# Patient Record
Sex: MALE | Race: WHITE | Employment: OTHER | RURAL
[De-identification: names, ages, dates, MRNs, and addresses within clinical notes are randomized per-mention and may not be internally consistent; named-entity substitution may affect disease eponyms.]

---

## 2017-02-21 ENCOUNTER — OFFICE VISIT (OUTPATIENT)
Dept: PRIMARY CARE CLINIC | Age: 62
End: 2017-02-21
Payer: COMMERCIAL

## 2017-02-21 VITALS
SYSTOLIC BLOOD PRESSURE: 150 MMHG | WEIGHT: 250.2 LBS | HEART RATE: 87 BPM | BODY MASS INDEX: 36.95 KG/M2 | OXYGEN SATURATION: 97 % | DIASTOLIC BLOOD PRESSURE: 90 MMHG

## 2017-02-21 DIAGNOSIS — M06.9 RHEUMATOID ARTHRITIS, INVOLVING UNSPECIFIED SITE, UNSPECIFIED RHEUMATOID FACTOR PRESENCE: ICD-10-CM

## 2017-02-21 DIAGNOSIS — G62.9 PERIPHERAL POLYNEUROPATHY: ICD-10-CM

## 2017-02-21 DIAGNOSIS — E11.42 TYPE 2 DIABETES MELLITUS WITH DIABETIC POLYNEUROPATHY, WITHOUT LONG-TERM CURRENT USE OF INSULIN (HCC): Primary | ICD-10-CM

## 2017-02-21 DIAGNOSIS — I10 ESSENTIAL HYPERTENSION: ICD-10-CM

## 2017-02-21 PROCEDURE — 99213 OFFICE O/P EST LOW 20 MIN: CPT | Performed by: NURSE PRACTITIONER

## 2017-02-21 RX ORDER — DOXAZOSIN MESYLATE 4 MG/1
4 TABLET ORAL 2 TIMES DAILY
Qty: 180 TABLET | Refills: 3 | Status: SHIPPED | OUTPATIENT
Start: 2017-02-21 | End: 2017-08-23 | Stop reason: SDUPTHER

## 2017-02-21 ASSESSMENT — ENCOUNTER SYMPTOMS
GASTROINTESTINAL NEGATIVE: 1
EYES NEGATIVE: 1
RESPIRATORY NEGATIVE: 1

## 2017-02-25 LAB
CHOLESTEROL, TOTAL: 160 MG/DL
CHOLESTEROL/HDL RATIO: NORMAL
HBA1C MFR BLD: 6.6 %
HDLC SERPL-MCNC: 39 MG/DL (ref 35–70)
LDL CHOLESTEROL CALCULATED: 84 MG/DL (ref 0–160)
TRIGL SERPL-MCNC: 185 MG/DL
VLDLC SERPL CALC-MCNC: 37 MG/DL

## 2017-02-27 DIAGNOSIS — I10 ESSENTIAL HYPERTENSION: ICD-10-CM

## 2017-02-27 DIAGNOSIS — E11.42 TYPE 2 DIABETES MELLITUS WITH DIABETIC POLYNEUROPATHY, WITHOUT LONG-TERM CURRENT USE OF INSULIN (HCC): ICD-10-CM

## 2017-02-27 DIAGNOSIS — M06.9 RHEUMATOID ARTHRITIS, INVOLVING UNSPECIFIED SITE, UNSPECIFIED RHEUMATOID FACTOR PRESENCE: ICD-10-CM

## 2017-02-27 ASSESSMENT — ENCOUNTER SYMPTOMS
SORE THROAT: 0
NAUSEA: 0
ABDOMINAL PAIN: 0
VOMITING: 0
SHORTNESS OF BREATH: 0
EYE PAIN: 0
COUGH: 0

## 2017-02-28 ENCOUNTER — TELEPHONE (OUTPATIENT)
Dept: PRIMARY CARE CLINIC | Age: 62
End: 2017-02-28

## 2017-04-28 ENCOUNTER — OFFICE VISIT (OUTPATIENT)
Dept: PRIMARY CARE CLINIC | Age: 62
End: 2017-04-28
Payer: COMMERCIAL

## 2017-04-28 VITALS
DIASTOLIC BLOOD PRESSURE: 86 MMHG | BODY MASS INDEX: 37.03 KG/M2 | HEIGHT: 69 IN | OXYGEN SATURATION: 98 % | RESPIRATION RATE: 20 BRPM | WEIGHT: 250 LBS | HEART RATE: 73 BPM | SYSTOLIC BLOOD PRESSURE: 138 MMHG

## 2017-04-28 DIAGNOSIS — M53.3 PAIN OF LEFT SACROILIAC JOINT: Primary | ICD-10-CM

## 2017-04-28 PROCEDURE — 99213 OFFICE O/P EST LOW 20 MIN: CPT | Performed by: FAMILY MEDICINE

## 2017-04-28 PROCEDURE — 20610 DRAIN/INJ JOINT/BURSA W/O US: CPT | Performed by: FAMILY MEDICINE

## 2017-04-28 RX ORDER — TRIAMCINOLONE ACETONIDE 40 MG/ML
40 INJECTION, SUSPENSION INTRA-ARTICULAR; INTRAMUSCULAR ONCE
Status: COMPLETED | OUTPATIENT
Start: 2017-04-28 | End: 2017-04-28

## 2017-04-28 RX ORDER — GABAPENTIN 300 MG/1
300 CAPSULE ORAL
COMMUNITY
Start: 2017-03-24 | End: 2019-06-20 | Stop reason: SDUPTHER

## 2017-04-28 RX ORDER — ETANERCEPT 50 MG/ML
SOLUTION SUBCUTANEOUS
COMMUNITY
Start: 2017-04-13 | End: 2021-03-11 | Stop reason: CLARIF

## 2017-04-28 RX ORDER — HYDROCODONE BITARTRATE AND ACETAMINOPHEN 7.5; 325 MG/1; MG/1
1 TABLET ORAL EVERY 6 HOURS PRN
Qty: 12 TABLET | Refills: 0 | Status: SHIPPED | OUTPATIENT
Start: 2017-04-28 | End: 2017-05-05

## 2017-04-28 RX ORDER — TIZANIDINE 2 MG/1
2 TABLET ORAL
COMMUNITY
Start: 2017-03-24 | End: 2019-04-23 | Stop reason: CLARIF

## 2017-04-28 RX ADMIN — TRIAMCINOLONE ACETONIDE 40 MG: 40 INJECTION, SUSPENSION INTRA-ARTICULAR; INTRAMUSCULAR at 11:27

## 2017-04-28 ASSESSMENT — ENCOUNTER SYMPTOMS
COUGH: 0
SORE THROAT: 0
ABDOMINAL PAIN: 0
EYE DISCHARGE: 0
SHORTNESS OF BREATH: 0
VOMITING: 0
DIARRHEA: 0
NAUSEA: 0
CONSTIPATION: 0
RHINORRHEA: 0
EYE ITCHING: 0
EYE REDNESS: 0

## 2017-05-19 ENCOUNTER — OFFICE VISIT (OUTPATIENT)
Dept: PRIMARY CARE CLINIC | Age: 62
End: 2017-05-19
Payer: COMMERCIAL

## 2017-05-19 VITALS
BODY MASS INDEX: 36.03 KG/M2 | DIASTOLIC BLOOD PRESSURE: 100 MMHG | OXYGEN SATURATION: 96 % | WEIGHT: 244 LBS | HEART RATE: 82 BPM | SYSTOLIC BLOOD PRESSURE: 142 MMHG

## 2017-05-19 DIAGNOSIS — G62.9 PERIPHERAL POLYNEUROPATHY: ICD-10-CM

## 2017-05-19 DIAGNOSIS — I10 ESSENTIAL HYPERTENSION: ICD-10-CM

## 2017-05-19 DIAGNOSIS — M06.9 RHEUMATOID ARTHRITIS, INVOLVING UNSPECIFIED SITE, UNSPECIFIED RHEUMATOID FACTOR PRESENCE: ICD-10-CM

## 2017-05-19 DIAGNOSIS — E11.9 TYPE 2 DIABETES MELLITUS WITHOUT COMPLICATION, WITHOUT LONG-TERM CURRENT USE OF INSULIN (HCC): Primary | ICD-10-CM

## 2017-05-19 LAB — HBA1C MFR BLD: 6.8 %

## 2017-05-19 PROCEDURE — 83036 HEMOGLOBIN GLYCOSYLATED A1C: CPT | Performed by: NURSE PRACTITIONER

## 2017-05-19 PROCEDURE — 99213 OFFICE O/P EST LOW 20 MIN: CPT | Performed by: NURSE PRACTITIONER

## 2017-05-19 RX ORDER — TRAMADOL HYDROCHLORIDE 50 MG/1
50 TABLET ORAL EVERY 6 HOURS PRN
Qty: 30 TABLET | Refills: 2 | Status: SHIPPED | OUTPATIENT
Start: 2017-05-19 | End: 2017-05-29

## 2017-05-19 ASSESSMENT — PATIENT HEALTH QUESTIONNAIRE - PHQ9
2. FEELING DOWN, DEPRESSED OR HOPELESS: 0
1. LITTLE INTEREST OR PLEASURE IN DOING THINGS: 0
SUM OF ALL RESPONSES TO PHQ9 QUESTIONS 1 & 2: 0
SUM OF ALL RESPONSES TO PHQ QUESTIONS 1-9: 0

## 2017-05-19 ASSESSMENT — ENCOUNTER SYMPTOMS
RESPIRATORY NEGATIVE: 1
EYES NEGATIVE: 1
GASTROINTESTINAL NEGATIVE: 1

## 2017-05-31 ASSESSMENT — ENCOUNTER SYMPTOMS
ABDOMINAL PAIN: 0
SORE THROAT: 0
SHORTNESS OF BREATH: 0
COUGH: 0
VOMITING: 0
EYE PAIN: 0
NAUSEA: 0

## 2017-06-07 ENCOUNTER — TELEPHONE (OUTPATIENT)
Dept: PRIMARY CARE CLINIC | Age: 62
End: 2017-06-07

## 2017-06-07 RX ORDER — AZITHROMYCIN 250 MG/1
TABLET, FILM COATED ORAL
Qty: 1 PACKET | Refills: 0 | Status: SHIPPED | OUTPATIENT
Start: 2017-06-07 | End: 2017-06-17

## 2017-06-07 RX ORDER — BENZONATATE 200 MG/1
200 CAPSULE ORAL 3 TIMES DAILY PRN
Qty: 30 CAPSULE | Refills: 0 | Status: SHIPPED | OUTPATIENT
Start: 2017-06-07 | End: 2018-02-12 | Stop reason: ALTCHOICE

## 2017-08-17 ENCOUNTER — TELEPHONE (OUTPATIENT)
Dept: PRIMARY CARE CLINIC | Age: 62
End: 2017-08-17

## 2017-08-17 ENCOUNTER — OFFICE VISIT (OUTPATIENT)
Dept: PRIMARY CARE CLINIC | Age: 62
End: 2017-08-17
Payer: COMMERCIAL

## 2017-08-17 VITALS
SYSTOLIC BLOOD PRESSURE: 130 MMHG | WEIGHT: 248 LBS | HEART RATE: 83 BPM | BODY MASS INDEX: 36.62 KG/M2 | OXYGEN SATURATION: 98 % | DIASTOLIC BLOOD PRESSURE: 70 MMHG

## 2017-08-17 DIAGNOSIS — M06.9 RHEUMATOID ARTHRITIS, INVOLVING UNSPECIFIED SITE, UNSPECIFIED RHEUMATOID FACTOR PRESENCE: ICD-10-CM

## 2017-08-17 DIAGNOSIS — G62.9 PERIPHERAL POLYNEUROPATHY: ICD-10-CM

## 2017-08-17 DIAGNOSIS — I10 ESSENTIAL HYPERTENSION: ICD-10-CM

## 2017-08-17 DIAGNOSIS — E11.9 TYPE 2 DIABETES MELLITUS WITHOUT COMPLICATION, WITHOUT LONG-TERM CURRENT USE OF INSULIN (HCC): Primary | ICD-10-CM

## 2017-08-17 LAB — HBA1C MFR BLD: 6.8 %

## 2017-08-17 PROCEDURE — 83036 HEMOGLOBIN GLYCOSYLATED A1C: CPT | Performed by: NURSE PRACTITIONER

## 2017-08-17 PROCEDURE — 99213 OFFICE O/P EST LOW 20 MIN: CPT | Performed by: NURSE PRACTITIONER

## 2017-08-17 ASSESSMENT — ENCOUNTER SYMPTOMS
SORE THROAT: 0
EYE PAIN: 0
NAUSEA: 0
VOMITING: 0
ABDOMINAL PAIN: 0
SHORTNESS OF BREATH: 0
COUGH: 0

## 2017-08-22 RX ORDER — NEBIVOLOL 20 MG/1
20 TABLET ORAL 2 TIMES DAILY
Qty: 28 TABLET | Refills: 0 | COMMUNITY
Start: 2017-08-22 | End: 2018-05-11 | Stop reason: ALTCHOICE

## 2017-08-23 RX ORDER — POTASSIUM CHLORIDE 20 MEQ/1
20 TABLET, EXTENDED RELEASE ORAL DAILY
Qty: 90 TABLET | Refills: 3 | Status: SHIPPED | OUTPATIENT
Start: 2017-08-23 | End: 2018-10-23 | Stop reason: SDUPTHER

## 2017-08-23 RX ORDER — DOXAZOSIN MESYLATE 4 MG/1
4 TABLET ORAL 2 TIMES DAILY
Qty: 180 TABLET | Refills: 3 | Status: SHIPPED | OUTPATIENT
Start: 2017-08-23 | End: 2018-01-16 | Stop reason: DRUGHIGH

## 2017-10-12 ENCOUNTER — OFFICE VISIT (OUTPATIENT)
Dept: NEUROLOGY | Facility: CLINIC | Age: 62
End: 2017-10-12

## 2017-10-12 VITALS
SYSTOLIC BLOOD PRESSURE: 162 MMHG | OXYGEN SATURATION: 98 % | WEIGHT: 254 LBS | DIASTOLIC BLOOD PRESSURE: 88 MMHG | HEART RATE: 78 BPM | HEIGHT: 69 IN | BODY MASS INDEX: 37.62 KG/M2

## 2017-10-12 DIAGNOSIS — M54.17 LUMBOSACRAL RADICULOPATHY AT S1: ICD-10-CM

## 2017-10-12 DIAGNOSIS — G45.9 TRANSIENT CEREBRAL ISCHEMIA, UNSPECIFIED TYPE: Primary | ICD-10-CM

## 2017-10-12 DIAGNOSIS — G60.9 IDIOPATHIC PERIPHERAL NEUROPATHY: ICD-10-CM

## 2017-10-12 DIAGNOSIS — R20.0 NUMBNESS OF LOWER EXTREMITY: ICD-10-CM

## 2017-10-12 PROCEDURE — 99214 OFFICE O/P EST MOD 30 MIN: CPT | Performed by: NURSE PRACTITIONER

## 2017-10-12 RX ORDER — MEDROXYPROGESTERONE ACETATE 150 MG/ML
50 INJECTION, SUSPENSION INTRAMUSCULAR WEEKLY
COMMUNITY
Start: 2017-09-18 | End: 2021-03-11

## 2017-10-12 NOTE — PROGRESS NOTES
Subjective   Jesse Brink is a 62 y.o. male     Chief Complaint   Patient presents with   • Follow-up     Pt is here for a 1 year FU for transient cerebral ischemia. Pt states that he has been having a hard time with his back, pt states that he tried to help a friend lift a freezer at which time he felt a sharp pain in his back and his right leg went numb. Pt states that he went to a local clinic and recieved a shot in his back and slowly gained the feeling back in his leg within a month. Pt states he is still having trouble with his feet as well.        History of Present Illness  Patient is very pleasant 62-year-old male in clinic today to follow-up for TIA in 2013, ocular migraines, diabetic neuropathy, and chronic lumbar pain with acute recent injury.  As stated above patient had a recent reinjury of his lumbar.  He does state that it is improving after being treated in clinic through primary care.  She denies any new symptoms of TIA.  Patient does continue with complaints of bilateral foot numbness with difficulty feeling his feet at all times.  The patient states that the neuropathy is not worsening and just has not improved.  Reviewed patient's fasting blood sugars at home prior to breakfast he's running approximately 150 to 170s with afternoon readings in the 260s to to 80s.     PAST HISTORY:Patient in clinic today to follow-up for migraines peripheral neuropathy and lumbar radiculopathy.  Patient states that his migraine headaches have resolved he has come off his Humira and he continues with his Elavil and he states that since the change in medication he's had no migraine.     Peripheral neuropathy: Patient states that there is been no change he continues to use his custom inserts for her shoes he is also using lidocaine cream as well as gabapentin.  Gabapentin is on the lower dose side however we can increase the dosage secondary to side effects occurring with patient.  5 to S1 lumbar  "radiculopathy patient states he hasn't returned to neurosurgery yet he's putting it off as long as he can.  Patient is still doing home exercise program which does assist in keeping him stretched and more mobile.     Patient had a TIA in 2013 he's been on aspirin ever since and has had no new symptoms.      + DM neuropathy bilat fett: Using gabapentin states @ home glucose fasting 140's after food 190's. Aware to set goal below 120 with wt loss goal to wt of 200. Following podiatry      C/O headaches x 1 month states top head with eye throbbing and visual periph shadows. Saw lights. States resolved x 2-3 weeks. States resolved as quickly as they came on        The following portions of the patient's history were reviewed and updated as appropriate: allergies, current medications, past family history, past medical history, past social history, past surgical history and problem list.    Review of Systems  Constitutional: Negative.   Eyes: Positive for visual disturbance.   Respiratory: Negative.   Cardiovascular: Negative.   Musculoskeletal: Positive for arthralgias, back pain, myalgias, neck pain and neck stiffness.   Neurological: Positive for numbness and headaches, history of tia.   Psychiatric/Behavioral: Negative.      Objective         Vitals:    10/12/17 0850   BP: 162/88   Pulse: 78   SpO2: 98%   Weight: 254 lb (115 kg)   Height: 69\" (175.3 cm)     GENERAL: Patient is pleasant, cooperative, appears to be stated age.  Body habitus is endomorphic.  NECK: Neck are non-tender without thyromegaly or adenopathy.  Carotic upstrokes are 1+/4.  No cranial or cervical bruits.  The neck is supple with a full range of motion.   CARDIOVASCULAR:  Regular rate and rhythm with normal S1 and S2 without rub or gallop.  RESPIRATORY:  Clear to auscultation without wheezes or crackle   BACK:  Back is straight without midline defect.    PSYCH:  Higher cortical function/mental status:  The patient is alert.  He  is oriented x3 to " time, place and person.  Recent and the remote memory appear normal.  The patient has a good fund of knowledge.  There is no visual or auditory hallucination or suicidal or homicidal ideation.   CRANIAL NERVES:    Extraocular movements are full and smooth with normal pursuits and saccades.  No nystagmus noted.  The face is symmetric. palate elevate symmetrically, Tongue midline, positive gag reflex. The remainder of the cranial nerves are intact and symmetrical.    MOTOR: Strength is 5/5 throughout with normal tone and bulk  No involuntary movements noted.  Patient with decreased sensation bilateral distal feet positive pedal pulses bilaterally patient with normal sensation midfoot   COORDINATION AND GAIT:  The patient walks with a narrow-based gait mild right lower extremity limp.    MUSCULOSKELETAL: Range of motion normal, no clubbing, cyanosis, or edema.  No joint swelling.     I have personally reviewed the above labs and imaging studies.    Assessment/Plan   1. Diabetic Peripheral Neuropathy - discuss importance of cont tight DMII control  EMG results discussed previously.  Patient willing to speak with primary care to try to get tighter control of his diabetes.  In the meantime we will continue with current therapy of gabapentin 300 mg 3 times a day        2. L5-S1 Radiculopathy -  Pt to continue HEP continue TENS unit.  Patient states acute pain is improving.      Chronic low back pain is a common problem in clinical practice. A history and physical examination should place patients into one of several categories: (1) nonspecific low back pain; (2) back pain associated with radiculopathy or spinal stenosis; (3) back pain referred from a nonspinal source; or (4) back pain associated with another specific spinal cause. For patients who have back pain associated with radiculopathy, spinal stenosis, or another specific spinal cause, magnetic resonance imaging or computed tomography may establish the diagnosis and  guide management. Acetaminophen and nonsteroidal anti-inflammatory drugs are first-line medications for chronic low back pain. Tramadol, opioids, and other adjunctive medications may benefit some patients who do not respond to nonsteroidal anti-inflammatory drugs. Acupuncture, exercise therapy, multidisciplinary rehabilitation programs, massage, behavior therapy, and spinal manipulation are effective in certain clinical situations. Patients with radicular symptoms may benefit from epidural steroid injections, but studies have produced mixed results. Most patients with chronic low back pain would not require surgical intervention. A surgical evaluation may be considered for select patients with functional disabilities or refractory pain despite multiple nonsurgical treatments.       3. Migraines: Stable on Elavil.  Continue current treatment.     We'll have patient return to clinic in 12 months or sooner if need be.

## 2017-10-26 ENCOUNTER — OFFICE VISIT (OUTPATIENT)
Dept: CARDIOLOGY | Facility: CLINIC | Age: 62
End: 2017-10-26

## 2017-10-26 VITALS
DIASTOLIC BLOOD PRESSURE: 100 MMHG | HEIGHT: 69 IN | SYSTOLIC BLOOD PRESSURE: 180 MMHG | WEIGHT: 253.7 LBS | BODY MASS INDEX: 37.58 KG/M2 | HEART RATE: 68 BPM

## 2017-10-26 DIAGNOSIS — R00.2 PALPITATIONS: ICD-10-CM

## 2017-10-26 DIAGNOSIS — R07.9 CHEST PAIN SYNDROME: ICD-10-CM

## 2017-10-26 DIAGNOSIS — I10 BENIGN ESSENTIAL HTN: Primary | ICD-10-CM

## 2017-10-26 DIAGNOSIS — G45.9 TRANSIENT CEREBRAL ISCHEMIA, UNSPECIFIED TYPE: ICD-10-CM

## 2017-10-26 PROCEDURE — 99213 OFFICE O/P EST LOW 20 MIN: CPT | Performed by: INTERNAL MEDICINE

## 2017-10-26 RX ORDER — CARVEDILOL 25 MG/1
25 TABLET ORAL 2 TIMES DAILY
Qty: 180 TABLET | Refills: 3 | Status: SHIPPED | OUTPATIENT
Start: 2017-10-26 | End: 2018-05-10 | Stop reason: SDUPTHER

## 2017-10-26 RX ORDER — NEBIVOLOL 20 MG/1
20 TABLET ORAL 2 TIMES DAILY
COMMUNITY
End: 2017-10-26

## 2017-10-26 NOTE — PROGRESS NOTES
[facility]  Office visit  Jesse Brink  1955  [phone]    VISIT DATE:  10/26/2017    PCP: SELMA Hayden  [address] [facility] [address]    CC:  Chief Complaint   Patient presents with   • Transient Ischemic Attack       PROBLEM LIST:  1. Intermittent chest pain syndrome:  a. Cardiac catheterization, November 2013, revealing no significant obstructive coronary artery disease with normal left ventricular systolic function.  2. Recent transient ischemic attack with normal echocardiogram, carotid duplex, and CT scan of the brain by verbal report.  3. Benign hypertension.  4. History of tobacco, quitting 28 years ago.  5. Hyperglycemia, diet controlled.  6. Rheumatoid arthritis.  7. Obesity.  8. Family history of heart disease.  9. Surgical history:  a. Pilonidal cyst, 1977.  b. Benign fibroid removed, 1980.  c. Anal fistula repair, 1999 and 2008.    ASSESSMENT:   Diagnosis Plan   1. Benign essential HTN     2. Transient cerebral ischemia, unspecified type     3. Chest pain syndrome         PLAN:  Hypertension: Goal less than 140/90.  Converted by diastolic to carvedilol 25 mg by mouth twice a day.  Otherwise continue current medical therapy.    Palpitations: This is a new complaint today.  No high risk clinical features.  Overall appears consistent with symptomatic premature ventricular contractions.  Currently asymptomatic.  Continue beta-blockade.  Would recommend ambulatory ECG monitoring if symptoms increase in frequency or severity.    History of stroke: Continue aggressive risk factor modification.  Continue aspirin afterload reduction.  Lipids are well controlled.    Subjective  Systolic blood pressures in the running in the 140-1 45 mmHg range with diastolics usually less than 90 mmHg.  Denies chest pain, dyspnea on exertion.  No paresthesias headache or visual changes.  Does report intermittent palpitations which she feels is a hard bump.  Usually occurring at  "rest.  No obvious triggers.  Has not had any recent palpitations.  Reports that his insurance company will no longer cover by systolic.  he is compliant with medical therapy.  Fasting lipid panel is well-controlled.    PHYSICAL EXAMINATION:  Vitals:    10/26/17 1330   Pulse: 68   Weight: 253 lb 11.2 oz (115 kg)   Height: 69\" (175.3 cm)     General Appearance:    Alert, cooperative, no distress, appears stated age   Head:    Normocephalic, without obvious abnormality, atraumatic   Eyes:    conjunctiva/corneas clear   Nose:   Nares normal, septum midline, mucosa normal, no drainage   Throat:   Lips, teeth and gums normal   Neck:   Supple, symmetrical, trachea midline, no carotid    bruit or JVD   Lungs:     Clear to auscultation bilaterally, respirations unlabored   Chest Wall:    No tenderness or deformity    Heart:    Regular rate and rhythm, S1 and S2 normal, no murmur, rub   or gallop, normal carotid impulse bilaterally without bruit.   Abdomen:     Soft, non-tender   Extremities:   Extremities normal, atraumatic, no cyanosis or edema   Pulses:   2+ and symmetric all extremities   Skin:   Skin color, texture, turgor normal, no rashes or lesions       Diagnostic Data:  Procedures  No results found for: CHLPL, TRIG, HDL, LDLDIRECT  No results found for: GLUCOSE, BUN, CREATININE, NA, K, CL, CO2, CREATININE, BUN  No results found for: HGBA1C  No results found for: WBC, HGB, HCT, PLT    Allergies  Allergies   Allergen Reactions   • Adalimumab      HA, intractable ocular migraine       Current Medications    Current Outpatient Prescriptions:   •  amitriptyline (ELAVIL) 50 MG tablet, 1 po 2 hours prior to bedtime, Disp: 90 tablet, Rfl: 3  •  aspirin 81 MG tablet, Take  by mouth daily., Disp: , Rfl:   •  Blood Glucose Monitoring Suppl (ONE TOUCH ULTRA 2) W/DEVICE kit, USE PER MD INSTRUCTIONS, Disp: , Rfl:   •  Cholecalciferol (VITAMIN D3) 2000 UNITS capsule, Take  by mouth daily., Disp: , Rfl:   •  doxazosin (CARDURA) 4 " MG tablet, Take 1 tablet by mouth 2 (Two) Times a Day., Disp: 180 tablet, Rfl: 2  •  ENBREL SURECLICK 50 MG/ML solution auto-injector, 1 (One) Time Per Week., Disp: , Rfl:   •  gabapentin (NEURONTIN) 300 MG capsule, Take 1 capsule by mouth 3 (three) times a day., Disp: 90 capsule, Rfl: 3  •  glucose blood (FREESTYLE TEST STRIPS) test strip, 1 each by Does not apply route daily. And PRN Dx: DM2, Disp: , Rfl:   •  lidocaine-prilocaine (EMLA) 2.5-2.5 % cream, As Needed., Disp: , Rfl:   •  losartan-hydrochlorothiazide (HYZAAR) 100-25 MG per tablet, Take  by mouth daily., Disp: , Rfl:   •  metFORMIN (GLUCOPHAGE) 500 MG tablet, Take  by mouth 2 (two) times a day., Disp: , Rfl:   •  nebivolol (BYSTOLIC) 20 MG tablet, Take 20 mg by mouth 2 (Two) Times a Day., Disp: , Rfl:   •  ONETOUCH DELICA LANCETS FINE misc, Use to test blood sugar twice daily and as needed., Disp: , Rfl:   •  potassium chloride (MICRO-K) 10 MEQ CR capsule, Take  by mouth daily., Disp: , Rfl:   •  tiZANidine (ZANAFLEX) 2 MG tablet, as needed., Disp: , Rfl: 1          ROS  Review of Systems   Cardiovascular: Negative for chest pain, claudication, dyspnea on exertion, irregular heartbeat and palpitations.   Respiratory: Negative for cough and shortness of breath.          SOCIAL HX  Social History     Social History   • Marital status:      Spouse name: N/A   • Number of children: N/A   • Years of education: N/A     Occupational History   • Not on file.     Social History Main Topics   • Smoking status: Former Smoker   • Smokeless tobacco: Never Used      Comment: quit 35 years ago   • Alcohol use 0.6 oz/week     1 Cans of beer per week      Comment: occas   • Drug use: No   • Sexual activity: Defer     Other Topics Concern   • Not on file     Social History Narrative       FAMILY HX  Family History   Problem Relation Age of Onset   • Heart disease Other    • Diabetes Other    • Cancer Other    • Hypertension Other    • Heart disease Mother    •  Diabetes Father    • Hypertension Father    • Cancer Father              Dillon Neville III, MD, FACC

## 2017-11-20 ENCOUNTER — OFFICE VISIT (OUTPATIENT)
Dept: PRIMARY CARE CLINIC | Age: 62
End: 2017-11-20
Payer: COMMERCIAL

## 2017-11-20 VITALS
BODY MASS INDEX: 37.36 KG/M2 | OXYGEN SATURATION: 98 % | DIASTOLIC BLOOD PRESSURE: 70 MMHG | WEIGHT: 253 LBS | HEART RATE: 82 BPM | SYSTOLIC BLOOD PRESSURE: 130 MMHG

## 2017-11-20 DIAGNOSIS — R35.1 NOCTURIA: ICD-10-CM

## 2017-11-20 DIAGNOSIS — M06.9 RHEUMATOID ARTHRITIS, INVOLVING UNSPECIFIED SITE, UNSPECIFIED RHEUMATOID FACTOR PRESENCE: ICD-10-CM

## 2017-11-20 DIAGNOSIS — I10 ESSENTIAL HYPERTENSION: ICD-10-CM

## 2017-11-20 DIAGNOSIS — G62.9 PERIPHERAL POLYNEUROPATHY: ICD-10-CM

## 2017-11-20 DIAGNOSIS — E11.9 TYPE 2 DIABETES MELLITUS WITHOUT COMPLICATION, WITHOUT LONG-TERM CURRENT USE OF INSULIN (HCC): ICD-10-CM

## 2017-11-20 PROCEDURE — 99213 OFFICE O/P EST LOW 20 MIN: CPT | Performed by: NURSE PRACTITIONER

## 2017-11-20 PROCEDURE — G8484 FLU IMMUNIZE NO ADMIN: HCPCS | Performed by: NURSE PRACTITIONER

## 2017-11-20 PROCEDURE — 90749 UNLISTED VACCINE/TOXOID: CPT | Performed by: NURSE PRACTITIONER

## 2017-11-20 PROCEDURE — 1036F TOBACCO NON-USER: CPT | Performed by: NURSE PRACTITIONER

## 2017-11-20 PROCEDURE — 3017F COLORECTAL CA SCREEN DOC REV: CPT | Performed by: NURSE PRACTITIONER

## 2017-11-20 PROCEDURE — G8427 DOCREV CUR MEDS BY ELIG CLIN: HCPCS | Performed by: NURSE PRACTITIONER

## 2017-11-20 PROCEDURE — G8417 CALC BMI ABV UP PARAM F/U: HCPCS | Performed by: NURSE PRACTITIONER

## 2017-11-20 PROCEDURE — 90471 IMMUNIZATION ADMIN: CPT | Performed by: NURSE PRACTITIONER

## 2017-11-20 PROCEDURE — 3044F HG A1C LEVEL LT 7.0%: CPT | Performed by: NURSE PRACTITIONER

## 2017-11-20 RX ORDER — LOSARTAN POTASSIUM AND HYDROCHLOROTHIAZIDE 25; 100 MG/1; MG/1
1 TABLET ORAL DAILY
Qty: 90 TABLET | Refills: 3 | Status: SHIPPED | OUTPATIENT
Start: 2017-11-20 | End: 2018-05-18 | Stop reason: SDUPTHER

## 2017-11-20 RX ORDER — LOSARTAN POTASSIUM AND HYDROCHLOROTHIAZIDE 25; 100 MG/1; MG/1
1 TABLET ORAL DAILY
Qty: 30 TABLET | Refills: 3 | Status: CANCELLED | OUTPATIENT
Start: 2017-11-20

## 2017-11-20 ASSESSMENT — ENCOUNTER SYMPTOMS
SORE THROAT: 0
SHORTNESS OF BREATH: 0
VOMITING: 0
ABDOMINAL PAIN: 0
EYE PAIN: 0
NAUSEA: 0
COUGH: 0

## 2017-11-20 NOTE — PROGRESS NOTES
Have you seen any other physician or provider since your last visit? Yes Dr. Karen Figueroa     Have you had any other diagnostic tests since your last visit? no    Have you changed or stopped any medications since your last visit including any over-the-counter medicines, vitamins, or herbal medicines? no     Are you taking all your prescribed medications? Yes  If NO, why? -  N/A    BP Readings from Last 2 Encounters:   08/17/17 130/70   05/19/17 (!) 142/100     Hemoglobin A1C (%)   Date Value   08/17/2017 6.8     Microscopic Examination (no units)   Date Value   12/15/2016 YES     LDL Calculated (mg/dL)   Date Value   02/24/2017 84              Tobacco use:  Patient  reports that he has quit smoking. He has a 15.00 pack-year smoking history. He has never used smokeless tobacco.    If Smoker - Cessation materials given? No    Is Daily aspirin on medication list?:  No    Diabetic retinal exam done? No   If yes, document in Health Maintenance. Monofilament placed on counter? No    Shoes and socks removed? No    BP taken with correct size cuff? Yes   Repeated if > 130/80 No     Microalbumin performed if applicable? No    REVIEW OF SYSTEMS:  Review of Systems   Constitutional: Negative for chills and fever. HENT: Negative for ear pain and sore throat. Eyes: Negative for pain and visual disturbance. Respiratory: Negative for cough and shortness of breath. Cardiovascular: Negative for chest pain, palpitations and leg swelling. Gastrointestinal: Negative for abdominal pain, nausea and vomiting. Genitourinary: Negative for dysuria and hematuria. Musculoskeletal: Negative for joint swelling. Skin: Negative for rash. Neurological: Negative for dizziness and weakness. Psychiatric/Behavioral: Negative for sleep disturbance.

## 2017-11-22 LAB
AVERAGE GLUCOSE: NORMAL
CHOLESTEROL, TOTAL: 140 MG/DL
CHOLESTEROL/HDL RATIO: ABNORMAL
HBA1C MFR BLD: 6.9 %
HDLC SERPL-MCNC: 32 MG/DL (ref 35–70)
LDL CHOLESTEROL CALCULATED: 76 MG/DL (ref 0–160)
PROSTATE SPECIFIC ANTIGEN: 1.22 NG/ML
TRIGL SERPL-MCNC: 158 MG/DL
VLDLC SERPL CALC-MCNC: 31.6 MG/DL

## 2017-11-27 NOTE — PROGRESS NOTES
SUBJECTIVE:    Patient ID: Melvin Flores is a 65 y.o. male. Medical history Review  Past Medical, Family, and Social History reviewed and does not contribute to the patient presenting condition    Health Maintenance Due   Topic Date Due    Diabetic foot exam  09/12/1966    HIV screen  09/12/1971    Diabetic microalbuminuria test  09/12/1974    DTaP/Tdap/Td vaccine (1 - Tdap) 09/12/1975    Pneumococcal med risk (1 of 1 - PPSV23) 09/12/1975    Zostavax vaccine  09/12/2016    Diabetic retinal exam  08/18/2017        HPI:   Chief Complaint   Patient presents with    Diabetes     Patient here today for a follow up. He states his sugar gets higher when he doesn't eat. It ranges from 257-100. He states he will get his labs done this week. Patient's medications, allergies, past medical, surgical, social and family histories were reviewed and updated as appropriate. Review of Systems Reviewed and acurate. See MA note. OBJECTIVE:  /70 (Site: Right Arm, Position: Sitting, Cuff Size: Large Adult)   Pulse 82   Wt 253 lb (114.8 kg)   SpO2 98%   BMI 37.36 kg/m²    Physical Exam   Constitutional: He is oriented to person, place, and time. He appears well-developed and well-nourished. No distress. HENT:   Head: Normocephalic. Right Ear: Tympanic membrane normal.   Left Ear: Tympanic membrane normal.   Mouth/Throat: No oropharyngeal exudate. Eyes: Lids are normal.   Neck: Neck supple. Cardiovascular: Normal rate, regular rhythm and normal heart sounds. Pulmonary/Chest: Effort normal and breath sounds normal.   Abdominal: Soft. Bowel sounds are normal. He exhibits no distension. There is no tenderness. Musculoskeletal: He exhibits no edema. Lymphadenopathy:     He has no cervical adenopathy. Neurological: He is alert and oriented to person, place, and time. Skin: Skin is warm and dry. Psychiatric: He has a normal mood and affect. Vitals reviewed.       No results found for requested labs within last 30 days. Hemoglobin A1C (%)   Date Value   08/17/2017 6.8     Microscopic Examination (no units)   Date Value   12/15/2016 YES     LDL Calculated (mg/dL)   Date Value   02/24/2017 84         Lab Results   Component Value Date    WBC 5.8 12/15/2016    NEUTROABS 3.0 12/15/2016    HGB 17.1 12/15/2016    HCT 48.2 12/15/2016    MCV 87.3 12/15/2016     12/15/2016       Lab Results   Component Value Date    TSH 0.972 07/19/2014       Prior to Visit Medications    Medication Sig Taking? Authorizing Provider   losartan-hydrochlorothiazide (HYZAAR) 100-25 MG per tablet Take 1 tablet by mouth daily Yes WILDER Osorio   potassium chloride (KLOR-CON M) 20 MEQ extended release tablet Take 1 tablet by mouth daily Yes WILDER Osorio   doxazosin (CARDURA) 4 MG tablet Take 1 tablet by mouth 2 times daily Yes WILDER Osorio   nebivolol (BYSTOLIC) 20 MG TABS tablet Take 1 tablet by mouth 2 times daily Yes WILDER Osorio   benzonatate (TESSALON) 200 MG capsule Take 1 capsule by mouth 3 times daily as needed for Cough Yes WILDER Osorio   metFORMIN (GLUCOPHAGE) 500 MG tablet Take 1 tablet by mouth 2 times daily (with meals) Yes WILDER Osorio   ENBREL SURECLICK 50 MG/ML SOAJ  Yes Historical Provider, MD   gabapentin (NEURONTIN) 300 MG capsule Take 300 mg by mouth Yes Historical Provider, MD   tiZANidine (ZANAFLEX) 2 MG tablet Take 2 mg by mouth Yes Historical Provider, MD   glucose blood VI test strips (ASCENSIA AUTODISC VI;ONE TOUCH ULTRA TEST VI) strip 1 each by In Vitro route daily As needed. ( Verio test strip) Yes WILDER Osorio   amitriptyline (ELAVIL) 50 MG tablet Take 50 mg by mouth nightly Yes Historical Provider, MD Aguilera Clutter LANCETS FINE MISC Use to test blood sugar twice daily and as needed.  Yes Lashonda Hutcihnson MD   Blood Glucose Monitoring Suppl (ONE TOUCH ULTRA 2) W/DEVICE KIT USE PER MD INSTRUCTIONS Yes Marck Garcia

## 2017-12-13 ENCOUNTER — TELEPHONE (OUTPATIENT)
Dept: PRIMARY CARE CLINIC | Age: 62
End: 2017-12-13

## 2017-12-18 DIAGNOSIS — R35.1 NOCTURIA: ICD-10-CM

## 2017-12-18 DIAGNOSIS — E11.9 TYPE 2 DIABETES MELLITUS WITHOUT COMPLICATION, WITHOUT LONG-TERM CURRENT USE OF INSULIN (HCC): ICD-10-CM

## 2017-12-18 DIAGNOSIS — I10 ESSENTIAL HYPERTENSION: ICD-10-CM

## 2018-01-16 RX ORDER — DOXAZOSIN 8 MG/1
8 TABLET ORAL NIGHTLY
Qty: 90 TABLET | Refills: 1 | Status: SHIPPED | OUTPATIENT
Start: 2018-01-16 | End: 2019-02-12

## 2018-02-12 ENCOUNTER — OFFICE VISIT (OUTPATIENT)
Dept: PRIMARY CARE CLINIC | Age: 63
End: 2018-02-12
Payer: COMMERCIAL

## 2018-02-12 VITALS
BODY MASS INDEX: 37.92 KG/M2 | DIASTOLIC BLOOD PRESSURE: 86 MMHG | RESPIRATION RATE: 20 BRPM | HEIGHT: 69 IN | TEMPERATURE: 98.5 F | SYSTOLIC BLOOD PRESSURE: 140 MMHG | OXYGEN SATURATION: 97 % | WEIGHT: 256 LBS | HEART RATE: 83 BPM

## 2018-02-12 DIAGNOSIS — J01.00 ACUTE NON-RECURRENT MAXILLARY SINUSITIS: ICD-10-CM

## 2018-02-12 PROBLEM — M53.3 PAIN OF LEFT SACROILIAC JOINT: Status: RESOLVED | Noted: 2017-04-28 | Resolved: 2018-02-12

## 2018-02-12 PROCEDURE — G8484 FLU IMMUNIZE NO ADMIN: HCPCS | Performed by: FAMILY MEDICINE

## 2018-02-12 PROCEDURE — 99213 OFFICE O/P EST LOW 20 MIN: CPT | Performed by: FAMILY MEDICINE

## 2018-02-12 PROCEDURE — 1036F TOBACCO NON-USER: CPT | Performed by: FAMILY MEDICINE

## 2018-02-12 PROCEDURE — 3017F COLORECTAL CA SCREEN DOC REV: CPT | Performed by: FAMILY MEDICINE

## 2018-02-12 PROCEDURE — G8417 CALC BMI ABV UP PARAM F/U: HCPCS | Performed by: FAMILY MEDICINE

## 2018-02-12 PROCEDURE — G8427 DOCREV CUR MEDS BY ELIG CLIN: HCPCS | Performed by: FAMILY MEDICINE

## 2018-02-12 RX ORDER — CEFDINIR 300 MG/1
300 CAPSULE ORAL 2 TIMES DAILY
Qty: 20 CAPSULE | Refills: 0 | Status: SHIPPED | OUTPATIENT
Start: 2018-02-12 | End: 2018-02-20 | Stop reason: ALTCHOICE

## 2018-02-12 RX ORDER — BENZONATATE 200 MG/1
200 CAPSULE ORAL 3 TIMES DAILY PRN
Qty: 30 CAPSULE | Refills: 0 | Status: SHIPPED | OUTPATIENT
Start: 2018-02-12 | End: 2018-10-23 | Stop reason: CLARIF

## 2018-02-12 RX ORDER — FLUTICASONE PROPIONATE 50 MCG
1 SPRAY, SUSPENSION (ML) NASAL DAILY
Qty: 1 BOTTLE | Refills: 3 | Status: SHIPPED | OUTPATIENT
Start: 2018-02-12 | End: 2019-01-23 | Stop reason: ALTCHOICE

## 2018-02-12 ASSESSMENT — ENCOUNTER SYMPTOMS
ABDOMINAL PAIN: 0
CONSTIPATION: 0
NAUSEA: 0
DIARRHEA: 0
EYE REDNESS: 0
EYE DISCHARGE: 0
SORE THROAT: 1
SHORTNESS OF BREATH: 0
COUGH: 1
RHINORRHEA: 0
EYE ITCHING: 0
VOMITING: 0

## 2018-02-14 ENCOUNTER — TELEPHONE (OUTPATIENT)
Dept: PRIMARY CARE CLINIC | Age: 63
End: 2018-02-14

## 2018-02-14 RX ORDER — DEXTROMETHORPHAN POLISTIREX 30 MG/5ML
60 SUSPENSION ORAL 2 TIMES DAILY PRN
Qty: 148 ML | Refills: 1 | Status: SHIPPED | OUTPATIENT
Start: 2018-02-14 | End: 2018-02-20 | Stop reason: ALTCHOICE

## 2018-02-20 ENCOUNTER — OFFICE VISIT (OUTPATIENT)
Dept: PRIMARY CARE CLINIC | Age: 63
End: 2018-02-20
Payer: COMMERCIAL

## 2018-02-20 VITALS
OXYGEN SATURATION: 98 % | BODY MASS INDEX: 37.77 KG/M2 | HEIGHT: 69 IN | SYSTOLIC BLOOD PRESSURE: 130 MMHG | DIASTOLIC BLOOD PRESSURE: 70 MMHG | HEART RATE: 91 BPM | WEIGHT: 255 LBS

## 2018-02-20 DIAGNOSIS — M06.9 RHEUMATOID ARTHRITIS, INVOLVING UNSPECIFIED SITE, UNSPECIFIED RHEUMATOID FACTOR PRESENCE: ICD-10-CM

## 2018-02-20 DIAGNOSIS — I10 ESSENTIAL HYPERTENSION: ICD-10-CM

## 2018-02-20 DIAGNOSIS — E11.8 TYPE 2 DIABETES MELLITUS WITH COMPLICATION, WITHOUT LONG-TERM CURRENT USE OF INSULIN (HCC): Primary | ICD-10-CM

## 2018-02-20 LAB — HBA1C MFR BLD: 8 %

## 2018-02-20 PROCEDURE — G8482 FLU IMMUNIZE ORDER/ADMIN: HCPCS | Performed by: NURSE PRACTITIONER

## 2018-02-20 PROCEDURE — G8427 DOCREV CUR MEDS BY ELIG CLIN: HCPCS | Performed by: NURSE PRACTITIONER

## 2018-02-20 PROCEDURE — 3017F COLORECTAL CA SCREEN DOC REV: CPT | Performed by: NURSE PRACTITIONER

## 2018-02-20 PROCEDURE — 3045F PR MOST RECENT HEMOGLOBIN A1C LEVEL 7.0-9.0%: CPT | Performed by: NURSE PRACTITIONER

## 2018-02-20 PROCEDURE — G8417 CALC BMI ABV UP PARAM F/U: HCPCS | Performed by: NURSE PRACTITIONER

## 2018-02-20 PROCEDURE — 83036 HEMOGLOBIN GLYCOSYLATED A1C: CPT | Performed by: NURSE PRACTITIONER

## 2018-02-20 PROCEDURE — 1036F TOBACCO NON-USER: CPT | Performed by: NURSE PRACTITIONER

## 2018-02-20 PROCEDURE — 99213 OFFICE O/P EST LOW 20 MIN: CPT | Performed by: NURSE PRACTITIONER

## 2018-02-20 ASSESSMENT — ENCOUNTER SYMPTOMS
DIARRHEA: 0
CONSTIPATION: 0
EYE DISCHARGE: 0
SORE THROAT: 0
ABDOMINAL PAIN: 0
NAUSEA: 0
VOMITING: 0
SHORTNESS OF BREATH: 0
EYE ITCHING: 0
EYE REDNESS: 0
COUGH: 1
RHINORRHEA: 0

## 2018-03-06 ENCOUNTER — TELEPHONE (OUTPATIENT)
Dept: PRIMARY CARE CLINIC | Age: 63
End: 2018-03-06

## 2018-03-08 ENCOUNTER — TELEPHONE (OUTPATIENT)
Dept: PRIMARY CARE CLINIC | Age: 63
End: 2018-03-08

## 2018-03-08 NOTE — TELEPHONE ENCOUNTER
Patient came to the office today for more samples of Jardiance 10mg. This was prescribed by WILDER Alexander. Patient was given 4 box/boxes. Qty. 28, Lot # [id number], Exp. Date 01/20.

## 2018-03-13 NOTE — PROGRESS NOTES
SUBJECTIVE:    Patient ID: Tien Shin is a 65 y.o. male. Medical history Review  Past Medical, Family, and Social History reviewed and does not contribute to the patient presenting condition    Health Maintenance Due   Topic Date Due    Diabetic foot exam  09/12/1966    HIV screen  09/12/1971    Diabetic microalbuminuria test  09/12/1974    DTaP/Tdap/Td vaccine (1 - Tdap) 09/12/1975    Pneumococcal med risk (1 of 1 - PPSV23) 09/12/1975    Shingles Vaccine (1 of 2 - 2 Dose Series) 09/12/2006    Diabetic retinal exam  08/18/2017    Potassium monitoring  12/15/2017    Creatinine monitoring  12/15/2017        HPI:   Chief Complaint   Patient presents with    Diabetes     Patient states his blood sugar has been increased around 200       Patient's medications, allergies, past medical, surgical, social and family histories were reviewed and updated as appropriate. Review of Systems Reviewed and acurate. See MA note. OBJECTIVE:  /70 (Site: Right Arm, Position: Sitting, Cuff Size: Large Adult)   Pulse 91   Ht 5' 9\" (1.753 m)   Wt 255 lb (115.7 kg)   SpO2 98%   BMI 37.66 kg/m²    Physical Exam   Constitutional: He is oriented to person, place, and time. He appears well-developed and well-nourished. No distress. HENT:   Head: Normocephalic. Right Ear: Tympanic membrane normal.   Left Ear: Tympanic membrane normal.   Mouth/Throat: No oropharyngeal exudate. Eyes: Lids are normal.   Neck: Neck supple. Cardiovascular: Normal rate, regular rhythm and normal heart sounds. Pulmonary/Chest: Effort normal and breath sounds normal.   Abdominal: Soft. Bowel sounds are normal. He exhibits no distension. There is no tenderness. Musculoskeletal: He exhibits no edema. Lymphadenopathy:     He has no cervical adenopathy. Neurological: He is alert and oriented to person, place, and time. Skin: Skin is warm and dry. Psychiatric: He has a normal mood and affect.    Vitals think you have used too much of this medicine. An overdose of any prescription medicine can be fatal. Overdose symptoms may include extreme drowsiness, muscle weakness, confusion, cold and clammy skin, pinpoint pupils, shallow breathing, slow heart rate, fainting, or coma. · Don't share prescription medicines with others, even when they seem to have the same symptoms. What may be good for you may be harmful to others. · If you are no longer taking a prescribed medication and you have pills left please take your pills out of their original containers. Mix crushed pills with an undesirable substance, such as cat litter or used coffee grounds. Put the mixture into a disposable container with a lid, such as an empty margarine tub, or into a sealable bag. Cover up or remove any of your personal information on the empty containers by covering it with black permanent marker or duct tape. Place the sealed container with the mixture, and the empty drug containers, in the trash. · If you use a medication that is in the form of a patch, dispose of used patches by folding them in half so that the sticky sides meet, and then flushing them down a toilet. They should not be placed in the household trash where children or pets can find them. · If you have any questions, ask your provider or pharmacist for more information. · Be sure to keep all appointments for provider visits or tests. We are committed to providing you with the best care possible. In order to help us achieve these goals please remember to bring all medications, herbal products, and over the counter supplements with you to each visit. If your provider has ordered testing for you, please be sure to follow up with our office if you have not received results within 7 days after the testing took place. *If you receive a survey after visiting one of our offices, please take time to share your experience concerning your physician office visit.  These

## 2018-03-26 ENCOUNTER — TELEPHONE (OUTPATIENT)
Dept: PRIMARY CARE CLINIC | Age: 63
End: 2018-03-26

## 2018-04-25 ENCOUNTER — TELEPHONE (OUTPATIENT)
Dept: PRIMARY CARE CLINIC | Age: 63
End: 2018-04-25

## 2018-05-10 ENCOUNTER — OFFICE VISIT (OUTPATIENT)
Dept: CARDIOLOGY | Facility: CLINIC | Age: 63
End: 2018-05-10

## 2018-05-10 VITALS
DIASTOLIC BLOOD PRESSURE: 90 MMHG | HEIGHT: 69 IN | SYSTOLIC BLOOD PRESSURE: 140 MMHG | WEIGHT: 247 LBS | HEART RATE: 92 BPM | BODY MASS INDEX: 36.58 KG/M2

## 2018-05-10 DIAGNOSIS — R00.2 PALPITATIONS: ICD-10-CM

## 2018-05-10 DIAGNOSIS — I10 BENIGN ESSENTIAL HTN: Primary | ICD-10-CM

## 2018-05-10 DIAGNOSIS — G45.9 TRANSIENT CEREBRAL ISCHEMIA, UNSPECIFIED TYPE: ICD-10-CM

## 2018-05-10 PROCEDURE — 99213 OFFICE O/P EST LOW 20 MIN: CPT | Performed by: INTERNAL MEDICINE

## 2018-05-10 RX ORDER — AMLODIPINE BESYLATE 2.5 MG/1
2.5 TABLET ORAL DAILY
Qty: 90 TABLET | Refills: 3 | Status: SHIPPED | OUTPATIENT
Start: 2018-05-10 | End: 2018-12-18

## 2018-05-10 RX ORDER — DOXAZOSIN MESYLATE 4 MG/1
4 TABLET ORAL 2 TIMES DAILY
Qty: 180 TABLET | Refills: 2 | Status: SHIPPED | OUTPATIENT
Start: 2018-05-10 | End: 2019-02-05 | Stop reason: HOSPADM

## 2018-05-10 RX ORDER — CARVEDILOL 25 MG/1
25 TABLET ORAL 2 TIMES DAILY WITH MEALS
Qty: 180 TABLET | Refills: 3 | Status: SHIPPED | OUTPATIENT
Start: 2018-05-10 | End: 2018-12-18

## 2018-05-10 NOTE — PROGRESS NOTES
[facility] at [facility]  Office visit  Jesse Brink  1955  [phone]    VISIT DATE:  10/26/2017    PCP: SELMA Hayden  [address] [facility] [address]    CC:  Chief Complaint   Patient presents with   • Palpitations   • Chest Pain   • Hypertension       PROBLEM LIST:  1. Intermittent chest pain syndrome:  a. Cardiac catheterization, November 2013, revealing no significant obstructive coronary artery disease with normal left ventricular systolic function.  2. Transient ischemic attack with normal echocardiogram, carotid duplex, and CT scan of the brain by verbal report.  3. Benign hypertension.  4. History of tobacco, quitting 28 years ago.  5. Hyperglycemia, diet controlled.  6. Rheumatoid arthritis.  7. Obesity.  8. Family history of heart disease.  9. Surgical history:  a. Pilonidal cyst, 1977.  b. Benign fibroid removed, 1980.  c. Anal fistula repair, 1999 and 2008.    ASSESSMENT:   Diagnosis Plan   1. Benign essential HTN     2. Transient cerebral ischemia, unspecified type     3. Palpitations         PLAN:  Hypertension: Goal less than 140/90.  Adding amlodipine 2.5 mg by mouth daily,  Otherwise continue current medical therapy.    Palpitations: This is a new complaint today.  No high risk clinical features.  Overall appears consistent with symptomatic premature ventricular contractions.  Currently asymptomatic.  Continue beta-blockade.  Would recommend ambulatory ECG monitoring if symptoms increase in frequency or severity.    History of stroke: Continue aggressive risk factor modification.  Continue aspirin afterload reduction. LDL at goal with dietary modifications only. Low HDL levels and mildly elevated triglycerides, continue dietary modifications and regular exercise.    Subjective  Systolic blood pressures in the running in the 140-145 mmHg range with diastolics usually less than 90 mmHg.  Denies chest pain, dyspnea on exertion.  No paresthesias headache or visual  "changes. Still reports intermittent isolated palpitations which are rare in nature. Some generalized mild fatigue but otherwise no limitations.  he is compliant with medical therapy.      PHYSICAL EXAMINATION:  Vitals:    05/10/18 1039   Weight: 112 kg (247 lb)   Height: 175.3 cm (69\")     General Appearance:    Alert, cooperative, no distress, appears stated age   Head:    Normocephalic, without obvious abnormality, atraumatic   Eyes:    conjunctiva/corneas clear   Nose:   Nares normal, septum midline, mucosa normal, no drainage   Throat:   Lips, teeth and gums normal   Neck:   Supple, symmetrical, trachea midline, no carotid    bruit or JVD   Lungs:     Clear to auscultation bilaterally, respirations unlabored   Chest Wall:    No tenderness or deformity    Heart:    Regular rate and rhythm, S1 and S2 normal, no murmur, rub   or gallop, normal carotid impulse bilaterally without bruit.   Abdomen:     Soft, non-tender   Extremities:   Extremities normal, atraumatic, no cyanosis or edema   Pulses:   2+ and symmetric all extremities   Skin:   Skin color, texture, turgor normal, no rashes or lesions       Diagnostic Data:  Procedures  No results found for: CHLPL, TRIG, HDL, LDLDIRECT  No results found for: GLUCOSE, BUN, CREATININE, NA, K, CL, CO2, CREATININE, BUN  No results found for: HGBA1C  No results found for: WBC, HGB, HCT, PLT    Allergies  Allergies   Allergen Reactions   • Adalimumab      HA, intractable ocular migraine       Current Medications    Current Outpatient Prescriptions:   •  aspirin 81 MG tablet, Take 81 mg by mouth Daily., Disp: , Rfl:   •  carvedilol (COREG) 25 MG tablet, Take 1 tablet by mouth 2 (Two) Times a Day., Disp: 180 tablet, Rfl: 3  •  doxazosin (CARDURA) 4 MG tablet, Take 1 tablet by mouth 2 (Two) Times a Day., Disp: 180 tablet, Rfl: 2  •  losartan-hydrochlorothiazide (HYZAAR) 100-25 MG per tablet, Take 1 tablet by mouth Daily., Disp: , Rfl:   •  amitriptyline (ELAVIL) 50 MG tablet, 1 " po 2 hours prior to bedtime, Disp: 90 tablet, Rfl: 3  •  Blood Glucose Monitoring Suppl (ONE TOUCH ULTRA 2) W/DEVICE kit, USE PER MD INSTRUCTIONS, Disp: , Rfl:   •  Cholecalciferol (VITAMIN D3) 2000 UNITS capsule, Take  by mouth daily., Disp: , Rfl:   •  ENBREL SURECLICK 50 MG/ML solution auto-injector, 1 (One) Time Per Week., Disp: , Rfl:   •  gabapentin (NEURONTIN) 300 MG capsule, Take 1 capsule by mouth 3 (three) times a day., Disp: 90 capsule, Rfl: 3  •  glucose blood (FREESTYLE TEST STRIPS) test strip, 1 each by Does not apply route daily. And PRN Dx: DM2, Disp: , Rfl:   •  lidocaine-prilocaine (EMLA) 2.5-2.5 % cream, As Needed., Disp: , Rfl:   •  metFORMIN (GLUCOPHAGE) 500 MG tablet, Take  by mouth 2 (two) times a day., Disp: , Rfl:   •  ONETOUCH DELICA LANCETS FINE misc, Use to test blood sugar twice daily and as needed., Disp: , Rfl:   •  potassium chloride (MICRO-K) 10 MEQ CR capsule, Take  by mouth daily., Disp: , Rfl:   •  tiZANidine (ZANAFLEX) 2 MG tablet, as needed., Disp: , Rfl: 1          ROS  Review of Systems   Cardiovascular: Positive for chest pain. Negative for claudication, dyspnea on exertion, irregular heartbeat and palpitations.   Respiratory: Negative for cough and shortness of breath.          SOCIAL HX  Social History     Social History   • Marital status:      Spouse name: N/A   • Number of children: N/A   • Years of education: N/A     Occupational History   • Not on file.     Social History Main Topics   • Smoking status: Former Smoker   • Smokeless tobacco: Never Used      Comment: quit 35 years ago   • Alcohol use 0.6 oz/week     1 Cans of beer per week      Comment: occas   • Drug use: No   • Sexual activity: Defer     Other Topics Concern   • Not on file     Social History Narrative   • No narrative on file       FAMILY HX  Family History   Problem Relation Age of Onset   • Heart disease Other    • Diabetes Other    • Cancer Other    • Hypertension Other    • Heart disease  Mother    • Diabetes Father    • Hypertension Father    • Cancer Father              Dillon Neville III, MD, FACC

## 2018-05-11 ENCOUNTER — OFFICE VISIT (OUTPATIENT)
Dept: PRIMARY CARE CLINIC | Age: 63
End: 2018-05-11
Payer: COMMERCIAL

## 2018-05-11 VITALS
WEIGHT: 245 LBS | BODY MASS INDEX: 36.29 KG/M2 | OXYGEN SATURATION: 98 % | HEART RATE: 42 BPM | DIASTOLIC BLOOD PRESSURE: 84 MMHG | SYSTOLIC BLOOD PRESSURE: 136 MMHG | HEIGHT: 69 IN

## 2018-05-11 DIAGNOSIS — J01.00 ACUTE NON-RECURRENT MAXILLARY SINUSITIS: Primary | ICD-10-CM

## 2018-05-11 DIAGNOSIS — I95.9 HYPOTENSION, UNSPECIFIED HYPOTENSION TYPE: ICD-10-CM

## 2018-05-11 PROCEDURE — 1036F TOBACCO NON-USER: CPT | Performed by: NURSE PRACTITIONER

## 2018-05-11 PROCEDURE — 99213 OFFICE O/P EST LOW 20 MIN: CPT | Performed by: NURSE PRACTITIONER

## 2018-05-11 PROCEDURE — G8427 DOCREV CUR MEDS BY ELIG CLIN: HCPCS | Performed by: NURSE PRACTITIONER

## 2018-05-11 PROCEDURE — 3017F COLORECTAL CA SCREEN DOC REV: CPT | Performed by: NURSE PRACTITIONER

## 2018-05-11 PROCEDURE — G8417 CALC BMI ABV UP PARAM F/U: HCPCS | Performed by: NURSE PRACTITIONER

## 2018-05-11 RX ORDER — DEXTROMETHORPHAN HYDROBROMIDE AND PROMETHAZINE HYDROCHLORIDE 15; 6.25 MG/5ML; MG/5ML
5 SYRUP ORAL 4 TIMES DAILY PRN
Qty: 180 ML | Refills: 0 | Status: SHIPPED | OUTPATIENT
Start: 2018-05-11 | End: 2018-05-18

## 2018-05-11 RX ORDER — AMOXICILLIN AND CLAVULANATE POTASSIUM 875; 125 MG/1; MG/1
1 TABLET, FILM COATED ORAL 2 TIMES DAILY
Qty: 20 TABLET | Refills: 0 | Status: SHIPPED | OUTPATIENT
Start: 2018-05-11 | End: 2018-05-21

## 2018-05-11 ASSESSMENT — ENCOUNTER SYMPTOMS
CONSTIPATION: 0
COUGH: 1
VOMITING: 0
EYE ITCHING: 0
RHINORRHEA: 0
DIARRHEA: 0
EYE REDNESS: 0
ABDOMINAL PAIN: 0
SORE THROAT: 1
NAUSEA: 0
SHORTNESS OF BREATH: 1
EYE DISCHARGE: 0

## 2018-05-15 LAB
ALBUMIN SERPL-MCNC: 4 G/DL
ALP BLD-CCNC: 75 U/L
ALT SERPL-CCNC: 40 U/L
ANION GAP SERPL CALCULATED.3IONS-SCNC: ABNORMAL MMOL/L
AST SERPL-CCNC: 33 U/L
AVERAGE GLUCOSE: NORMAL
BILIRUB SERPL-MCNC: 0.6 MG/DL (ref 0.1–1.4)
BUN BLDV-MCNC: 15 MG/DL
CALCIUM SERPL-MCNC: 9.6 MG/DL
CHLORIDE BLD-SCNC: 101 MMOL/L
CHOLESTEROL, TOTAL: 147 MG/DL
CHOLESTEROL/HDL RATIO: ABNORMAL
CO2: 29 MMOL/L
CREAT SERPL-MCNC: 1.1 MG/DL
GFR CALCULATED: ABNORMAL
GLUCOSE BLD-MCNC: 143 MG/DL
HBA1C MFR BLD: 6.9 %
HDLC SERPL-MCNC: 27 MG/DL (ref 35–70)
LDL CHOLESTEROL CALCULATED: 85 MG/DL (ref 0–160)
POTASSIUM SERPL-SCNC: 3.7 MMOL/L
SODIUM BLD-SCNC: 142 MMOL/L
TOTAL PROTEIN: 6.7
TRIGL SERPL-MCNC: 174 MG/DL
VLDLC SERPL CALC-MCNC: 34.8 MG/DL

## 2018-05-18 ENCOUNTER — OFFICE VISIT (OUTPATIENT)
Dept: PRIMARY CARE CLINIC | Age: 63
End: 2018-05-18
Payer: COMMERCIAL

## 2018-05-18 VITALS
SYSTOLIC BLOOD PRESSURE: 124 MMHG | DIASTOLIC BLOOD PRESSURE: 80 MMHG | BODY MASS INDEX: 36.18 KG/M2 | OXYGEN SATURATION: 97 % | HEART RATE: 86 BPM | WEIGHT: 245 LBS

## 2018-05-18 DIAGNOSIS — I10 ESSENTIAL HYPERTENSION: ICD-10-CM

## 2018-05-18 DIAGNOSIS — E11.42 TYPE 2 DIABETES MELLITUS WITH DIABETIC POLYNEUROPATHY, WITHOUT LONG-TERM CURRENT USE OF INSULIN (HCC): Primary | ICD-10-CM

## 2018-05-18 DIAGNOSIS — G62.9 PERIPHERAL POLYNEUROPATHY: ICD-10-CM

## 2018-05-18 PROCEDURE — G8427 DOCREV CUR MEDS BY ELIG CLIN: HCPCS | Performed by: NURSE PRACTITIONER

## 2018-05-18 PROCEDURE — 1036F TOBACCO NON-USER: CPT | Performed by: NURSE PRACTITIONER

## 2018-05-18 PROCEDURE — 2022F DILAT RTA XM EVC RTNOPTHY: CPT | Performed by: NURSE PRACTITIONER

## 2018-05-18 PROCEDURE — 3045F PR MOST RECENT HEMOGLOBIN A1C LEVEL 7.0-9.0%: CPT | Performed by: NURSE PRACTITIONER

## 2018-05-18 PROCEDURE — G8417 CALC BMI ABV UP PARAM F/U: HCPCS | Performed by: NURSE PRACTITIONER

## 2018-05-18 PROCEDURE — 3017F COLORECTAL CA SCREEN DOC REV: CPT | Performed by: NURSE PRACTITIONER

## 2018-05-18 PROCEDURE — 99213 OFFICE O/P EST LOW 20 MIN: CPT | Performed by: NURSE PRACTITIONER

## 2018-05-18 RX ORDER — AMLODIPINE BESYLATE 2.5 MG/1
2.5 TABLET ORAL
COMMUNITY
Start: 2018-05-10 | End: 2018-07-17 | Stop reason: SINTOL

## 2018-05-18 RX ORDER — LOSARTAN POTASSIUM AND HYDROCHLOROTHIAZIDE 25; 100 MG/1; MG/1
1 TABLET ORAL DAILY
Qty: 90 TABLET | Refills: 3 | Status: SHIPPED | OUTPATIENT
Start: 2018-05-18 | End: 2019-02-15 | Stop reason: ALTCHOICE

## 2018-06-17 ASSESSMENT — ENCOUNTER SYMPTOMS
RESPIRATORY NEGATIVE: 1
GASTROINTESTINAL NEGATIVE: 1
EYES NEGATIVE: 1
RHINORRHEA: 1

## 2018-06-19 ENCOUNTER — TELEPHONE (OUTPATIENT)
Dept: PRIMARY CARE CLINIC | Age: 63
End: 2018-06-19

## 2018-06-19 ENCOUNTER — OFFICE VISIT (OUTPATIENT)
Dept: PRIMARY CARE CLINIC | Age: 63
End: 2018-06-19
Payer: COMMERCIAL

## 2018-06-19 VITALS
SYSTOLIC BLOOD PRESSURE: 142 MMHG | HEART RATE: 103 BPM | OXYGEN SATURATION: 99 % | DIASTOLIC BLOOD PRESSURE: 80 MMHG | WEIGHT: 245.6 LBS | BODY MASS INDEX: 36.27 KG/M2

## 2018-06-19 DIAGNOSIS — I10 ESSENTIAL HYPERTENSION: Primary | ICD-10-CM

## 2018-06-19 DIAGNOSIS — I49.9 CARDIAC ARRHYTHMIA, UNSPECIFIED CARDIAC ARRHYTHMIA TYPE: ICD-10-CM

## 2018-06-19 DIAGNOSIS — R00.1 BRADYCARDIA: ICD-10-CM

## 2018-06-19 PROCEDURE — G8417 CALC BMI ABV UP PARAM F/U: HCPCS | Performed by: NURSE PRACTITIONER

## 2018-06-19 PROCEDURE — 1036F TOBACCO NON-USER: CPT | Performed by: NURSE PRACTITIONER

## 2018-06-19 PROCEDURE — 99213 OFFICE O/P EST LOW 20 MIN: CPT | Performed by: NURSE PRACTITIONER

## 2018-06-19 PROCEDURE — 3017F COLORECTAL CA SCREEN DOC REV: CPT | Performed by: NURSE PRACTITIONER

## 2018-06-19 PROCEDURE — G8427 DOCREV CUR MEDS BY ELIG CLIN: HCPCS | Performed by: NURSE PRACTITIONER

## 2018-06-19 RX ORDER — CARVEDILOL 25 MG/1
TABLET ORAL
COMMUNITY
Start: 2018-05-22 | End: 2018-07-17 | Stop reason: SINTOL

## 2018-06-19 RX ORDER — NEBIVOLOL 10 MG/1
10 TABLET ORAL 2 TIMES DAILY
Qty: 60 TABLET | Refills: 5 | Status: SHIPPED | OUTPATIENT
Start: 2018-06-19 | End: 2018-07-17 | Stop reason: SDUPTHER

## 2018-06-19 ASSESSMENT — PATIENT HEALTH QUESTIONNAIRE - PHQ9
1. LITTLE INTEREST OR PLEASURE IN DOING THINGS: 0
2. FEELING DOWN, DEPRESSED OR HOPELESS: 0
SUM OF ALL RESPONSES TO PHQ9 QUESTIONS 1 & 2: 0
SUM OF ALL RESPONSES TO PHQ QUESTIONS 1-9: 0

## 2018-06-19 ASSESSMENT — ENCOUNTER SYMPTOMS
SHORTNESS OF BREATH: 0
EYE PAIN: 0
VOMITING: 0
ABDOMINAL PAIN: 0
NAUSEA: 0
COUGH: 0
SORE THROAT: 0

## 2018-07-17 ENCOUNTER — OFFICE VISIT (OUTPATIENT)
Dept: PRIMARY CARE CLINIC | Age: 63
End: 2018-07-17
Payer: COMMERCIAL

## 2018-07-17 ENCOUNTER — TELEPHONE (OUTPATIENT)
Dept: PRIMARY CARE CLINIC | Age: 63
End: 2018-07-17

## 2018-07-17 VITALS
OXYGEN SATURATION: 97 % | HEART RATE: 87 BPM | BODY MASS INDEX: 35.74 KG/M2 | SYSTOLIC BLOOD PRESSURE: 128 MMHG | DIASTOLIC BLOOD PRESSURE: 70 MMHG | WEIGHT: 242 LBS

## 2018-07-17 DIAGNOSIS — I10 ESSENTIAL HYPERTENSION: ICD-10-CM

## 2018-07-17 DIAGNOSIS — E11.9 TYPE 2 DIABETES MELLITUS WITHOUT COMPLICATION, UNSPECIFIED LONG TERM INSULIN USE STATUS: Primary | ICD-10-CM

## 2018-07-17 LAB — HBA1C MFR BLD: 6.8 %

## 2018-07-17 PROCEDURE — G8417 CALC BMI ABV UP PARAM F/U: HCPCS | Performed by: NURSE PRACTITIONER

## 2018-07-17 PROCEDURE — 1036F TOBACCO NON-USER: CPT | Performed by: NURSE PRACTITIONER

## 2018-07-17 PROCEDURE — G8427 DOCREV CUR MEDS BY ELIG CLIN: HCPCS | Performed by: NURSE PRACTITIONER

## 2018-07-17 PROCEDURE — 3017F COLORECTAL CA SCREEN DOC REV: CPT | Performed by: NURSE PRACTITIONER

## 2018-07-17 PROCEDURE — 99213 OFFICE O/P EST LOW 20 MIN: CPT | Performed by: NURSE PRACTITIONER

## 2018-07-17 PROCEDURE — 2022F DILAT RTA XM EVC RTNOPTHY: CPT | Performed by: NURSE PRACTITIONER

## 2018-07-17 PROCEDURE — 83036 HEMOGLOBIN GLYCOSYLATED A1C: CPT | Performed by: NURSE PRACTITIONER

## 2018-07-17 PROCEDURE — 3044F HG A1C LEVEL LT 7.0%: CPT | Performed by: NURSE PRACTITIONER

## 2018-07-17 RX ORDER — NEBIVOLOL 10 MG/1
10 TABLET ORAL 2 TIMES DAILY
Qty: 180 TABLET | Refills: 3 | Status: SHIPPED | OUTPATIENT
Start: 2018-07-17 | End: 2018-07-18 | Stop reason: SDUPTHER

## 2018-07-17 ASSESSMENT — ENCOUNTER SYMPTOMS
EYE PAIN: 0
SORE THROAT: 0
VOMITING: 0
NAUSEA: 0
ABDOMINAL PAIN: 0
SHORTNESS OF BREATH: 0
COUGH: 0

## 2018-07-17 NOTE — TELEPHONE ENCOUNTER
Per the orders of tarik Casillas, 3 box/boxes of Jardiance 25 mg were given to patient today. Qty. 21, Lot # [id number], Exp. Date 8/20. Patient given instructions with samples. Per the orders of tarik Casillas, 2 box/boxes of bystolic 10 mg were given to patient today. Qty. 14, Lot # [id number], Exp. Date 12/18. Patient given instructions with samples.

## 2018-07-18 RX ORDER — NEBIVOLOL 10 MG/1
10 TABLET ORAL 2 TIMES DAILY
Qty: 14 TABLET | Refills: 0 | COMMUNITY
Start: 2018-07-18 | End: 2018-08-28 | Stop reason: SDUPTHER

## 2018-07-20 DIAGNOSIS — E11.8 TYPE 2 DIABETES MELLITUS WITH COMPLICATION, WITHOUT LONG-TERM CURRENT USE OF INSULIN (HCC): ICD-10-CM

## 2018-08-08 NOTE — PROGRESS NOTES
SUBJECTIVE:    Patient ID: Radha Santos is a 65 y.o. male. Medical history Review  Past Medical, Family, and Social History reviewed and does not contribute to the patient presenting condition    Health Maintenance Due   Topic Date Due    Diabetic foot exam  09/12/1966    HIV screen  09/12/1971    Diabetic microalbuminuria test  09/12/1974    DTaP/Tdap/Td vaccine (1 - Tdap) 09/12/1975    Pneumococcal med risk (1 of 1 - PPSV23) 09/12/1975    Shingles Vaccine (1 of 2 - 2 Dose Series) 09/12/2006    Diabetic retinal exam  08/18/2017        HPI:   Chief Complaint   Patient presents with    Hypertension     Patient here today for a follow up with HTN. He states he is feeling better. He feels like the Bystolic is working great. Home BP has been good with no bradycardia. Patient's medications, allergies, past medical, surgical, social and family histories were reviewed and updated as appropriate. Review of Systems Reviewed and acurate. See MA note. OBJECTIVE:  /70 (Site: Right Arm, Position: Sitting, Cuff Size: Medium Adult)   Pulse 87   Wt 242 lb (109.8 kg)   SpO2 97%   BMI 35.74 kg/m²    Physical Exam   Constitutional: He is oriented to person, place, and time. He appears well-developed and well-nourished. No distress. HENT:   Head: Normocephalic. Right Ear: Tympanic membrane normal.   Left Ear: Tympanic membrane normal.   Mouth/Throat: No oropharyngeal exudate. Eyes: Lids are normal.   Neck: Neck supple. Cardiovascular: Normal rate, regular rhythm and normal heart sounds. Pulmonary/Chest: Effort normal and breath sounds normal.   Abdominal: Soft. Bowel sounds are normal. He exhibits no distension. There is no tenderness. Musculoskeletal: He exhibits no edema. Lymphadenopathy:     He has no cervical adenopathy. Neurological: He is alert and oriented to person, place, and time. Skin: Skin is warm and dry.    Psychiatric: He has a normal mood and

## 2018-08-27 ENCOUNTER — TELEPHONE (OUTPATIENT)
Dept: PRIMARY CARE CLINIC | Age: 63
End: 2018-08-27

## 2018-08-28 RX ORDER — NEBIVOLOL 10 MG/1
10 TABLET ORAL 2 TIMES DAILY
Qty: 21 TABLET | Refills: 0 | COMMUNITY
Start: 2018-08-28 | End: 2018-09-19 | Stop reason: SDUPTHER

## 2018-09-19 ENCOUNTER — TELEPHONE (OUTPATIENT)
Dept: PRIMARY CARE CLINIC | Age: 63
End: 2018-09-19

## 2018-09-19 RX ORDER — NEBIVOLOL 10 MG/1
10 TABLET ORAL 2 TIMES DAILY
Qty: 28 TABLET | Refills: 0 | COMMUNITY
Start: 2018-09-19 | End: 2018-10-23 | Stop reason: SDUPTHER

## 2018-10-16 RX ORDER — EMPAGLIFLOZIN 25 MG/1
TABLET, FILM COATED ORAL
Qty: 30 TABLET | Refills: 5 | Status: SHIPPED | OUTPATIENT
Start: 2018-10-16 | End: 2018-10-23 | Stop reason: SDUPTHER

## 2018-10-23 ENCOUNTER — OFFICE VISIT (OUTPATIENT)
Dept: PRIMARY CARE CLINIC | Age: 63
End: 2018-10-23
Payer: COMMERCIAL

## 2018-10-23 ENCOUNTER — TELEPHONE (OUTPATIENT)
Dept: PRIMARY CARE CLINIC | Age: 63
End: 2018-10-23

## 2018-10-23 VITALS
BODY MASS INDEX: 36.8 KG/M2 | HEART RATE: 68 BPM | SYSTOLIC BLOOD PRESSURE: 126 MMHG | WEIGHT: 242.8 LBS | HEIGHT: 68 IN | DIASTOLIC BLOOD PRESSURE: 70 MMHG | OXYGEN SATURATION: 96 %

## 2018-10-23 DIAGNOSIS — Z79.4 TYPE 2 DIABETES MELLITUS WITHOUT COMPLICATION, WITH LONG-TERM CURRENT USE OF INSULIN (HCC): Primary | ICD-10-CM

## 2018-10-23 DIAGNOSIS — M06.9 RHEUMATOID ARTHRITIS, INVOLVING UNSPECIFIED SITE, UNSPECIFIED RHEUMATOID FACTOR PRESENCE: ICD-10-CM

## 2018-10-23 DIAGNOSIS — G62.9 PERIPHERAL POLYNEUROPATHY: ICD-10-CM

## 2018-10-23 DIAGNOSIS — E11.9 TYPE 2 DIABETES MELLITUS WITHOUT COMPLICATION, WITH LONG-TERM CURRENT USE OF INSULIN (HCC): Primary | ICD-10-CM

## 2018-10-23 DIAGNOSIS — I10 ESSENTIAL HYPERTENSION: ICD-10-CM

## 2018-10-23 LAB — HBA1C MFR BLD: 6.9 %

## 2018-10-23 PROCEDURE — 90688 IIV4 VACCINE SPLT 0.5 ML IM: CPT | Performed by: NURSE PRACTITIONER

## 2018-10-23 PROCEDURE — 99213 OFFICE O/P EST LOW 20 MIN: CPT | Performed by: NURSE PRACTITIONER

## 2018-10-23 PROCEDURE — 2022F DILAT RTA XM EVC RTNOPTHY: CPT | Performed by: NURSE PRACTITIONER

## 2018-10-23 PROCEDURE — 3044F HG A1C LEVEL LT 7.0%: CPT | Performed by: NURSE PRACTITIONER

## 2018-10-23 PROCEDURE — 83036 HEMOGLOBIN GLYCOSYLATED A1C: CPT | Performed by: NURSE PRACTITIONER

## 2018-10-23 PROCEDURE — 90471 IMMUNIZATION ADMIN: CPT | Performed by: NURSE PRACTITIONER

## 2018-10-23 PROCEDURE — 3017F COLORECTAL CA SCREEN DOC REV: CPT | Performed by: NURSE PRACTITIONER

## 2018-10-23 PROCEDURE — G8417 CALC BMI ABV UP PARAM F/U: HCPCS | Performed by: NURSE PRACTITIONER

## 2018-10-23 PROCEDURE — 90472 IMMUNIZATION ADMIN EACH ADD: CPT | Performed by: NURSE PRACTITIONER

## 2018-10-23 PROCEDURE — G8482 FLU IMMUNIZE ORDER/ADMIN: HCPCS | Performed by: NURSE PRACTITIONER

## 2018-10-23 PROCEDURE — 1036F TOBACCO NON-USER: CPT | Performed by: NURSE PRACTITIONER

## 2018-10-23 PROCEDURE — G8427 DOCREV CUR MEDS BY ELIG CLIN: HCPCS | Performed by: NURSE PRACTITIONER

## 2018-10-23 RX ORDER — POTASSIUM CHLORIDE 20 MEQ/1
20 TABLET, EXTENDED RELEASE ORAL DAILY
Qty: 90 TABLET | Refills: 3 | Status: SHIPPED | OUTPATIENT
Start: 2018-10-23 | End: 2019-07-25

## 2018-10-23 RX ORDER — NEBIVOLOL 10 MG/1
10 TABLET ORAL 2 TIMES DAILY
Qty: 28 TABLET | Refills: 0 | COMMUNITY
Start: 2018-10-23 | End: 2018-11-27 | Stop reason: SDUPTHER

## 2018-10-23 ASSESSMENT — ENCOUNTER SYMPTOMS
SHORTNESS OF BREATH: 0
SORE THROAT: 0
ABDOMINAL PAIN: 0
EYE PAIN: 0
VOMITING: 0
NAUSEA: 0
COUGH: 0

## 2018-11-20 ENCOUNTER — TELEPHONE (OUTPATIENT)
Dept: PRIMARY CARE CLINIC | Age: 63
End: 2018-11-20

## 2018-11-27 RX ORDER — NEBIVOLOL 10 MG/1
10 TABLET ORAL 2 TIMES DAILY
Qty: 21 TABLET | Refills: 0 | COMMUNITY
Start: 2018-11-27 | End: 2019-02-12

## 2018-12-18 ENCOUNTER — OFFICE VISIT (OUTPATIENT)
Dept: CARDIOLOGY | Facility: CLINIC | Age: 63
End: 2018-12-18

## 2018-12-18 VITALS
OXYGEN SATURATION: 97 % | HEART RATE: 83 BPM | HEIGHT: 69 IN | SYSTOLIC BLOOD PRESSURE: 154 MMHG | BODY MASS INDEX: 35.93 KG/M2 | WEIGHT: 242.6 LBS | DIASTOLIC BLOOD PRESSURE: 90 MMHG

## 2018-12-18 DIAGNOSIS — R07.2 PRECORDIAL PAIN: ICD-10-CM

## 2018-12-18 DIAGNOSIS — R00.2 PALPITATIONS: ICD-10-CM

## 2018-12-18 DIAGNOSIS — I10 BENIGN ESSENTIAL HTN: Primary | ICD-10-CM

## 2018-12-18 PROCEDURE — 99214 OFFICE O/P EST MOD 30 MIN: CPT | Performed by: INTERNAL MEDICINE

## 2018-12-18 RX ORDER — LIDOCAINE AND PRILOCAINE 25; 25 MG/G; MG/G
1 CREAM TOPICAL AS NEEDED
COMMUNITY
End: 2019-01-17

## 2018-12-18 RX ORDER — NEBIVOLOL 10 MG/1
10 TABLET ORAL 2 TIMES DAILY
COMMUNITY
End: 2019-02-05 | Stop reason: HOSPADM

## 2018-12-18 RX ORDER — ISOSORBIDE MONONITRATE 30 MG/1
30 TABLET, EXTENDED RELEASE ORAL DAILY
Qty: 30 TABLET | Refills: 11 | Status: SHIPPED | OUTPATIENT
Start: 2018-12-18 | End: 2019-02-05 | Stop reason: HOSPADM

## 2018-12-18 NOTE — PROGRESS NOTES
[facility] at [facility]  Office visit  Jesse Brink  1955  [phone]    VISIT DATE:  10/26/2017    PCP: Terri Barreto APRN  [address] [facility] [place] [address]    CC:  Chief Complaint   Patient presents with   • Hypertension   • Palpitations       PROBLEM LIST:  1. Intermittent chest pain syndrome:  a. Cardiac catheterization, November 2013, revealing no significant obstructive coronary artery disease with normal left ventricular systolic function.  2. Transient ischemic attack with normal echocardiogram, carotid duplex, and CT scan of the brain by verbal report.  3. Benign hypertension.  4. History of tobacco, quitting 28 years ago.  5. Hyperglycemia, diet controlled.  6. Rheumatoid arthritis.  7. Obesity.  8. Family history of heart disease.  9. Surgical history:  a. Pilonidal cyst, 1977.  b. Benign fibroid removed, 1980.  c. Anal fistula repair, 1999 and 2008.    ASSESSMENT:   Diagnosis Plan   1. Benign essential HTN     2. Palpitations         PLAN:  Hypertension: Goal less than 140/90.  Adding imdur due to concomitant angina,  Otherwise continue current medical therapy.    Palpitations: Currently asymptomatic.  No high risk clinical features.  Overall appears consistent with symptomatic premature ventricular contractions.   Continue beta-blockade.  Would recommend ambulatory ECG monitoring if symptoms increase in frequency or severity.    History of stroke: Continue aggressive risk factor modification.  Continue aspirin afterload reduction. LDL at goal with dietary modifications only. Low HDL levels and mildly elevated triglycerides, continue dietary modifications and regular exercise.    Exertional CP: Lexiscan MPI. Adding Imdur.  F/u 6 weeks.    Subjective  Systolic blood pressures in the running in the 130-155 mmHg range with diastolics usually less than 90 mmHg.  Increasing episode of exertional cp, moderate intensity, non-radiating, relieved with rest over the previous 2-3  months.  he is compliant with medical therapy.  Developed bradycardia on coreg.  Tolerating bystolic.    PHYSICAL EXAMINATION:  There were no vitals filed for this visit.  General Appearance:    Alert, cooperative, no distress, appears stated age   Head:    Normocephalic, without obvious abnormality, atraumatic   Eyes:    conjunctiva/corneas clear   Nose:   Nares normal, septum midline, mucosa normal, no drainage   Throat:   Lips, teeth and gums normal   Neck:   Supple, symmetrical, trachea midline, no carotid    bruit or JVD   Lungs:     Clear to auscultation bilaterally, respirations unlabored   Chest Wall:    No tenderness or deformity    Heart:    Regular rate and rhythm, S1 and S2 normal, no murmur, rub   or gallop, normal carotid impulse bilaterally without bruit.   Abdomen:     Soft, non-tender   Extremities:   Extremities normal, atraumatic, no cyanosis or edema   Pulses:   2+ and symmetric all extremities   Skin:   Skin color, texture, turgor normal, no rashes or lesions       Diagnostic Data:  Procedures  No results found for: CHLPL, TRIG, HDL, LDLDIRECT  No results found for: GLUCOSE, BUN, CREATININE, NA, K, CL, CO2, CREATININE, BUN  No results found for: HGBA1C  No results found for: WBC, HGB, HCT, PLT    Allergies  Allergies   Allergen Reactions   • Adalimumab      HA, intractable ocular migraine       Current Medications    Current Outpatient Medications:   •  amitriptyline (ELAVIL) 50 MG tablet, 1 po 2 hours prior to bedtime, Disp: 90 tablet, Rfl: 3  •  amLODIPine (NORVASC) 2.5 MG tablet, Take 1 tablet by mouth Daily., Disp: 90 tablet, Rfl: 3  •  aspirin 81 MG tablet, Take 81 mg by mouth Daily., Disp: , Rfl:   •  Blood Glucose Monitoring Suppl (ONE TOUCH ULTRA 2) W/DEVICE kit, USE PER MD INSTRUCTIONS, Disp: , Rfl:   •  carvedilol (COREG) 25 MG tablet, Take 1 tablet by mouth 2 (Two) Times a Day With Meals., Disp: 180 tablet, Rfl: 3  •  Cholecalciferol (VITAMIN D3) 2000 UNITS capsule, Take 2,000 Units  by mouth Daily., Disp: , Rfl:   •  doxazosin (CARDURA) 4 MG tablet, Take 1 tablet by mouth 2 (Two) Times a Day., Disp: 180 tablet, Rfl: 2  •  Empagliflozin (JARDIANCE) 10 MG tablet, Take 10 mg by mouth Daily., Disp: , Rfl:   •  ENBREL SURECLICK 50 MG/ML solution auto-injector, 1 (One) Time Per Week., Disp: , Rfl:   •  gabapentin (NEURONTIN) 300 MG capsule, Take 1 capsule by mouth 3 (three) times a day., Disp: 90 capsule, Rfl: 3  •  glucose blood (FREESTYLE TEST STRIPS) test strip, 1 each by Does not apply route daily. And PRN Dx: DM2, Disp: , Rfl:   •  lidocaine-prilocaine (EMLA) 2.5-2.5 % cream, Apply 1 application topically As Needed., Disp: , Rfl:   •  losartan-hydrochlorothiazide (HYZAAR) 100-25 MG per tablet, Take 1 tablet by mouth Daily., Disp: , Rfl:   •  metFORMIN (GLUCOPHAGE) 500 MG tablet, Take 500 mg by mouth 2 (Two) Times a Day., Disp: , Rfl:   •  ONETOUCH DELICA LANCETS FINE misc, Use to test blood sugar twice daily and as needed., Disp: , Rfl:   •  potassium chloride (MICRO-K) 10 MEQ CR capsule, Take 10 mEq by mouth Daily., Disp: , Rfl:   •  tiZANidine (ZANAFLEX) 2 MG tablet, Take 2 mg by mouth As Needed., Disp: , Rfl: 1          ROS  Review of Systems   Cardiovascular: Negative for chest pain, claudication, dyspnea on exertion, irregular heartbeat and palpitations.   Respiratory: Negative for cough and shortness of breath.          SOCIAL HX  Social History     Socioeconomic History   • Marital status:      Spouse name: Not on file   • Number of children: Not on file   • Years of education: Not on file   • Highest education level: Not on file   Social Needs   • Financial resource strain: Not on file   • Food insecurity - worry: Not on file   • Food insecurity - inability: Not on file   • Transportation needs - medical: Not on file   • Transportation needs - non-medical: Not on file   Occupational History   • Not on file   Tobacco Use   • Smoking status: Former Smoker   • Smokeless tobacco:  Never Used   • Tobacco comment: quit 35 years ago   Substance and Sexual Activity   • Alcohol use: Yes     Alcohol/week: 0.6 oz     Types: 1 Cans of beer per week     Comment: occas   • Drug use: No   • Sexual activity: Defer   Other Topics Concern   • Not on file   Social History Narrative   • Not on file       FAMILY HX  Family History   Problem Relation Age of Onset   • Heart disease Other    • Diabetes Other    • Cancer Other    • Hypertension Other    • Heart disease Mother    • Diabetes Father    • Hypertension Father    • Cancer Father              Dillon Neville III, MD, FACC

## 2019-01-10 ENCOUNTER — OUTSIDE FACILITY SERVICE (OUTPATIENT)
Dept: CARDIOLOGY | Facility: CLINIC | Age: 64
End: 2019-01-10

## 2019-01-10 ENCOUNTER — HOSPITAL ENCOUNTER (OUTPATIENT)
Dept: NON INVASIVE DIAGNOSTICS | Facility: HOSPITAL | Age: 64
Discharge: HOME | End: 2019-01-10
Payer: MEDICARE

## 2019-01-10 ENCOUNTER — TELEPHONE (OUTPATIENT)
Dept: CARDIOLOGY | Facility: CLINIC | Age: 64
End: 2019-01-10

## 2019-01-10 ENCOUNTER — HOSPITAL ENCOUNTER (OUTPATIENT)
Dept: NUCLEAR MEDICINE | Facility: HOSPITAL | Age: 64
Discharge: HOME | End: 2019-01-10
Payer: MEDICARE

## 2019-01-10 DIAGNOSIS — R07.89 OTHER CHEST PAIN: ICD-10-CM

## 2019-01-10 DIAGNOSIS — R94.39 ABNORMAL STRESS TEST: Primary | ICD-10-CM

## 2019-01-10 LAB
LV EF: 36 %
LVEF MODALITY: NORMAL

## 2019-01-10 PROCEDURE — 93018 CV STRESS TEST I&R ONLY: CPT | Performed by: INTERNAL MEDICINE

## 2019-01-10 PROCEDURE — 78452 HT MUSCLE IMAGE SPECT MULT: CPT

## 2019-01-10 PROCEDURE — 6360000002 HC RX W HCPCS: Performed by: INTERNAL MEDICINE

## 2019-01-10 PROCEDURE — A9500 TC99M SESTAMIBI: HCPCS | Performed by: INTERNAL MEDICINE

## 2019-01-10 PROCEDURE — 3430000000 HC RX DIAGNOSTIC RADIOPHARMACEUTICAL: Performed by: INTERNAL MEDICINE

## 2019-01-10 PROCEDURE — 78452 HT MUSCLE IMAGE SPECT MULT: CPT | Performed by: INTERNAL MEDICINE

## 2019-01-10 PROCEDURE — 93017 CV STRESS TEST TRACING ONLY: CPT

## 2019-01-10 RX ADMIN — TETRAKIS(2-METHOXYISOBUTYLISOCYANIDE)COPPER(I) TETRAFLUOROBORATE 35.7 MILLICURIE: 1 INJECTION, POWDER, LYOPHILIZED, FOR SOLUTION INTRAVENOUS at 11:14

## 2019-01-10 RX ADMIN — TETRAKIS(2-METHOXYISOBUTYLISOCYANIDE)COPPER(I) TETRAFLUOROBORATE 11.1 MILLICURIE: 1 INJECTION, POWDER, LYOPHILIZED, FOR SOLUTION INTRAVENOUS at 09:29

## 2019-01-10 RX ADMIN — REGADENOSON 0.4 MG: 0.08 INJECTION, SOLUTION INTRAVENOUS at 11:02

## 2019-01-10 NOTE — TELEPHONE ENCOUNTER
----- Message from Dillon Neville III, MD sent at 1/10/2019  1:00 PM EST -----  Abnormal stress test. Needs set up for [facility].

## 2019-01-10 NOTE — TELEPHONE ENCOUNTER
Patient notified of abnormal stress test and needs to have a cardiac cath with .Told him our  would call him to set up the date and time.He verbalized understanding. Order place in Epic.

## 2019-01-11 PROBLEM — R94.39 ABNORMAL STRESS TEST: Status: ACTIVE | Noted: 2019-01-11

## 2019-01-14 ENCOUNTER — PREP FOR SURGERY (OUTPATIENT)
Dept: OTHER | Facility: HOSPITAL | Age: 64
End: 2019-01-14

## 2019-01-14 DIAGNOSIS — I20.9 ANGINA PECTORIS (HCC): Primary | ICD-10-CM

## 2019-01-14 RX ORDER — ASPIRIN 325 MG
325 TABLET ORAL ONCE
Status: CANCELLED | OUTPATIENT
Start: 2019-01-14 | End: 2019-01-14

## 2019-01-14 RX ORDER — ONDANSETRON 2 MG/ML
4 INJECTION INTRAMUSCULAR; INTRAVENOUS EVERY 6 HOURS PRN
Status: CANCELLED | OUTPATIENT
Start: 2019-01-14

## 2019-01-14 RX ORDER — ASPIRIN 325 MG
325 TABLET, DELAYED RELEASE (ENTERIC COATED) ORAL DAILY
Status: CANCELLED | OUTPATIENT
Start: 2019-01-15

## 2019-01-14 RX ORDER — ACETAMINOPHEN 325 MG/1
650 TABLET ORAL EVERY 4 HOURS PRN
Status: CANCELLED | OUTPATIENT
Start: 2019-01-14

## 2019-01-14 RX ORDER — SODIUM CHLORIDE 0.9 % (FLUSH) 0.9 %
3-10 SYRINGE (ML) INJECTION AS NEEDED
Status: CANCELLED | OUTPATIENT
Start: 2019-01-14

## 2019-01-14 RX ORDER — NITROGLYCERIN 0.4 MG/1
0.4 TABLET SUBLINGUAL
Status: CANCELLED | OUTPATIENT
Start: 2019-01-14

## 2019-01-14 RX ORDER — SODIUM CHLORIDE 0.9 % (FLUSH) 0.9 %
3 SYRINGE (ML) INJECTION EVERY 12 HOURS SCHEDULED
Status: CANCELLED | OUTPATIENT
Start: 2019-01-14

## 2019-01-16 ENCOUNTER — TELEPHONE (OUTPATIENT)
Dept: PRIMARY CARE CLINIC | Age: 64
End: 2019-01-16

## 2019-01-17 ENCOUNTER — APPOINTMENT (OUTPATIENT)
Dept: PREADMISSION TESTING | Facility: HOSPITAL | Age: 64
End: 2019-01-17

## 2019-01-17 DIAGNOSIS — I20.9 ANGINA PECTORIS (HCC): ICD-10-CM

## 2019-01-17 LAB
ALBUMIN SERPL-MCNC: 4.5 G/DL (ref 3.2–4.8)
ALBUMIN/GLOB SERPL: 2.3 G/DL (ref 1.5–2.5)
ALP SERPL-CCNC: 58 U/L (ref 25–100)
ALT SERPL W P-5'-P-CCNC: 42 U/L (ref 7–40)
ANION GAP SERPL CALCULATED.3IONS-SCNC: 9 MMOL/L (ref 3–11)
AST SERPL-CCNC: 25 U/L (ref 0–33)
BASOPHILS # BLD AUTO: 0.03 10*3/MM3 (ref 0–0.2)
BASOPHILS NFR BLD AUTO: 0.5 % (ref 0–1)
BILIRUB SERPL-MCNC: 0.7 MG/DL (ref 0.3–1.2)
BUN BLD-MCNC: 16 MG/DL (ref 9–23)
BUN/CREAT SERPL: 11.7 (ref 7–25)
CALCIUM SPEC-SCNC: 9.5 MG/DL (ref 8.7–10.4)
CHLORIDE SERPL-SCNC: 103 MMOL/L (ref 99–109)
CO2 SERPL-SCNC: 28 MMOL/L (ref 20–31)
CREAT BLD-MCNC: 1.37 MG/DL (ref 0.6–1.3)
DEPRECATED RDW RBC AUTO: 43.7 FL (ref 37–54)
EOSINOPHIL # BLD AUTO: 0.12 10*3/MM3 (ref 0–0.3)
EOSINOPHIL NFR BLD AUTO: 1.9 % (ref 0–3)
ERYTHROCYTE [DISTWIDTH] IN BLOOD BY AUTOMATED COUNT: 13.1 % (ref 11.3–14.5)
GFR SERPL CREATININE-BSD FRML MDRD: 53 ML/MIN/1.73
GLOBULIN UR ELPH-MCNC: 2 GM/DL
GLUCOSE BLD-MCNC: 154 MG/DL (ref 70–100)
HBA1C MFR BLD: 6.9 % (ref 4.8–5.6)
HCT VFR BLD AUTO: 54.4 % (ref 38.9–50.9)
HGB BLD-MCNC: 18.8 G/DL (ref 13.1–17.5)
IMM GRANULOCYTES # BLD AUTO: 0.01 10*3/MM3 (ref 0–0.03)
IMM GRANULOCYTES NFR BLD AUTO: 0.2 % (ref 0–0.6)
LYMPHOCYTES # BLD AUTO: 2.51 10*3/MM3 (ref 0.6–4.8)
LYMPHOCYTES NFR BLD AUTO: 39.7 % (ref 24–44)
MCH RBC QN AUTO: 31.7 PG (ref 27–31)
MCHC RBC AUTO-ENTMCNC: 34.6 G/DL (ref 32–36)
MCV RBC AUTO: 91.7 FL (ref 80–99)
MONOCYTES # BLD AUTO: 0.55 10*3/MM3 (ref 0–1)
MONOCYTES NFR BLD AUTO: 8.7 % (ref 0–12)
NEUTROPHILS # BLD AUTO: 3.11 10*3/MM3 (ref 1.5–8.3)
NEUTROPHILS NFR BLD AUTO: 49.2 % (ref 41–71)
PLATELET # BLD AUTO: 190 10*3/MM3 (ref 150–450)
PMV BLD AUTO: 10.7 FL (ref 6–12)
POTASSIUM BLD-SCNC: 4 MMOL/L (ref 3.5–5.5)
PROT SERPL-MCNC: 6.5 G/DL (ref 5.7–8.2)
RBC # BLD AUTO: 5.93 10*6/MM3 (ref 4.2–5.76)
SODIUM BLD-SCNC: 140 MMOL/L (ref 132–146)
WBC NRBC COR # BLD: 6.32 10*3/MM3 (ref 3.5–10.8)

## 2019-01-17 PROCEDURE — 36415 COLL VENOUS BLD VENIPUNCTURE: CPT

## 2019-01-17 PROCEDURE — 93010 ELECTROCARDIOGRAM REPORT: CPT | Performed by: INTERNAL MEDICINE

## 2019-01-17 PROCEDURE — 85025 COMPLETE CBC W/AUTO DIFF WBC: CPT | Performed by: PHYSICIAN ASSISTANT

## 2019-01-17 PROCEDURE — 83036 HEMOGLOBIN GLYCOSYLATED A1C: CPT | Performed by: PHYSICIAN ASSISTANT

## 2019-01-17 PROCEDURE — 93005 ELECTROCARDIOGRAM TRACING: CPT

## 2019-01-17 PROCEDURE — 80053 COMPREHEN METABOLIC PANEL: CPT | Performed by: PHYSICIAN ASSISTANT

## 2019-01-17 RX ORDER — BACLOFEN 10 MG/1
10 TABLET ORAL AS NEEDED
COMMUNITY

## 2019-01-17 NOTE — DISCHARGE INSTRUCTIONS

## 2019-01-18 ENCOUNTER — HOSPITAL ENCOUNTER (OUTPATIENT)
Facility: HOSPITAL | Age: 64
Discharge: HOME | End: 2019-01-18
Attending: INTERNAL MEDICINE | Admitting: INTERNAL MEDICINE

## 2019-01-18 ENCOUNTER — APPOINTMENT (OUTPATIENT)
Dept: CARDIOLOGY | Facility: HOSPITAL | Age: 64
End: 2019-01-18
Attending: INTERNAL MEDICINE

## 2019-01-18 VITALS
BODY MASS INDEX: 36.68 KG/M2 | HEIGHT: 68 IN | SYSTOLIC BLOOD PRESSURE: 129 MMHG | OXYGEN SATURATION: 97 % | WEIGHT: 242 LBS | HEART RATE: 73 BPM | TEMPERATURE: 98.6 F | RESPIRATION RATE: 16 BRPM | DIASTOLIC BLOOD PRESSURE: 79 MMHG

## 2019-01-18 DIAGNOSIS — R94.39 ABNORMAL STRESS TEST: ICD-10-CM

## 2019-01-18 LAB
ARTICHOKE IGE QN: 95 MG/DL (ref 0–130)
BH CV ECHO MEAS - AO ROOT AREA (BSA CORRECTED): 1.5
BH CV ECHO MEAS - AO ROOT AREA: 8.5 CM^2
BH CV ECHO MEAS - AO ROOT DIAM: 3.3 CM
BH CV ECHO MEAS - BSA(HAYCOCK): 2.3 M^2
BH CV ECHO MEAS - BSA: 2.2 M^2
BH CV ECHO MEAS - BZI_BMI: 36.8 KILOGRAMS/M^2
BH CV ECHO MEAS - BZI_METRIC_HEIGHT: 172.7 CM
BH CV ECHO MEAS - BZI_METRIC_WEIGHT: 109.8 KG
BH CV ECHO MEAS - EDV(CUBED): 103.5 ML
BH CV ECHO MEAS - EDV(MOD-SP2): 137 ML
BH CV ECHO MEAS - EDV(MOD-SP4): 193 ML
BH CV ECHO MEAS - EDV(TEICH): 102.1 ML
BH CV ECHO MEAS - EF(CUBED): 61.6 %
BH CV ECHO MEAS - EF(MOD-BP): 43 %
BH CV ECHO MEAS - EF(MOD-SP2): 44.5 %
BH CV ECHO MEAS - EF(MOD-SP4): 44 %
BH CV ECHO MEAS - EF(TEICH): 53.2 %
BH CV ECHO MEAS - ESV(CUBED): 39.7 ML
BH CV ECHO MEAS - ESV(MOD-SP2): 76 ML
BH CV ECHO MEAS - ESV(MOD-SP4): 108 ML
BH CV ECHO MEAS - ESV(TEICH): 47.8 ML
BH CV ECHO MEAS - FS: 27.3 %
BH CV ECHO MEAS - IVS/LVPW: 1.2
BH CV ECHO MEAS - IVSD: 1.5 CM
BH CV ECHO MEAS - LA DIMENSION: 4.1 CM
BH CV ECHO MEAS - LA/AO: 1.2
BH CV ECHO MEAS - LAD MAJOR: 5.4 CM
BH CV ECHO MEAS - LAT PEAK E' VEL: 7.6 CM/SEC
BH CV ECHO MEAS - LATERAL E/E' RATIO: 10.5
BH CV ECHO MEAS - LV DIASTOLIC VOL/BSA (35-75): 87.1 ML/M^2
BH CV ECHO MEAS - LV MASS(C)D: 267.1 GRAMS
BH CV ECHO MEAS - LV MASS(C)DI: 120.5 GRAMS/M^2
BH CV ECHO MEAS - LV SYSTOLIC VOL/BSA (12-30): 48.7 ML/M^2
BH CV ECHO MEAS - LVIDD: 4.7 CM
BH CV ECHO MEAS - LVIDS: 3.4 CM
BH CV ECHO MEAS - LVLD AP2: 8.3 CM
BH CV ECHO MEAS - LVLD AP4: 8.2 CM
BH CV ECHO MEAS - LVLS AP2: 7.5 CM
BH CV ECHO MEAS - LVLS AP4: 7 CM
BH CV ECHO MEAS - LVPWD: 1.3 CM
BH CV ECHO MEAS - MED PEAK E' VEL: 5.3 CM/SEC
BH CV ECHO MEAS - MEDIAL E/E' RATIO: 15.1
BH CV ECHO MEAS - MV A MAX VEL: 99.7 CM/SEC
BH CV ECHO MEAS - MV DEC SLOPE: 218.3 CM/SEC^2
BH CV ECHO MEAS - MV DEC TIME: 0.24 SEC
BH CV ECHO MEAS - MV E MAX VEL: 81.4 CM/SEC
BH CV ECHO MEAS - MV E/A: 0.82
BH CV ECHO MEAS - MV P1/2T MAX VEL: 89.6 CM/SEC
BH CV ECHO MEAS - MV P1/2T: 120.2 MSEC
BH CV ECHO MEAS - MVA P1/2T LCG: 2.5 CM^2
BH CV ECHO MEAS - MVA(P1/2T): 1.8 CM^2
BH CV ECHO MEAS - PA ACC SLOPE: 516.7 CM/SEC^2
BH CV ECHO MEAS - PA ACC TIME: 0.12 SEC
BH CV ECHO MEAS - PA PR(ACCEL): 23.5 MMHG
BH CV ECHO MEAS - SI(CUBED): 28.8 ML/M^2
BH CV ECHO MEAS - SI(MOD-SP2): 27.5 ML/M^2
BH CV ECHO MEAS - SI(MOD-SP4): 38.4 ML/M^2
BH CV ECHO MEAS - SI(TEICH): 24.5 ML/M^2
BH CV ECHO MEAS - SV(CUBED): 63.8 ML
BH CV ECHO MEAS - SV(MOD-SP2): 61 ML
BH CV ECHO MEAS - SV(MOD-SP4): 85 ML
BH CV ECHO MEAS - SV(TEICH): 54.3 ML
BH CV ECHO MEAS - TAPSE (>1.6): 2.5 CM2
BH CV ECHO MEASUREMENTS AVERAGE E/E' RATIO: 12.62
BH CV XLRA - RV BASE: 3.9 CM
BH CV XLRA - RV LENGTH: 7.5 CM
BH CV XLRA - RV MID: 3.3 CM
BH CV XLRA - TDI S': 11.5 CM/SEC
CHOLEST SERPL-MCNC: 133 MG/DL (ref 0–200)
GLUCOSE BLDC GLUCOMTR-MCNC: 132 MG/DL (ref 70–130)
GLUCOSE BLDC GLUCOMTR-MCNC: 139 MG/DL (ref 70–130)
HDLC SERPL-MCNC: 29 MG/DL (ref 40–60)
LEFT ATRIUM VOLUME INDEX: 25.7 ML/M^2
LEFT ATRIUM VOLUME: 57 ML
MAXIMAL PREDICTED HEART RATE: 158 BPM
STRESS TARGET HR: 134 BPM
TRIGL SERPL-MCNC: 168 MG/DL (ref 0–150)

## 2019-01-18 PROCEDURE — 82962 GLUCOSE BLOOD TEST: CPT

## 2019-01-18 PROCEDURE — 25010000002 MIDAZOLAM PER 1 MG: Performed by: INTERNAL MEDICINE

## 2019-01-18 PROCEDURE — 93458 L HRT ARTERY/VENTRICLE ANGIO: CPT | Performed by: INTERNAL MEDICINE

## 2019-01-18 PROCEDURE — 80061 LIPID PANEL: CPT | Performed by: PHYSICIAN ASSISTANT

## 2019-01-18 PROCEDURE — 25010000002 FENTANYL CITRATE (PF) 100 MCG/2ML SOLUTION: Performed by: INTERNAL MEDICINE

## 2019-01-18 PROCEDURE — 93306 TTE W/DOPPLER COMPLETE: CPT

## 2019-01-18 PROCEDURE — 99205 OFFICE O/P NEW HI 60 MIN: CPT | Performed by: THORACIC SURGERY (CARDIOTHORACIC VASCULAR SURGERY)

## 2019-01-18 PROCEDURE — C1769 GUIDE WIRE: HCPCS | Performed by: INTERNAL MEDICINE

## 2019-01-18 PROCEDURE — 93306 TTE W/DOPPLER COMPLETE: CPT | Performed by: INTERNAL MEDICINE

## 2019-01-18 PROCEDURE — 0 IOPAMIDOL PER 1 ML: Performed by: INTERNAL MEDICINE

## 2019-01-18 PROCEDURE — C1894 INTRO/SHEATH, NON-LASER: HCPCS | Performed by: INTERNAL MEDICINE

## 2019-01-18 PROCEDURE — 25010000002 HEPARIN (PORCINE) PER 1000 UNITS: Performed by: INTERNAL MEDICINE

## 2019-01-18 PROCEDURE — 25010000002 SULFUR HEXAFLUORIDE MICROSPH 60.7-25 MG RECONSTITUTED SUSPENSION: Performed by: INTERNAL MEDICINE

## 2019-01-18 RX ORDER — ASPIRIN 325 MG
325 TABLET, DELAYED RELEASE (ENTERIC COATED) ORAL DAILY
Status: DISCONTINUED | OUTPATIENT
Start: 2019-01-19 | End: 2019-01-18 | Stop reason: HOSPADM

## 2019-01-18 RX ORDER — ROSUVASTATIN CALCIUM 40 MG/1
40 TABLET, COATED ORAL DAILY
Qty: 30 TABLET | Refills: 11 | Status: SHIPPED | OUTPATIENT
Start: 2019-01-18 | End: 2019-04-25 | Stop reason: SDUPTHER

## 2019-01-18 RX ORDER — FENTANYL CITRATE 50 UG/ML
INJECTION, SOLUTION INTRAMUSCULAR; INTRAVENOUS AS NEEDED
Status: DISCONTINUED | OUTPATIENT
Start: 2019-01-18 | End: 2019-01-18 | Stop reason: HOSPADM

## 2019-01-18 RX ORDER — SODIUM CHLORIDE 0.9 % (FLUSH) 0.9 %
3-10 SYRINGE (ML) INJECTION AS NEEDED
Status: DISCONTINUED | OUTPATIENT
Start: 2019-01-18 | End: 2019-01-18 | Stop reason: HOSPADM

## 2019-01-18 RX ORDER — SODIUM CHLORIDE 0.9 % (FLUSH) 0.9 %
3 SYRINGE (ML) INJECTION EVERY 12 HOURS SCHEDULED
Status: DISCONTINUED | OUTPATIENT
Start: 2019-01-18 | End: 2019-01-18 | Stop reason: HOSPADM

## 2019-01-18 RX ORDER — MIDAZOLAM HYDROCHLORIDE 1 MG/ML
INJECTION INTRAMUSCULAR; INTRAVENOUS AS NEEDED
Status: DISCONTINUED | OUTPATIENT
Start: 2019-01-18 | End: 2019-01-18 | Stop reason: HOSPADM

## 2019-01-18 RX ORDER — LIDOCAINE HYDROCHLORIDE 10 MG/ML
INJECTION, SOLUTION EPIDURAL; INFILTRATION; INTRACAUDAL; PERINEURAL AS NEEDED
Status: DISCONTINUED | OUTPATIENT
Start: 2019-01-18 | End: 2019-01-18 | Stop reason: HOSPADM

## 2019-01-18 RX ORDER — SODIUM CHLORIDE 9 MG/ML
INJECTION, SOLUTION INTRAVENOUS CONTINUOUS PRN
Status: COMPLETED | OUTPATIENT
Start: 2019-01-18 | End: 2019-01-18

## 2019-01-18 RX ORDER — ONDANSETRON 2 MG/ML
4 INJECTION INTRAMUSCULAR; INTRAVENOUS EVERY 6 HOURS PRN
Status: DISCONTINUED | OUTPATIENT
Start: 2019-01-18 | End: 2019-01-18 | Stop reason: HOSPADM

## 2019-01-18 RX ORDER — NITROGLYCERIN 0.4 MG/1
0.4 TABLET SUBLINGUAL
Status: DISCONTINUED | OUTPATIENT
Start: 2019-01-18 | End: 2019-01-18 | Stop reason: HOSPADM

## 2019-01-18 RX ORDER — ASPIRIN 325 MG
325 TABLET ORAL ONCE
Status: COMPLETED | OUTPATIENT
Start: 2019-01-18 | End: 2019-01-18

## 2019-01-18 RX ORDER — ACETAMINOPHEN 325 MG/1
650 TABLET ORAL EVERY 4 HOURS PRN
Status: DISCONTINUED | OUTPATIENT
Start: 2019-01-18 | End: 2019-01-18 | Stop reason: HOSPADM

## 2019-01-18 RX ORDER — SODIUM CHLORIDE 9 MG/ML
1-3 INJECTION, SOLUTION INTRAVENOUS CONTINUOUS
Status: DISCONTINUED | OUTPATIENT
Start: 2019-01-18 | End: 2019-01-18 | Stop reason: HOSPADM

## 2019-01-18 RX ADMIN — SULFUR HEXAFLUORIDE 4 ML: KIT at 16:15

## 2019-01-18 RX ADMIN — SODIUM CHLORIDE 3 ML/KG/HR: 9 INJECTION, SOLUTION INTRAVENOUS at 09:17

## 2019-01-18 RX ADMIN — SODIUM CHLORIDE, PRESERVATIVE FREE 3 ML: 5 INJECTION INTRAVENOUS at 09:22

## 2019-01-18 RX ADMIN — ASPIRIN 325 MG ORAL TABLET 325 MG: 325 PILL ORAL at 09:17

## 2019-01-18 NOTE — NURSING NOTE
Echo lab called floor about 1500 to let me know that they would be sending for the patient for the carotid ultrasound and echo. When patient returned from echo lab, he was discharged home. At 1645, echo tech stopped at the floor asking where the patient was, stated that carotid ultrasound had not been completed in the lab. I informed her that echo staff did not call and notify me that the carotid ultrasound had not been completed as originally stated.

## 2019-01-18 NOTE — H&P
Pre-cardiac Catheterization History and Physical  [facility] at [facility]      Patient:  Jesse Brink  :  1955  MRN: [id number]    PCP:  Terri Barreto APRN  PHONE:  [phone]    DATE: 2019  ID: Jesse Brink is a 63 y.o. male resident of [place], [place]     CC: Chest pain     PROBLEM LIST:   1. Intermittent chest pain syndrome:  a. Cardiac catheterization, 2013, revealing no significant obstructive coronary artery disease with normal left ventricular systolic function.  b. Recurrent progressive chest pain 2018  2. Transient ischemic attack with normal echocardiogram, carotid duplex, and CT scan of the brain by verbal report.  3. Benign hypertension.  4. History of tobacco, quitting 28 years ago.  5. Hyperglycemia, diet controlled.  6. Rheumatoid arthritis.  7. Obesity.  8. Family history of heart disease.  9. Surgical history:  a. Pilonidal cyst, .  b. Benign fibroid removed, .  c. Anal fistula repair,  and     BRIEF HPI: Patient was seen in our offices last month, please refer to that note. He has been having increasing episodes of exertional chest pain relieved with rest for the past 2-3 months. He had a catheterization in  demonstrating nonobstructive CAD. He presents for repeat catheterization today.     Cardiac Risk Factors: advanced age (older than 55 for men, 65 for women), diabetes mellitus, dyslipidemia, hypertension, male gender, obesity (BMI >= 30 kg/m2) and sedentary lifestyle    Allergies:      Allergies   Allergen Reactions   • Adalimumab      HA, intractable ocular migraine       MEDICATIONS:  No current facility-administered medications on file prior to encounter.      Current Outpatient Medications on File Prior to Encounter   Medication Sig   • aspirin 81 MG tablet Take 81 mg by mouth Daily.   • Blood Glucose Monitoring Suppl (ONE TOUCH ULTRA 2) W/DEVICE kit USE PER MD INSTRUCTIONS   • Cholecalciferol  "(VITAMIN D3) 2000 UNITS capsule Take 2,000 Units by mouth Daily.   • doxazosin (CARDURA) 4 MG tablet Take 1 tablet by mouth 2 (Two) Times a Day.   • Empagliflozin (JARDIANCE) 25 MG tablet Take 25 mg by mouth Daily.   • ENBREL SURECLICK 50 MG/ML solution auto-injector 1 (One) Time Per Week.   • gabapentin (NEURONTIN) 300 MG capsule Take 1 capsule by mouth 3 (three) times a day.   • glucose blood (FREESTYLE TEST STRIPS) test strip 1 each by Does not apply route daily. And PRN Dx: DM2   • isosorbide mononitrate (IMDUR) 30 MG 24 hr tablet Take 1 tablet by mouth Daily. (Patient taking differently: Take 30 mg by mouth Daily. Patient has not started taking yet - has questions for Dr. Neville)   • losartan-hydrochlorothiazide (HYZAAR) 100-25 MG per tablet Take 1 tablet by mouth Daily.   • metFORMIN (GLUCOPHAGE) 500 MG tablet Take 1,000 mg by mouth 2 (Two) Times a Day.   • nebivolol (BYSTOLIC) 10 MG tablet Take 10 mg by mouth 2 (Two) Times a Day.   • ONETOUCH DELICA LANCETS FINE misc Use to test blood sugar twice daily and as needed.   • potassium chloride ER (K-TAB) 20 MEQ tablet controlled-release ER tablet Take 20 mEq by mouth Daily.   • tiZANidine (ZANAFLEX) 2 MG tablet Take 2 mg by mouth As Needed.       Past medical & surgical history, social and family history reviewed in the electronic medical record.    ROS: Pertinent positives listed in the HPI and problem list above. All others reviewed and negative.     Physical Exam:   /87 (BP Location: Right arm, Patient Position: Lying)   Pulse 76   Temp 98.6 °F (37 °C) (Oral)   Resp 18   Ht 172.7 cm (68\")   Wt 110 kg (242 lb)   BMI 36.80 kg/m²     Constitutional:    Alert, cooperative, in no acute distress   Neck:     No Jugular venous distention, adenopathy, or thyromegaly noted.    Heart:    Regular rhythm and normal rate, normal S1 and S2, no murmurs,gallops, rubs, or clicks. No distinct PMI noted.    Lungs:     Clear to auscultation bilaterally, respirations " regular, even     and unlabored    Abdomen:     Soft non-tender, non-distended, normal bowel sounds, no masses or organomegaly   Extremities:   No gross deformities, no edema, clubbing, or cyanosis.    Pulses:   Peripheral pulses palpable and equal bilaterally.     Barbaeu Test:  Left: Normal  (oxymetric Allens) Right: Not Assessed     Labs and Diagnostic Data:  Results from last 7 days   Lab Units 01/17/19  0944   SODIUM mmol/L 140   POTASSIUM mmol/L 4.0   CHLORIDE mmol/L 103   CO2 mmol/L 28.0   BUN mg/dL 16   CREATININE mg/dL 1.37*   GLUCOSE mg/dL 154*   CALCIUM mg/dL 9.5     Results from last 7 days   Lab Units 01/17/19  0944   WBC 10*3/mm3 6.32   HEMOGLOBIN g/dL 18.8*   HEMATOCRIT % 54.4*   PLATELETS 10*3/mm3 190     Lab Results   Component Value Date    CHOL 133 01/18/2019    TRIG 168 (H) 01/18/2019    HDL 29 (L) 01/18/2019    LDL 95 01/18/2019    AST 25 01/17/2019    ALT 42 (H) 01/17/2019     Results from last 7 days   Lab Units 01/17/19  0944   HEMOGLOBIN A1C % 6.90*               EKG 1/14/19: nonspecific    IMPRESSION:  · 62 y/o WM with history of nonobstructive CAD, HTN, HLD and DM2 with recent progressive symptoms of chest pain concerning for angina.     PLAN:  · Procedure to perform: LHC +/- CBI. Risks, benefits and alternatives to the procedure explained to the patient and he understands and wishes to proceed.

## 2019-01-18 NOTE — CONSULTS
Cardiothoracic Surgery History & Physical       Requesting physician: Dr. Dillon Neville MD    Chief complaint: Chest pain    HPI: 63-year-old  male with a history of hypertension, diabetes mellitus, remote tobacco abuse, obesity, transient ischemic attack and fibromyalgia who presents with chest pain.  For the past year, the patient has noticed substernal chest pressure that is aggravated with activity and alleviated with rest.  He will developed chest pain with activities such as weed eating or sometimes a simple walk.  His chest pain radiates to the bilateral arms and is associated with some shortness of breath on exertion.      Past Medical History:   Diagnosis Date   • Anxiety    • Cancer (CMS/HCC)     skin    • Chest pain syndrome    • Diabetes mellitus (CMS/HCC)    • Diverticulitis    • Fibromyalgia    • Hearing aid worn    • History of arthritis    • History of cellulitis    • History of diabetes mellitus    • History of hypertension    • History of kidney stones    • History of malignant neoplasm    • History of MRI of spine     demonstrating buldging discs at L3/L4, L4/L5, L5/S1 levels   • History of panic attacks    • History of tobacco abuse     History of tobacco, quitting 28 years ago.   • Hyperglycemia     Hyperglycemia, diet controlled.   • Hypertension    • Irregular heart beat    • Migraine     occular   • Obesity    • Rheumatoid arthritis (CMS/HCC)    • Rheumatoid arthritis (CMS/HCC)    • Transient ischemic attack     Recent transient ischemic attack with normal echocardiogram, carotid duplex, and CT scan of the brain by verbal report.     Past Surgical History:   Procedure Laterality Date   • ANAL FISTULA REPAIR  1999    Anal fistula repair, 1999 and 2008.   • CARDIAC CATHETERIZATION     • COLONOSCOPY     • MYOMECTOMY  1980    Benign fibroid removed, 1980.   • PILONIDAL CYSTECTOMY  1977     Family History   Problem Relation Age of Onset   • Heart disease Other    • Diabetes Other    •  Cancer Other    • Hypertension Other    • Heart disease Mother    • Diabetes Father    • Hypertension Father    • Cancer Father      Social History     Tobacco Use   • Smoking status: Former Smoker     Packs/day: 1.00     Types: Cigarettes   • Smokeless tobacco: Never Used   • Tobacco comment: quit 35 years ago   Substance Use Topics   • Alcohol use: Yes     Alcohol/week: 0.6 oz     Types: 1 Cans of beer per week     Comment: occas   • Drug use: No     Occupation: Retired   Lives in [facility] with his wife  Medications Prior to Admission   Medication Sig Dispense Refill Last Dose   • aspirin 81 MG tablet Take 81 mg by mouth Daily.   1/18/2019 at 0600   • baclofen (LIORESAL) 10 MG tablet Take 10 mg by mouth As Needed for Muscle Spasms.   Past Month at Unknown time   • Blood Glucose Monitoring Suppl (ONE TOUCH ULTRA 2) W/DEVICE kit USE PER MD INSTRUCTIONS   1/18/2019 at 0600   • Cholecalciferol (VITAMIN D3) 2000 UNITS capsule Take 2,000 Units by mouth Daily.   1/18/2019 at 0600   • doxazosin (CARDURA) 4 MG tablet Take 1 tablet by mouth 2 (Two) Times a Day. 180 tablet 2 1/18/2019 at 0600   • Empagliflozin (JARDIANCE) 25 MG tablet Take 25 mg by mouth Daily.   1/18/2019 at 0600   • ENBREL SURECLICK 50 MG/ML solution auto-injector 1 (One) Time Per Week.   1/18/2019 at 0600   • gabapentin (NEURONTIN) 300 MG capsule Take 1 capsule by mouth 3 (three) times a day. 90 capsule 3 1/18/2019 at 0600   • glucose blood (FREESTYLE TEST STRIPS) test strip 1 each by Does not apply route daily. And PRN Dx: DM2   1/18/2019 at Unknown time   • isosorbide mononitrate (IMDUR) 30 MG 24 hr tablet Take 1 tablet by mouth Daily. (Patient taking differently: Take 30 mg by mouth Daily. Patient has not started taking yet - has questions for Dr. Neville) 30 tablet 11 Patient Taking Differently at Unknown time   • losartan-hydrochlorothiazide (HYZAAR) 100-25 MG per tablet Take 1 tablet by mouth Daily.   1/18/2019 at 0600   • metFORMIN  (GLUCOPHAGE) 500 MG tablet Take 1,000 mg by mouth 2 (Two) Times a Day.   1/18/2019 at 0600   • nebivolol (BYSTOLIC) 10 MG tablet Take 10 mg by mouth 2 (Two) Times a Day.   1/18/2019 at 0600   • ONETOUCH DELICA LANCETS FINE misc Use to test blood sugar twice daily and as needed.   1/18/2019 at Unknown time   • potassium chloride ER (K-TAB) 20 MEQ tablet controlled-release ER tablet Take 20 mEq by mouth Daily.   1/18/2019 at 0600   • tiZANidine (ZANAFLEX) 2 MG tablet Take 2 mg by mouth As Needed.  1 Past Month     Allergies:  Adalimumab    Review of Systems:    A comprehensive review of systems was negative except for:   Constitutional: positive for fatigue  Respiratory: positive for dyspnea on exertion  Cardiovascular: positive for chest pain, dyspnea, exertional chest pressure/discomfort and fatigue    All other systems were reviewed and are negative.    Vital Signs:  Temp:  [98.6 °F (37 °C)] 98.6 °F (37 °C)  Heart Rate:  [68-82] 73  Resp:  [16-18] 16  BP: ()/() 129/79    Physical Exam:  Physical Exam   Constitutional: He is oriented to person, place, and time. He appears well-developed and well-nourished. No distress.   Obese  male   HENT:   Head: Normocephalic and atraumatic.   Eyes: Conjunctivae are normal. No scleral icterus.   Neck: Neck supple. No JVD present. No tracheal deviation present.   No carotid bruits bilaterally   Cardiovascular: Normal rate, regular rhythm and normal heart sounds. Exam reveals no gallop and no friction rub.   No murmur heard.  Pulmonary/Chest: Effort normal and breath sounds normal. No respiratory distress. He has no wheezes. He has no rales.   Abdominal: Soft. He exhibits no distension and no mass. There is no tenderness. There is no rebound and no guarding.   Musculoskeletal: Normal range of motion. He exhibits no edema.   Neurological: He is alert and oriented to person, place, and time.   Skin: Skin is warm and dry. No rash noted. He is not diaphoretic. No  erythema.   Psychiatric: He has a normal mood and affect. His behavior is normal. Judgment and thought content normal.       Labs:  Results from last 7 days   Lab Units 01/17/19  0944   WBC 10*3/mm3 6.32   HEMOGLOBIN g/dL 18.8*   HEMATOCRIT % 54.4*   PLATELETS 10*3/mm3 190     Results from last 7 days   Lab Units 01/17/19  0944   SODIUM mmol/L 140   POTASSIUM mmol/L 4.0   CHLORIDE mmol/L 103   CO2 mmol/L 28.0   BUN mg/dL 16   CREATININE mg/dL 1.37*   GLUCOSE mg/dL 154*   CALCIUM mg/dL 9.5     Imaging:   -Cardiac catheterization performed 1/18/19, personally reviewed, demonstrates proximal mid LAD occlusion.  The first diagonal has a 95% proximal stenosis.  The remaining LAD fills via collaterals.  The OM 1 and has 95% mid vessel stenosis and OM 2 has 70-80% stenosis.  The right coronary artery has 100% occlusion and fills via collaterals.  LVEDP 17 mmHg  -Echocardiogram is pending  -Carotid duplex is pending    Assessment: 63-year-old  male with a history of hypertension, diabetes mellitus, remote tobacco abuse, obesity, transient ischemic attack and fibromyalgia who presents with stable angina secondary to multivessel coronary artery disease.      Plan: Mr. Brink's echocardiogram is pending to assess for valvular dysfunction and ejection fraction.  Given his chronic occlusions, the patient may need a myocardial viability study if his ejection fraction is low on echocardiogram.  A carotid duplex will also be performed given his history of transient ischemic attack.  Assuming the patient has viable myocardium, his anatomy is reasonable for coronary artery bypass grafting.  The procedure was discussed with the patient along with the risks and benefits including pain, bleeding, infection, renal failure, stroke and death.  Mr. Brink is at higher risk for perioperative stroke given his previous transient ischemic attack and renal dysfunction given his elevated creatinine.  He understood these risks and  wished to proceed with surgery.  Dr. Neville plans to discharge the patient today after his ultrasounds and my office will call him to arrange for elective surgery.  We will arrange for a myocardial viability study prior to surgery if his ejection fraction is low.      Dillon Gu MD  01/18/19  4:06 PM

## 2019-01-23 ENCOUNTER — PREP FOR SURGERY (OUTPATIENT)
Dept: OTHER | Facility: HOSPITAL | Age: 64
End: 2019-01-23

## 2019-01-23 ENCOUNTER — OFFICE VISIT (OUTPATIENT)
Dept: PRIMARY CARE CLINIC | Age: 64
End: 2019-01-23
Payer: MEDICARE

## 2019-01-23 VITALS
BODY MASS INDEX: 36.49 KG/M2 | HEART RATE: 91 BPM | OXYGEN SATURATION: 97 % | WEIGHT: 240 LBS | SYSTOLIC BLOOD PRESSURE: 118 MMHG | DIASTOLIC BLOOD PRESSURE: 70 MMHG

## 2019-01-23 DIAGNOSIS — J06.9 UPPER RESPIRATORY TRACT INFECTION, UNSPECIFIED TYPE: ICD-10-CM

## 2019-01-23 DIAGNOSIS — I20.8 ANGINA AT REST (HCC): Primary | ICD-10-CM

## 2019-01-23 DIAGNOSIS — I25.119 CORONARY ARTERY DISEASE INVOLVING NATIVE HEART WITH ANGINA PECTORIS, UNSPECIFIED VESSEL OR LESION TYPE (HCC): Primary | ICD-10-CM

## 2019-01-23 DIAGNOSIS — Z79.4 TYPE 2 DIABETES MELLITUS WITHOUT COMPLICATION, WITH LONG-TERM CURRENT USE OF INSULIN (HCC): ICD-10-CM

## 2019-01-23 DIAGNOSIS — E11.9 TYPE 2 DIABETES MELLITUS WITHOUT COMPLICATION, WITH LONG-TERM CURRENT USE OF INSULIN (HCC): ICD-10-CM

## 2019-01-23 DIAGNOSIS — I10 ESSENTIAL HYPERTENSION: ICD-10-CM

## 2019-01-23 DIAGNOSIS — I25.10 CAD IN NATIVE ARTERY: Primary | ICD-10-CM

## 2019-01-23 PROBLEM — I20.89 ANGINA AT REST: Status: ACTIVE | Noted: 2019-01-23

## 2019-01-23 PROCEDURE — 2022F DILAT RTA XM EVC RTNOPTHY: CPT | Performed by: NURSE PRACTITIONER

## 2019-01-23 PROCEDURE — G8598 ASA/ANTIPLAT THER USED: HCPCS | Performed by: NURSE PRACTITIONER

## 2019-01-23 PROCEDURE — G8427 DOCREV CUR MEDS BY ELIG CLIN: HCPCS | Performed by: NURSE PRACTITIONER

## 2019-01-23 PROCEDURE — 3017F COLORECTAL CA SCREEN DOC REV: CPT | Performed by: NURSE PRACTITIONER

## 2019-01-23 PROCEDURE — 1036F TOBACCO NON-USER: CPT | Performed by: NURSE PRACTITIONER

## 2019-01-23 PROCEDURE — 3046F HEMOGLOBIN A1C LEVEL >9.0%: CPT | Performed by: NURSE PRACTITIONER

## 2019-01-23 PROCEDURE — G8417 CALC BMI ABV UP PARAM F/U: HCPCS | Performed by: NURSE PRACTITIONER

## 2019-01-23 PROCEDURE — G8482 FLU IMMUNIZE ORDER/ADMIN: HCPCS | Performed by: NURSE PRACTITIONER

## 2019-01-23 PROCEDURE — 99213 OFFICE O/P EST LOW 20 MIN: CPT | Performed by: NURSE PRACTITIONER

## 2019-01-23 RX ORDER — ISOSORBIDE MONONITRATE 30 MG/1
30 TABLET, EXTENDED RELEASE ORAL
COMMUNITY
Start: 2018-12-18 | End: 2019-02-15 | Stop reason: ALTCHOICE

## 2019-01-23 RX ORDER — CHLORHEXIDINE GLUCONATE 500 MG/1
1 CLOTH TOPICAL EVERY 12 HOURS PRN
Status: CANCELLED | OUTPATIENT
Start: 2019-01-30

## 2019-01-23 RX ORDER — ASPIRIN 325 MG
325 TABLET ORAL NIGHTLY
Status: CANCELLED | OUTPATIENT
Start: 2019-01-30 | End: 2019-01-31

## 2019-01-23 RX ORDER — CHLORHEXIDINE GLUCONATE 0.12 MG/ML
15 RINSE ORAL ONCE
Status: CANCELLED | OUTPATIENT
Start: 2019-01-31 | End: 2019-01-31

## 2019-01-23 RX ORDER — CHLORHEXIDINE GLUCONATE 500 MG/1
1 CLOTH TOPICAL EVERY 12 HOURS PRN
Status: CANCELLED | OUTPATIENT
Start: 2019-01-31

## 2019-01-23 RX ORDER — NITROGLYCERIN 0.4 MG/1
0.4 TABLET SUBLINGUAL
Status: CANCELLED | OUTPATIENT
Start: 2019-01-31

## 2019-01-23 RX ORDER — ACETAMINOPHEN 325 MG/1
650 TABLET ORAL EVERY 4 HOURS PRN
Status: CANCELLED | OUTPATIENT
Start: 2019-01-31

## 2019-01-23 RX ORDER — AZITHROMYCIN 250 MG/1
TABLET, FILM COATED ORAL
Qty: 6 TABLET | Refills: 0 | Status: SHIPPED | OUTPATIENT
Start: 2019-01-23 | End: 2019-02-02

## 2019-01-23 RX ORDER — BACLOFEN 10 MG/1
TABLET ORAL
COMMUNITY
Start: 2019-01-07 | End: 2019-02-12

## 2019-01-23 RX ORDER — GABAPENTIN 300 MG/1
300 CAPSULE ORAL
COMMUNITY
Start: 2016-07-06 | End: 2019-01-23 | Stop reason: SDUPTHER

## 2019-01-23 RX ORDER — ROSUVASTATIN CALCIUM 40 MG/1
40 TABLET, COATED ORAL
COMMUNITY
Start: 2019-01-18 | End: 2020-09-04 | Stop reason: SDUPTHER

## 2019-01-23 ASSESSMENT — ENCOUNTER SYMPTOMS
VOMITING: 0
NAUSEA: 0
EYE PAIN: 0
SORE THROAT: 0
SHORTNESS OF BREATH: 0
ABDOMINAL PAIN: 0

## 2019-01-24 ENCOUNTER — TELEPHONE (OUTPATIENT)
Dept: CARDIAC SURGERY | Facility: CLINIC | Age: 64
End: 2019-01-24

## 2019-01-24 ENCOUNTER — TELEPHONE (OUTPATIENT)
Dept: PRIMARY CARE CLINIC | Age: 64
End: 2019-01-24

## 2019-01-24 DIAGNOSIS — R05.9 COUGH: Primary | ICD-10-CM

## 2019-01-24 RX ORDER — HYDROCODONE POLISTIREX AND CHLORPHENIRAMINE POLISTIREX 10; 8 MG/5ML; MG/5ML
5 SUSPENSION, EXTENDED RELEASE ORAL EVERY 12 HOURS PRN
Qty: 30 ML | Refills: 0 | Status: SHIPPED | OUTPATIENT
Start: 2019-01-24 | End: 2019-01-27

## 2019-01-24 NOTE — TELEPHONE ENCOUNTER
----- Message from Dillon Gu MD sent at 1/24/2019  6:23 AM EST -----  It should be ok as long as he is feeling better by the time of surgery.      ----- Message -----  From: Eric Welsh  Sent: 1/23/2019  11:44 AM  To: Dillon Gu MD    I Have Mr. Brink scheduled on 01/31. He let me know this morning that he had went to his PCP this morning and was given a Z-Pack for a cold. He said he will be done with the Z-Pack before his surgery but wants to make sure that will not affect anything.   ----- Message -----  From: Dillon Gu MD  Sent: 1/23/2019   3:48 AM  To: Eric Welsh    No need for viability study as per our discussion, thanks    ----- Message -----  From: Eric Welsh  Sent: 1/22/2019   2:36 PM  To: Dillon Gu MD    Pt's EF is 36-40%. Is this ok or do you want the Myocardial viability study? Thanks.   ----- Message -----  From: Dillon Gu MD  Sent: 1/18/2019   4:14 PM  To: Eric Welsh    Please arrange for CABG in the next 1-2 weeks please.  IF his EF on echo is 30% or lower, please obtain a myocardial viability study before booking surgery.  Thanks.

## 2019-01-30 ENCOUNTER — HOSPITAL ENCOUNTER (OUTPATIENT)
Dept: GENERAL RADIOLOGY | Facility: HOSPITAL | Age: 64
Discharge: HOME | End: 2019-01-30
Admitting: PHYSICIAN ASSISTANT

## 2019-01-30 ENCOUNTER — APPOINTMENT (OUTPATIENT)
Dept: PREADMISSION TESTING | Facility: HOSPITAL | Age: 64
End: 2019-01-30

## 2019-01-30 ENCOUNTER — HOSPITAL ENCOUNTER (OUTPATIENT)
Dept: PULMONOLOGY | Facility: HOSPITAL | Age: 64
Discharge: HOME | End: 2019-01-30

## 2019-01-30 VITALS — BODY MASS INDEX: 35.4 KG/M2 | HEIGHT: 69 IN | WEIGHT: 239 LBS

## 2019-01-30 DIAGNOSIS — I25.10 CAD IN NATIVE ARTERY: ICD-10-CM

## 2019-01-30 LAB
ABO GROUP BLD: NORMAL
ALBUMIN SERPL-MCNC: 4.37 G/DL (ref 3.2–4.8)
ALBUMIN/GLOB SERPL: 2.1 G/DL (ref 1.5–2.5)
ALP SERPL-CCNC: 55 U/L (ref 25–100)
ALT SERPL W P-5'-P-CCNC: 57 U/L (ref 7–40)
AMPHET+METHAMPHET UR QL: NEGATIVE
AMPHETAMINES UR QL: NEGATIVE
ANION GAP SERPL CALCULATED.3IONS-SCNC: 8 MMOL/L (ref 3–11)
APTT PPP: 29.1 SECONDS (ref 24–37)
AST SERPL-CCNC: 38 U/L (ref 0–33)
BARBITURATES UR QL SCN: NEGATIVE
BASOPHILS # BLD AUTO: 0.02 10*3/MM3 (ref 0–0.2)
BASOPHILS NFR BLD AUTO: 0.3 % (ref 0–1)
BENZODIAZ UR QL SCN: NEGATIVE
BILIRUB SERPL-MCNC: 0.9 MG/DL (ref 0.3–1.2)
BLD GP AB SCN SERPL QL: NEGATIVE
BUN BLD-MCNC: 14 MG/DL (ref 9–23)
BUN/CREAT SERPL: 11.6 (ref 7–25)
BUPRENORPHINE SERPL-MCNC: NEGATIVE NG/ML
CALCIUM SPEC-SCNC: 9.6 MG/DL (ref 8.7–10.4)
CANNABINOIDS SERPL QL: NEGATIVE
CHLORIDE SERPL-SCNC: 103 MMOL/L (ref 99–109)
CO2 SERPL-SCNC: 26 MMOL/L (ref 20–31)
COCAINE UR QL: NEGATIVE
CREAT BLD-MCNC: 1.21 MG/DL (ref 0.6–1.3)
DEPRECATED RDW RBC AUTO: 41.4 FL (ref 37–54)
EOSINOPHIL # BLD AUTO: 0.12 10*3/MM3 (ref 0–0.3)
EOSINOPHIL NFR BLD AUTO: 1.5 % (ref 0–3)
ERYTHROCYTE [DISTWIDTH] IN BLOOD BY AUTOMATED COUNT: 12.7 % (ref 11.3–14.5)
GFR SERPL CREATININE-BSD FRML MDRD: 61 ML/MIN/1.73
GLOBULIN UR ELPH-MCNC: 2.1 GM/DL
GLUCOSE BLD-MCNC: 127 MG/DL (ref 70–100)
HBA1C MFR BLD: 6.6 % (ref 4.8–5.6)
HCT VFR BLD AUTO: 52.5 % (ref 38.9–50.9)
HGB BLD-MCNC: 18.3 G/DL (ref 13.1–17.5)
IMM GRANULOCYTES # BLD AUTO: 0.03 10*3/MM3 (ref 0–0.03)
IMM GRANULOCYTES NFR BLD AUTO: 0.4 % (ref 0–0.6)
INR PPP: 1.09 (ref 0.85–1.16)
LYMPHOCYTES # BLD AUTO: 2.4 10*3/MM3 (ref 0.6–4.8)
LYMPHOCYTES NFR BLD AUTO: 31 % (ref 24–44)
MAGNESIUM SERPL-MCNC: 1.9 MG/DL (ref 1.3–2.7)
MCH RBC QN AUTO: 31.4 PG (ref 27–31)
MCHC RBC AUTO-ENTMCNC: 34.9 G/DL (ref 32–36)
MCV RBC AUTO: 90.2 FL (ref 80–99)
METHADONE UR QL SCN: NEGATIVE
MONOCYTES # BLD AUTO: 0.78 10*3/MM3 (ref 0–1)
MONOCYTES NFR BLD AUTO: 10.1 % (ref 0–12)
NEUTROPHILS # BLD AUTO: 4.43 10*3/MM3 (ref 1.5–8.3)
NEUTROPHILS NFR BLD AUTO: 57.1 % (ref 41–71)
OPIATES UR QL: POSITIVE
OXYCODONE UR QL SCN: NEGATIVE
PA ADP PRP-ACNC: 103 PRU
PCP UR QL SCN: NEGATIVE
PLATELET # BLD AUTO: 189 10*3/MM3 (ref 150–450)
PMV BLD AUTO: 10.5 FL (ref 6–12)
POTASSIUM BLD-SCNC: 3.8 MMOL/L (ref 3.5–5.5)
PROPOXYPH UR QL: NEGATIVE
PROT SERPL-MCNC: 6.5 G/DL (ref 5.7–8.2)
PROTHROMBIN TIME: 13.5 SECONDS (ref 11.2–14.3)
RBC # BLD AUTO: 5.82 10*6/MM3 (ref 4.2–5.76)
RH BLD: POSITIVE
SODIUM BLD-SCNC: 137 MMOL/L (ref 132–146)
T&S EXPIRATION DATE: NORMAL
TRICYCLICS UR QL SCN: NEGATIVE
WBC NRBC COR # BLD: 7.75 10*3/MM3 (ref 3.5–10.8)

## 2019-01-30 PROCEDURE — 94010 BREATHING CAPACITY TEST: CPT | Performed by: INTERNAL MEDICINE

## 2019-01-30 PROCEDURE — 36415 COLL VENOUS BLD VENIPUNCTURE: CPT

## 2019-01-30 PROCEDURE — 71046 X-RAY EXAM CHEST 2 VIEWS: CPT

## 2019-01-30 PROCEDURE — 80053 COMPREHEN METABOLIC PANEL: CPT | Performed by: PHYSICIAN ASSISTANT

## 2019-01-30 PROCEDURE — 86923 COMPATIBILITY TEST ELECTRIC: CPT

## 2019-01-30 PROCEDURE — 85576 BLOOD PLATELET AGGREGATION: CPT | Performed by: PHYSICIAN ASSISTANT

## 2019-01-30 PROCEDURE — 86901 BLOOD TYPING SEROLOGIC RH(D): CPT | Performed by: PHYSICIAN ASSISTANT

## 2019-01-30 PROCEDURE — 83036 HEMOGLOBIN GLYCOSYLATED A1C: CPT | Performed by: PHYSICIAN ASSISTANT

## 2019-01-30 PROCEDURE — 80306 DRUG TEST PRSMV INSTRMNT: CPT | Performed by: PHYSICIAN ASSISTANT

## 2019-01-30 PROCEDURE — 94010 BREATHING CAPACITY TEST: CPT

## 2019-01-30 PROCEDURE — 85730 THROMBOPLASTIN TIME PARTIAL: CPT | Performed by: PHYSICIAN ASSISTANT

## 2019-01-30 PROCEDURE — 86900 BLOOD TYPING SEROLOGIC ABO: CPT | Performed by: PHYSICIAN ASSISTANT

## 2019-01-30 PROCEDURE — 85610 PROTHROMBIN TIME: CPT | Performed by: PHYSICIAN ASSISTANT

## 2019-01-30 PROCEDURE — 85025 COMPLETE CBC W/AUTO DIFF WBC: CPT | Performed by: PHYSICIAN ASSISTANT

## 2019-01-30 PROCEDURE — 86850 RBC ANTIBODY SCREEN: CPT | Performed by: PHYSICIAN ASSISTANT

## 2019-01-30 PROCEDURE — 83735 ASSAY OF MAGNESIUM: CPT | Performed by: PHYSICIAN ASSISTANT

## 2019-01-30 RX ORDER — CHLORHEXIDINE GLUCONATE 500 MG/1
1 CLOTH TOPICAL EVERY 12 HOURS PRN
Status: ACTIVE | OUTPATIENT
Start: 2019-01-30

## 2019-01-30 RX ORDER — ASPIRIN 325 MG
325 TABLET ORAL NIGHTLY
Status: SHIPPED | OUTPATIENT
Start: 2019-01-30 | End: 2019-01-31

## 2019-01-31 ENCOUNTER — APPOINTMENT (OUTPATIENT)
Dept: GENERAL RADIOLOGY | Facility: HOSPITAL | Age: 64
End: 2019-01-31

## 2019-01-31 ENCOUNTER — ANESTHESIA EVENT (OUTPATIENT)
Dept: PERIOP | Facility: HOSPITAL | Age: 64
End: 2019-01-31

## 2019-01-31 ENCOUNTER — APPOINTMENT (OUTPATIENT)
Dept: CARDIOLOGY | Facility: HOSPITAL | Age: 64
End: 2019-01-31

## 2019-01-31 ENCOUNTER — HOSPITAL ENCOUNTER (INPATIENT)
Facility: HOSPITAL | Age: 64
LOS: 5 days | Discharge: HOME | End: 2019-02-05
Attending: THORACIC SURGERY (CARDIOTHORACIC VASCULAR SURGERY) | Admitting: THORACIC SURGERY (CARDIOTHORACIC VASCULAR SURGERY)

## 2019-01-31 ENCOUNTER — ANESTHESIA (OUTPATIENT)
Dept: PERIOP | Facility: HOSPITAL | Age: 64
End: 2019-01-31

## 2019-01-31 DIAGNOSIS — I20.8 ANGINA AT REST (HCC): ICD-10-CM

## 2019-01-31 DIAGNOSIS — Z74.09 IMPAIRED FUNCTIONAL MOBILITY, BALANCE, GAIT, AND ENDURANCE: Primary | ICD-10-CM

## 2019-01-31 DIAGNOSIS — I25.10 CAD IN NATIVE ARTERY: ICD-10-CM

## 2019-01-31 PROBLEM — I20.9 ANGINA PECTORIS (HCC): Status: RESOLVED | Noted: 2019-01-23 | Resolved: 2019-01-31

## 2019-01-31 PROBLEM — Z95.1 S/P CABG X 5: Status: ACTIVE | Noted: 2019-01-31

## 2019-01-31 PROBLEM — I25.5 ISCHEMIC CARDIOMYOPATHY: Status: ACTIVE | Noted: 2019-01-31

## 2019-01-31 PROBLEM — E11.9 TYPE 2 DIABETES MELLITUS (HCC): Status: ACTIVE | Noted: 2019-01-31

## 2019-01-31 PROBLEM — I20.9 ANGINA PECTORIS: Status: ACTIVE | Noted: 2019-01-23

## 2019-01-31 PROBLEM — E78.2 MIXED HYPERLIPIDEMIA: Status: ACTIVE | Noted: 2019-01-31

## 2019-01-31 LAB
ABO GROUP BLD: NORMAL
ACT BLD: 103 SECONDS (ref 82–152)
ACT BLD: 109 SECONDS (ref 82–152)
ACT BLD: 120 SECONDS (ref 82–152)
ACT BLD: 477 SECONDS (ref 82–152)
ACT BLD: 488 SECONDS (ref 82–152)
ACT BLD: 703 SECONDS (ref 82–152)
ACT BLD: 708 SECONDS (ref 82–152)
ACT BLD: 769 SECONDS (ref 82–152)
ACT BLD: 98 SECONDS (ref 82–152)
ALBUMIN SERPL-MCNC: 3.57 G/DL (ref 3.2–4.8)
ALBUMIN SERPL-MCNC: 3.79 G/DL (ref 3.2–4.8)
ANION GAP SERPL CALCULATED.3IONS-SCNC: 10 MMOL/L (ref 3–11)
ANION GAP SERPL CALCULATED.3IONS-SCNC: 11 MMOL/L (ref 3–11)
APTT PPP: 27.9 SECONDS (ref 24–37)
ARTERIAL PATENCY WRIST A: ABNORMAL
ATMOSPHERIC PRESS: ABNORMAL MMHG
BASE EXCESS BLDA CALC-SCNC: -0.6 MMOL/L (ref 0–2)
BASE EXCESS BLDA CALC-SCNC: -1 MMOL/L (ref -5–5)
BASE EXCESS BLDA CALC-SCNC: -1 MMOL/L (ref -5–5)
BASE EXCESS BLDA CALC-SCNC: -1.4 MMOL/L (ref 0–2)
BASE EXCESS BLDA CALC-SCNC: -2 MMOL/L (ref -5–5)
BASE EXCESS BLDA CALC-SCNC: -2 MMOL/L (ref -5–5)
BASE EXCESS BLDA CALC-SCNC: -3 MMOL/L (ref -5–5)
BASE EXCESS BLDA CALC-SCNC: -3.1 MMOL/L (ref 0–2)
BASE EXCESS BLDA CALC-SCNC: -4 MMOL/L (ref -5–5)
BASE EXCESS BLDA CALC-SCNC: -4 MMOL/L (ref -5–5)
BASE EXCESS BLDA CALC-SCNC: 1 MMOL/L (ref -5–5)
BDY SITE: ABNORMAL
BH CV XLRA MEAS LEFT DIST CCA EDV: 13.8 CM/SEC
BH CV XLRA MEAS LEFT DIST CCA PSV: 65.3 CM/SEC
BH CV XLRA MEAS LEFT DIST ICA EDV: 24.8 CM/SEC
BH CV XLRA MEAS LEFT DIST ICA PSV: 66 CM/SEC
BH CV XLRA MEAS LEFT ICA/CCA RATIO: 0.75
BH CV XLRA MEAS LEFT MID CCA EDV: 13.8 CM/SEC
BH CV XLRA MEAS LEFT MID CCA PSV: 75 CM/SEC
BH CV XLRA MEAS LEFT MID ICA EDV: 21.4 CM/SEC
BH CV XLRA MEAS LEFT MID ICA PSV: 56.6 CM/SEC
BH CV XLRA MEAS LEFT PROX CCA EDV: 18.2 CM/SEC
BH CV XLRA MEAS LEFT PROX CCA PSV: 90.1 CM/SEC
BH CV XLRA MEAS LEFT PROX ECA EDV: 13.8 CM/SEC
BH CV XLRA MEAS LEFT PROX ECA PSV: 91 CM/SEC
BH CV XLRA MEAS LEFT PROX ICA EDV: 10.8 CM/SEC
BH CV XLRA MEAS LEFT PROX ICA PSV: 43 CM/SEC
BH CV XLRA MEAS LEFT PROX SCLA EDV: 11.2 CM/SEC
BH CV XLRA MEAS LEFT PROX SCLA PSV: 111 CM/SEC
BH CV XLRA MEAS LEFT VERTEBRAL A EDV: 14.9 CM/SEC
BH CV XLRA MEAS LEFT VERTEBRAL A PSV: 47.1 CM/SEC
BH CV XLRA MEAS RIGHT DIST CCA EDV: 16.3 CM/SEC
BH CV XLRA MEAS RIGHT DIST CCA PSV: 77.9 CM/SEC
BH CV XLRA MEAS RIGHT DIST ICA EDV: 22.7 CM/SEC
BH CV XLRA MEAS RIGHT DIST ICA PSV: 71.2 CM/SEC
BH CV XLRA MEAS RIGHT ICA/CCA RATIO: 0.85
BH CV XLRA MEAS RIGHT MID CCA EDV: 15.1 CM/SEC
BH CV XLRA MEAS RIGHT MID CCA PSV: 88 CM/SEC
BH CV XLRA MEAS RIGHT MID ICA EDV: 19.2 CM/SEC
BH CV XLRA MEAS RIGHT MID ICA PSV: 62 CM/SEC
BH CV XLRA MEAS RIGHT PROX CCA EDV: 15.7 CM/SEC
BH CV XLRA MEAS RIGHT PROX CCA PSV: 73.5 CM/SEC
BH CV XLRA MEAS RIGHT PROX ECA EDV: 9.8 CM/SEC
BH CV XLRA MEAS RIGHT PROX ECA PSV: 119.4 CM/SEC
BH CV XLRA MEAS RIGHT PROX ICA EDV: 19.5 CM/SEC
BH CV XLRA MEAS RIGHT PROX ICA PSV: 74.8 CM/SEC
BH CV XLRA MEAS RIGHT PROX SCLA EDV: 13.5 CM/SEC
BH CV XLRA MEAS RIGHT PROX SCLA PSV: 151.5 CM/SEC
BH CV XLRA MEAS RIGHT VERTEBRAL A EDV: 9.4 CM/SEC
BH CV XLRA MEAS RIGHT VERTEBRAL A PSV: 33.3 CM/SEC
BODY TEMPERATURE: 37 C
BUN BLD-MCNC: 15 MG/DL (ref 9–23)
BUN BLD-MCNC: 17 MG/DL (ref 9–23)
BUN/CREAT SERPL: 11.2 (ref 7–25)
BUN/CREAT SERPL: 11.9 (ref 7–25)
CA-I BLDA-SCNC: 1.04 MMOL/L (ref 1.2–1.32)
CA-I BLDA-SCNC: 1.08 MMOL/L (ref 1.2–1.32)
CA-I BLDA-SCNC: 1.12 MMOL/L (ref 1.2–1.32)
CA-I BLDA-SCNC: 1.15 MMOL/L (ref 1.2–1.32)
CA-I BLDA-SCNC: 1.16 MMOL/L (ref 1.2–1.32)
CA-I BLDA-SCNC: 1.17 MMOL/L (ref 1.2–1.32)
CA-I BLDA-SCNC: 1.25 MMOL/L (ref 1.2–1.32)
CA-I BLDA-SCNC: 1.26 MMOL/L (ref 1.2–1.32)
CA-I SERPL ISE-MCNC: 1.22 MMOL/L (ref 1.12–1.32)
CALCIUM SPEC-SCNC: 9 MG/DL (ref 8.7–10.4)
CALCIUM SPEC-SCNC: 9.1 MG/DL (ref 8.7–10.4)
CHLORIDE SERPL-SCNC: 106 MMOL/L (ref 99–109)
CHLORIDE SERPL-SCNC: 107 MMOL/L (ref 99–109)
CO2 BLDA-SCNC: 22 MMOL/L (ref 24–29)
CO2 BLDA-SCNC: 23 MMOL/L (ref 24–29)
CO2 BLDA-SCNC: 23.1 MMOL/L (ref 23–27)
CO2 BLDA-SCNC: 24 MMOL/L (ref 24–29)
CO2 BLDA-SCNC: 25 MMOL/L (ref 24–29)
CO2 BLDA-SCNC: 25 MMOL/L (ref 24–29)
CO2 BLDA-SCNC: 26 MMOL/L (ref 24–29)
CO2 BLDA-SCNC: 26.6 MMOL/L (ref 23–27)
CO2 BLDA-SCNC: 26.7 MMOL/L (ref 23–27)
CO2 BLDA-SCNC: 27 MMOL/L (ref 24–29)
CO2 BLDA-SCNC: 27 MMOL/L (ref 24–29)
CO2 SERPL-SCNC: 20 MMOL/L (ref 20–31)
CO2 SERPL-SCNC: 23 MMOL/L (ref 20–31)
COHGB MFR BLD: 1 % (ref 0–2)
COHGB MFR BLD: 1.1 % (ref 0–2)
COHGB MFR BLD: 1.2 % (ref 0–2)
CREAT BLD-MCNC: 1.34 MG/DL (ref 0.6–1.3)
CREAT BLD-MCNC: 1.43 MG/DL (ref 0.6–1.3)
DEPRECATED RDW RBC AUTO: 41.7 FL (ref 37–54)
DEPRECATED RDW RBC AUTO: 42.6 FL (ref 37–54)
DEPRECATED RDW RBC AUTO: 42.8 FL (ref 37–54)
ERYTHROCYTE [DISTWIDTH] IN BLOOD BY AUTOMATED COUNT: 12.5 % (ref 11.3–14.5)
ERYTHROCYTE [DISTWIDTH] IN BLOOD BY AUTOMATED COUNT: 12.8 % (ref 11.3–14.5)
ERYTHROCYTE [DISTWIDTH] IN BLOOD BY AUTOMATED COUNT: 12.8 % (ref 11.3–14.5)
GFR SERPL CREATININE-BSD FRML MDRD: 50 ML/MIN/1.73
GFR SERPL CREATININE-BSD FRML MDRD: 54 ML/MIN/1.73
GLUCOSE BLD-MCNC: 160 MG/DL (ref 70–100)
GLUCOSE BLD-MCNC: 161 MG/DL (ref 70–100)
GLUCOSE BLDC GLUCOMTR-MCNC: 109 MG/DL (ref 70–130)
GLUCOSE BLDC GLUCOMTR-MCNC: 110 MG/DL (ref 70–130)
GLUCOSE BLDC GLUCOMTR-MCNC: 115 MG/DL (ref 70–130)
GLUCOSE BLDC GLUCOMTR-MCNC: 128 MG/DL (ref 70–130)
GLUCOSE BLDC GLUCOMTR-MCNC: 131 MG/DL (ref 70–130)
GLUCOSE BLDC GLUCOMTR-MCNC: 143 MG/DL (ref 70–130)
GLUCOSE BLDC GLUCOMTR-MCNC: 145 MG/DL (ref 70–130)
GLUCOSE BLDC GLUCOMTR-MCNC: 151 MG/DL (ref 70–130)
GLUCOSE BLDC GLUCOMTR-MCNC: 156 MG/DL (ref 70–130)
GLUCOSE BLDC GLUCOMTR-MCNC: 165 MG/DL (ref 70–130)
GLUCOSE BLDC GLUCOMTR-MCNC: 167 MG/DL (ref 70–130)
HCO3 BLDA-SCNC: 20.7 MMOL/L (ref 22–26)
HCO3 BLDA-SCNC: 21.4 MMOL/L (ref 22–26)
HCO3 BLDA-SCNC: 22 MMOL/L (ref 20–26)
HCO3 BLDA-SCNC: 22.6 MMOL/L (ref 22–26)
HCO3 BLDA-SCNC: 23.3 MMOL/L (ref 22–26)
HCO3 BLDA-SCNC: 24 MMOL/L (ref 22–26)
HCO3 BLDA-SCNC: 24.5 MMOL/L (ref 22–26)
HCO3 BLDA-SCNC: 25.1 MMOL/L (ref 20–26)
HCO3 BLDA-SCNC: 25.2 MMOL/L (ref 22–26)
HCO3 BLDA-SCNC: 25.3 MMOL/L (ref 20–26)
HCO3 BLDA-SCNC: 25.4 MMOL/L (ref 22–26)
HCT VFR BLD AUTO: 37.3 % (ref 38.9–50.9)
HCT VFR BLD AUTO: 38.1 % (ref 38.9–50.9)
HCT VFR BLD AUTO: 39.9 % (ref 38.9–50.9)
HCT VFR BLD CALC: 39.5 %
HCT VFR BLD CALC: 40.4 %
HCT VFR BLD CALC: 41.3 %
HCT VFR BLDA CALC: 26 % (ref 38–51)
HCT VFR BLDA CALC: 32 % (ref 38–51)
HCT VFR BLDA CALC: 32 % (ref 38–51)
HCT VFR BLDA CALC: 33 % (ref 38–51)
HCT VFR BLDA CALC: 34 % (ref 38–51)
HCT VFR BLDA CALC: 37 % (ref 38–51)
HCT VFR BLDA CALC: 41 % (ref 38–51)
HCT VFR BLDA CALC: 43 % (ref 38–51)
HGB BLD-MCNC: 12.6 G/DL (ref 13.1–17.5)
HGB BLD-MCNC: 12.9 G/DL (ref 13.1–17.5)
HGB BLD-MCNC: 13.6 G/DL (ref 13.1–17.5)
HGB BLDA-MCNC: 10.9 G/DL (ref 12–17)
HGB BLDA-MCNC: 10.9 G/DL (ref 12–17)
HGB BLDA-MCNC: 11.2 G/DL (ref 12–17)
HGB BLDA-MCNC: 11.6 G/DL (ref 12–17)
HGB BLDA-MCNC: 12.6 G/DL (ref 12–17)
HGB BLDA-MCNC: 12.9 G/DL (ref 13.5–17.5)
HGB BLDA-MCNC: 13.2 G/DL (ref 13.5–17.5)
HGB BLDA-MCNC: 13.5 G/DL (ref 13.5–17.5)
HGB BLDA-MCNC: 13.9 G/DL (ref 12–17)
HGB BLDA-MCNC: 14.6 G/DL (ref 12–17)
HGB BLDA-MCNC: 8.8 G/DL (ref 12–17)
HOROWITZ INDEX BLD+IHG-RTO: 100 %
HOROWITZ INDEX BLD+IHG-RTO: 40 %
HOROWITZ INDEX BLD+IHG-RTO: 40 %
INR PPP: 1.5 (ref 0.85–1.16)
LEFT ARM BP: NORMAL MMHG
MAGNESIUM SERPL-MCNC: 2.4 MG/DL (ref 1.3–2.7)
MAGNESIUM SERPL-MCNC: 2.8 MG/DL (ref 1.3–2.7)
MCH RBC QN AUTO: 30.9 PG (ref 27–31)
MCH RBC QN AUTO: 31 PG (ref 27–31)
MCH RBC QN AUTO: 31 PG (ref 27–31)
MCHC RBC AUTO-ENTMCNC: 33.8 G/DL (ref 32–36)
MCHC RBC AUTO-ENTMCNC: 33.9 G/DL (ref 32–36)
MCHC RBC AUTO-ENTMCNC: 34.1 G/DL (ref 32–36)
MCV RBC AUTO: 90.9 FL (ref 80–99)
MCV RBC AUTO: 91.4 FL (ref 80–99)
MCV RBC AUTO: 91.6 FL (ref 80–99)
METHGB BLD QL: 1 % (ref 0–1.5)
METHGB BLD QL: 1.2 % (ref 0–1.5)
METHGB BLD QL: 1.2 % (ref 0–1.5)
MODALITY: ABNORMAL
NOTE: ABNORMAL
OXYHGB MFR BLDV: 95.7 % (ref 94–99)
OXYHGB MFR BLDV: 96.3 % (ref 94–99)
OXYHGB MFR BLDV: 97.3 % (ref 94–99)
PA ADP PRP-ACNC: 128 PRU
PCO2 BLDA: 31.6 MM HG (ref 35–45)
PCO2 BLDA: 38.1 MM HG (ref 35–45)
PCO2 BLDA: 38.4 MM HG
PCO2 BLDA: 40.3 MM HG (ref 35–45)
PCO2 BLDA: 41.3 MM HG (ref 35–45)
PCO2 BLDA: 41.8 MM HG (ref 35–45)
PCO2 BLDA: 41.9 MM HG (ref 35–45)
PCO2 BLDA: 44.5 MM HG (ref 35–45)
PCO2 BLDA: 46 MM HG
PCO2 BLDA: 48.1 MM HG (ref 35–45)
PCO2 BLDA: 48.3 MM HG
PCO2 TEMP ADJ BLD: 38.4 MM HG (ref 35–48)
PCO2 TEMP ADJ BLD: 46 MM HG (ref 35–48)
PCO2 TEMP ADJ BLD: 48.3 MM HG (ref 35–48)
PH BLDA: 7.33 PH UNITS (ref 7.35–7.45)
PH BLDA: 7.33 PH UNITS (ref 7.35–7.6)
PH BLDA: 7.34 PH UNITS (ref 7.35–7.6)
PH BLDA: 7.34 PH UNITS (ref 7.35–7.6)
PH BLDA: 7.35 PH UNITS (ref 7.35–7.45)
PH BLDA: 7.36 PH UNITS (ref 7.35–7.6)
PH BLDA: 7.36 PH UNITS (ref 7.35–7.6)
PH BLDA: 7.37 PH UNITS (ref 7.35–7.45)
PH BLDA: 7.38 PH UNITS (ref 7.35–7.6)
PH BLDA: 7.41 PH UNITS (ref 7.35–7.6)
PH BLDA: 7.42 PH UNITS (ref 7.35–7.6)
PH, TEMP CORRECTED: 7.33 PH UNITS
PH, TEMP CORRECTED: 7.35 PH UNITS
PH, TEMP CORRECTED: 7.37 PH UNITS
PHOSPHATE SERPL-MCNC: 1.5 MG/DL (ref 2.4–5.1)
PHOSPHATE SERPL-MCNC: 3.8 MG/DL (ref 2.4–5.1)
PLATELET # BLD AUTO: 112 10*3/MM3 (ref 150–450)
PLATELET # BLD AUTO: 114 10*3/MM3 (ref 150–450)
PLATELET # BLD AUTO: 121 10*3/MM3 (ref 150–450)
PMV BLD AUTO: 10.1 FL (ref 6–12)
PMV BLD AUTO: 10.3 FL (ref 6–12)
PMV BLD AUTO: 9.5 FL (ref 6–12)
PO2 BLDA: 110 MM HG (ref 83–108)
PO2 BLDA: 112 MM HG (ref 83–108)
PO2 BLDA: 193 MMHG (ref 80–105)
PO2 BLDA: 196 MM HG (ref 83–108)
PO2 BLDA: 229 MMHG (ref 80–105)
PO2 BLDA: 291 MMHG (ref 80–105)
PO2 BLDA: 294 MMHG (ref 80–105)
PO2 BLDA: 361 MMHG (ref 80–105)
PO2 BLDA: 367 MMHG (ref 80–105)
PO2 BLDA: 433 MMHG (ref 80–105)
PO2 BLDA: 77 MMHG (ref 80–105)
PO2 TEMP ADJ BLD: 110 MM HG (ref 83–108)
PO2 TEMP ADJ BLD: 112 MM HG (ref 83–108)
PO2 TEMP ADJ BLD: 196 MM HG (ref 83–108)
POTASSIUM BLD-SCNC: 3.3 MMOL/L (ref 3.5–5.5)
POTASSIUM BLD-SCNC: 4.1 MMOL/L (ref 3.5–5.5)
POTASSIUM BLDA-SCNC: 3.3 MMOL/L (ref 3.5–4.9)
POTASSIUM BLDA-SCNC: 3.6 MMOL/L (ref 3.5–4.9)
POTASSIUM BLDA-SCNC: 3.8 MMOL/L (ref 3.5–4.9)
POTASSIUM BLDA-SCNC: 4 MMOL/L (ref 3.5–4.9)
POTASSIUM BLDA-SCNC: 4.1 MMOL/L (ref 3.5–4.9)
POTASSIUM BLDA-SCNC: 4.1 MMOL/L (ref 3.5–4.9)
POTASSIUM BLDA-SCNC: 4.2 MMOL/L (ref 3.5–4.9)
POTASSIUM BLDA-SCNC: 4.3 MMOL/L (ref 3.5–4.9)
PROTHROMBIN TIME: 17.4 SECONDS (ref 11.2–14.3)
RBC # BLD AUTO: 4.07 10*6/MM3 (ref 4.2–5.76)
RBC # BLD AUTO: 4.17 10*6/MM3 (ref 4.2–5.76)
RBC # BLD AUTO: 4.39 10*6/MM3 (ref 4.2–5.76)
RH BLD: POSITIVE
SAO2 % BLDA: 100 % (ref 95–98)
SAO2 % BLDA: 96 % (ref 95–98)
SODIUM BLD-SCNC: 137 MMOL/L (ref 132–146)
SODIUM BLD-SCNC: 140 MMOL/L (ref 132–146)
SODIUM BLDA-SCNC: 133 MMOL/L (ref 138–146)
SODIUM BLDA-SCNC: 136 MMOL/L (ref 138–146)
SODIUM BLDA-SCNC: 137 MMOL/L (ref 138–146)
SODIUM BLDA-SCNC: 140 MMOL/L (ref 138–146)
SODIUM BLDA-SCNC: 143 MMOL/L (ref 138–146)
VENTILATOR MODE: ABNORMAL
WBC NRBC COR # BLD: 10.87 10*3/MM3 (ref 3.5–10.8)
WBC NRBC COR # BLD: 14.19 10*3/MM3 (ref 3.5–10.8)
WBC NRBC COR # BLD: 9.65 10*3/MM3 (ref 3.5–10.8)

## 2019-01-31 PROCEDURE — 33521 CABG ARTERY-VEIN FOUR: CPT | Performed by: THORACIC SURGERY (CARDIOTHORACIC VASCULAR SURGERY)

## 2019-01-31 PROCEDURE — 94799 UNLISTED PULMONARY SVC/PX: CPT

## 2019-01-31 PROCEDURE — 33508 ENDOSCOPIC VEIN HARVEST: CPT | Performed by: THORACIC SURGERY (CARDIOTHORACIC VASCULAR SURGERY)

## 2019-01-31 PROCEDURE — 83735 ASSAY OF MAGNESIUM: CPT | Performed by: PHYSICIAN ASSISTANT

## 2019-01-31 PROCEDURE — 85730 THROMBOPLASTIN TIME PARTIAL: CPT | Performed by: PHYSICIAN ASSISTANT

## 2019-01-31 PROCEDURE — 86900 BLOOD TYPING SEROLOGIC ABO: CPT

## 2019-01-31 PROCEDURE — 86901 BLOOD TYPING SEROLOGIC RH(D): CPT

## 2019-01-31 PROCEDURE — 5A2204Z RESTORATION OF CARDIAC RHYTHM, SINGLE: ICD-10-PCS | Performed by: THORACIC SURGERY (CARDIOTHORACIC VASCULAR SURGERY)

## 2019-01-31 PROCEDURE — 93880 EXTRACRANIAL BILAT STUDY: CPT | Performed by: INTERNAL MEDICINE

## 2019-01-31 PROCEDURE — P9041 ALBUMIN (HUMAN),5%, 50ML: HCPCS | Performed by: PHYSICIAN ASSISTANT

## 2019-01-31 PROCEDURE — 85027 COMPLETE CBC AUTOMATED: CPT | Performed by: THORACIC SURGERY (CARDIOTHORACIC VASCULAR SURGERY)

## 2019-01-31 PROCEDURE — 93010 ELECTROCARDIOGRAM REPORT: CPT | Performed by: INTERNAL MEDICINE

## 2019-01-31 PROCEDURE — 25010000002 FENTANYL CITRATE (PF) 100 MCG/2ML SOLUTION: Performed by: THORACIC SURGERY (CARDIOTHORACIC VASCULAR SURGERY)

## 2019-01-31 PROCEDURE — 25010000003 POTASSIUM CHLORIDE PER 2 MEQ: Performed by: PHYSICIAN ASSISTANT

## 2019-01-31 PROCEDURE — 84295 ASSAY OF SERUM SODIUM: CPT

## 2019-01-31 PROCEDURE — 25010000002 MIDAZOLAM PER 1 MG: Performed by: ANESTHESIOLOGY

## 2019-01-31 PROCEDURE — 06BP4ZZ EXCISION OF RIGHT SAPHENOUS VEIN, PERCUTANEOUS ENDOSCOPIC APPROACH: ICD-10-PCS | Performed by: THORACIC SURGERY (CARDIOTHORACIC VASCULAR SURGERY)

## 2019-01-31 PROCEDURE — 93005 ELECTROCARDIOGRAM TRACING: CPT | Performed by: PHYSICIAN ASSISTANT

## 2019-01-31 PROCEDURE — 84132 ASSAY OF SERUM POTASSIUM: CPT

## 2019-01-31 PROCEDURE — 25010000002 ALBUMIN HUMAN 5% PER 50 ML: Performed by: ANESTHESIOLOGY

## 2019-01-31 PROCEDURE — 25010000002 HEPARIN (PORCINE) PER 1000 UNITS: Performed by: ANESTHESIOLOGY

## 2019-01-31 PROCEDURE — 80069 RENAL FUNCTION PANEL: CPT | Performed by: PHYSICIAN ASSISTANT

## 2019-01-31 PROCEDURE — P9041 ALBUMIN (HUMAN),5%, 50ML: HCPCS

## 2019-01-31 PROCEDURE — 85027 COMPLETE CBC AUTOMATED: CPT | Performed by: PHYSICIAN ASSISTANT

## 2019-01-31 PROCEDURE — 02100AW BYPASS CORONARY ARTERY, ONE ARTERY FROM AORTA WITH AUTOLOGOUS ARTERIAL TISSUE, OPEN APPROACH: ICD-10-PCS | Performed by: THORACIC SURGERY (CARDIOTHORACIC VASCULAR SURGERY)

## 2019-01-31 PROCEDURE — 85014 HEMATOCRIT: CPT

## 2019-01-31 PROCEDURE — 82947 ASSAY GLUCOSE BLOOD QUANT: CPT

## 2019-01-31 PROCEDURE — 25010000002 ALBUMIN HUMAN 5% PER 50 ML

## 2019-01-31 PROCEDURE — 82330 ASSAY OF CALCIUM: CPT | Performed by: PHYSICIAN ASSISTANT

## 2019-01-31 PROCEDURE — 71045 X-RAY EXAM CHEST 1 VIEW: CPT

## 2019-01-31 PROCEDURE — 82803 BLOOD GASES ANY COMBINATION: CPT

## 2019-01-31 PROCEDURE — 02L70CK OCCLUSION OF LEFT ATRIAL APPENDAGE WITH EXTRALUMINAL DEVICE, OPEN APPROACH: ICD-10-PCS | Performed by: THORACIC SURGERY (CARDIOTHORACIC VASCULAR SURGERY)

## 2019-01-31 PROCEDURE — P9041 ALBUMIN (HUMAN),5%, 50ML: HCPCS | Performed by: ANESTHESIOLOGY

## 2019-01-31 PROCEDURE — 25010000002 CEFUROXIME PER 750 MG: Performed by: ANESTHESIOLOGY

## 2019-01-31 PROCEDURE — 85576 BLOOD PLATELET AGGREGATION: CPT | Performed by: ANESTHESIOLOGY

## 2019-01-31 PROCEDURE — 5A1221Z PERFORMANCE OF CARDIAC OUTPUT, CONTINUOUS: ICD-10-PCS | Performed by: THORACIC SURGERY (CARDIOTHORACIC VASCULAR SURGERY)

## 2019-01-31 PROCEDURE — A4648 IMPLANTABLE TISSUE MARKER: HCPCS | Performed by: THORACIC SURGERY (CARDIOTHORACIC VASCULAR SURGERY)

## 2019-01-31 PROCEDURE — 94002 VENT MGMT INPAT INIT DAY: CPT

## 2019-01-31 PROCEDURE — 25010000002 ONDANSETRON PER 1 MG: Performed by: PHYSICIAN ASSISTANT

## 2019-01-31 PROCEDURE — C1894 INTRO/SHEATH, NON-LASER: HCPCS | Performed by: THORACIC SURGERY (CARDIOTHORACIC VASCULAR SURGERY)

## 2019-01-31 PROCEDURE — C1751 CATH, INF, PER/CENT/MIDLINE: HCPCS | Performed by: ANESTHESIOLOGY

## 2019-01-31 PROCEDURE — 85347 COAGULATION TIME ACTIVATED: CPT

## 2019-01-31 PROCEDURE — 25010000002 PAPAVERINE PER 60 MG: Performed by: THORACIC SURGERY (CARDIOTHORACIC VASCULAR SURGERY)

## 2019-01-31 PROCEDURE — 99233 SBSQ HOSP IP/OBS HIGH 50: CPT | Performed by: INTERNAL MEDICINE

## 2019-01-31 PROCEDURE — 80069 RENAL FUNCTION PANEL: CPT | Performed by: THORACIC SURGERY (CARDIOTHORACIC VASCULAR SURGERY)

## 2019-01-31 PROCEDURE — 25010000002 HEPARIN (PORCINE) PER 1000 UNITS: Performed by: THORACIC SURGERY (CARDIOTHORACIC VASCULAR SURGERY)

## 2019-01-31 PROCEDURE — 82805 BLOOD GASES W/O2 SATURATION: CPT

## 2019-01-31 PROCEDURE — 93318 ECHO TRANSESOPHAGEAL INTRAOP: CPT | Performed by: THORACIC SURGERY (CARDIOTHORACIC VASCULAR SURGERY)

## 2019-01-31 PROCEDURE — 63710000001 INSULIN REGULAR HUMAN PER 5 UNITS: Performed by: ANESTHESIOLOGY

## 2019-01-31 PROCEDURE — 021309W BYPASS CORONARY ARTERY, FOUR OR MORE ARTERIES FROM AORTA WITH AUTOLOGOUS VENOUS TISSUE, OPEN APPROACH: ICD-10-PCS | Performed by: THORACIC SURGERY (CARDIOTHORACIC VASCULAR SURGERY)

## 2019-01-31 PROCEDURE — 25010000002 AMIODARONE PER 30 MG: Performed by: ANESTHESIOLOGY

## 2019-01-31 PROCEDURE — 33533 CABG ARTERIAL SINGLE: CPT | Performed by: THORACIC SURGERY (CARDIOTHORACIC VASCULAR SURGERY)

## 2019-01-31 PROCEDURE — 25010000003 DOPAMINE PER 40 MG: Performed by: ANESTHESIOLOGY

## 2019-01-31 PROCEDURE — 93880 EXTRACRANIAL BILAT STUDY: CPT

## 2019-01-31 PROCEDURE — 25010000002 ALBUMIN HUMAN 5% PER 50 ML: Performed by: PHYSICIAN ASSISTANT

## 2019-01-31 PROCEDURE — 25010000002 MORPHINE PER 10 MG: Performed by: THORACIC SURGERY (CARDIOTHORACIC VASCULAR SURGERY)

## 2019-01-31 PROCEDURE — 25010000002 CEFUROXIME: Performed by: PHYSICIAN ASSISTANT

## 2019-01-31 PROCEDURE — 82330 ASSAY OF CALCIUM: CPT

## 2019-01-31 PROCEDURE — 85610 PROTHROMBIN TIME: CPT | Performed by: PHYSICIAN ASSISTANT

## 2019-01-31 PROCEDURE — 25810000003 DEXTROSE 5 % WITH KCL 20 MEQ 20-5 MEQ/L-% SOLUTION: Performed by: PHYSICIAN ASSISTANT

## 2019-01-31 DEVICE — APPL CLIP LAA ATRICLIP FLX 35MM: Type: IMPLANTABLE DEVICE | Site: HEART | Status: FUNCTIONAL

## 2019-01-31 DEVICE — DISK-SHAPED STYLE, SILICONE (1 PER STERILE PKG)
Type: IMPLANTABLE DEVICE | Site: HEART | Status: FUNCTIONAL
Brand: SCANLAN® RADIOMARK® GRAFT MARKERS

## 2019-01-31 RX ORDER — MORPHINE SULFATE 4 MG/ML
4 INJECTION, SOLUTION INTRAMUSCULAR; INTRAVENOUS EVERY 4 HOURS PRN
Status: DISCONTINUED | OUTPATIENT
Start: 2019-01-31 | End: 2019-01-31

## 2019-01-31 RX ORDER — FENTANYL CITRATE 50 UG/ML
25 INJECTION, SOLUTION INTRAMUSCULAR; INTRAVENOUS
Status: DISCONTINUED | OUTPATIENT
Start: 2019-01-31 | End: 2019-02-01

## 2019-01-31 RX ORDER — HYDROCODONE BITARTRATE AND ACETAMINOPHEN 7.5; 325 MG/1; MG/1
1 TABLET ORAL EVERY 6 HOURS PRN
Status: DISCONTINUED | OUTPATIENT
Start: 2019-01-31 | End: 2019-01-31

## 2019-01-31 RX ORDER — POTASSIUM CHLORIDE 1.5 G/1.77G
40 POWDER, FOR SOLUTION ORAL AS NEEDED
Status: DISCONTINUED | OUTPATIENT
Start: 2019-01-31 | End: 2019-02-05 | Stop reason: HOSPADM

## 2019-01-31 RX ORDER — SODIUM CHLORIDE 0.9 % (FLUSH) 0.9 %
3 SYRINGE (ML) INJECTION EVERY 12 HOURS SCHEDULED
Status: CANCELLED | OUTPATIENT
Start: 2019-01-31

## 2019-01-31 RX ORDER — ASPIRIN 325 MG
325 TABLET, DELAYED RELEASE (ENTERIC COATED) ORAL DAILY
Status: DISCONTINUED | OUTPATIENT
Start: 2019-02-01 | End: 2019-02-05 | Stop reason: HOSPADM

## 2019-01-31 RX ORDER — ATORVASTATIN CALCIUM 40 MG/1
40 TABLET, FILM COATED ORAL NIGHTLY
Status: DISCONTINUED | OUTPATIENT
Start: 2019-01-31 | End: 2019-02-01

## 2019-01-31 RX ORDER — CHLORHEXIDINE GLUCONATE 0.12 MG/ML
15 RINSE ORAL EVERY 12 HOURS SCHEDULED
Status: DISCONTINUED | OUTPATIENT
Start: 2019-01-31 | End: 2019-02-01

## 2019-01-31 RX ORDER — VECURONIUM BROMIDE 1 MG/ML
INJECTION, POWDER, LYOPHILIZED, FOR SOLUTION INTRAVENOUS AS NEEDED
Status: DISCONTINUED | OUTPATIENT
Start: 2019-01-31 | End: 2019-01-31 | Stop reason: SURG

## 2019-01-31 RX ORDER — DOPAMINE HYDROCHLORIDE 160 MG/100ML
2-20 INJECTION, SOLUTION INTRAVENOUS CONTINUOUS PRN
Status: DISCONTINUED | OUTPATIENT
Start: 2019-01-31 | End: 2019-02-01

## 2019-01-31 RX ORDER — PROTAMINE SULFATE 10 MG/ML
50 INJECTION, SOLUTION INTRAVENOUS ONCE
Status: DISCONTINUED | OUTPATIENT
Start: 2019-01-31 | End: 2019-02-01

## 2019-01-31 RX ORDER — METOPROLOL TARTRATE 5 MG/5ML
2.5 INJECTION INTRAVENOUS EVERY 6 HOURS SCHEDULED
Status: DISCONTINUED | OUTPATIENT
Start: 2019-01-31 | End: 2019-02-01

## 2019-01-31 RX ORDER — SENNA AND DOCUSATE SODIUM 50; 8.6 MG/1; MG/1
2 TABLET, FILM COATED ORAL 2 TIMES DAILY
Status: DISCONTINUED | OUTPATIENT
Start: 2019-01-31 | End: 2019-02-05

## 2019-01-31 RX ORDER — LIDOCAINE HYDROCHLORIDE 10 MG/ML
0.5 INJECTION, SOLUTION EPIDURAL; INFILTRATION; INTRACAUDAL; PERINEURAL ONCE AS NEEDED
Status: COMPLETED | OUTPATIENT
Start: 2019-01-31 | End: 2019-01-31

## 2019-01-31 RX ORDER — ASPIRIN 325 MG
325 TABLET ORAL ONCE
Status: DISCONTINUED | OUTPATIENT
Start: 2019-01-31 | End: 2019-02-01

## 2019-01-31 RX ORDER — POTASSIUM CHLORIDE 29.8 MG/ML
20 INJECTION INTRAVENOUS
Status: DISCONTINUED | OUTPATIENT
Start: 2019-01-31 | End: 2019-02-01

## 2019-01-31 RX ORDER — NITROGLYCERIN 20 MG/100ML
5-200 INJECTION INTRAVENOUS CONTINUOUS PRN
Status: DISCONTINUED | OUTPATIENT
Start: 2019-01-31 | End: 2019-02-03

## 2019-01-31 RX ORDER — CHLORHEXIDINE GLUCONATE 0.12 MG/ML
15 RINSE ORAL ONCE
Status: COMPLETED | OUTPATIENT
Start: 2019-01-31 | End: 2019-01-31

## 2019-01-31 RX ORDER — BISACODYL 10 MG
10 SUPPOSITORY, RECTAL RECTAL DAILY PRN
Status: DISCONTINUED | OUTPATIENT
Start: 2019-02-01 | End: 2019-02-05 | Stop reason: HOSPADM

## 2019-01-31 RX ORDER — ACETAMINOPHEN 325 MG/1
650 TABLET ORAL EVERY 4 HOURS PRN
Status: DISCONTINUED | OUTPATIENT
Start: 2019-01-31 | End: 2019-01-31 | Stop reason: HOSPADM

## 2019-01-31 RX ORDER — ALBUMIN, HUMAN INJ 5% 5 %
SOLUTION INTRAVENOUS CONTINUOUS PRN
Status: DISCONTINUED | OUTPATIENT
Start: 2019-01-31 | End: 2019-01-31 | Stop reason: SURG

## 2019-01-31 RX ORDER — SODIUM CHLORIDE, SODIUM LACTATE, POTASSIUM CHLORIDE, CALCIUM CHLORIDE 600; 310; 30; 20 MG/100ML; MG/100ML; MG/100ML; MG/100ML
9 INJECTION, SOLUTION INTRAVENOUS CONTINUOUS
Status: DISCONTINUED | OUTPATIENT
Start: 2019-01-31 | End: 2019-01-31

## 2019-01-31 RX ORDER — AMIODARONE HYDROCHLORIDE 50 MG/ML
INJECTION, SOLUTION INTRAVENOUS AS NEEDED
Status: DISCONTINUED | OUTPATIENT
Start: 2019-01-31 | End: 2019-01-31 | Stop reason: SURG

## 2019-01-31 RX ORDER — ALBUMIN, HUMAN INJ 5% 5 %
500 SOLUTION INTRAVENOUS AS NEEDED
Status: COMPLETED | OUTPATIENT
Start: 2019-01-31 | End: 2019-01-31

## 2019-01-31 RX ORDER — DOPAMINE HYDROCHLORIDE 80 MG/100ML
INJECTION, SOLUTION INTRAVENOUS CONTINUOUS PRN
Status: DISCONTINUED | OUTPATIENT
Start: 2019-01-31 | End: 2019-01-31 | Stop reason: SURG

## 2019-01-31 RX ORDER — LIDOCAINE HYDROCHLORIDE 10 MG/ML
INJECTION, SOLUTION INFILTRATION; PERINEURAL AS NEEDED
Status: DISCONTINUED | OUTPATIENT
Start: 2019-01-31 | End: 2019-01-31 | Stop reason: SURG

## 2019-01-31 RX ORDER — PHENYLEPHRINE HCL IN 0.9% NACL 0.5 MG/5ML
.5-3 SYRINGE (ML) INTRAVENOUS CONTINUOUS PRN
Status: DISCONTINUED | OUTPATIENT
Start: 2019-01-31 | End: 2019-02-03 | Stop reason: ALTCHOICE

## 2019-01-31 RX ORDER — DOBUTAMINE HYDROCHLORIDE 100 MG/100ML
2-20 INJECTION INTRAVENOUS CONTINUOUS PRN
Status: DISCONTINUED | OUTPATIENT
Start: 2019-01-31 | End: 2019-02-01

## 2019-01-31 RX ORDER — SODIUM CHLORIDE 9 MG/ML
INJECTION, SOLUTION INTRAVENOUS AS NEEDED
Status: DISCONTINUED | OUTPATIENT
Start: 2019-01-31 | End: 2019-01-31 | Stop reason: HOSPADM

## 2019-01-31 RX ORDER — OXYCODONE AND ACETAMINOPHEN 10; 325 MG/1; MG/1
1 TABLET ORAL EVERY 4 HOURS PRN
Status: DISCONTINUED | OUTPATIENT
Start: 2019-01-31 | End: 2019-02-03

## 2019-01-31 RX ORDER — PAPAVERINE HYDROCHLORIDE 30 MG/ML
INJECTION INTRAMUSCULAR; INTRAVENOUS AS NEEDED
Status: DISCONTINUED | OUTPATIENT
Start: 2019-01-31 | End: 2019-01-31 | Stop reason: HOSPADM

## 2019-01-31 RX ORDER — MIDAZOLAM HYDROCHLORIDE 1 MG/ML
INJECTION INTRAMUSCULAR; INTRAVENOUS AS NEEDED
Status: DISCONTINUED | OUTPATIENT
Start: 2019-01-31 | End: 2019-01-31 | Stop reason: SURG

## 2019-01-31 RX ORDER — BISACODYL 5 MG/1
10 TABLET, DELAYED RELEASE ORAL DAILY PRN
Status: DISCONTINUED | OUTPATIENT
Start: 2019-01-31 | End: 2019-02-05 | Stop reason: HOSPADM

## 2019-01-31 RX ORDER — ONDANSETRON 2 MG/ML
4 INJECTION INTRAMUSCULAR; INTRAVENOUS EVERY 6 HOURS PRN
Status: DISCONTINUED | OUTPATIENT
Start: 2019-01-31 | End: 2019-02-05 | Stop reason: HOSPADM

## 2019-01-31 RX ORDER — SODIUM CHLORIDE 9 MG/ML
INJECTION, SOLUTION INTRAVENOUS CONTINUOUS PRN
Status: DISCONTINUED | OUTPATIENT
Start: 2019-01-31 | End: 2019-01-31 | Stop reason: SURG

## 2019-01-31 RX ORDER — DOCUSATE SODIUM 100 MG/1
100 CAPSULE, LIQUID FILLED ORAL 2 TIMES DAILY PRN
Status: DISCONTINUED | OUTPATIENT
Start: 2019-01-31 | End: 2019-02-05 | Stop reason: HOSPADM

## 2019-01-31 RX ORDER — SUFENTANIL CITRATE 50 UG/ML
INJECTION EPIDURAL; INTRAVENOUS AS NEEDED
Status: DISCONTINUED | OUTPATIENT
Start: 2019-01-31 | End: 2019-01-31 | Stop reason: SURG

## 2019-01-31 RX ORDER — ETOMIDATE 2 MG/ML
INJECTION INTRAVENOUS AS NEEDED
Status: DISCONTINUED | OUTPATIENT
Start: 2019-01-31 | End: 2019-01-31 | Stop reason: SURG

## 2019-01-31 RX ORDER — HYDROCODONE BITARTRATE AND ACETAMINOPHEN 7.5; 325 MG/1; MG/1
1 TABLET ORAL EVERY 4 HOURS PRN
Status: DISCONTINUED | OUTPATIENT
Start: 2019-01-31 | End: 2019-02-05 | Stop reason: HOSPADM

## 2019-01-31 RX ORDER — MAGNESIUM HYDROXIDE 1200 MG/15ML
LIQUID ORAL AS NEEDED
Status: DISCONTINUED | OUTPATIENT
Start: 2019-01-31 | End: 2019-01-31 | Stop reason: HOSPADM

## 2019-01-31 RX ORDER — ALBUMIN, HUMAN INJ 5% 5 %
SOLUTION INTRAVENOUS
Status: COMPLETED
Start: 2019-01-31 | End: 2019-01-31

## 2019-01-31 RX ORDER — MORPHINE SULFATE 2 MG/ML
2 INJECTION, SOLUTION INTRAMUSCULAR; INTRAVENOUS
Status: DISCONTINUED | OUTPATIENT
Start: 2019-01-31 | End: 2019-01-31

## 2019-01-31 RX ORDER — POTASSIUM CHLORIDE, DEXTROSE MONOHYDRATE 150; 5 MG/100ML; G/100ML
30 INJECTION, SOLUTION INTRAVENOUS CONTINUOUS
Status: DISCONTINUED | OUTPATIENT
Start: 2019-01-31 | End: 2019-02-01

## 2019-01-31 RX ORDER — DEXMEDETOMIDINE HYDROCHLORIDE 4 UG/ML
.2-1.5 INJECTION, SOLUTION INTRAVENOUS CONTINUOUS PRN
Status: DISCONTINUED | OUTPATIENT
Start: 2019-01-31 | End: 2019-02-01

## 2019-01-31 RX ORDER — ROCURONIUM BROMIDE 10 MG/ML
INJECTION, SOLUTION INTRAVENOUS AS NEEDED
Status: DISCONTINUED | OUTPATIENT
Start: 2019-01-31 | End: 2019-01-31 | Stop reason: SURG

## 2019-01-31 RX ORDER — ALBUTEROL SULFATE 2.5 MG/3ML
2.5 SOLUTION RESPIRATORY (INHALATION) EVERY 4 HOURS PRN
Status: DISCONTINUED | OUTPATIENT
Start: 2019-01-31 | End: 2019-02-01

## 2019-01-31 RX ORDER — HEPARIN SODIUM 1000 [USP'U]/ML
INJECTION, SOLUTION INTRAVENOUS; SUBCUTANEOUS AS NEEDED
Status: DISCONTINUED | OUTPATIENT
Start: 2019-01-31 | End: 2019-01-31 | Stop reason: SURG

## 2019-01-31 RX ORDER — FAMOTIDINE 10 MG/ML
20 INJECTION, SOLUTION INTRAVENOUS ONCE
Status: CANCELLED | OUTPATIENT
Start: 2019-01-31 | End: 2019-01-31

## 2019-01-31 RX ORDER — NOREPINEPHRINE BIT/0.9 % NACL 8 MG/250ML
.02-.3 INFUSION BOTTLE (ML) INTRAVENOUS CONTINUOUS PRN
Status: DISCONTINUED | OUTPATIENT
Start: 2019-01-31 | End: 2019-02-03

## 2019-01-31 RX ORDER — MEPERIDINE HYDROCHLORIDE 25 MG/ML
25 INJECTION INTRAMUSCULAR; INTRAVENOUS; SUBCUTANEOUS EVERY 4 HOURS PRN
Status: DISCONTINUED | OUTPATIENT
Start: 2019-01-31 | End: 2019-02-01

## 2019-01-31 RX ORDER — NITROGLYCERIN 0.4 MG/1
0.4 TABLET SUBLINGUAL
Status: DISCONTINUED | OUTPATIENT
Start: 2019-01-31 | End: 2019-01-31 | Stop reason: HOSPADM

## 2019-01-31 RX ORDER — SODIUM CHLORIDE 9 MG/ML
30 INJECTION, SOLUTION INTRAVENOUS CONTINUOUS PRN
Status: DISCONTINUED | OUTPATIENT
Start: 2019-01-31 | End: 2019-02-01

## 2019-01-31 RX ORDER — FAMOTIDINE 20 MG/1
20 TABLET, FILM COATED ORAL ONCE
Status: COMPLETED | OUTPATIENT
Start: 2019-01-31 | End: 2019-01-31

## 2019-01-31 RX ORDER — CHLORHEXIDINE GLUCONATE 500 MG/1
1 CLOTH TOPICAL EVERY 12 HOURS PRN
Status: DISCONTINUED | OUTPATIENT
Start: 2019-01-31 | End: 2019-01-31 | Stop reason: HOSPADM

## 2019-01-31 RX ORDER — CALCIUM CHLORIDE 100 MG/ML
INJECTION INTRAVENOUS; INTRAVENTRICULAR AS NEEDED
Status: DISCONTINUED | OUTPATIENT
Start: 2019-01-31 | End: 2019-01-31 | Stop reason: SURG

## 2019-01-31 RX ORDER — MORPHINE SULFATE 2 MG/ML
2 INJECTION, SOLUTION INTRAMUSCULAR; INTRAVENOUS
Status: DISCONTINUED | OUTPATIENT
Start: 2019-01-31 | End: 2019-02-01

## 2019-01-31 RX ORDER — SODIUM CHLORIDE 0.9 % (FLUSH) 0.9 %
30 SYRINGE (ML) INJECTION ONCE AS NEEDED
Status: DISCONTINUED | OUTPATIENT
Start: 2019-01-31 | End: 2019-02-01

## 2019-01-31 RX ORDER — POTASSIUM CHLORIDE 750 MG/1
40 CAPSULE, EXTENDED RELEASE ORAL AS NEEDED
Status: DISCONTINUED | OUTPATIENT
Start: 2019-01-31 | End: 2019-02-05 | Stop reason: HOSPADM

## 2019-01-31 RX ORDER — NOREPINEPHRINE BITARTRATE 1 MG/ML
INJECTION, SOLUTION INTRAVENOUS CONTINUOUS PRN
Status: DISCONTINUED | OUTPATIENT
Start: 2019-01-31 | End: 2019-01-31 | Stop reason: SURG

## 2019-01-31 RX ORDER — MIDAZOLAM HYDROCHLORIDE 1 MG/ML
1.5 INJECTION INTRAMUSCULAR; INTRAVENOUS ONCE
Status: COMPLETED | OUTPATIENT
Start: 2019-01-31 | End: 2019-01-31

## 2019-01-31 RX ORDER — SODIUM CHLORIDE 0.9 % (FLUSH) 0.9 %
3-10 SYRINGE (ML) INJECTION AS NEEDED
Status: CANCELLED | OUTPATIENT
Start: 2019-01-31

## 2019-01-31 RX ADMIN — SODIUM CHLORIDE: 9 INJECTION, SOLUTION INTRAVENOUS at 08:19

## 2019-01-31 RX ADMIN — SODIUM CHLORIDE 2.4 UNITS/HR: 9 INJECTION, SOLUTION INTRAVENOUS at 16:08

## 2019-01-31 RX ADMIN — CHLORHEXIDINE GLUCONATE 15 ML: 1.2 RINSE ORAL at 06:32

## 2019-01-31 RX ADMIN — DOPAMINE HYDROCHLORIDE 5 MCG/KG/MIN: 80 INJECTION, SOLUTION INTRAVENOUS at 13:05

## 2019-01-31 RX ADMIN — ALBUMIN (HUMAN) 500 ML: 12.5 SOLUTION INTRAVENOUS at 15:06

## 2019-01-31 RX ADMIN — VECURONIUM BROMIDE 20 MG: 1 INJECTION, POWDER, LYOPHILIZED, FOR SOLUTION INTRAVENOUS at 08:37

## 2019-01-31 RX ADMIN — SODIUM GLYCOLATE 20 MMOL: 216 INJECTION, SOLUTION INTRAVENOUS at 20:34

## 2019-01-31 RX ADMIN — VASOPRESSIN 0.02 UNITS/MIN: 20 INJECTION, SOLUTION INTRAMUSCULAR; SUBCUTANEOUS at 13:32

## 2019-01-31 RX ADMIN — POTASSIUM CHLORIDE 20 MEQ: 29.8 INJECTION, SOLUTION INTRAVENOUS at 20:34

## 2019-01-31 RX ADMIN — CALCIUM CHLORIDE 500 G: 100 INJECTION INTRAVENOUS; INTRAVENTRICULAR at 09:35

## 2019-01-31 RX ADMIN — INSULIN HUMAN 6 UNITS: 100 INJECTION, SOLUTION PARENTERAL at 12:27

## 2019-01-31 RX ADMIN — SODIUM BICARBONATE 25 MEQ: 84 INJECTION, SOLUTION INTRAVENOUS at 14:21

## 2019-01-31 RX ADMIN — FENTANYL CITRATE 25 MCG: 50 INJECTION, SOLUTION INTRAMUSCULAR; INTRAVENOUS at 21:43

## 2019-01-31 RX ADMIN — FAMOTIDINE 20 MG: 20 TABLET, FILM COATED ORAL at 06:32

## 2019-01-31 RX ADMIN — CEFUROXIME 1.5 G: 90 INJECTION, POWDER, FOR SOLUTION INTRAVENOUS at 08:37

## 2019-01-31 RX ADMIN — CEFUROXIME 1.5 G: 1.5 INJECTION, POWDER, FOR SOLUTION INTRAVENOUS at 21:16

## 2019-01-31 RX ADMIN — ATORVASTATIN CALCIUM 40 MG: 40 TABLET, FILM COATED ORAL at 21:16

## 2019-01-31 RX ADMIN — SUFENTANIL CITRATE 150 MCG: 50 INJECTION EPIDURAL; INTRAVENOUS at 08:26

## 2019-01-31 RX ADMIN — POTASSIUM CHLORIDE AND DEXTROSE MONOHYDRATE 30 ML/HR: 150; 5 INJECTION, SOLUTION INTRAVENOUS at 15:04

## 2019-01-31 RX ADMIN — FENTANYL CITRATE 25 MCG: 50 INJECTION, SOLUTION INTRAMUSCULAR; INTRAVENOUS at 23:48

## 2019-01-31 RX ADMIN — SUFENTANIL CITRATE 100 MCG: 50 INJECTION EPIDURAL; INTRAVENOUS at 08:34

## 2019-01-31 RX ADMIN — MIDAZOLAM HYDROCHLORIDE 1.5 MG: 1 INJECTION, SOLUTION INTRAMUSCULAR; INTRAVENOUS at 06:44

## 2019-01-31 RX ADMIN — ALBUMIN (HUMAN) 500 ML: 12.5 SOLUTION INTRAVENOUS at 18:12

## 2019-01-31 RX ADMIN — MIDAZOLAM HYDROCHLORIDE 10 MG: 1 INJECTION, SOLUTION INTRAMUSCULAR; INTRAVENOUS at 08:26

## 2019-01-31 RX ADMIN — EPHEDRINE SULFATE 10 MG: 50 INJECTION INTRAMUSCULAR; INTRAVENOUS; SUBCUTANEOUS at 09:35

## 2019-01-31 RX ADMIN — LIDOCAINE HYDROCHLORIDE 0.5 ML: 10 INJECTION, SOLUTION EPIDURAL; INFILTRATION; INTRACAUDAL; PERINEURAL at 06:03

## 2019-01-31 RX ADMIN — LIDOCAINE HYDROCHLORIDE 50 MG: 10 INJECTION, SOLUTION INFILTRATION; PERINEURAL at 08:26

## 2019-01-31 RX ADMIN — INSULIN HUMAN 4 UNITS: 100 INJECTION, SOLUTION PARENTERAL at 10:51

## 2019-01-31 RX ADMIN — MORPHINE SULFATE 2 MG: 2 INJECTION, SOLUTION INTRAMUSCULAR; INTRAVENOUS at 21:43

## 2019-01-31 RX ADMIN — HYDROCODONE BITARTRATE AND ACETAMINOPHEN 1 TABLET: 7.5; 325 TABLET ORAL at 18:14

## 2019-01-31 RX ADMIN — ROCURONIUM BROMIDE 100 MG: 10 SOLUTION INTRAVENOUS at 08:26

## 2019-01-31 RX ADMIN — VASOPRESSIN 0.02 UNITS/MIN: 20 INJECTION, SOLUTION INTRAMUSCULAR; SUBCUTANEOUS at 13:33

## 2019-01-31 RX ADMIN — POTASSIUM CHLORIDE 20 MEQ: 29.8 INJECTION, SOLUTION INTRAVENOUS at 20:58

## 2019-01-31 RX ADMIN — DEXMEDETOMIDINE HYDROCHLORIDE 0.2 MCG/KG/HR: 4 INJECTION, SOLUTION INTRAVENOUS at 18:40

## 2019-01-31 RX ADMIN — AMIODARONE HYDROCHLORIDE 15 MG: 50 INJECTION, SOLUTION INTRAVENOUS at 13:23

## 2019-01-31 RX ADMIN — ALBUMIN HUMAN: 0.05 INJECTION, SOLUTION INTRAVENOUS at 13:18

## 2019-01-31 RX ADMIN — DEXMEDETOMIDINE HYDROCHLORIDE 1.5 MCG/KG/HR: 4 INJECTION, SOLUTION INTRAVENOUS at 21:41

## 2019-01-31 RX ADMIN — ONDANSETRON 4 MG: 2 INJECTION INTRAMUSCULAR; INTRAVENOUS at 21:20

## 2019-01-31 RX ADMIN — EPHEDRINE SULFATE 10 MG: 50 INJECTION INTRAMUSCULAR; INTRAVENOUS; SUBCUTANEOUS at 09:37

## 2019-01-31 RX ADMIN — MUPIROCIN 1 APPLICATION: 20 OINTMENT TOPICAL at 06:32

## 2019-01-31 RX ADMIN — FENTANYL CITRATE 25 MCG: 50 INJECTION, SOLUTION INTRAMUSCULAR; INTRAVENOUS at 21:05

## 2019-01-31 RX ADMIN — HEPARIN SODIUM 43000 UNITS: 1000 INJECTION, SOLUTION INTRAVENOUS; SUBCUTANEOUS at 10:42

## 2019-01-31 RX ADMIN — POTASSIUM CHLORIDE 20 MEQ: 29.8 INJECTION, SOLUTION INTRAVENOUS at 19:27

## 2019-01-31 RX ADMIN — CHLORHEXIDINE GLUCONATE 15 ML: 1.2 RINSE ORAL at 21:16

## 2019-01-31 RX ADMIN — SODIUM CHLORIDE, POTASSIUM CHLORIDE, SODIUM LACTATE AND CALCIUM CHLORIDE 9 ML/HR: 600; 310; 30; 20 INJECTION, SOLUTION INTRAVENOUS at 06:03

## 2019-01-31 RX ADMIN — NOREPINEPHRINE BITARTRATE 0.09 MCG/KG/MIN: 1 INJECTION, SOLUTION, CONCENTRATE INTRAVENOUS at 13:05

## 2019-01-31 RX ADMIN — ETOMIDATE 20 MG: 2 INJECTION, SOLUTION INTRAVENOUS at 08:26

## 2019-01-31 NOTE — ANESTHESIA PROCEDURE NOTES
Airway  Urgency: elective    Airway not difficult    General Information and Staff    Anesthesiologist: Barber Galo MD    Indications and Patient Condition  Indications for airway management: airway protection    Preoxygenated: yes  Mask difficulty assessment: 1 - vent by mask    Final Airway Details  Final airway type: endotracheal airway      Successful airway: ETT  Cuffed: yes   Successful intubation technique: direct laryngoscopy  Endotracheal tube insertion site: oral  Blade: Dee  Blade size: 3  ETT size (mm): 7.5  Cormack-Lehane Classification: grade I - full view of glottis  Placement verified by: chest auscultation and capnometry   Measured from: gums  Number of attempts at approach: 1

## 2019-01-31 NOTE — ANESTHESIA PROCEDURE NOTES
Arterial Line      Patient location during procedure: pre-op   Line placed for hemodynamic monitoring.  Performed By   Anesthesiologist: Barber Galo MD  Preanesthetic Checklist  Completed: patient identified, site marked, surgical consent, pre-op evaluation, timeout performed, IV checked, risks and benefits discussed and monitors and equipment checked  Arterial Line Prep   Sterile Tech: cap, gloves and sterile barriers  Prep: ChloraPrep  Patient monitoring: blood pressure monitoring, continuous pulse oximetry and EKG  Arterial Line Procedure   Laterality:right  Location:  radial artery  Catheter size: 20 G   Guidance: Doppler guided, ultrasound guided, landmark technique and palpation technique  Number of attempts: 1  Successful placement: yes          Post Assessment   Dressing Type: line sutured, occlusive dressing applied, secured with tape and wrist guard applied.   Complications no  Circ/Move/Sens Assessment: normal and unchanged.   Patient Tolerance: patient tolerated the procedure well with no apparent complications

## 2019-01-31 NOTE — ANESTHESIA POSTPROCEDURE EVALUATION
Patient: Jesse rBink    Procedure Summary     Date:  01/31/19 Room / Location:   [facility] [address] [place] [address] [facility]    Anesthesia Start:  0819 Anesthesia Stop:      Procedures:       MEDIAN STERNOTOMY, CORONARY ARTERY BYPASS GRAFT X5, UTILIZING THE LEFT INTERNAL MAMMARY ARTERY, EVH OF THE RIGHT GREATER SAPHENOUS VEIN (N/A Chest)      TRANSESOPHAGEAL ECHOCARDIOGRAM WITH ANESTHESIA (N/A Chest)      LEFT ATRIAL APPENDAGE OCCLUSION (N/A ) Diagnosis:       Angina at rest (CMS/HCC)      (Angina at rest (CMS/HCC) [I20.8])    Surgeon:  Dillon Gu MD Provider:  Barber Galo MD    Anesthesia Type:  general ASA Status:  4          Anesthesia Type: general  Last vitals  BP   143/82 (01/31/19 0611)   Temp   98 °F (36.7 °C) (01/31/19 0611)   Pulse   80 (01/31/19 0611)   Resp   16 (01/31/19 0611)     SpO2   94 % (01/31/19 0611)     Post Anesthesia Care and Evaluation    Patient location during evaluation: ICU  Patient participation: complete - patient cannot participate  Pain score: 0  Pain management: adequate  Anesthetic complications: No anesthetic complications  PONV Status: none  Cardiovascular status: acceptable and hemodynamically stable  Respiratory status: acceptable, ETT and ventilator  Hydration status: acceptable    Comments: Transport to [facility] ICU on full monitors, stable, report to RN, patient is stable but on Norepi and vasopressin drips

## 2019-01-31 NOTE — ANESTHESIA PREPROCEDURE EVALUATION
Anesthesia Evaluation     NPO Solid Status: > 8 hours  NPO Liquid Status: > 8 hours           Airway   Mallampati: II  TM distance: >3 FB  Possible difficult intubation  Dental - normal exam     Pulmonary - normal exam   (+) a smoker,   (-) asthma  Cardiovascular     ECG reviewed  Rhythm: regular  Rate: normal    (+) CAD, angina,   (-) pacemaker, murmur, cardiac stents      Neuro/Psych  (+) TIA (claims TIA 2013),     (-) seizures  GI/Hepatic/Renal/Endo    (+) obesity,   diabetes mellitus (NIDDM),   (-) liver disease, no renal disease    Musculoskeletal     Abdominal    Substance History      OB/GYN          Other                      Anesthesia Plan    ASA 4     general   (GA  A line  Cortis  [place]  TIMOTHY)  intravenous induction   Anesthetic plan, all risks, benefits, and alternatives have been provided, discussed and informed consent has been obtained with: patient.

## 2019-02-01 ENCOUNTER — APPOINTMENT (OUTPATIENT)
Dept: GENERAL RADIOLOGY | Facility: HOSPITAL | Age: 64
End: 2019-02-01

## 2019-02-01 LAB
ALBUMIN SERPL-MCNC: 3.68 G/DL (ref 3.2–4.8)
ALBUMIN SERPL-MCNC: 3.86 G/DL (ref 3.2–4.8)
ANION GAP SERPL CALCULATED.3IONS-SCNC: 5 MMOL/L (ref 3–11)
ANION GAP SERPL CALCULATED.3IONS-SCNC: 7 MMOL/L (ref 3–11)
BASOPHILS # BLD AUTO: 0.02 10*3/MM3 (ref 0–0.2)
BASOPHILS NFR BLD AUTO: 0.2 % (ref 0–1)
BUN BLD-MCNC: 15 MG/DL (ref 9–23)
BUN BLD-MCNC: 16 MG/DL (ref 9–23)
BUN/CREAT SERPL: 11 (ref 7–25)
BUN/CREAT SERPL: 12 (ref 7–25)
CALCIUM SPEC-SCNC: 8.3 MG/DL (ref 8.7–10.4)
CALCIUM SPEC-SCNC: 8.7 MG/DL (ref 8.7–10.4)
CHLORIDE SERPL-SCNC: 108 MMOL/L (ref 99–109)
CHLORIDE SERPL-SCNC: 113 MMOL/L (ref 99–109)
CO2 SERPL-SCNC: 24 MMOL/L (ref 20–31)
CO2 SERPL-SCNC: 25 MMOL/L (ref 20–31)
CREAT BLD-MCNC: 1.33 MG/DL (ref 0.6–1.3)
CREAT BLD-MCNC: 1.36 MG/DL (ref 0.6–1.3)
DEPRECATED RDW RBC AUTO: 44 FL (ref 37–54)
EOSINOPHIL # BLD AUTO: 0.09 10*3/MM3 (ref 0–0.3)
EOSINOPHIL NFR BLD AUTO: 0.8 % (ref 0–3)
ERYTHROCYTE [DISTWIDTH] IN BLOOD BY AUTOMATED COUNT: 13 % (ref 11.3–14.5)
GFR SERPL CREATININE-BSD FRML MDRD: 53 ML/MIN/1.73
GFR SERPL CREATININE-BSD FRML MDRD: 54 ML/MIN/1.73
GLUCOSE BLD-MCNC: 110 MG/DL (ref 70–100)
GLUCOSE BLD-MCNC: 120 MG/DL (ref 70–100)
GLUCOSE BLDC GLUCOMTR-MCNC: 108 MG/DL (ref 70–130)
GLUCOSE BLDC GLUCOMTR-MCNC: 108 MG/DL (ref 70–130)
GLUCOSE BLDC GLUCOMTR-MCNC: 111 MG/DL (ref 70–130)
GLUCOSE BLDC GLUCOMTR-MCNC: 113 MG/DL (ref 70–130)
GLUCOSE BLDC GLUCOMTR-MCNC: 114 MG/DL (ref 70–130)
GLUCOSE BLDC GLUCOMTR-MCNC: 115 MG/DL (ref 70–130)
GLUCOSE BLDC GLUCOMTR-MCNC: 116 MG/DL (ref 70–130)
GLUCOSE BLDC GLUCOMTR-MCNC: 118 MG/DL (ref 70–130)
GLUCOSE BLDC GLUCOMTR-MCNC: 120 MG/DL (ref 70–130)
GLUCOSE BLDC GLUCOMTR-MCNC: 121 MG/DL (ref 70–130)
GLUCOSE BLDC GLUCOMTR-MCNC: 124 MG/DL (ref 70–130)
GLUCOSE BLDC GLUCOMTR-MCNC: 125 MG/DL (ref 70–130)
GLUCOSE BLDC GLUCOMTR-MCNC: 125 MG/DL (ref 70–130)
GLUCOSE BLDC GLUCOMTR-MCNC: 126 MG/DL (ref 70–130)
GLUCOSE BLDC GLUCOMTR-MCNC: 126 MG/DL (ref 70–130)
GLUCOSE BLDC GLUCOMTR-MCNC: 131 MG/DL (ref 70–130)
HCT VFR BLD AUTO: 36.9 % (ref 38.9–50.9)
HGB BLD-MCNC: 12.4 G/DL (ref 13.1–17.5)
IMM GRANULOCYTES # BLD AUTO: 0.03 10*3/MM3 (ref 0–0.03)
IMM GRANULOCYTES NFR BLD AUTO: 0.3 % (ref 0–0.6)
INR PPP: 1.36 (ref 0.85–1.16)
LYMPHOCYTES # BLD AUTO: 1.35 10*3/MM3 (ref 0.6–4.8)
LYMPHOCYTES NFR BLD AUTO: 12.6 % (ref 24–44)
MAGNESIUM SERPL-MCNC: 2.2 MG/DL (ref 1.3–2.7)
MCH RBC QN AUTO: 31.1 PG (ref 27–31)
MCHC RBC AUTO-ENTMCNC: 33.6 G/DL (ref 32–36)
MCV RBC AUTO: 92.5 FL (ref 80–99)
MONOCYTES # BLD AUTO: 1.29 10*3/MM3 (ref 0–1)
MONOCYTES NFR BLD AUTO: 12 % (ref 0–12)
NEUTROPHILS # BLD AUTO: 7.96 10*3/MM3 (ref 1.5–8.3)
NEUTROPHILS NFR BLD AUTO: 74.4 % (ref 41–71)
PHOSPHATE SERPL-MCNC: 3.9 MG/DL (ref 2.4–5.1)
PHOSPHATE SERPL-MCNC: 6.1 MG/DL (ref 2.4–5.1)
PLATELET # BLD AUTO: 136 10*3/MM3 (ref 150–450)
PMV BLD AUTO: 10.6 FL (ref 6–12)
POTASSIUM BLD-SCNC: 4.1 MMOL/L (ref 3.5–5.5)
POTASSIUM BLD-SCNC: 4.2 MMOL/L (ref 3.5–5.5)
PROTHROMBIN TIME: 16.1 SECONDS (ref 11.2–14.3)
RBC # BLD AUTO: 3.99 10*6/MM3 (ref 4.2–5.76)
SODIUM BLD-SCNC: 139 MMOL/L (ref 132–146)
SODIUM BLD-SCNC: 143 MMOL/L (ref 132–146)
WBC NRBC COR # BLD: 10.71 10*3/MM3 (ref 3.5–10.8)

## 2019-02-01 PROCEDURE — 93010 ELECTROCARDIOGRAM REPORT: CPT | Performed by: INTERNAL MEDICINE

## 2019-02-01 PROCEDURE — P9041 ALBUMIN (HUMAN),5%, 50ML: HCPCS

## 2019-02-01 PROCEDURE — 83735 ASSAY OF MAGNESIUM: CPT | Performed by: PHYSICIAN ASSISTANT

## 2019-02-01 PROCEDURE — 85025 COMPLETE CBC W/AUTO DIFF WBC: CPT | Performed by: PHYSICIAN ASSISTANT

## 2019-02-01 PROCEDURE — 25010000002 ONDANSETRON PER 1 MG: Performed by: PHYSICIAN ASSISTANT

## 2019-02-01 PROCEDURE — 85610 PROTHROMBIN TIME: CPT | Performed by: PHYSICIAN ASSISTANT

## 2019-02-01 PROCEDURE — 25010000002 CEFUROXIME: Performed by: PHYSICIAN ASSISTANT

## 2019-02-01 PROCEDURE — 25010000002 FENTANYL CITRATE (PF) 100 MCG/2ML SOLUTION: Performed by: THORACIC SURGERY (CARDIOTHORACIC VASCULAR SURGERY)

## 2019-02-01 PROCEDURE — 93005 ELECTROCARDIOGRAM TRACING: CPT | Performed by: PHYSICIAN ASSISTANT

## 2019-02-01 PROCEDURE — 25010000002 ALBUMIN HUMAN 5% PER 50 ML

## 2019-02-01 PROCEDURE — 25010000002 MORPHINE PER 10 MG: Performed by: THORACIC SURGERY (CARDIOTHORACIC VASCULAR SURGERY)

## 2019-02-01 PROCEDURE — 80069 RENAL FUNCTION PANEL: CPT | Performed by: PHYSICIAN ASSISTANT

## 2019-02-01 PROCEDURE — 63710000001 INSULIN DETEMIR PER 5 UNITS: Performed by: INTERNAL MEDICINE

## 2019-02-01 PROCEDURE — 94799 UNLISTED PULMONARY SVC/PX: CPT

## 2019-02-01 PROCEDURE — 82962 GLUCOSE BLOOD TEST: CPT

## 2019-02-01 PROCEDURE — 99233 SBSQ HOSP IP/OBS HIGH 50: CPT | Performed by: INTERNAL MEDICINE

## 2019-02-01 PROCEDURE — 63710000001 INSULIN LISPRO (HUMAN) PER 5 UNITS: Performed by: INTERNAL MEDICINE

## 2019-02-01 PROCEDURE — 71045 X-RAY EXAM CHEST 1 VIEW: CPT

## 2019-02-01 PROCEDURE — 97163 PT EVAL HIGH COMPLEX 45 MIN: CPT

## 2019-02-01 RX ORDER — ALBUMIN, HUMAN INJ 5% 5 %
SOLUTION INTRAVENOUS
Status: COMPLETED
Start: 2019-02-01 | End: 2019-02-01

## 2019-02-01 RX ORDER — BACLOFEN 10 MG/1
10 TABLET ORAL 3 TIMES DAILY PRN
Status: DISCONTINUED | OUTPATIENT
Start: 2019-02-01 | End: 2019-02-05 | Stop reason: HOSPADM

## 2019-02-01 RX ORDER — BACLOFEN 10 MG/1
10 TABLET ORAL 3 TIMES DAILY
Status: DISCONTINUED | OUTPATIENT
Start: 2019-02-01 | End: 2019-02-01

## 2019-02-01 RX ORDER — TERAZOSIN 5 MG/1
5 CAPSULE ORAL NIGHTLY
Status: DISCONTINUED | OUTPATIENT
Start: 2019-02-01 | End: 2019-02-05 | Stop reason: HOSPADM

## 2019-02-01 RX ORDER — GABAPENTIN 300 MG/1
300 CAPSULE ORAL 2 TIMES DAILY
COMMUNITY

## 2019-02-01 RX ORDER — POTASSIUM CHLORIDE 750 MG/1
10 TABLET, FILM COATED, EXTENDED RELEASE ORAL DAILY
COMMUNITY

## 2019-02-01 RX ORDER — GABAPENTIN 300 MG/1
300 CAPSULE ORAL EVERY 12 HOURS SCHEDULED
Status: DISCONTINUED | OUTPATIENT
Start: 2019-02-01 | End: 2019-02-05 | Stop reason: HOSPADM

## 2019-02-01 RX ORDER — ALBUMIN, HUMAN INJ 5% 5 %
500 SOLUTION INTRAVENOUS ONCE
Status: COMPLETED | OUTPATIENT
Start: 2019-02-01 | End: 2019-02-01

## 2019-02-01 RX ORDER — MORPHINE SULFATE 2 MG/ML
2 INJECTION, SOLUTION INTRAMUSCULAR; INTRAVENOUS
Status: DISCONTINUED | OUTPATIENT
Start: 2019-02-01 | End: 2019-02-05 | Stop reason: HOSPADM

## 2019-02-01 RX ORDER — ROSUVASTATIN CALCIUM 20 MG/1
40 TABLET, COATED ORAL NIGHTLY
Status: DISCONTINUED | OUTPATIENT
Start: 2019-02-01 | End: 2019-02-05 | Stop reason: HOSPADM

## 2019-02-01 RX ORDER — MECLIZINE HCL 12.5 MG/1
12.5 TABLET ORAL EVERY 4 HOURS PRN
Status: DISCONTINUED | OUTPATIENT
Start: 2019-02-01 | End: 2019-02-05 | Stop reason: HOSPADM

## 2019-02-01 RX ADMIN — CEFUROXIME 1.5 G: 1.5 INJECTION, POWDER, FOR SOLUTION INTRAVENOUS at 22:08

## 2019-02-01 RX ADMIN — NOREPINEPHRINE BITARTRATE 0.09 MCG/KG/MIN: 8 SOLUTION at 03:44

## 2019-02-01 RX ADMIN — SENNOSIDES AND DOCUSATE SODIUM 2 TABLET: 8.6; 5 TABLET ORAL at 08:27

## 2019-02-01 RX ADMIN — MORPHINE SULFATE 2 MG: 2 INJECTION, SOLUTION INTRAMUSCULAR; INTRAVENOUS at 01:16

## 2019-02-01 RX ADMIN — ONDANSETRON 4 MG: 2 INJECTION INTRAMUSCULAR; INTRAVENOUS at 05:14

## 2019-02-01 RX ADMIN — MORPHINE SULFATE 2 MG: 2 INJECTION, SOLUTION INTRAMUSCULAR; INTRAVENOUS at 03:44

## 2019-02-01 RX ADMIN — TERAZOSIN HYDROCHLORIDE 5 MG: 5 CAPSULE ORAL at 20:09

## 2019-02-01 RX ADMIN — ALBUMIN (HUMAN) 500 ML: 12.5 SOLUTION INTRAVENOUS at 04:06

## 2019-02-01 RX ADMIN — HYDROCODONE BITARTRATE AND ACETAMINOPHEN 1 TABLET: 7.5; 325 TABLET ORAL at 14:24

## 2019-02-01 RX ADMIN — MORPHINE SULFATE 2 MG: 2 INJECTION, SOLUTION INTRAMUSCULAR; INTRAVENOUS at 02:04

## 2019-02-01 RX ADMIN — OXYCODONE HYDROCHLORIDE AND ACETAMINOPHEN 1 TABLET: 10; 325 TABLET ORAL at 01:16

## 2019-02-01 RX ADMIN — SODIUM CHLORIDE 2.8 UNITS/HR: 9 INJECTION, SOLUTION INTRAVENOUS at 10:12

## 2019-02-01 RX ADMIN — CEFUROXIME 1.5 G: 1.5 INJECTION, POWDER, FOR SOLUTION INTRAVENOUS at 13:19

## 2019-02-01 RX ADMIN — ROSUVASTATIN CALCIUM 40 MG: 20 TABLET, FILM COATED ORAL at 20:09

## 2019-02-01 RX ADMIN — INSULIN DETEMIR 15 UNITS: 100 INJECTION, SOLUTION SUBCUTANEOUS at 14:24

## 2019-02-01 RX ADMIN — HYDROCODONE BITARTRATE AND ACETAMINOPHEN 1 TABLET: 7.5; 325 TABLET ORAL at 02:04

## 2019-02-01 RX ADMIN — OXYCODONE HYDROCHLORIDE AND ACETAMINOPHEN 1 TABLET: 10; 325 TABLET ORAL at 05:07

## 2019-02-01 RX ADMIN — SENNOSIDES AND DOCUSATE SODIUM 2 TABLET: 8.6; 5 TABLET ORAL at 20:09

## 2019-02-01 RX ADMIN — METOPROLOL TARTRATE 12.5 MG: 25 TABLET, FILM COATED ORAL at 22:08

## 2019-02-01 RX ADMIN — MECLIZINE HCL 12.5 MG 12.5 MG: 12.5 TABLET ORAL at 20:09

## 2019-02-01 RX ADMIN — OXYCODONE HYDROCHLORIDE AND ACETAMINOPHEN 1 TABLET: 10; 325 TABLET ORAL at 20:07

## 2019-02-01 RX ADMIN — MECLIZINE HCL 12.5 MG 12.5 MG: 12.5 TABLET ORAL at 14:52

## 2019-02-01 RX ADMIN — CEFUROXIME 1.5 G: 1.5 INJECTION, POWDER, FOR SOLUTION INTRAVENOUS at 05:07

## 2019-02-01 RX ADMIN — ASPIRIN 325 MG: 325 TABLET, DELAYED RELEASE ORAL at 08:27

## 2019-02-01 RX ADMIN — FENTANYL CITRATE 25 MCG: 50 INJECTION, SOLUTION INTRAMUSCULAR; INTRAVENOUS at 04:31

## 2019-02-01 RX ADMIN — HYDROCODONE BITARTRATE AND ACETAMINOPHEN 1 TABLET: 7.5; 325 TABLET ORAL at 05:53

## 2019-02-01 RX ADMIN — ALBUMIN, HUMAN INJ 5% 500 ML: 5 SOLUTION at 04:06

## 2019-02-01 RX ADMIN — MORPHINE SULFATE 2 MG: 2 INJECTION, SOLUTION INTRAMUSCULAR; INTRAVENOUS at 05:53

## 2019-02-01 RX ADMIN — HYDROCODONE BITARTRATE AND ACETAMINOPHEN 1 TABLET: 7.5; 325 TABLET ORAL at 23:56

## 2019-02-01 RX ADMIN — GABAPENTIN 300 MG: 300 CAPSULE ORAL at 20:07

## 2019-02-01 RX ADMIN — GABAPENTIN 300 MG: 300 CAPSULE ORAL at 13:19

## 2019-02-01 NOTE — PLAN OF CARE
Problem: Patient Care Overview  Goal: Plan of Care Review  Outcome: Ongoing (interventions implemented as appropriate)   02/01/19 0305   Coping/Psychosocial   Plan of Care Reviewed With patient;spouse   Plan of Care Review   Progress improving   OTHER   Outcome Summary Pt extubated at 2348 on 1/31/2019. Vasopressin off and titrating levophed. Pt intact neurologically, passed dysphasia. Complaints of pain his only problem at this time. Tolerating oral fluids without N/V.        Problem: Skin Injury Risk (Adult)  Goal: Identify Related Risk Factors and Signs and Symptoms  Outcome: Ongoing (interventions implemented as appropriate)    Goal: Skin Health and Integrity  Outcome: Ongoing (interventions implemented as appropriate)      Problem: Cardiac Surgery (Adult)  Goal: Signs and Symptoms of Listed Potential Problems Will be Absent, Minimized or Managed (Cardiac Surgery)  Outcome: Ongoing (interventions implemented as appropriate)      Problem: Fall Risk (Adult)  Goal: Identify Related Risk Factors and Signs and Symptoms  Outcome: Ongoing (interventions implemented as appropriate)    Goal: Absence of Fall  Outcome: Ongoing (interventions implemented as appropriate)

## 2019-02-01 NOTE — OP NOTE
DATE OF PROCEDURE: 1/31/2019     PREOPERATIVE DIAGNOSES:  1. Stable angina  2. Multivessel coronary artery disease  3. Hypertension  4. Diabetes mellitus  5. Remote tobacco abuse  6. Obesity  7. Transient ischemic attack     POSTOPERATIVE DIAGNOSES:    1. Stable angina  2. Multivessel coronary artery disease  3. Hypertension  4. Diabetes mellitus  5. Remote tobacco abuse  6. Obesity  7. Transient ischemic attack     PROCEDURES PERFORMED:    1. Coronary artery bypass graft x 5  2. Endoscopic vein harvesting (Right greater saphenous vein).    3. Right common femoral arterial line placement  4. Left atrial appendage ligation (35 mm Atricure clip placement)    SURGEON: Dillon Gu MD       ASSISTANTS:    1. Marie Barnes PA-C      ANESTHESIA: General endotracheal anesthesia with Domingo Galo DO     ESTIMATED BLOOD LOSS: 600 mL.       CROSSCLAMP TIME: 115 minutes.       TOTAL CARDIOPULMONARY BYPASS TIME: 126 minutes.       DISTAL BYPASS TARGETS:    1. LIMA to LAD.    2. Greater saphenous vein to RCA  3. Greater saphenous vein to OM1  4. Greater saphenous vein to OM2  4. Greater saphenous vein to D1    INDICATIONS:  63-year-old  male with a history of hypertension, diabetes mellitus, remote tobacco abuse, obesity, transient ischemic attack and fibromyalgia who presented with stable angina secondary to multivessel coronary artery disease.  The patient was felt to be a reasonable candidate for surgical revascularization. The risks and benefits of surgery were discussed with the patient including pain, bleeding, infection, renal failure, stroke and death. The patient understood these risks and wished to proceed with surgery.      DESCRIPTION OF PROCEDURE: The patient was taken to the operating room and placed under general endotracheal anesthesia. A central line, [place]-Lauro catheter, radial arterial line, and Youngblood catheter were placed. The radial arterial line was marginal with unreliable waveform.  A  right common femoral arterial line was placed using modified Seldinger technique and a 4 Fr sheath.  Initially, a venous stick was performed and a 4 Fr sheath was placed in the right common femoral vein and left in place.  The patient was prepped and draped in the usual sterile fashion and a timeout was performed, including the patient's name, procedure, consent, beta blockade administration, and antibiotics were verified.    The right greater saphenous vein was harvested from the groin to the ankle using EVH technique. Subcutaneous tissues were closed with a running 3-0 Vicryl suture and 4-0 Monocryl subcuticular stitch. Simultaneously, a median sternotomy incision was made and electrocautery was utilized to gain access to the sternum. A midline sternotomy was performed after lung desufflation and hemostasis was achieved with electrocautery.   Attention was turned to the left internal mammary artery, which was taken down using electrocautery and small clips. Dissection was performed proximally to the subclavian vein and distally to the bifurcation. The bifurcation was ligated with 2 large clips to each branch and sharply divided. This revealed excellent flow within the mammary artery which was ligated distally using small clips and irrigated with papaverine solution and placed in a soaked Ray-Jose sponge in the left pleural space. The pericardium was opened and stay sutures were placed to create a pericardial well. Next, 3-0 Prolene sutures were placed in the ascending aorta and systemic heparin was administered. Additional cannulation sutures were placed in the right atrial appendage, ascending aorta, and right atrium. After verification of satisfactory activated clotting time, the arterial cannula was placed and connected to the cardiopulmonary bypass circuit after being de-aired. The line was tested and a wrap was performed. The venous cannula was inserted followed by antegrade and retrograde cardioplegia lines.  The heart was fairly irritable with manipulation and atrial fibrillation with rapid ventricular rate was cardioverted using synchronized paddles at 20 J.  The patient returned back to sinus rhythm.  Given this atrial fibrillation, I planned to place a left atrial appendage clip.  The vein was inspected and found to be of smaller caliber proximally and of appropriate quality for bypass grafting. A slit within the pericardial well along the cephalad portion was created for appropriate transition of the mammary pedicle into the mediastinum. Cardiopulmonary bypass was initiated and the patient was allowed to drift in temperature and distal bypass targets were verified. Bypass flow was dropped and the aortic crossclamp was applied. Cardioplegia was administered in an antegrade fashion with immediate cessation of cardiac activity. There was a marginal septal temperature response given his coronary artery occlusions and additional retrograde cardioplegia was given with a good temperature response. The root vent suction was turned on high and the right coronary artery distribution was thoroughly inspected.    The distal RCA was diffusely diseased and measured 1.75 mm in diameter.  An arteriotomy was made on the distal most portion of this vessel and extended proximally and distally. An end-to-side anastomosis was performed with running 7-0 Prolene suture and tied down. Cardioplegia was given down the anastomosis via hand injection with no evidence of leak and good blood flow. Verification of vein length was obtained by filling the heart and the vein graft was trimmed to the appropriate length. The second obtuse marginal was thin walled and measured 1 mm in diameter.  An arteriotomy was made on the mid portion of this vessel beyond the bifurcation and extended proximally and distally. The proximal aspect of this vessel was in the AV groove and was not well visualized.  An end-to-side anastomosis was performed with running  7-0 Prolene suture and tied down. Cardioplegia was given down the anastomosis via hand injection with no evidence of leak and good blood flow. Verification of vein length was obtained by filling the heart and the vein graft was trimmed to the appropriate length. The first obtuse marginal was 1.25 mm in diameter and an arteriotomy was made on the proximal portion of this vessel and extended proximally and distally. An end-to-side anastomosis was performed with running 7-0 Prolene suture and tied down. Cardioplegia was given down the anastomosis via hand injection with no evidence of leak and good blood flow. Verification of vein length was obtained by filling the heart and the vein graft was trimmed to the appropriate length.  The first diagonal branch was 1 mm in diameter and an arteriotomy was made on the mid portion of this vessel and extended proximally and distally. An end-to-side anastomosis was performed with running 7-0 Prolene suture and tied down. Cardioplegia was given down the anastomosis via hand injection with no evidence of leak and good blood flow. Verification of vein length was obtained by filling the heart and the vein graft was trimmed to the appropriate length. The LAD was inspected and found to have proximal disease on palpation.  The vessel was thickened beyond the occlusion. An arteriotomy was made on the mid LAD and extended proximally and distally on this 1.5 mm diameter vessel. The internal mammary artery was then prepared for grafting by ligating the 2 venous branches along the pedicle using small clips. The mammary artery was trimmed proximal to the bifurcation and beveled for grafting. An end-to-side anastomosis with an 8-0 Prolene suture was constructed and tied down. The bulldog clamp was removed and there was excellent flow within the vessel and adequate filling of the LAD. The underlying mammary fascia was secured to the epicardium using two 6-0 Prolene sutures. The left atrial  appendage was small and an Atricure 35 mm clip was applied at the base.  The clip slipped up along the caudal aspect of the appendage and no ProV clips were on the shelf.  I elected to close this small area of the appendage using a 4-0 V7 Prolene suture.  Four aortotomies were then made on the proximal ascending aorta and end-to-side proximal anastomoses were carried out using 6-0 Prolene suture and labeled with a radiopaque washers. A hot shot of cardioplegia was given down the ascending aorta after bulldog clamps were applied to the vein grafts. The veins were de-aired and the patient was placed in steep Trendelenburg position. The root vent was turned on high suction and cardiopulmonary bypass flow was turned down with crossclamp subsequently removed. Bypass flows were returned to normal and the bulldog clamps were removed. The heart returned to spontaneous sinus rhythm without fibrillation. The proximal and distal anastomoses were then inspected and found to be hemostatic.   The patient was subsequently weaned from cardiopulmonary bypass and decannulation was successfully carried out. All cannulation sites were reinforced with additional 4-0 Prolene suture and inspected for hemostasis. Doppler examination of bypass grafts revealed excellent flow within the mammary and vein grafts.  Atrial and ventricular pacing wires were placed and secured using 0 silk suture. Three chest tubes were then placed within the left and right pleural spaces and mediastinum. These were secured using a 0 Ethibond suture. The sternum was reapproximated with #7 stainless steel wire and the linea alba was closed with a running 0 Vicryl suture. Subcutaneous tissues were closed with a 2-0 Vicryl suture and the inferior aspect of the incision was closed with additional interrupted 2-0 Vicryl sutures in the dermal layer. The skin was reapproximated with a 4-0 Monocryl subcuticular stitch and overlying skin glue was applied to the incision.  Gauze and tape were applied to the chest tube sites and the patient was subsequently transported to cardiac ICU in stable condition, intubated.

## 2019-02-01 NOTE — PROGRESS NOTES
Discharge Planning Assessment  [facility]     Patient Name: Jesse Brink  MRN: [id number]  Today's Date: 2/1/2019    Admit Date: 1/31/2019    Discharge Needs Assessment     Row Name 02/01/19 1300       Living Environment    Lives With  spouse    Name(s) of Who Lives With Patient  Spouse:  Torri Brink, #[phone]; cell#[phone]    Current Living Arrangements  home/apartment/condo    Primary Care Provided by  self;spouse/significant other    Provides Primary Care For  no one    Family Caregiver if Needed  none;spouse    Quality of Family Relationships  helpful;involved;supportive    Able to Return to Prior Arrangements  yes    Living Arrangement Comments  Resides in private residence with spouse       Resource/Environmental Concerns    Resource/Environmental Concerns  none    Transportation Concerns  car, none       Transition Planning    Patient/Family Anticipates Transition to  home with family    Patient/Family Anticipated Services at Transition      Transportation Anticipated  family or friend will provide       Discharge Needs Assessment    Readmission Within the Last 30 Days  no previous admission in last 30 days    Concerns to be Addressed  discharge planning    Equipment Currently Used at Home  glucometer;cane, straight    Anticipated Changes Related to Illness  other (see comments)    Equipment Needed After Discharge  other (see comments)    Outpatient/Agency/Support Group Needs  other (see comments)    Discharge Facility/Level of Care Needs  other (see comments)    Offered/Gave Vendor List  no    Patient's Choice of Community Agency(s)  NA    Current Discharge Risk physical impairment    Discharge Coordination/Progress Patient wishes to return home at d/c, physical therapy recommending SNF/rehab, depending on progress.  Patient/spouse report limited activity and cane use prior to hospitalization        Discharge Plan     Row Name 02/01/19 1304       Plan    Plan Home vs.  SNF    Patient/Family in Agreement with Plan  -- Patient/spouse    Plan Comments Met with patient/spouse at bedside, both anticipate discharge home when medically ready.  Patient reports he uses cane at home for assistance with ambulation.  Case Management will continue to follow for all discharge planning needs.    Final Discharge Disposition Code 30 - still a patient        Destination      No service coordination in this encounter.      Durable Medical Equipment      No service coordination in this encounter.      Dialysis/Infusion      No service coordination in this encounter.      Home Medical Care      No service coordination in this encounter.      Community Resources      No service coordination in this encounter.          Demographic Summary     Row Name 02/01/19 1251       General Information    Admission Type  inpatient    Arrived From  home    Referral Source  admission list;interdisciplinary rounds    Reason for Consult  discharge planning    Preferred Language  English     Used During This Interaction  no        Functional Status    No documentation.       Psychosocial    No documentation.       Abuse/Neglect    No documentation.       Legal    No documentation.       Substance Abuse    No documentation.       Patient Forms    No documentation.           DONNIE Lauren

## 2019-02-01 NOTE — PLAN OF CARE
Problem: Patient Care Overview  Goal: Plan of Care Review  Outcome: Ongoing (interventions implemented as appropriate)   02/01/19 1462   Coping/Psychosocial   Plan of Care Reviewed With patient;spouse   OTHER   Outcome Summary PT eval completed patient is very anxious about moving and needs much encouragement to increase activity patient ambulates 30 ft with mod assist of 2 chair behind patient for safety, anticipate patient may need SNF upon D/C from [facility]

## 2019-02-01 NOTE — PLAN OF CARE
Problem: Ventilation, Mechanical Invasive (Adult)  Goal: Signs and Symptoms of Listed Potential Problems Will be Absent, Minimized or Managed (Ventilation, Mechanical Invasive)  Outcome: Outcome(s) achieved Date Met: 02/01/19

## 2019-02-01 NOTE — PROGRESS NOTES
"Adult Nutrition  Assessment/PES    Patient Name:  Jesse Brink  YOB: 1955  MRN: [id number]  Admit Date:  1/31/2019    Assessment Date:  2/1/2019    Comments:      Reason for Assessment     Row Name 02/01/19 1057          Reason for Assessment    Reason For Assessment  other (see comments) MDR/15\"         Nutrition/Diet History     Row Name 02/01/19 1057          Nutrition/Diet History    Typical Food/Fluid Intake  NURSING REPORTS PT HAVING SOME NAUSEA & STILL ON PRESSORS         Anthropometrics     Row Name 02/01/19 0800 02/01/19 0600       Anthropometrics    Height  175 cm (68.9\")  175 cm (68.9\")    Weight  108 kg (238 lb 1.6 oz)  108 kg (238 lb 1.6 oz)       Ideal Body Weight (IBW)    Ideal Body Weight (IBW) (kg)  73.4  73.4    % Ideal Body Weight  147.14  147.14       Body Mass Index (BMI)    BMI (kg/m2)  35.34  35.34       IBW Adjustment, Para/Tetraplegia    5% Adjustment, Para (IBW)  69.73  69.73    10% Adjustment, Para (IBW)  66.06  66.06    10% Adjustment, Tetra (IBW)  66.06  66.06    15% Adjustment, Tetra (IBW)  62.39  62.39    Row Name 02/01/19 0400          Anthropometrics    Height  175 cm (68.9\")     Weight  108 kg (238 lb 1.6 oz)        Ideal Body Weight (IBW)    Ideal Body Weight (IBW) (kg)  73.4     % Ideal Body Weight  147.14        Body Mass Index (BMI)    BMI (kg/m2)  35.34        IBW Adjustment, Para/Tetraplegia    5% Adjustment, Para (IBW)  69.73     10% Adjustment, Para (IBW)  66.06     10% Adjustment, Tetra (IBW)  66.06     15% Adjustment, Tetra (IBW)  62.39             Estimated/Assessed Needs     Row Name 02/01/19 0800 02/01/19 0600       Calculation Measurements    Height  175 cm (68.9\")  175 cm (68.9\")    Row Name 02/01/19 0400          Calculation Measurements    Height  175 cm (68.9\")           Evaluation of Received Nutrient/Fluid Intake     Row Name 02/01/19 0800 02/01/19 0600       Calculation Measurements    Height  175 cm (68.9\")  175 cm (68.9\")    Row Name " "02/01/19 0400          Calculation Measurements    Height  175 cm (68.9\")         Evaluation of Prescribed Nutrient/Fluid Intake     Row Name 02/01/19 0800 02/01/19 0600       Calculation Measurements    Height  175 cm (68.9\")  175 cm (68.9\")    Row Name 02/01/19 0400          Calculation Measurements    Height  175 cm (68.9\")             Problem/Interventions:                  Education/Evaluation     Row Name 02/01/19 1057          Monitor/Evaluation    Monitor  Per protocol           Electronically signed by:  January Pichardo RD  02/01/19 10:57 AM  "

## 2019-02-01 NOTE — PROGRESS NOTES
CTS Progress Note      POD 1 s/p CABG x 5       LOS: 1 day   Patient Care Team:  Terri Barreto APRN as PCP - General    Subjective  Awake, extubated, up in chair  Cooperative, pain under good control.  Low-dose levo fed for support    Objective    Vital Signs  Temp:  [97.4 °F (36.3 °C)-100.5 °F (38.1 °C)] 99.5 °F (37.5 °C)  Heart Rate:  [] 80  Resp:  [14-28] 22  BP: ()/(48-87) 112/69  Arterial Line BP: ()/(10-65) 114/46  FiO2 (%):  [40 %-100 %] 40 %    Physical Exam:   General Appearance: alert, appears stated age and cooperative   Lungs: clear to auscultation, respirations regular, respirations even and respirations unlabored   Heart: regular rhythm & normal rate, normal S1, S2, no murmur, no gallop, no rub and no click   Skin: Warm, dry, Incision c/d/i     Results   Results from last 7 days   Lab Units 02/01/19  0409   WBC 10*3/mm3 10.71   HEMOGLOBIN g/dL 12.4*   HEMATOCRIT % 36.9*   PLATELETS 10*3/mm3 136*     Results from last 7 days   Lab Units 02/01/19  0409   SODIUM mmol/L 139   POTASSIUM mmol/L 4.1   CHLORIDE mmol/L 108   CO2 mmol/L 24.0   BUN mg/dL 15   CREATININE mg/dL 1.36*   GLUCOSE mg/dL 120*   CALCIUM mg/dL 8.3*           Imaging Results (last 24 hours)     Procedure Component Value Units Date/Time    XR Chest 1 View [[id number]] Updated:  02/01/19 0436    XR Chest 1 View [[id number]] Collected:  01/31/19 1546     Updated:  01/31/19 1546    Narrative:       EXAMINATION: XR CHEST 1 VW-01/31/2019:      INDICATION: Post-Op Check Line & Tube Placement;  I25.10-Atherosclerotic heart disease of native coronary artery without  angina pectoris; I20.8-Other forms of angina pectoris.      COMPARISON: 01/30/2019.     FINDINGS: Portable chest reveals the patient to be status post median  sternotomy. Endotracheal tube placed in satisfactory position above the  erma. [place]-Lauro catheter is identified with tip in the right main  pulmonary artery. Mild increased markings identified at the lung  bases  bilaterally. Degenerative changes seen within the spine. Bilateral chest  tubes in place. No definite pneumothorax.           Impression:       The patient is status post median sternotomy with lines and  tubes in satisfactory position. Bilateral chest tubes in place with mild  increased markings identified at the lung bases.     D:  01/31/2019  E:  01/31/2019                   Assessment  POD 1 s/p CABG x 5, expected recovery  Creatinine at baseline    CAD in native artery    Benign essential HTN    Type 2 diabetes mellitus (CMS/HCC)    Ischemic cardiomyopathy    S/P CABG x 5 1/31/19    Mixed hyperlipidemia  Chest tube 625 cc out the last 18 hours.      Plan   DC [place], art line, Youngblood catheter  Wean and DC levophed  Continue chest tubes and wires  Ambulate  Pulmonary toilet

## 2019-02-01 NOTE — PLAN OF CARE
Problem: Patient Care Overview  Goal: Plan of Care Review  Outcome: Ongoing (interventions implemented as appropriate)   02/01/19 1508   Coping/Psychosocial   Plan of Care Reviewed With patient;spouse   OTHER   Outcome Summary Pt refuses to open his eyes. States that his dizziness is something he has occasionally at home. Boundaries set with pt and he frequently asks for more time with each attempt to ambulate then his pain med effects have worn off. Requests to be repositioned in the chair but doesn't want to stand up to be repositioned. He doesn't hold his cup to drink water. He asks his wife to do it for him. Weaning Levophed. Art line out. SG out. Meclizine on board for pts dizziness.   02/01/19 1508   Coping/Psychosocial   Plan of Care Reviewed With patient;spouse   OTHER   Outcome Summary Pt refuses to open his eyes. States that his dizziness is something he has occasionally at home. Boundaries set with pt and he frequently asks for more time with each attempt to ambulate then his pain med effects have worn off. Requests to be repositioned in the chair but doesn't want to stand up to be repositioned. He doesn't hold his cup to drink water. He asks his wife to do it for him.    02/01/19 1508   Coping/Psychosocial   Plan of Care Reviewed With patient;spouse   OTHER   Outcome Summary Pt refuses to open his eyes. States that his dizziness is something he has occasionally at home. Boundaries set with pt and he frequently asks for more time with each attempt to ambulate then his pain med effects have worn off. Requests to be repositioned in the chair but doesn't want to stand up to be repositioned. He doesn't hold his cup to drink water. He asks his wife to do it for him. Weaning Levophed. Art line and [place] Lauro out.              Problem: Skin Injury Risk (Adult)  Goal: Identify Related Risk Factors and Signs and Symptoms  Outcome: Ongoing (interventions implemented as appropriate)   02/01/19 1508   Skin Injury Risk  "(Adult)   Related Risk Factors (Skin Injury Risk) body weight extremes;critical care admission;mechanical forces;medication;mobility impaired;other (see comments)  (Impaired motivation for independence.)     Goal: Skin Health and Integrity  Outcome: Ongoing (interventions implemented as appropriate)   02/01/19 1508   Skin Injury Risk (Adult)   Skin Health and Integrity making progress toward outcome      02/01/19 1508   Skin Injury Risk (Adult)   Skin Health and Integrity making progress toward outcome       Problem: Cardiac Surgery (Adult)  Goal: Signs and Symptoms of Listed Potential Problems Will be Absent, Minimized or Managed (Cardiac Surgery)  Outcome: Ongoing (interventions implemented as appropriate)   02/01/19 1508   Goal/Outcome Evaluation   Problems Assessed (Cardiac Surgery) all   Problems Present (Cardiac Surgery) functional deficit;pain;situational response  (\"My vertigo is acting up.\")       Problem: Fall Risk (Adult)  Goal: Identify Related Risk Factors and Signs and Symptoms  Outcome: Ongoing (interventions implemented as appropriate)   02/01/19 1508   Fall Risk (Adult)   Related Risk Factors (Fall Risk) culprit medication(s);confusion/agitation;fatigue/slow reaction;gait/mobility problems;impaired vision;polypharmacy;sensory deficits;sleep pattern alteration;environment unfamiliar   Signs and Symptoms (Fall Risk) presence of risk factors     Goal: Absence of Fall  Outcome: Ongoing (interventions implemented as appropriate)   02/01/19 1508   Fall Risk (Adult)   Absence of Fall achieves outcome         "

## 2019-02-01 NOTE — PROGRESS NOTES
[facility] at [facility]  Progress Note       LOS: 1 day   Patient Care Team:  Terri Barreto APRN as PCP - General    Chief Complaint:  Follow up HTN, medical management     Subjective     POD #1, extubated, sitting up in chair. Still on levo, but BP stable. Awake and alert. Sore from surgery. Breathing ok on NC.         Review of Systems:   Pertinent positives in HPI, all others reviewed and negative.      Objective       Current Facility-Administered Medications:   •  albuterol (PROVENTIL) nebulizer solution 0.083% 2.5 mg/3mL, 2.5 mg, Nebulization, Q4H PRN, Marie Barnes PA-C  •  aspirin EC tablet 325 mg, 325 mg, Oral, Daily, Marie Barnes PA-C  •  aspirin tablet 325 mg, 325 mg, Oral, Once, Delisa-Marie Pedro PA-C  •  atorvastatin (LIPITOR) tablet 40 mg, 40 mg, Oral, Nightly, Marie Barnes PA-C, 40 mg at 01/31/19 2116  •  bisacodyl (DULCOLAX) EC tablet 10 mg, 10 mg, Oral, Daily PRN, Marie Barnes PA-C  •  bisacodyl (DULCOLAX) suppository 10 mg, 10 mg, Rectal, Daily PRN, Delisa-Marie Pedro PA-C  •  calcium gluconate 1 g in sodium chloride 0.9 % 100 mL IVPB, 1 g, Intravenous, Once PRN **AND** calcium gluconate 6 g in sodium chloride 0.9 % 500 mL IVPB, 6 g, Intravenous, Once PRN, Marie Barnes PA-C  •  calcium gluconate 2 g in sodium chloride 0.9 % 100 mL IVPB, 2 g, Intravenous, Once PRN, Marie Barnes PA-C  •  cefuroxime (ZINACEF) 1.5 g/100 mL 0.9% NS IVPB (mbp), 1.5 g, Intravenous, Q8H, Marie Barnes PA-C, 1.5 g at 02/01/19 0507  •  chlorhexidine (PERIDEX) 0.12 % solution 15 mL, 15 mL, Mouth/Throat, Q12H, Marie Barnes PA-C, 15 mL at 01/31/19 2116  •  dexmedetomidine (PRECEDEX) 400 mcg/100 mL (4 mcg/mL) infusion, 0.2-1.5 mcg/kg/hr, Intravenous, Continuous PRN, Marie Barnes PA-C, Last Rate: 40.5 mL/hr at 01/31/19 2141, 1.5 mcg/kg/hr at 01/31/19 2141  •  dextrose 5 % with KCl 20 mEq/L infusion, 30 mL/hr, Intravenous,  Continuous, Marie Barnes PA-C, Last Rate: 30 mL/hr at 01/31/19 1504, 30 mL/hr at 01/31/19 1504  •  DOBUTamine (DOBUTREX) 1 mg/mL infusion, 2-20 mcg/kg/min, Intravenous, Continuous PRN, Marie Barnes PA-C  •  docusate sodium (COLACE) capsule 100 mg, 100 mg, Oral, BID PRN, Marie Barnes PA-C  •  DOPamine 400 mg/250 mL (1.6 mg/mL) infusion, 2-20 mcg/kg/min, Intravenous, Continuous PRN, Marie Barnes PA-C  •  EPINEPHrine (ADRENALIN) 10,000 mcg in sodium chloride 0.9 % 250 mL (40 mcg/mL) infusion, 0.02-0.3 mcg/kg/min, Intravenous, Continuous PRN, Marie Barnes PA-C  •  fentaNYL citrate (PF) (SUBLIMAZE) injection 25 mcg, 25 mcg, Intravenous, Q30 Min PRN, Dillon Gu MD, 25 mcg at 02/01/19 0431  •  HYDROcodone-acetaminophen (NORCO) 7.5-325 MG per tablet 1 tablet, 1 tablet, Oral, Q4H PRN, Dillon Gu MD, 1 tablet at 02/01/19 0553  •  insulin regular (HumuLIN R,NovoLIN R) 100 Units in sodium chloride 0.9 % 100 mL (1 Units/mL) infusion, 0-50 Units/hr, Intravenous, Continuous PRN, Marie Barnes PA-C, Last Rate: 2.8 mL/hr at 02/01/19 0400, 2.8 Units/hr at 02/01/19 0400  •  magnesium hydroxide (MILK OF MAGNESIA) suspension 2400 mg/10mL 10 mL, 10 mL, Oral, Daily PRN, Marie Barnes PA-C  •  metoprolol tartrate (LOPRESSOR) half tablet 12.5 mg, 12.5 mg, Oral, Q12H, Marie Barnes PA-C  •  metoprolol tartrate (LOPRESSOR) injection 2.5 mg, 2.5 mg, Intravenous, Q6H, Marie Barnes PA-C  •  Morphine sulfate (PF) injection 2 mg, 2 mg, Intravenous, Q30 Min PRN, Dillon Gu MD, 2 mg at 02/01/19 0553  •  niCARdipine (CARDENE-IV) 40 mg/200 mL (0.2 mg/mL) in 0.9% NaCl infusion, 5-15 mg/hr, Intravenous, Continuous PRN, Marie Barnes PA-C  •  nitroglycerin 50 mg/250 mL (0.2 mg/mL) infusion, 5-200 mcg/min, Intravenous, Continuous PRN, Marie Barnes PA-C  •  norepinephrine (LEVOPHED) 8 mg/250 mL (32 mcg/mL) in sodium chloride 0.9% infusion (premix), 0.02-0.3  mcg/kg/min, Intravenous, Continuous PRN, Marie Barnes PA-C, Last Rate: 20.3 mL/hr at 02/01/19 0410, 0.1 mcg/kg/min at 02/01/19 0410  •  ondansetron (ZOFRAN) injection 4 mg, 4 mg, Intravenous, Q6H PRN, Marie Barnes PA-C, 4 mg at 02/01/19 0514  •  oxyCODONE-acetaminophen (PERCOCET)  MG per tablet 1 tablet, 1 tablet, Oral, Q4H PRN, Dillon Gu MD, 1 tablet at 02/01/19 0507  •  Pharmacy Consult - [facility], , Does not apply, Daily, [place] [facility]  •  phenylephrine ([place]-SYNEPHRINE) 50 mg/250 mL (0.2 mg/mL) in 0.9% NS  infusion, 0.5-3 mcg/kg/min, Intravenous, Continuous PRN, Marie Barnes PA-C  •  potassium chloride (MICRO-K) CR capsule 40 mEq, 40 mEq, Oral, PRN **OR** potassium chloride (KLOR-CON) packet 40 mEq, 40 mEq, Oral, PRN, Marie Barnes PA-C  •  potassium chloride 20 mEq in 50 mL IVPB, 20 mEq, Intravenous, Q1H PRN **OR** potassium chloride 20 mEq in 50 mL IVPB, 20 mEq, Intravenous, Q1H PRN, Marie Barnes PA-C, Last Rate: 50 mL/hr at 01/31/19 2058, 20 mEq at 01/31/19 2058  •  potassium phosphate 45 mmol in sodium chloride 0.9 % 250 mL infusion, 45 mmol, Intravenous, PRN **OR** potassium phosphate 30 mmol in sodium chloride 0.9 % 100 mL infusion, 30 mmol, Intravenous, PRN **OR** potassium phosphate 15 mmol in sodium chloride 0.9 % 100 mL infusion, 15 mmol, Intravenous, PRN **OR** sodium glycerophosphate 40 mmol in sodium chloride 0.9 % 500 mL IVPB, 40 mmol, Intravenous, PRN **OR** sodium glycerophosphate 20 mmol in sodium chloride 0.9 % 250 mL IVPB, 20 mmol, Intravenous, PRN, Dillon Gu MD, 20 mmol at 01/31/19 2034  •  propofol (DIPRIVAN) infusion 10 mg/mL 100 mL, 5-50 mcg/kg/min, Intravenous, Continuous PRN, Dillon Gu MD, Stopped at 01/31/19 1715  •  protamine injection 50 mg, 50 mg, Intravenous, Once, Marie Barnes PA-C  •  racemic epinephrine (RACEPINEPHRINE) nebulizer solution 0.5 mL, 0.5 mL, Nebulization, Once PRN, Marie Barnes PA-C  •   "sennosides-docusate sodium (SENOKOT-S) 8.6-50 MG tablet 2 tablet, 2 tablet, Oral, BID, Marie Barnes PA-C  •  sodium bicarbonate injection 8.4% 50 mEq, 50 mEq, Intravenous, Once PRN, Marie Barnes PA-C  •  sodium chloride 0.9 % flush 30 mL, 30 mL, Intravenous, Once PRN, Marie Barnes PA-C  •  sodium chloride 0.9 % infusion, 30 mL/hr, Intravenous, Continuous PRN, Marie Barnes PA-C  •  Vasopressin 20 Units in sodium chloride 0.9 % 100 mL (0.2 Units/mL) infusion, 0.02-0.1 Units/min, Intravenous, Continuous, Dillon Gu MD, Stopped at 01/31/19 2320    Facility-Administered Medications Ordered in Other Encounters:   •  Chlorhexidine Gluconate Cloth 2 % pads 1 application, 1 application, Topical, Q12H PRN, Marie Barnes PA-C    Vital Sign Min/Max for last 24 hours  Temp  Min: 97.4 °F (36.3 °C)  Max: 100.5 °F (38.1 °C)   BP  Min: 73/49  Max: 136/72   Pulse  Min: 79  Max: 110   Resp  Min: 14  Max: 28   SpO2  Min: 95 %  Max: 100 %   No Data Recorded   Weight  Min: 108 kg (238 lb 1.6 oz)  Max: 108 kg (239 lb)     Flowsheet Rows      First Filed Value   Admission Height  175.3 cm (69\") Documented at 01/31/2019 0611   Admission Weight  108 kg (239 lb) Documented at 01/31/2019 0611            Intake/Output Summary (Last 24 hours) at 2/1/2019 0756  Last data filed at 2/1/2019 0600  Gross per 24 hour   Intake 5046.8 ml   Output 3025 ml   Net 2021.8 ml       Physical Exam:     General Appearance:    Alert, cooperative, in no acute distress   Lungs:     Clear to auscultation anteriorly,respirations regular, even and                  unlabored    Heart:    Regular rhythm and normal rate, distant heart sounds normal S1 and S2, no murmur, no gallop, no rub, no click   Chest Wall:    No abnormalities observed   Abdomen:     Normal bowel sounds, nontender   Extremities:   Moves all extremities well, no edema, no cyanosis, no             Redness. ACE wrapped   Pulses:   Pulses palpable and equal " bilaterally   Skin:   No bleeding, bruising or rash        Results Review:   Results from last 7 days   Lab Units 02/01/19  0409 01/31/19  2326 01/31/19  1842   WBC 10*3/mm3 10.71 9.65 10.87*   HEMOGLOBIN g/dL 12.4* 12.6* 12.9*   HEMATOCRIT % 36.9* 37.3* 38.1*   PLATELETS 10*3/mm3 136* 121* 112*     Results from last 7 days   Lab Units 02/01/19  0409 01/31/19  2326 01/31/19  1842   SODIUM mmol/L 139 143 140   POTASSIUM mmol/L 4.1 4.2 3.3*   CHLORIDE mmol/L 108 113* 107   CO2 mmol/L 24.0 25.0 23.0   BUN mg/dL 15 16 17   CREATININE mg/dL 1.36* 1.33* 1.43*   GLUCOSE mg/dL 120* 110* 160*      Results from last 7 days   Lab Units 01/30/19  1250   HEMOGLOBIN A1C % 6.60*                 Results from last 7 days   Lab Units 02/01/19  0409 01/31/19  1517 01/30/19  1250   PROTIME Seconds 16.1* 17.4* 13.5   INR  1.36* 1.50* 1.09   APTT seconds  --  27.9 29.1           Intake/Output Summary (Last 24 hours) at 2/1/2019 0756  Last data filed at 2/1/2019 0600  Gross per 24 hour   Intake 5046.8 ml   Output 3025 ml   Net 2021.8 ml       I personally viewed and interpreted the patient's EKG/Telemetry data    EKG: NSR with PVCs 82 bpm    Telemetry: NSR    Ejection Fraction  No results found for: EF    Echo EF Estimated  No results found for: ECHOEFEST      Present on Admission:  • (Resolved) Angina pectoris (CMS/HCC)  • CAD in native artery  • Benign essential HTN  • Type 2 diabetes mellitus (CMS/HCC)  • Ischemic cardiomyopathy  • Mixed hyperlipidemia    Assessment/Plan   1. CAD, S/P CABG x 5 and ligation of MARIA LUISA 1/31/19, POD #1  - on ASA, statin, BB,   2. Ischemic Cardiomyopathy:  Echocardiogram 1/18/19: EF 36-40%   3. HTN  - with post operative hypotension, Levo to be weaned and d/c today  4. Hyperlipidemia  5. History of TIA  6. CKD:  - Cr stable      Electronically signed by MARIAN Mccracken, 02/01/19, 8:06 AM.

## 2019-02-01 NOTE — PROGRESS NOTES
"INTENSIVIST NOTE     Hospital Day: 1      Mr. Jesse Brink, 63 y.o. male is followed for:   Postoperative critical care and medical management of medical comorbidities       SUBJECTIVE     63-year-old male with past medical history of hypertension, type 2 diabetes mellitus and chronic pain presents with increasing symptoms of angina pectoris.  Cardiac catheterization reveals multivessel coronary disease.  Left ventricular function impaired with both systolic and diastolic dysfunction with EF 36-40% by preoperative echocardiogram earlier this month.  He is now status post CABG ×5 on 1/31/19 by Dr. Gu.    Interval history:    Extubated overnight.  On nasal cannula O2.  Pain control remains an issue.  Maximum temperature 100.5    The patient's relevant past medical, surgical and social history were reviewed and updated in Epic as appropriate.       OBJECTIVE     Vital Sign Min/Max for last 24 hours  Temp  Min: 97.4 °F (36.3 °C)  Max: 100.5 °F (38.1 °C)   BP  Min: 73/49  Max: 136/72   Pulse  Min: 74  Max: 110   Resp  Min: 14  Max: 28   SpO2  Min: 95 %  Max: 100 %   No Data Recorded   Weight  Min: 108 kg (238 lb 1.6 oz)  Max: 108 kg (238 lb 1.6 oz)      Intake/Output Summary (Last 24 hours) at 2/1/2019 1147  Last data filed at 2/1/2019 0800  Gross per 24 hour   Intake 4596.8 ml   Output 2775 ml   Net 1821.8 ml      Flowsheet Rows      First Filed Value   Admission Height  175.3 cm (69\") Documented at 01/31/2019 0611   Admission Weight  108 kg (239 lb) Documented at 01/31/2019 0611             02/01/19  0400 02/01/19  0600 02/01/19  0800   Weight: 108 kg (238 lb 1.6 oz) 108 kg (238 lb 1.6 oz) 108 kg (238 lb 1.6 oz)            Objective:  General Appearance:  In no acute distress.    Vital signs: (most recent): Blood pressure 120/62, pulse 74, temperature 99 °F (37.2 °C), temperature source Core, resp. rate 18, height 175 cm (68.9\"), weight 108 kg (238 lb 1.6 oz), SpO2 98 %.    HEENT: (Nasal cannula O2  Right " internal jugular introducer)    Lungs:  Normal effort and normal respiratory rate.  Breath sounds clear to auscultation.  He is not in respiratory distress.  No rales, wheezes or rhonchi.    Heart: Normal rate.  Regular rhythm.  S1 normal and S2 normal.  No murmur, gallop or friction rub.   Chest: Symmetric chest wall expansion. (Median sternotomy.  Mediastinal tubes in place)  Abdomen: Abdomen is soft and non-distended.  Bowel sounds are normal.   There is no mass. There is no splenomegaly. There is no hepatomegaly.   Extremities: There is no deformity or dependent edema.    Neurological: Patient is alert and oriented to person, place and time.    Pupils:  Pupils are equal, round, and reactive to light.    Skin:  Warm and dry.              I reviewed the patient's new clinical results.  I reviewed the patient's new imaging results/reports including actual images and agree with reports.      Chest X-Ray:  Bibasilar mild atelectasis    INFUSIONS    DOPamine 2-20 mcg/kg/min    insulin 0-50 Units/hr Last Rate: 2.8 Units/hr (02/01/19 1012)   niCARdipine 5-15 mg/hr    nitroglycerin 5-200 mcg/min    norepinephrine 0.02-0.3 mcg/kg/min Last Rate: 0.1 mcg/kg/min (02/01/19 0410)   phenylephrine 0.5-3 mcg/kg/min        Results from last 7 days   Lab Units 02/01/19  0409 01/31/19 2326 01/31/19  1842   WBC 10*3/mm3 10.71 9.65 10.87*   HEMOGLOBIN g/dL 12.4* 12.6* 12.9*   HEMATOCRIT % 36.9* 37.3* 38.1*   PLATELETS 10*3/mm3 136* 121* 112*     Results from last 7 days   Lab Units 02/01/19  0409 01/31/19  2326 01/31/19  1842 01/31/19  1517   SODIUM mmol/L 139 143 140 137   POTASSIUM mmol/L 4.1 4.2 3.3* 4.1   CHLORIDE mmol/L 108 113* 107 106   CO2 mmol/L 24.0 25.0 23.0 20.0   BUN mg/dL 15 16 17 15   GLUCOSE mg/dL 120* 110* 160* 161*   CREATININE mg/dL 1.36* 1.33* 1.43* 1.34*   MAGNESIUM mg/dL 2.2  --  2.4 2.8*   CALCIUM mg/dL 8.3* 8.7 9.0 9.1         Results from last 7 days   Lab Units 01/31/19  2337 01/31/19  1936 01/31/19  1521  01/31/19  1418 01/31/19  1329   PH, ARTERIAL pH units 7.349* 7.325* 7.365 7.36 7.34*   PCO2, ARTERIAL mm Hg 46.0 48.3 38.4  --   --    PO2 ART mm Hg 110.0* 112.0* 196.0*  --   --    FIO2 % 40 40 100  --   --          [facility] (%):  [40 %-100 %] 40 %  S RR:  [14] 14  PEEP/CPAP (cm H2O):  [5 cm H20] 5 cm H20  MD SUP:  [10 cm H20] 10 cm H20  MAP (cm H2O):  [7.9-12] 9.2    I reviewed the patient's medications.    Assessment/Plan   ASSESSMENT      Active Hospital Problems    Diagnosis   • **CAD in native artery     1. [facility]  1-18-19: Multivessel obstructive coronary disease: Proximal RCA .  Mid LAD .  95% proximal first diagonal, 95% first obtuse marginal branch, 70-80% proximal second obtuse marginal branch.    2.  CABG x 5 (1-31-19) LIMA--> LAD, GSV--> RCA, OM1, OM2, D1  · Left atrial appendage occlusion         • Ischemic cardiomyopathy     1.  Systolic and diastolic with EF 36-40% by echocardiogram 1/18/19     • S/P CABG x 5 1/31/19   • Type 2 diabetes mellitus (CMS/HCC)   • Mixed hyperlipidemia   • Benign essential HTN         63-year-old male status post CABG ×51/31/19.  Preoperatively he had ischemic myopathy with systolic and diastolic dysfunction as well as diabetes mellitus and hypertension.      Extubated overnight and on nasal cannula O2.  Afebrile.  Fluid balance +2 L.  Remains on insulin and norepinephrine drips.          PLAN     1. Transition insulin to subcutaneous from IV  2. Aspirin/statin/beta blocker  3. Resume prostate medications prior to DC of [place]  4. Resume gabapentin at a reduced dose  5. Pulmonary toilet  6. Mobilize           I discussed the patient's findings and my recommendations with patient and nursing staff     Plan of care and goals reviewed with multidisciplinary team at daily rounds.    High level of risk due to:  parenteral controlled substances and drugs requiring intensive monitoring for toxicity.    Armando Smith MD  Pulmonary and Critical Care  Medicine  02/01/19 11:47 AM

## 2019-02-01 NOTE — THERAPY EVALUATION
Acute Care - Physical Therapy Initial Evaluation  [facility]     Patient Name: Jesse Brink  : 1955  MRN: [id number]  Today's Date: 2019   Onset of Illness/Injury or Date of Surgery: 19  Date of Referral to PT: 19  Referring Physician: MD Gu      Admit Date: 2019    Visit Dx:     ICD-10-CM ICD-9-CM   1. Impaired functional mobility, balance, gait, and endurance Z74.09 V49.89   2. CAD in native artery I25.10 414.01   3. Angina at rest (CMS/HCC) I20.8 413.9     Patient Active Problem List   Diagnosis   • Benign essential HTN   • Lumbosacral radiculopathy at S1   • Numbness of lower extremity   • Transient ischemic attack   • Rheumatoid arthritis (CMS/HCC)   • Obesity   • Idiopathic peripheral neuropathy   • Palpitations   • CAD in native artery   • Type 2 diabetes mellitus (CMS/HCC)   • Ischemic cardiomyopathy   • S/P CABG x 5 19   • Mixed hyperlipidemia     Past Medical History:   Diagnosis Date   • Anxiety    • Cancer (CMS/HCC)     skin    • Chest pain syndrome    • Diabetes mellitus (CMS/HCC)     TYPE 2 DIAGNOSED SEVERAL YEARS AGO. TRIES TO TEST ONCE DAILY   • Diverticulitis    • Fibromyalgia    • Hearing aid worn    • History of cellulitis    • History of kidney stones    • History of malignant neoplasm    • History of MRI of spine     demonstrating buldging discs at L3/L4, L4/L5, L5/S1 levels   • History of panic attacks    • History of tobacco abuse     History of tobacco, quitting 28 years ago.   • Hypertension    • Irregular heart beat    • Migraine     occular   • Obesity    • Rheumatoid arthritis (CMS/HCC)    • Transient ischemic attack     Recent transient ischemic attack with normal echocardiogram, carotid duplex, and CT scan of the brain by verbal report.     Past Surgical History:   Procedure Laterality Date   • ANAL FISTULA REPAIR      Anal fistula repair,  and .   • ATRIAL APPENDAGE EXCLUSION LEFT WITH TRANSESOPHAGEAL ECHOCARDIOGRAM N/A  1/31/2019    Procedure: ATRIAL APPENDAGE OCCLUSION LEFT;  Surgeon: Dillon Gu MD;  Location: [facility] OR;  Service: Cardiothoracic   • CARDIAC CATHETERIZATION N/A 1/18/2019    Procedure: Left Heart Cath;  Surgeon: Dillon Neville III, MD;  Location:  [facility] CATH INVASIVE LOCATION;  Service: Cardiovascular   • COLONOSCOPY  2016   • CORONARY ARTERY BYPASS GRAFT N/A 1/31/2019    Procedure: MEDIAN STERNOTOMY, CORONARY ARTERY BYPASS GRAFT X5, UTILIZING THE LEFT INTERNAL MAMMARY ARTERY, EVH OF THE RIGHT GREATER SAPHENOUS VEIN;  Surgeon: Dillon Gu MD;  Location:  [facility] OR;  Service: Cardiothoracic   • MYOMECTOMY  1980    Benign fibroid removed, 1980.   • PILONIDAL CYSTECTOMY  1977   • SKIN CANCER EXCISION      left shoulder   • TRANSESOPHAGEAL ECHOCARDIOGRAM (TIMOTHY) N/A 1/31/2019    Procedure: TRANSESOPHAGEAL ECHOCARDIOGRAM WITH ANESTHESIA;  Surgeon: Dillon Gu MD;  Location:  [facility] OR;  Service: Cardiothoracic        PT ASSESSMENT (last 12 hours)      Physical Therapy Evaluation     Row Name 02/01/19 0930          PT Evaluation Time/Intention    Subjective Information  complains of;weakness;fatigue;pain;dizziness  -FLAVIA     Document Type  evaluation  -FLAVIA     Mode of Treatment  physical therapy  -FLAVIA     Patient Effort  adequate  -FLAVIA     Symptoms Noted During/After Treatment  fatigue;dizziness;increased pain  -FLAVIA     Row Name 02/01/19 0965          General Information    Patient Profile Reviewed?  yes  -FLAVIA     Onset of Illness/Injury or Date of Surgery  01/31/19  -FLAVIA     Referring Physician  MD Gu  -FLAVIA     Patient Observations  alert;cooperative;agree to therapy  -FLAVIA     Patient/Family Observations  family supportive  -FLAVIA     Prior Level of Function  independent:;gait;transfer;bed mobility;ADL's  -FLAVIA     Equipment Currently Used at Home  none  -FLAVIA     Pertinent History of Current Functional Problem  patient was admitted with chest pain and is now seen S/P CABG x5 on 1/31  -FLAVIA     Existing Precautions/Restrictions   cardiac;fall;oxygen therapy device and L/min;sternal  -FLAVIA     Risks Reviewed  patient:;family:;LOB;increased discomfort;change in vital signs  -FLAVIA     Benefits Reviewed  patient:;family:;improve function;increase strength;decrease risk of DVT  -FLAVIA     Barriers to Rehab  medically complex;previous functional deficit;ineffective coping  -FLAVIA     Row Name 02/01/19 0930          Relationship/Environment    Lives With  spouse  -     Row Name 02/01/19 0930          Resource/Environmental Concerns    Current Living Arrangements  home/apartment/condo  -FLAVIA     Resource/Environmental Concerns  none  -     Row Name 02/01/19 0930          Cognitive Assessment/Intervention- PT/OT    Orientation Status (Cognition)  oriented x 4  -FLAVIA     Follows Commands (Cognition)  WFL  -FLAVIA     Safety Deficit (Cognitive)  safety precautions awareness;safety precautions follow-through/compliance;awareness of need for assistance;insight into deficits/self awareness  -     Row Name 02/01/19 0930          Safety Issues, Functional Mobility    Safety Issues Affecting Function (Mobility)  awareness of need for assistance;insight into deficits/self awareness;safety precaution awareness;safety precautions follow-through/compliance  -     Impairments Affecting Function (Mobility)  balance;endurance/activity tolerance;pain;shortness of breath;strength  -     Row Name 02/01/19 0930          Bed Mobility Assessment/Treatment    Comment (Bed Mobility)  patient is OOB and returns to the chair   -     Row Name 02/01/19 0930          Transfer Assessment/Treatment    Transfer Assessment/Treatment  sit-stand transfer;stand-sit transfer  -FLAVIA     Sit-Stand [place] (Transfers)  moderate assist (50% patient effort);2 person assist  -FLAVIA     Stand-Sit [place] (Transfers)  moderate assist (50% patient effort);2 person assist  -     Row Name 02/01/19 0930          Sit-Stand Transfer    Assistive Device (Sit-Stand Transfers)  walker, front-wheeled   -     Row Name 02/01/19 0930          Stand-Sit Transfer    Assistive Device (Stand-Sit Transfers)  walker, front-wheeled  -     Row Name 02/01/19 0930          Gait/Stairs Assessment/Training    Gait/Stairs Assessment/Training  gait/ambulation independence;gait/ambulation assistive device  -     Alger Level (Gait)  moderate assist (50% patient effort);2 person assist;1 person to manage equipment  -     Assistive Device (Gait)  walker, front-wheeled  -     Distance in Feet (Gait)  30  -FLAVIA     Pattern (Gait)  step-to  -FLAVIA     Deviations/Abnormal Patterns (Gait)  bilateral deviations;base of support, wide;stride length decreased;gait speed decreased  -     Bilateral Gait Deviations  forward flexed posture;knee buckling, bilateral  -     Row Name 02/01/19 0930          General ROM    GENERAL ROM COMMENTS  no ROM deficits  -     Row Name 02/01/19 0930          MMT (Manual Muscle Testing)    General MMT Comments  generalized weakness. 3+/5 patient needs encouragement to move arms and legs   -     Row Name 02/01/19 0930          Motor Assessment/Intervention    Additional Documentation  Balance (Group);Therapeutic Exercise (Group);Therapeutic Exercise Interventions (Group)  -     Row Name 02/01/19 0930          Therapeutic Exercise    Therapeutic Exercise  seated, lower extremities  -     Additional Documentation  Therapeutic Exercise (Row)  -     Row Name 02/01/19 0930          Lower Extremity Seated Therapeutic Exercise    Performed, Seated Lower Extremity (Therapeutic Exercise)  hip flexion/extension;knee flexion/extension;ankle dorsiflexion/plantarflexion  -     Exercise Type, Seated Lower Extremity (Therapeutic Exercise)  AAROM (active assistive range of motion)  -     Sets/Reps Detail, Seated Lower Extremity (Therapeutic Exercise)  1/10  -     Row Name 02/01/19 0930          Balance    Balance  static sitting balance;static standing balance;dynamic sitting balance;dynamic  standing balance  -[facility] Name 02/01/19 0930          Static Sitting Balance    Level of [place] (Unsupported Sitting, Static Balance)  supervision  -FLAVIA     Sitting Position (Unsupported Sitting, Static Balance)  sitting in chair  -     Time Able to Maintain Position (Unsupported Sitting, Static Balance)  more than 5 minutes  -FLAVIA     Row Name 02/01/19 0930          Dynamic Sitting Balance    Level of [place], Reaches Outside Midline (Sitting, Dynamic Balance)  minimal assist, 75% patient effort  -FLAVIA     Sitting Position, Reaches Outside Midline (Sitting, Dynamic Balance)  sitting in chair  -[facility] Name 02/01/19 0930          Static Standing Balance    Level of [place] (Supported Standing, Static Balance)  moderate assist, 50 to 74% patient effort;2 person assist  -FLAVIA     Time Able to Maintain Position (Supported Standing, Static Balance)  3 to 4 minutes  -FLAVIA     Assistive Device Utilized (Supported Standing, Static Balance)  walker, rolling  -[facility] Name 02/01/19 0930          Dynamic Standing Balance    Level of [place], Reaches Outside Midline (Standing, Dynamic Balance)  moderate assist, 50 to 74% patient effort;2 person assist  -FLAVIA     Time Able to Maintain Position, Reaches Outside Midline (Standing, Dynamic Balance)  1 to 2 minutes  -FLAVIA     Assistive Device Utilized (Supported Standing, Dynamic Balance)  walker, rolling  -[facility] Name 02/01/19 0930          Pain Assessment    Additional Documentation  Pain Scale: Numbers Pre/Post-Treatment (Group)  -FLAVIA     Row Name 02/01/19 0930          Pain Scale: Numbers Pre/Post-Treatment    Pain Scale: Numbers, Pretreatment  4/10  -FLAVIA     Pain Scale: Numbers, Post-Treatment  6/10  -FLAVIA     Pain Location - Orientation  incisional  -FLAVIA     Pain Location  chest  -FLAVIA     Pain Intervention(s)  Repositioned;Ambulation/increased activity;Rest;Splinting  -[facility] Name             Wound 01/31/19 0900 chest incision    Wound - Properties Group  Date first assessed: 01/31/19  -TF Time first assessed: 0900  -TF Location: chest  -TF Type: incision  -TF    Row Name             Wound 01/31/19 0900 Right leg incision    Wound - Properties Group Date first assessed: 01/31/19  -TF Time first assessed: 0900  -TF Side: Right  -TF Location: leg  -TF Type: incision  -TF    Row Name             Wound 01/31/19 1731 Right groin puncture    Wound - Properties Group Date first assessed: 01/31/19  -AD Time first assessed: 1731  -AD Side: Right  -AD Location: groin  -AD Type: puncture  -AD, S/P VENOUS AND FEMORAL SHEATH     Row Name 02/01/19 0930          Coping    Observed Emotional State  anxious  -FLAVIA     Verbalized Emotional State  anxiety  -FLAVIA     Row Name 02/01/19 0930          Plan of Care Review    Plan of Care Reviewed With  patient;spouse  -FLAVIA     Row Name 02/01/19 0930          Physical Therapy Clinical Impression    Date of Referral to PT  02/01/19  -FLAVIA     PT Diagnosis (PT Clinical Impression)  impaired bed mobility transfer and gait decreased strength and balance  -FLAVIA     Patient/Family Goals Statement (PT Clinical Impression)  patient wants to go home at D/C  -FLAVIA     Criteria for Skilled Interventions Met (PT Clinical Impression)  yes;treatment indicated  -FLAVIA     Rehab Potential (PT Clinical Summary)  fair, will monitor progress closely  -FLAVIA     Care Plan Review (PT)  evaluation/treatment results reviewed;care plan/treatment goals reviewed;risks/benefits reviewed;patient/other agree to care plan  -FLAVIA     Care Plan Review, Other Participant (PT Clinical Impression)  spouse  -FLAVIA     Row Name 02/01/19 0930          Vital Signs    Pre Systolic BP Rehab  128  -FLAVIA     Pre Treatment Diastolic BP  53  -FLAVIA     Intra Systolic BP Rehab  114  -FLAVIA     Intra Treatment Diastolic BP  56  -FLAVIA     Post Systolic BP Rehab  124  -FLAVIA     Post Treatment Diastolic BP  65  -FLAVIA     Pretreatment Heart Rate (beats/min)  77  -FLAVIA     Posttreatment Heart Rate (beats/min)  88  -FLAVIA     Pre  SpO2 (%)  97  -FLAVIA     O2 Delivery Pre Treatment  nasal cannula  -FLAVIA     Post SpO2 (%)  95  -FLAVIA     O2 Delivery Post Treatment  nasal cannula  -FLAVIA     Pre Patient Position  Sitting  -FLAVIA     Intra Patient Position  Standing  -FLAVIA     Post Patient Position  Sitting  -FLAVIA     Row Name 02/01/19 0930          Physical Therapy Goals    Bed Mobility Goal Selection (PT)  bed mobility, PT goal 1  -FLAVIA     Transfer Goal Selection (PT)  transfer, PT goal 1  -FLAVAI     Gait Training Goal Selection (PT)  gait training, PT goal 1  -FLAVIA     Row Name 02/01/19 0930          Bed Mobility Goal 1 (PT)    Activity/Assistive Device (Bed Mobility Goal 1, PT)  sit to supine/supine to sit  -FLAVIA     [place] Level/Cues Needed (Bed Mobility Goal 1, PT)  independent  -FLAVIA     Time Frame (Bed Mobility Goal 1, PT)  long term goal (LTG);10 days  -FLAVIA     Progress/Outcomes (Bed Mobility Goal 1, PT)  goal ongoing  -FLAVIA     Row Name 02/01/19 0930          Transfer Goal 1 (PT)    Activity/Assistive Device (Transfer Goal 1, PT)  sit-to-stand/stand-to-sit  -FLAVIA     [place] Level/Cues Needed (Transfer Goal 1, PT)  independent  -FLAVIA     Time Frame (Transfer Goal 1, PT)  long term goal (LTG);10 days  -FLAVIA     Progress/Outcome (Transfer Goal 1, PT)  goal ongoing  -FLAVIA     Row Name 02/01/19 0930          Gait Training Goal 1 (PT)    Activity/Assistive Device (Gait Training Goal 1, PT)  gait (walking locomotion);assistive device use;walker, rolling  -FLAVIA     [place] Level (Gait Training Goal 1, PT)  contact guard assist  -FLAVIA     Distance (Gait Goal 1, PT)  300  -FLAVIA     Time Frame (Gait Training Goal 1, PT)  long term goal (LTG);10 days  -FLAVIA     Progress/Outcome (Gait Training Goal 1, PT)  goal ongoing  -FLAVIA     Row Name 02/01/19 0930          Patient Education Goal (PT)    Activity (Patient Education Goal, PT)  HEP  -FLAVIA     [place]/Cues/Accuracy (Memory Goal 2, PT)  verbalizes understanding  -FLAVIA     Time Frame (Patient Education Goal, PT)  long term goal  (LTG);10 days  -FLAVIA     Progress/Outcome (Patient Education Goal, PT)  goal ongoing  -FLAVIA     Row Name 02/01/19 0930          Positioning and Restraints    Pre-Treatment Position  sitting in chair/recliner  -FLAVIA     Post Treatment Position  chair  -FLAVIA     In Chair  notified nsg;reclined;sitting;call light within reach;exit alarm on;with family/caregiver  -FLAVIA       User Key  (r) = Recorded By, (t) = Taken By, (c) = Cosigned By    Initials Name Provider Type    Trupti Michelle PT Physical Therapist    Sarah Morillo, RN Registered Nurse    Gissel Jones RN Registered Nurse        Physical Therapy Education     Title: PT OT SLP Therapies (In Progress)     Topic: Physical Therapy (In Progress)     Point: Mobility training (In Progress)     Learning Progress Summary           Patient Acceptance, E, NR by FLAVIA at 2/1/2019  9:30 AM   Significant Other Acceptance, E, NR by FLAVIA at 2/1/2019  9:30 AM                   Point: Home exercise program (In Progress)     Learning Progress Summary           Patient Acceptance, E, NR by FLAVIA at 2/1/2019  9:30 AM   Significant Other Acceptance, E, NR by FLAVIA at 2/1/2019  9:30 AM                   Point: Body mechanics (In Progress)     Learning Progress Summary           Patient Acceptance, E, NR by FLAVIA at 2/1/2019  9:30 AM   Significant Other Acceptance, E, NR by FLAVIA at 2/1/2019  9:30 AM                   Point: Precautions (In Progress)     Learning Progress Summary           Patient Acceptance, E, NR by FLAVIA at 2/1/2019  9:30 AM   Significant Other Acceptance, E, NR by FLAVIA at 2/1/2019  9:30 AM                               User Key     Initials Effective Dates Name Provider Type Discipline    FLAVIA 06/19/15 -  Trupti An PT Physical Therapist PT              PT Recommendation and Plan  Anticipated Discharge Disposition (PT): skilled nursing facility  Planned Therapy Interventions (PT Eval): balance training, bed mobility training, gait training, home exercise program,  strengthening, transfer training  Therapy Frequency (PT Clinical Impression): daily  Outcome Summary/Treatment Plan (PT)  Anticipated Equipment Needs at Discharge (PT): front wheeled walker  Anticipated Discharge Disposition (PT): skilled nursing facility  Plan of Care Reviewed With: patient, spouse  Outcome Summary: PT eval completed patient is very anxious about moving and needs much encouragement to increase activity patient ambulates 30 ft with mod assist of 2 chair behind patient for safety, anticipate patient may need SNF upon D/C from [facility]   Outcome Measures     Row Name 02/01/19 0930             How much help from another person do you currently need...    Turning from your back to your side while in flat bed without using bedrails?  2  -FLAVIA      Moving from lying on back to sitting on the side of a flat bed without bedrails?  2  -FLAVIA      Moving to and from a bed to a chair (including a wheelchair)?  2  -FLAVIA      Standing up from a chair using your arms (e.g., wheelchair, bedside chair)?  2  -FLAVIA      Climbing 3-5 steps with a railing?  1  -FLAVIA      To walk in hospital room?  2  -FLAVIA      AM-PAC 6 Clicks Score  11  -FLAVIA         Functional Assessment    Outcome Measure Options  AM-PAC 6 Clicks Basic Mobility (PT)  -FLAVIA        User Key  (r) = Recorded By, (t) = Taken By, (c) = Cosigned By    Initials Name Provider Type    Trupti Michelle PT Physical Therapist         Time Calculation:   PT Charges     Row Name 02/01/19 0930             Time Calculation    Start Time  0930  -FLAVIA      PT Received On  02/01/19  -FLAVIA      PT Goal Re-Cert Due Date  02/11/19  -FLAVIA        User Key  (r) = Recorded By, (t) = Taken By, (c) = Cosigned By    Initials Name Provider Type    Trupti Michelle PT Physical Therapist        Therapy Suggested Charges     Code   Minutes Charges    None           Therapy Charges for Today     Code Description Service Date Service Provider Modifiers Qty    62790848999 HC PT EVAL HIGH COMPLEXITY 4  2/1/2019 Trupti An, PT GP 1    33109038515 HC PT THER SUPP EA 15 MIN 2/1/2019 Trupti An, PT GP 2          PT G-Codes  Outcome Measure Options: AM-PAC 6 Clicks Basic Mobility (PT)  AM-PAC 6 Clicks Score: 11      Trupti An, PT  2/1/2019

## 2019-02-02 ENCOUNTER — APPOINTMENT (OUTPATIENT)
Dept: GENERAL RADIOLOGY | Facility: HOSPITAL | Age: 64
End: 2019-02-02

## 2019-02-02 LAB
ANION GAP SERPL CALCULATED.3IONS-SCNC: 10 MMOL/L (ref 3–11)
BUN BLD-MCNC: 18 MG/DL (ref 9–23)
BUN/CREAT SERPL: 14.5 (ref 7–25)
CALCIUM SPEC-SCNC: 8.8 MG/DL (ref 8.7–10.4)
CHLORIDE SERPL-SCNC: 100 MMOL/L (ref 99–109)
CO2 SERPL-SCNC: 24 MMOL/L (ref 20–31)
CREAT BLD-MCNC: 1.24 MG/DL (ref 0.6–1.3)
DEPRECATED RDW RBC AUTO: 45.1 FL (ref 37–54)
ERYTHROCYTE [DISTWIDTH] IN BLOOD BY AUTOMATED COUNT: 13.4 % (ref 11.3–14.5)
GFR SERPL CREATININE-BSD FRML MDRD: 59 ML/MIN/1.73
GLUCOSE BLD-MCNC: 120 MG/DL (ref 70–100)
GLUCOSE BLDC GLUCOMTR-MCNC: 118 MG/DL (ref 70–130)
GLUCOSE BLDC GLUCOMTR-MCNC: 119 MG/DL (ref 70–130)
GLUCOSE BLDC GLUCOMTR-MCNC: 126 MG/DL (ref 70–130)
GLUCOSE BLDC GLUCOMTR-MCNC: 142 MG/DL (ref 70–130)
HCT VFR BLD AUTO: 35.9 % (ref 38.9–50.9)
HGB BLD-MCNC: 11.9 G/DL (ref 13.1–17.5)
MAGNESIUM SERPL-MCNC: 2.1 MG/DL (ref 1.3–2.7)
MCH RBC QN AUTO: 30.9 PG (ref 27–31)
MCHC RBC AUTO-ENTMCNC: 33.1 G/DL (ref 32–36)
MCV RBC AUTO: 93.2 FL (ref 80–99)
PLATELET # BLD AUTO: 125 10*3/MM3 (ref 150–450)
PMV BLD AUTO: 10.3 FL (ref 6–12)
POTASSIUM BLD-SCNC: 3.7 MMOL/L (ref 3.5–5.5)
RBC # BLD AUTO: 3.85 10*6/MM3 (ref 4.2–5.76)
SODIUM BLD-SCNC: 134 MMOL/L (ref 132–146)
WBC NRBC COR # BLD: 10.93 10*3/MM3 (ref 3.5–10.8)

## 2019-02-02 PROCEDURE — 83735 ASSAY OF MAGNESIUM: CPT | Performed by: INTERNAL MEDICINE

## 2019-02-02 PROCEDURE — 82962 GLUCOSE BLOOD TEST: CPT

## 2019-02-02 PROCEDURE — 25010000002 CEFUROXIME: Performed by: PHYSICIAN ASSISTANT

## 2019-02-02 PROCEDURE — 63710000001 INSULIN DETEMIR PER 5 UNITS: Performed by: INTERNAL MEDICINE

## 2019-02-02 PROCEDURE — 93010 ELECTROCARDIOGRAM REPORT: CPT | Performed by: INTERNAL MEDICINE

## 2019-02-02 PROCEDURE — 93005 ELECTROCARDIOGRAM TRACING: CPT | Performed by: PHYSICIAN ASSISTANT

## 2019-02-02 PROCEDURE — 85027 COMPLETE CBC AUTOMATED: CPT | Performed by: PHYSICIAN ASSISTANT

## 2019-02-02 PROCEDURE — 80048 BASIC METABOLIC PNL TOTAL CA: CPT | Performed by: PHYSICIAN ASSISTANT

## 2019-02-02 PROCEDURE — 25010000002 ONDANSETRON PER 1 MG: Performed by: PHYSICIAN ASSISTANT

## 2019-02-02 PROCEDURE — 25010000002 MORPHINE PER 10 MG: Performed by: INTERNAL MEDICINE

## 2019-02-02 PROCEDURE — 99024 POSTOP FOLLOW-UP VISIT: CPT | Performed by: THORACIC SURGERY (CARDIOTHORACIC VASCULAR SURGERY)

## 2019-02-02 PROCEDURE — 71045 X-RAY EXAM CHEST 1 VIEW: CPT

## 2019-02-02 RX ADMIN — ROSUVASTATIN CALCIUM 40 MG: 20 TABLET, FILM COATED ORAL at 21:41

## 2019-02-02 RX ADMIN — INSULIN LISPRO 3 UNITS: 100 INJECTION, SOLUTION INTRAVENOUS; SUBCUTANEOUS at 08:15

## 2019-02-02 RX ADMIN — HYDROCODONE BITARTRATE AND ACETAMINOPHEN 1 TABLET: 7.5; 325 TABLET ORAL at 05:47

## 2019-02-02 RX ADMIN — GABAPENTIN 300 MG: 300 CAPSULE ORAL at 08:14

## 2019-02-02 RX ADMIN — ASPIRIN 325 MG: 325 TABLET, DELAYED RELEASE ORAL at 08:14

## 2019-02-02 RX ADMIN — INSULIN LISPRO 3 UNITS: 100 INJECTION, SOLUTION INTRAVENOUS; SUBCUTANEOUS at 11:43

## 2019-02-02 RX ADMIN — OXYCODONE HYDROCHLORIDE AND ACETAMINOPHEN 1 TABLET: 10; 325 TABLET ORAL at 12:10

## 2019-02-02 RX ADMIN — ONDANSETRON 4 MG: 2 INJECTION INTRAMUSCULAR; INTRAVENOUS at 06:04

## 2019-02-02 RX ADMIN — GABAPENTIN 300 MG: 300 CAPSULE ORAL at 21:42

## 2019-02-02 RX ADMIN — METOPROLOL TARTRATE 12.5 MG: 25 TABLET, FILM COATED ORAL at 21:42

## 2019-02-02 RX ADMIN — SENNOSIDES AND DOCUSATE SODIUM 2 TABLET: 8.6; 5 TABLET ORAL at 21:42

## 2019-02-02 RX ADMIN — INSULIN DETEMIR 15 UNITS: 100 INJECTION, SOLUTION SUBCUTANEOUS at 21:36

## 2019-02-02 RX ADMIN — SENNOSIDES AND DOCUSATE SODIUM 2 TABLET: 8.6; 5 TABLET ORAL at 08:15

## 2019-02-02 RX ADMIN — TERAZOSIN HYDROCHLORIDE 5 MG: 5 CAPSULE ORAL at 21:42

## 2019-02-02 RX ADMIN — METOPROLOL TARTRATE 12.5 MG: 25 TABLET, FILM COATED ORAL at 08:14

## 2019-02-02 RX ADMIN — CEFUROXIME 1.5 G: 1.5 INJECTION, POWDER, FOR SOLUTION INTRAVENOUS at 05:47

## 2019-02-02 RX ADMIN — OXYCODONE HYDROCHLORIDE AND ACETAMINOPHEN 1 TABLET: 10; 325 TABLET ORAL at 16:42

## 2019-02-02 RX ADMIN — MORPHINE SULFATE 2 MG: 2 INJECTION, SOLUTION INTRAMUSCULAR; INTRAVENOUS at 03:04

## 2019-02-02 RX ADMIN — INSULIN LISPRO 3 UNITS: 100 INJECTION, SOLUTION INTRAVENOUS; SUBCUTANEOUS at 16:42

## 2019-02-02 NOTE — PLAN OF CARE
Problem: Patient Care Overview  Goal: Plan of Care Review  Outcome: Ongoing (interventions implemented as appropriate)   02/02/19 1820   Coping/Psychosocial   Plan of Care Reviewed With patient   Plan of Care Review   Progress improving   OTHER   Outcome Summary ambulated twice, pain medication making pt dizzy, VSS, voiding per urinal, he has had a lot more motivation today, chest tubes remain, no other changes today.        Problem: Skin Injury Risk (Adult)  Goal: Identify Related Risk Factors and Signs and Symptoms  Outcome: Ongoing (interventions implemented as appropriate)   02/02/19 1820   Skin Injury Risk (Adult)   Related Risk Factors (Skin Injury Risk) critical care admission     Goal: Skin Health and Integrity  Outcome: Ongoing (interventions implemented as appropriate)   02/02/19 1820   Skin Injury Risk (Adult)   Skin Health and Integrity making progress toward outcome       Problem: Cardiac Surgery (Adult)  Goal: Signs and Symptoms of Listed Potential Problems Will be Absent, Minimized or Managed (Cardiac Surgery)  Outcome: Ongoing (interventions implemented as appropriate)   02/02/19 1820   Goal/Outcome Evaluation   Problems Assessed (Cardiac Surgery) all   Problems Present (Cardiac Surgery) pain;postoperative nausea and vomiting

## 2019-02-02 NOTE — PROGRESS NOTES
Jesse Brink  [id number]  1955    POD# 2 S/P CABGx5    CC: I feel good    Subjective:  Awake alert no new complaints.  Still has increased amounts of chest tube drainage.  Chest x-ray is clear with the exception of small left pleural effusion. Vital signs are normal hemodynamics are normal.  Stable course to this point      CAD in native artery    Benign essential HTN    Type 2 diabetes mellitus (CMS/HCC)    Ischemic cardiomyopathy    S/P CABG x 5 1/31/19    Mixed hyperlipidemia      Objective:    Vital Signs:  Temp:  [98.4 °F (36.9 °C)-99.5 °F (37.5 °C)] 98.4 °F (36.9 °C)  Heart Rate:  [77-89] 79  Resp:  [16-28] 16  BP: ()/(51-75) 133/72  Arterial Line BP: (138)/(53) 138/53    Physical Exam:   General Appearance: alert, appears stated age    Lungs: clear to auscultation    Heart: regular rhythm & normal rate, normal S1, S2,no murmur    Skin:warm and dry        Results from last 7 days   Lab Units 02/02/19  0416   WBC 10*3/mm3 10.93*   HEMOGLOBIN g/dL 11.9*   HEMATOCRIT % 35.9*   PLATELETS 10*3/mm3 125*     Results from last 7 days   Lab Units 02/02/19  0416   SODIUM mmol/L 134   POTASSIUM mmol/L 3.7   CHLORIDE mmol/L 100   CO2 mmol/L 24.0   BUN mg/dL 18   CREATININE mg/dL 1.24   GLUCOSE mg/dL 120*   CALCIUM mg/dL 8.8       Imaging Results (last 24 hours)     Procedure Component Value Units Date/Time    XR Chest 1 View [[id number]] Collected:  02/02/19 1248     Updated:  02/02/19 1305    Narrative:       EXAMINATION: XR CHEST 1 VW - 2/2/2019     INDICATION:  Z74.09-Other reduced mobility; I25.10-Atherosclerotic heart  disease of native coronary artery without angina pectoris; I20.8-Other  forms of angina pectoris.     TECHNIQUE: Single frontal view of the chest.      COMPARISONS: 2/1/2019     FINDINGS:  Onyx catheter has been retracted into the SVC. Chest tubes  have been pulled. Other support and operative hardware is unchanged.  Trace volume effusions and adjacent atelectasis. No  pneumothorax.  Cardiomediastinal silhouette is unchanged.        Impression:       Hardware adjustment and removal as discussed. Unchanged  trace effusions.     DICTATED:   2/2/2019  EDITED/ls :   2/2/2019      This report was finalized on 2/2/2019 1:03 PM by Sivlerio Fields.       XR Chest 1 View [[id number]] Collected:  02/01/19 0850     Updated:  02/01/19 1831    Narrative:       EXAMINATION: XR CHEST 1 VW-      INDICATION: Postop heart surgery; I25.10-Atherosclerotic heart disease  of native coronary artery without angina pectoris; I20.8-Other forms of  angina pectoris.      COMPARISON: 01/31/2019.     FINDINGS: ET tube has been removed. Heart is enlarged. Vasculature  appears normal. Mild bibasilar atelectasis and effusion appears a little  increased postextubation. No pneumothorax is seen.           Impression:       Postextubation radiographic mild bibasilar  atelectasis/effusion. No pneumothorax.     D:  02/01/2019  E:  02/01/2019     This report was finalized on 2/1/2019 6:29 PM by DR. Anshul Martinez MD.       XR Chest 1 View [[id number]] Collected:  01/31/19 1546     Updated:  02/01/19 1719    Narrative:       EXAMINATION: XR CHEST 1 VW-01/31/2019:      INDICATION: Post-Op Check Line & Tube Placement;  I25.10-Atherosclerotic heart disease of native coronary artery without  angina pectoris; I20.8-Other forms of angina pectoris.      COMPARISON: 01/30/2019.     FINDINGS: Portable chest reveals the patient to be status post median  sternotomy. Endotracheal tube placed in satisfactory position above the  erma. Barton-Lauro catheter is identified with tip in the right main  pulmonary artery. Mild increased markings identified at the lung bases  bilaterally. Degenerative changes seen within the spine. Bilateral chest  tubes in place. No definite pneumothorax.           Impression:       The patient is status post median sternotomy with lines and  tubes in satisfactory position. Bilateral chest tubes in place with  mild  increased markings identified at the lung bases.     D:  01/31/2019  E:  01/31/2019     This report was finalized on 2/1/2019 5:17 PM by Dr. Alexandra Vallejo MD.              Diagnosis:  Postoperative CABG X5 stable course      Plan: Leave chest tubes in place continue current treatment regimen in SICU.  I have reviewed, verified, and confirmed the above history and current status.  I have examined the patient and confirmed the above physical findings.     Biju Terrell MD  02/02/19  1:09 PM

## 2019-02-02 NOTE — PLAN OF CARE
"Problem: Patient Care Overview  Goal: Plan of Care Review  Outcome: Ongoing (interventions implemented as appropriate)   02/02/19 0321   Coping/Psychosocial   Plan of Care Reviewed With patient   Plan of Care Review   Progress improving   OTHER   Outcome Summary Pt. oriented x 4. Ambulated to the doorway, then wheeled back to bed in chair, pt. stating, \" he was worn out and couldn't move legs.\" Pt. c/o pain, prns given. 330ml out of MTs so far for shift, adequate UOP, will D/C acosta. VSS. On 3L NC.         "

## 2019-02-03 LAB
ABO + RH BLD: NORMAL
ABO + RH BLD: NORMAL
ANION GAP SERPL CALCULATED.3IONS-SCNC: 7 MMOL/L (ref 3–11)
BH BB BLOOD EXPIRATION DATE: NORMAL
BH BB BLOOD EXPIRATION DATE: NORMAL
BH BB BLOOD TYPE BARCODE: 5100
BH BB BLOOD TYPE BARCODE: 5100
BH BB DISPENSE STATUS: NORMAL
BH BB DISPENSE STATUS: NORMAL
BH BB PRODUCT CODE: NORMAL
BH BB PRODUCT CODE: NORMAL
BH BB UNIT NUMBER: NORMAL
BH BB UNIT NUMBER: NORMAL
BUN BLD-MCNC: 22 MG/DL (ref 9–23)
BUN/CREAT SERPL: 19.1 (ref 7–25)
CALCIUM SPEC-SCNC: 8.3 MG/DL (ref 8.7–10.4)
CHLORIDE SERPL-SCNC: 102 MMOL/L (ref 99–109)
CO2 SERPL-SCNC: 28 MMOL/L (ref 20–31)
CREAT BLD-MCNC: 1.15 MG/DL (ref 0.6–1.3)
DEPRECATED RDW RBC AUTO: 44 FL (ref 37–54)
ERYTHROCYTE [DISTWIDTH] IN BLOOD BY AUTOMATED COUNT: 13.2 % (ref 11.3–14.5)
GFR SERPL CREATININE-BSD FRML MDRD: 64 ML/MIN/1.73
GLUCOSE BLD-MCNC: 134 MG/DL (ref 70–100)
GLUCOSE BLDC GLUCOMTR-MCNC: 124 MG/DL (ref 70–130)
GLUCOSE BLDC GLUCOMTR-MCNC: 138 MG/DL (ref 70–130)
GLUCOSE BLDC GLUCOMTR-MCNC: 160 MG/DL (ref 70–130)
GLUCOSE BLDC GLUCOMTR-MCNC: 171 MG/DL (ref 70–130)
HCT VFR BLD AUTO: 34.8 % (ref 38.9–50.9)
HGB BLD-MCNC: 11.7 G/DL (ref 13.1–17.5)
MCH RBC QN AUTO: 30.9 PG (ref 27–31)
MCHC RBC AUTO-ENTMCNC: 33.6 G/DL (ref 32–36)
MCV RBC AUTO: 91.8 FL (ref 80–99)
PLATELET # BLD AUTO: 146 10*3/MM3 (ref 150–450)
PMV BLD AUTO: 10.2 FL (ref 6–12)
POTASSIUM BLD-SCNC: 3.7 MMOL/L (ref 3.5–5.5)
RBC # BLD AUTO: 3.79 10*6/MM3 (ref 4.2–5.76)
SODIUM BLD-SCNC: 137 MMOL/L (ref 132–146)
UNIT  ABO: NORMAL
UNIT  ABO: NORMAL
UNIT  RH: NORMAL
UNIT  RH: NORMAL
WBC NRBC COR # BLD: 10.58 10*3/MM3 (ref 3.5–10.8)

## 2019-02-03 PROCEDURE — 99024 POSTOP FOLLOW-UP VISIT: CPT | Performed by: THORACIC SURGERY (CARDIOTHORACIC VASCULAR SURGERY)

## 2019-02-03 PROCEDURE — 82962 GLUCOSE BLOOD TEST: CPT

## 2019-02-03 PROCEDURE — 80048 BASIC METABOLIC PNL TOTAL CA: CPT | Performed by: PHYSICIAN ASSISTANT

## 2019-02-03 PROCEDURE — 97530 THERAPEUTIC ACTIVITIES: CPT

## 2019-02-03 PROCEDURE — 99232 SBSQ HOSP IP/OBS MODERATE 35: CPT | Performed by: INTERNAL MEDICINE

## 2019-02-03 PROCEDURE — 85027 COMPLETE CBC AUTOMATED: CPT | Performed by: PHYSICIAN ASSISTANT

## 2019-02-03 PROCEDURE — 63710000001 INSULIN DETEMIR PER 5 UNITS: Performed by: INTERNAL MEDICINE

## 2019-02-03 RX ORDER — OXYCODONE HYDROCHLORIDE AND ACETAMINOPHEN 5; 325 MG/1; MG/1
1 TABLET ORAL EVERY 4 HOURS PRN
Status: DISCONTINUED | OUTPATIENT
Start: 2019-02-03 | End: 2019-02-05 | Stop reason: HOSPADM

## 2019-02-03 RX ADMIN — INSULIN LISPRO 3 UNITS: 100 INJECTION, SOLUTION INTRAVENOUS; SUBCUTANEOUS at 12:08

## 2019-02-03 RX ADMIN — SENNOSIDES AND DOCUSATE SODIUM 2 TABLET: 8.6; 5 TABLET ORAL at 08:14

## 2019-02-03 RX ADMIN — INSULIN DETEMIR 15 UNITS: 100 INJECTION, SOLUTION SUBCUTANEOUS at 21:27

## 2019-02-03 RX ADMIN — GABAPENTIN 300 MG: 300 CAPSULE ORAL at 08:13

## 2019-02-03 RX ADMIN — METOPROLOL TARTRATE 12.5 MG: 25 TABLET, FILM COATED ORAL at 21:29

## 2019-02-03 RX ADMIN — OXYCODONE HYDROCHLORIDE AND ACETAMINOPHEN 1 TABLET: 10; 325 TABLET ORAL at 02:07

## 2019-02-03 RX ADMIN — SENNOSIDES AND DOCUSATE SODIUM 2 TABLET: 8.6; 5 TABLET ORAL at 21:28

## 2019-02-03 RX ADMIN — GABAPENTIN 300 MG: 300 CAPSULE ORAL at 21:28

## 2019-02-03 RX ADMIN — ASPIRIN 325 MG: 325 TABLET, DELAYED RELEASE ORAL at 08:13

## 2019-02-03 RX ADMIN — INSULIN LISPRO 3 UNITS: 100 INJECTION, SOLUTION INTRAVENOUS; SUBCUTANEOUS at 17:34

## 2019-02-03 RX ADMIN — OXYCODONE AND ACETAMINOPHEN 1 TABLET: 5; 325 TABLET ORAL at 21:27

## 2019-02-03 RX ADMIN — OXYCODONE HYDROCHLORIDE AND ACETAMINOPHEN 1 TABLET: 10; 325 TABLET ORAL at 06:46

## 2019-02-03 RX ADMIN — OXYCODONE AND ACETAMINOPHEN 1 TABLET: 5; 325 TABLET ORAL at 17:51

## 2019-02-03 RX ADMIN — METOPROLOL TARTRATE 12.5 MG: 25 TABLET, FILM COATED ORAL at 08:14

## 2019-02-03 RX ADMIN — ROSUVASTATIN CALCIUM 40 MG: 20 TABLET, FILM COATED ORAL at 21:28

## 2019-02-03 RX ADMIN — INSULIN LISPRO 2 UNITS: 100 INJECTION, SOLUTION INTRAVENOUS; SUBCUTANEOUS at 21:31

## 2019-02-03 RX ADMIN — OXYCODONE HYDROCHLORIDE AND ACETAMINOPHEN 1 TABLET: 10; 325 TABLET ORAL at 12:11

## 2019-02-03 RX ADMIN — TERAZOSIN HYDROCHLORIDE 5 MG: 5 CAPSULE ORAL at 21:28

## 2019-02-03 RX ADMIN — INSULIN LISPRO 3 UNITS: 100 INJECTION, SOLUTION INTRAVENOUS; SUBCUTANEOUS at 08:16

## 2019-02-03 RX ADMIN — INSULIN LISPRO 2 UNITS: 100 INJECTION, SOLUTION INTRAVENOUS; SUBCUTANEOUS at 12:08

## 2019-02-03 NOTE — PLAN OF CARE
Problem: Patient Care Overview  Goal: Plan of Care Review  Outcome: Ongoing (interventions implemented as appropriate)   02/03/19 0444   Coping/Psychosocial   Plan of Care Reviewed With patient;spouse   Plan of Care Review   Progress improving   OTHER   Outcome Summary No issues overnight. Pain well managed with PO pain medication. Minimal amount of serous drainage from MT's. Normal sinus rhythm. Only able to ambulate about 25 feet last night.        Problem: Cardiac Surgery (Adult)  Goal: Signs and Symptoms of Listed Potential Problems Will be Absent, Minimized or Managed (Cardiac Surgery)  Outcome: Ongoing (interventions implemented as appropriate)   02/03/19 6754   Goal/Outcome Evaluation   Problems Assessed (Cardiac Surgery) all   Problems Present (Cardiac Surgery) functional deficit;pain;situational response

## 2019-02-03 NOTE — PLAN OF CARE
Problem: Patient Care Overview  Goal: Plan of Care Review  Outcome: Ongoing (interventions implemented as appropriate)   02/03/19 0865   Coping/Psychosocial   Plan of Care Reviewed With patient   Plan of Care Review   Progress improving   OTHER   Outcome Summary Pt increased ambulation distance to 100ft with RW and Sarah x2 with chair follow. Pt given VC's for upright posture and proper management of RW. Pt required two seated rest breaks. Continue to progress as appropriate.

## 2019-02-03 NOTE — PROGRESS NOTES
Jesse Brink  [id number]  1955    POD# 3 S/P CABG X 5    CC: My chest is sore and I get tired easy    Subjective:  Ambulating in SICU with assistance.  Stable postoperative course to this point.  Chest tube drainage is minimal.  Hemodynamics stable vital signs normal.  No fever, chills, or night sweats.  No anginal quality chest.  The usual and expected musculoskeletal soreness      CAD in native artery    Benign essential HTN    Type 2 diabetes mellitus (CMS/HCC)    Ischemic cardiomyopathy    S/P CABG x 5 1/31/19    Mixed hyperlipidemia      Objective:    Vital Signs:  Temp:  [98.4 °F (36.9 °C)-98.9 °F (37.2 °C)] 98.4 °F (36.9 °C)  Heart Rate:  [] 85  Resp:  [16-24] 20  BP: (121-161)/(65-84) 121/67    Physical Exam:   General Appearance: alert, appears stated age    Lungs: clear to auscultation    Heart: regular rhythm & normal rate, normal S1, S2,no murmur    Skin:warm and dry        Results from last 7 days   Lab Units 02/03/19  0321   WBC 10*3/mm3 10.58   HEMOGLOBIN g/dL 11.7*   HEMATOCRIT % 34.8*   PLATELETS 10*3/mm3 146*     Results from last 7 days   Lab Units 02/03/19  0321   SODIUM mmol/L 137   POTASSIUM mmol/L 3.7   CHLORIDE mmol/L 102   CO2 mmol/L 28.0   BUN mg/dL 22   CREATININE mg/dL 1.15   GLUCOSE mg/dL 134*   CALCIUM mg/dL 8.3*       Imaging Results (last 24 hours)     Procedure Component Value Units Date/Time    XR Chest 1 View [[id number]] Collected:  02/02/19 1248     Updated:  02/02/19 1305    Narrative:       EXAMINATION: XR CHEST 1 VW - 2/2/2019     INDICATION:  Z74.09-Other reduced mobility; I25.10-Atherosclerotic heart  disease of native coronary artery without angina pectoris; I20.8-Other  forms of angina pectoris.     TECHNIQUE: Single frontal view of the chest.      COMPARISONS: 2/1/2019     FINDINGS:  [place] catheter has been retracted into the SVC. Chest tubes  have been pulled. Other support and operative hardware is unchanged.  Trace volume effusions and adjacent  atelectasis. No pneumothorax.  Cardiomediastinal silhouette is unchanged.        Impression:       Hardware adjustment and removal as discussed. Unchanged  trace effusions.     DICTATED:   2/2/2019  EDITED/ls :   2/2/2019      This report was finalized on 2/2/2019 1:03 PM by Silverio Fields.              Diagnosis:  Coronary artery disease, ischemic cardiomyopathy, status post CABG X5      Plan: Remove chest tubes and transfer to telemetry  I have reviewed, verified, and confirmed the above history and current status.  I have examined the patient and confirmed the above physical findings.     Biju Terrell MD  02/03/19  12:15 PM

## 2019-02-03 NOTE — THERAPY TREATMENT NOTE
Acute Care - Physical Therapy Treatment Note  [facility]     Patient Name: Jesse Brink  : 1955  MRN: [id number]  Today's Date: 2/3/2019  Onset of Illness/Injury or Date of Surgery: 19  Date of Referral to PT: 19  Referring Physician: MD Gu    Admit Date: 2019    Visit Dx:    ICD-10-CM ICD-9-CM   1. Impaired functional mobility, balance, gait, and endurance Z74.09 V49.89   2. CAD in native artery I25.10 414.01   3. Angina at rest (CMS/HCC) I20.8 413.9     Patient Active Problem List   Diagnosis   • Benign essential HTN   • Lumbosacral radiculopathy at S1   • Numbness of lower extremity   • Transient ischemic attack   • Rheumatoid arthritis (CMS/HCC)   • Obesity   • Idiopathic peripheral neuropathy   • Palpitations   • CAD in native artery   • Type 2 diabetes mellitus (CMS/HCC)   • Ischemic cardiomyopathy   • S/P CABG x 5 19   • Mixed hyperlipidemia       Therapy Treatment    Rehabilitation Treatment Summary     Row Name 19 0836             Treatment Time/Intention    Discipline  physical therapist  -KR      Document Type  therapy note (daily note)  -KR      Subjective Information  complains of;pain  -KR      Mode of Treatment  physical therapy  -KR      Care Plan Review  care plan/treatment goals reviewed;risks/benefits reviewed;patient/other agree to care plan  -KR      Therapy Frequency (PT Clinical Impression)  daily  -KR      Patient Effort  adequate  -KR      Existing Precautions/Restrictions  cardiac;fall;oxygen therapy device and L/min;sternal  -KR      Recorded by [KR] Pari Abbasi, PT 19 1115      Row Name 19 0836             Vital Signs    Pre Systolic BP Rehab  131  -KR      Pre Treatment Diastolic BP  67  -KR      Post Systolic BP Rehab  114  -KR      Post Treatment Diastolic BP  90  -KR      Pretreatment Heart Rate (beats/min)  96  -KR      Posttreatment Heart Rate (beats/min)  94  -KR      Pre SpO2 (%)  96  -KR      O2 Delivery Pre  Treatment  supplemental O2  -KR      Post SpO2 (%)  96  -KR      O2 Delivery Post Treatment  supplemental O2  -KR      Pre Patient Position  Sitting  -KR      Intra Patient Position  Standing  -KR      Post Patient Position  Sitting  -KR      Recorded by [KR] Pari Abbasi, PT 02/03/19 1115      Row Name 02/03/19 0836             Cognitive Assessment/Intervention    Additional Documentation  Cognitive Assessment/Intervention (Group)  -KR      Recorded by [KR] Pari Abbasi, PT 02/03/19 1115      Row Name 02/03/19 0836             Cognitive Assessment/Intervention- PT/OT    Affect/Mental Status (Cognitive)  WFL  -KR      Orientation Status (Cognition)  oriented x 4  -KR      Follows Commands (Cognition)  follows one step commands;over 90% accuracy;verbal cues/prompting required  -KR      Cognitive Function (Cognitive)  safety deficit  -KR      Safety Deficit (Cognitive)  mild deficit;awareness of need for assistance;insight into deficits/self awareness;safety precautions awareness;safety precautions follow-through/compliance  -KR      Personal Safety Interventions  fall prevention program maintained;gait belt;nonskid shoes/slippers when out of bed  -KR      Recorded by [KR] Pari Abbasi, PT 02/03/19 1115      Row Name 02/03/19 0836             Safety Issues, Functional Mobility    Safety Issues Affecting Function (Mobility)  awareness of need for assistance;insight into deficits/self awareness;safety precaution awareness;safety precautions follow-through/compliance  -KR      Impairments Affecting Function (Mobility)  balance;coordination;endurance/activity tolerance;shortness of breath;strength;pain  -KR      Recorded by [KR] Pari Abbasi, PT 02/03/19 1115      Row Name 02/03/19 0836             Bed Mobility Assessment/Treatment    Comment (Bed Mobility)  UIC  -KR      Recorded by [KR] Pari Abbasi, PT 02/03/19 1115      Row Name 02/03/19 0836             Transfer Assessment/Treatment    Transfer  Assessment/Treatment  sit-stand transfer;stand-sit transfer  -KR      Comment (Transfers)  Increased cueing for sequencing and hand placement to maintain sternal precautions.   -KR      Recorded by [KR] Pari Abbasi, PT 02/03/19 1115      Row Name 02/03/19 0836             Sit-Stand Transfer    Sit-Stand [place] (Transfers)  moderate assist (50% patient effort);2 person assist;verbal cues  -KR      Assistive Device (Sit-Stand Transfers)  walker, front-wheeled  -KR      Recorded by [KR] Pari Abbasi, PT 02/03/19 1115      Row Name 02/03/19 0836             Stand-Sit Transfer    Stand-Sit [place] (Transfers)  moderate assist (50% patient effort);2 person assist;verbal cues  -KR      Assistive Device (Stand-Sit Transfers)  walker, front-wheeled  -KR      Recorded by [KR] Pari Abbasi, PT 02/03/19 1115      Row Name 02/03/19 0836             Gait/Stairs Assessment/Training    Gait/Stairs Assessment/Training  gait/ambulation assistive device  -KR      [place] Level (Gait)  minimum assist (75% patient effort);2 person assist;1 person to manage equipment;verbal cues chair follow  -KR      Assistive Device (Gait)  walker, front-wheeled  -KR      Distance in Feet (Gait)  100  -KR      Pattern (Gait)  step-through  -KR      Deviations/Abnormal Patterns (Gait)  bilateral deviations;base of support, wide;sara decreased;other (see comments) decreased step length  -KR      Bilateral Gait Deviations  forward flexed posture;heel strike decreased  -KR      Comment (Gait/Stairs)  Pt demonstrated step through gait pattern with slow sara and wide HUE. Pt given verbal and tactile cues to push RW out in front of him. Pt required two prolonged seated rest breaks and increased encouragement for additional ambulation.   -KR      Recorded by [KR] Pari Abbasi, PT 02/03/19 1115      Row Name 02/03/19 0836             Motor Skills Assessment/Interventions    Additional Documentation  Balance (Group);Therapeutic  Exercise (Group)  -KR      Recorded by [KR] Pari Abbasi, PT 02/03/19 1115      Row Name 02/03/19 0836             Therapeutic Exercise    97713 - PT Therapeutic Activity Minutes  38  -KR      Recorded by [KR] Pari Abbasi, PT 02/03/19 1115      Row Name 02/03/19 0836             Balance    Balance  static sitting balance;static standing balance  -KR      Recorded by [KR] Pari Abbasi, PT 02/03/19 1115      Row Name 02/03/19 0836             Static Sitting Balance    Level of [place] (Unsupported Sitting, Static Balance)  supervision  -KR      Sitting Position (Unsupported Sitting, Static Balance)  sitting in chair  -KR      Recorded by [KR] Pari Abbasi, PT 02/03/19 1115      Row Name 02/03/19 0836             Static Standing Balance    Level of [place] (Supported Standing, Static Balance)  minimal assist, 75% patient effort  -KR      Assistive Device Utilized (Supported Standing, Static Balance)  walker, rolling  -KR      Recorded by [KR] Pari Abbasi, PT 02/03/19 1115      Row Name 02/03/19 0836             Positioning and Restraints    Pre-Treatment Position  sitting in chair/recliner  -KR      Post Treatment Position  chair  -KR      In Chair  notified nsg;reclined;call light within reach;encouraged to call for assist;with family/caregiver;RUE elevated;LUE elevated;legs elevated  -KR      Recorded by [KR] Pari Abbasi, PT 02/03/19 1115      Row Name 02/03/19 0836             Pain Assessment    Additional Documentation  Pain Scale: Numbers Pre/Post-Treatment (Group)  -KR      Recorded by [KR] Pari Abbasi, PT 02/03/19 1115      Row Name 02/03/19 0836             Pain Scale: Numbers Pre/Post-Treatment    Pain Scale: Numbers, Pretreatment  4/10  -KR      Pain Scale: Numbers, Post-Treatment  6/10  -KR      Pain Location - Orientation  incisional  -KR      Pain Location  chest  -KR      Pain Intervention(s)  Repositioned;Ambulation/increased activity  -KR      Recorded by [KR] Elroy  Pari AYON, PT 02/03/19 1115      Row Name                Wound 01/31/19 0900 chest incision    Wound - Properties Group Date first assessed: 01/31/19 [TF] Time first assessed: 0900 [TF] Location: chest [TF] Type: incision [TF] Recorded by:  [TF] Sarah Raines RN 01/31/19 0900    Row Name                Wound 01/31/19 0900 Right leg incision    Wound - Properties Group Date first assessed: 01/31/19 [TF] Time first assessed: 0900 [TF] Side: Right [TF] Location: leg [TF] Type: incision [TF] Recorded by:  [TF] Sarah Raines RN 01/31/19 0900    Row Name                Wound 01/31/19 1731 Right groin puncture    Wound - Properties Group Date first assessed: 01/31/19 [AD] Time first assessed: 1731 [AD] Side: Right [AD] Location: groin [AD] Type: puncture [AD], S/P VENOUS AND FEMORAL SHEATH  Recorded by:  [AD] Gissel Small RN 01/31/19 1732    Row Name 02/03/19 0836             Plan of Care Review    Plan of Care Reviewed With  patient  -KR      Recorded by [KR] Pari Abbasi, PT 02/03/19 1115      Row Name 02/03/19 0836             Outcome Summary/Treatment Plan (PT)    Daily Summary of Progress (PT)  progress toward functional goals as expected  -KR      Recorded by [KR] Pari Abbasi, PT 02/03/19 1115        User Key  (r) = Recorded By, (t) = Taken By, (c) = Cosigned By    Initials Name Effective Dates Discipline    TF Sarah Raines RN 06/16/16 -  Nurse    Gissel Jones RN 11/05/18 -  Nurse    Pari Noe, PT 04/03/18 -  PT          Wound 01/31/19 0900 chest incision (Active)   Dressing Appearance open to air 2/3/2019 10:00 AM   Closure Liquid skin adhesive 2/3/2019 10:00 AM   Base clean;dry 2/3/2019 10:00 AM   Periwound dry;intact;ecchymotic 2/3/2019 10:00 AM   Drainage Amount none 2/3/2019 10:00 AM   Care, Wound cleansed with;other (see comments) 2/2/2019  8:00 PM   Dressing Care, Wound open to air 2/3/2019  6:00 AM   Periwound Care, Wound dry periwound area maintained 2/3/2019  6:00  AM       Wound 01/31/19 0900 Right leg incision (Active)   Dressing Appearance open to air 2/3/2019 10:00 AM   Closure Liquid skin adhesive 2/3/2019 10:00 AM   Base clean;dry 2/3/2019 10:00 AM   Periwound dry;intact;ecchymotic 2/3/2019 10:00 AM   Drainage Amount none 2/3/2019 10:00 AM   Care, Wound cleansed with;other (see comments) 2/2/2019  8:00 PM   Dressing Care, Wound open to air 2/3/2019  6:00 AM   Periwound Care, Wound dry periwound area maintained 2/3/2019  6:00 AM       Wound 01/31/19 1731 Right groin puncture (Active)   Dressing Appearance open to air 2/3/2019 10:00 AM   Closure Open to air 2/3/2019  6:00 AM   Base clean;dry 2/3/2019 10:00 AM   Periwound dry;intact;ecchymotic 2/3/2019 10:00 AM   Drainage Amount none 2/3/2019 10:00 AM   Care, Wound cleansed with;other (see comments) 2/2/2019  8:00 PM   Dressing Care, Wound open to air 2/3/2019  6:00 AM   Periwound Care, Wound dry periwound area maintained 2/3/2019  6:00 AM           Physical Therapy Education     Title: PT OT SLP Therapies (In Progress)     Topic: Physical Therapy (In Progress)     Point: Mobility training (In Progress)     Learning Progress Summary           Patient Acceptance, E, NR by KR at 2/3/2019  8:36 AM    Acceptance, E, NR by FLAVIA at 2/1/2019  9:30 AM   Significant Other Acceptance, E, NR by KR at 2/3/2019  8:36 AM    Acceptance, E, NR by FLAVIA at 2/1/2019  9:30 AM                   Point: Home exercise program (In Progress)     Learning Progress Summary           Patient Acceptance, E, NR by KR at 2/3/2019  8:36 AM    Acceptance, E, NR by FLAVIA at 2/1/2019  9:30 AM   Significant Other Acceptance, E, NR by LALO at 2/3/2019  8:36 AM    Acceptance, E, NR by FLAVIA at 2/1/2019  9:30 AM                   Point: Body mechanics (In Progress)     Learning Progress Summary           Patient Acceptance, E, NR by LALO at 2/3/2019  8:36 AM    Acceptance, E, NR by FLAVIA at 2/1/2019  9:30 AM   Significant Other Acceptance, E NR by LALO at 2/3/2019  8:36 AM     Acceptance, E, NR by FLAVIA at 2/1/2019  9:30 AM                   Point: Precautions (In Progress)     Learning Progress Summary           Patient Acceptance, E, NR by LALO at 2/3/2019  8:36 AM    Acceptance, E, NR by FLAVIA at 2/1/2019  9:30 AM   Significant Other Acceptance, E, NR by LALO at 2/3/2019  8:36 AM    Acceptance, E, NR by FLAVIA at 2/1/2019  9:30 AM                               User Key     Initials Effective Dates Name Provider Type Discipline    FLAVIA 06/19/15 -  Trupti An, PT Physical Therapist PT    LALO 04/03/18 -  Pari Abbasi, PT Physical Therapist PT                PT Recommendation and Plan  Therapy Frequency (PT Clinical Impression): daily  Outcome Summary/Treatment Plan (PT)  Daily Summary of Progress (PT): progress toward functional goals as expected  Plan of Care Reviewed With: patient  Progress: improving  Outcome Summary: Pt increased ambulation distance to 100ft with RW and Sarah x2 with chair follow. Pt given VC's for upright posture and proper management of RW. Pt required two seated rest breaks. Continue to progress as appropriate.   Outcome Measures     Row Name 02/03/19 0836 02/01/19 0930          How much help from another person do you currently need...    Turning from your back to your side while in flat bed without using bedrails?  2  -KR  2  -FLAVIA     Moving from lying on back to sitting on the side of a flat bed without bedrails?  2  -KR  2  -FLAVIA     Moving to and from a bed to a chair (including a wheelchair)?  3  -KR  2  -FLAVIA     Standing up from a chair using your arms (e.g., wheelchair, bedside chair)?  2  -KR  2  -FLAVIA     Climbing 3-5 steps with a railing?  2  -KR  1  -FLAVIA     To walk in hospital room?  3  -KR  2  -FLAVIA     AM-PAC 6 Clicks Score  14  -KR  11  -FLAVIA        Functional Assessment    Outcome Measure Options  AM-PAC 6 Clicks Basic Mobility (PT)  -KR  AM-PAC 6 Clicks Basic Mobility (PT)  -FLAVIA       User Key  (r) = Recorded By, (t) = Taken By, (c) = Cosigned By    Initials Name  Provider Type    Trupti Michelle, PT Physical Therapist    KR Pari Abbasi, PT Physical Therapist         Time Calculation:   PT Charges     Row Name 02/03/19 0836             Time Calculation    Start Time  0836  -KR      PT Received On  02/03/19  -KR      PT Goal Re-Cert Due Date  02/11/19  -KR         Time Calculation- PT    Total Timed Code Minutes- PT  38 minute(s)  -KR         Timed Charges    08422 - PT Therapeutic Activity Minutes  38  -KR        User Key  (r) = Recorded By, (t) = Taken By, (c) = Cosigned By    Initials Name Provider Type    Pari Noe, PT Physical Therapist        Therapy Suggested Charges     Code   Minutes Charges    09798 (CPT®) Hc Pt Neuromusc Re Education Ea 15 Min      86977 (CPT®) Hc Pt Ther Proc Ea 15 Min      81944 (CPT®) Hc Gait Training Ea 15 Min      66252 (CPT®) Hc Pt Therapeutic Act Ea 15 Min 38 3    91934 (CPT®) Hc Pt Manual Therapy Ea 15 Min      27305 (CPT®) Hc Pt Iontophoresis Ea 15 Min      22276 (CPT®) Hc Pt Elec Stim Ea-Per 15 Min      39725 (CPT®) Hc Pt Ultrasound Ea 15 Min      95989 (CPT®) Hc Pt Self Care/Mgmt/Train Ea 15 Min      42424 (CPT®) Hc Pt Prosthetic (S) Train Initial Encounter, Each 15 Min      60051 (CPT®) Hc Pt Orthotic(S)/Prosthetic(S) Encounter, Each 15 Min      05575 (CPT®) Hc Orthotic(S) Mgmt/Train Initial Encounter, Each 15min      Total  38 3        Therapy Charges for Today     Code Description Service Date Service Provider Modifiers Qty    93182624535 HC PT THERAPEUTIC ACT EA 15 MIN 2/3/2019 Pari Abbasi, PT GP 3    63987659167 HC PT THER SUPP EA 15 MIN 2/3/2019 Pari Abbasi, PT GP 3          PT G-Codes  Outcome Measure Options: AM-PAC 6 Clicks Basic Mobility (PT)  AM-PAC 6 Clicks Score: 14    Karis Abbasi, PT  2/3/2019

## 2019-02-03 NOTE — PROGRESS NOTES
"Intensive Care Follow-up     Hospital:  LOS: 3 days   Mr. Jesse Brink, 63 y.o. male is followed for:   CAD in native artery   With post bypass management for diabetes mellitus     Subjective   Interval History:  The chart is been reviewed in full. The patient states that he is breathing without much difficulty. He has not had an appetite since his surgery but is trying to eat. He has had good control of his blood glucose on the current regimen. He states that he is still extremely sore around the sites of his incision.    The patient's past medical, surgical and social history were reviewed and updated in Epic as appropriate.        Objective     Infusions:     Medications:    aspirin 325 mg Oral Daily   gabapentin 300 mg Oral Q12H   insulin detemir 15 Units Subcutaneous Nightly   insulin lispro 0-9 Units Subcutaneous 4x Daily With Meals & Nightly   insulin lispro 3 Units Subcutaneous TID With Meals   metoprolol tartrate 12.5 mg Oral Q12H   pharmacy consult - MTM  Does not apply Daily   rosuvastatin 40 mg Oral Nightly   sennosides-docusate sodium 2 tablet Oral BID   terazosin 5 mg Oral Nightly       Vital Sign Min/Max for last 24 hours  Temp  Min: 98.4 °F (36.9 °C)  Max: 98.9 °F (37.2 °C)   BP  Min: 121/67  Max: 161/84   Pulse  Min: 84  Max: 103   Resp  Min: 16  Max: 24   SpO2  Min: 94 %  Max: 100 %   Flow (L/min)  Min: 2  Max: 5       Input/Output for last 24 hour shift  02/02 0701 - 02/03 0700  In: 707 [P.O.:550; I.V.:59]  Out: 2145 [Urine:1725]      Objective:  General Appearance:  In no acute distress, comfortable and well-appearing.    Vital signs: (most recent): Blood pressure 122/69, pulse 99, temperature 98.8 °F (37.1 °C), resp. rate 22, height 175 cm (68.9\"), weight 108 kg (238 lb 1.6 oz), SpO2 95 %.    HEENT: (Nasal cannula O2  Right internal jugular introducer)    Lungs:  Normal effort and normal respiratory rate.  Breath sounds clear to auscultation.  He is not in respiratory distress.  No " rales, wheezes or rhonchi.    Heart: Normal rate.  Regular rhythm.  S1 normal and S2 normal.  No murmur, gallop or friction rub.   Chest: Symmetric chest wall expansion. (Median sternotomy.  )  Abdomen: Abdomen is soft and non-distended.  Bowel sounds are normal.   There is no mass. There is no splenomegaly. There is no hepatomegaly. (Obese).   Extremities: There is no deformity or dependent edema.    Neurological: Patient is alert and oriented to person, place and time.    Pupils:  Pupils are equal, round, and reactive to light.    Skin:  Warm and dry.  No rash or cyanosis.             Results from last 7 days   Lab Units 02/03/19  0321 02/02/19  0416 02/01/19  0409   WBC 10*3/mm3 10.58 10.93* 10.71   HEMOGLOBIN g/dL 11.7* 11.9* 12.4*   PLATELETS 10*3/mm3 146* 125* 136*     Results from last 7 days   Lab Units 02/03/19  0321 02/02/19  0416 02/01/19  0409 01/31/19  2326 01/31/19  1842   SODIUM mmol/L 137 134 139 143 140   POTASSIUM mmol/L 3.7 3.7 4.1 4.2 3.3*   CO2 mmol/L 28.0 24.0 24.0 25.0 23.0   BUN mg/dL 22 18 15 16 17   CREATININE mg/dL 1.15 1.24 1.36* 1.33* 1.43*   MAGNESIUM mg/dL  --  2.1 2.2  --  2.4   PHOSPHORUS mg/dL  --   --  6.1* 3.9 1.5*   GLUCOSE mg/dL 134* 120* 120* 110* 160*     Estimated Creatinine Clearance: 80.5 mL/min (by C-G formula based on SCr of 1.15 mg/dL).    Results from last 7 days   Lab Units 01/31/19  2337   PH, ARTERIAL pH units 7.349*   PCO2, ARTERIAL mm Hg 46.0   PO2 ART mm Hg 110.0*         I reviewed the patient's results and images.     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  Continue with pain control to ensure this.  Continue with the current insulin regimen. I will last the hospitalist service to follow him for diabetes mellitus management when he is transferred to the  floor, possibly as early as today.  Continue with physical therapy.    Plan of care and goals reviewed with mulitdisciplinary/antibiotic stewardship team during rounds.   I discussed the patient's findings and my recommendations with patient, family and nursing staff         Obi John MD, [facility]  Pulmonology and Critical Care Medicine

## 2019-02-04 LAB
ANION GAP SERPL CALCULATED.3IONS-SCNC: 3 MMOL/L (ref 3–11)
BUN BLD-MCNC: 19 MG/DL (ref 9–23)
BUN/CREAT SERPL: 17.6 (ref 7–25)
CALCIUM SPEC-SCNC: 8.2 MG/DL (ref 8.7–10.4)
CHLORIDE SERPL-SCNC: 101 MMOL/L (ref 99–109)
CO2 SERPL-SCNC: 32 MMOL/L (ref 20–31)
CREAT BLD-MCNC: 1.08 MG/DL (ref 0.6–1.3)
GFR SERPL CREATININE-BSD FRML MDRD: 69 ML/MIN/1.73
GLUCOSE BLD-MCNC: 128 MG/DL (ref 70–100)
GLUCOSE BLDC GLUCOMTR-MCNC: 152 MG/DL (ref 70–130)
GLUCOSE BLDC GLUCOMTR-MCNC: 158 MG/DL (ref 70–130)
GLUCOSE BLDC GLUCOMTR-MCNC: 181 MG/DL (ref 70–130)
GLUCOSE BLDC GLUCOMTR-MCNC: 205 MG/DL (ref 70–130)
MAGNESIUM SERPL-MCNC: 2.3 MG/DL (ref 1.3–2.7)
POTASSIUM BLD-SCNC: 3.4 MMOL/L (ref 3.5–5.5)
POTASSIUM BLD-SCNC: 3.6 MMOL/L (ref 3.5–5.5)
SODIUM BLD-SCNC: 136 MMOL/L (ref 132–146)

## 2019-02-04 PROCEDURE — 63710000001 INSULIN LISPRO (HUMAN) PER 5 UNITS: Performed by: INTERNAL MEDICINE

## 2019-02-04 PROCEDURE — 25010000002 FUROSEMIDE PER 20 MG: Performed by: INTERNAL MEDICINE

## 2019-02-04 PROCEDURE — 84132 ASSAY OF SERUM POTASSIUM: CPT | Performed by: THORACIC SURGERY (CARDIOTHORACIC VASCULAR SURGERY)

## 2019-02-04 PROCEDURE — 83735 ASSAY OF MAGNESIUM: CPT | Performed by: THORACIC SURGERY (CARDIOTHORACIC VASCULAR SURGERY)

## 2019-02-04 PROCEDURE — 80048 BASIC METABOLIC PNL TOTAL CA: CPT | Performed by: PHYSICIAN ASSISTANT

## 2019-02-04 PROCEDURE — 97530 THERAPEUTIC ACTIVITIES: CPT

## 2019-02-04 PROCEDURE — 63710000001 INSULIN DETEMIR PER 5 UNITS: Performed by: INTERNAL MEDICINE

## 2019-02-04 PROCEDURE — 25010000002 HEPARIN (PORCINE) PER 1000 UNITS: Performed by: INTERNAL MEDICINE

## 2019-02-04 PROCEDURE — 99233 SBSQ HOSP IP/OBS HIGH 50: CPT | Performed by: INTERNAL MEDICINE

## 2019-02-04 PROCEDURE — 97110 THERAPEUTIC EXERCISES: CPT

## 2019-02-04 PROCEDURE — 82962 GLUCOSE BLOOD TEST: CPT

## 2019-02-04 RX ORDER — POTASSIUM CHLORIDE 1.5 G/1.77G
40 POWDER, FOR SOLUTION ORAL ONCE
Status: COMPLETED | OUTPATIENT
Start: 2019-02-04 | End: 2019-02-04

## 2019-02-04 RX ORDER — HEPARIN SODIUM 5000 [USP'U]/ML
5000 INJECTION, SOLUTION INTRAVENOUS; SUBCUTANEOUS EVERY 8 HOURS SCHEDULED
Status: DISCONTINUED | OUTPATIENT
Start: 2019-02-04 | End: 2019-02-05 | Stop reason: HOSPADM

## 2019-02-04 RX ORDER — FUROSEMIDE 10 MG/ML
40 INJECTION INTRAMUSCULAR; INTRAVENOUS ONCE
Status: COMPLETED | OUTPATIENT
Start: 2019-02-04 | End: 2019-02-04

## 2019-02-04 RX ADMIN — SENNOSIDES AND DOCUSATE SODIUM 2 TABLET: 8.6; 5 TABLET ORAL at 08:00

## 2019-02-04 RX ADMIN — HYDROCODONE BITARTRATE AND ACETAMINOPHEN 1 TABLET: 7.5; 325 TABLET ORAL at 08:49

## 2019-02-04 RX ADMIN — METOPROLOL TARTRATE 12.5 MG: 25 TABLET, FILM COATED ORAL at 21:27

## 2019-02-04 RX ADMIN — POTASSIUM CHLORIDE 40 MEQ: 750 CAPSULE, EXTENDED RELEASE ORAL at 21:27

## 2019-02-04 RX ADMIN — HYDROCODONE BITARTRATE AND ACETAMINOPHEN 1 TABLET: 7.5; 325 TABLET ORAL at 14:24

## 2019-02-04 RX ADMIN — METOPROLOL TARTRATE 12.5 MG: 25 TABLET, FILM COATED ORAL at 08:01

## 2019-02-04 RX ADMIN — POTASSIUM CHLORIDE 40 MEQ: 750 CAPSULE, EXTENDED RELEASE ORAL at 05:49

## 2019-02-04 RX ADMIN — INSULIN LISPRO 3 UNITS: 100 INJECTION, SOLUTION INTRAVENOUS; SUBCUTANEOUS at 07:52

## 2019-02-04 RX ADMIN — INSULIN LISPRO 4 UNITS: 100 INJECTION, SOLUTION INTRAVENOUS; SUBCUTANEOUS at 17:03

## 2019-02-04 RX ADMIN — POTASSIUM CHLORIDE 40 MEQ: 750 CAPSULE, EXTENDED RELEASE ORAL at 17:03

## 2019-02-04 RX ADMIN — INSULIN LISPRO 3 UNITS: 100 INJECTION, SOLUTION INTRAVENOUS; SUBCUTANEOUS at 17:04

## 2019-02-04 RX ADMIN — TERAZOSIN HYDROCHLORIDE 5 MG: 5 CAPSULE ORAL at 21:27

## 2019-02-04 RX ADMIN — HEPARIN SODIUM 5000 UNITS: 5000 INJECTION INTRAVENOUS; SUBCUTANEOUS at 14:12

## 2019-02-04 RX ADMIN — OXYCODONE AND ACETAMINOPHEN 1 TABLET: 5; 325 TABLET ORAL at 11:00

## 2019-02-04 RX ADMIN — ASPIRIN 325 MG: 325 TABLET, DELAYED RELEASE ORAL at 08:01

## 2019-02-04 RX ADMIN — INSULIN LISPRO 2 UNITS: 100 INJECTION, SOLUTION INTRAVENOUS; SUBCUTANEOUS at 11:18

## 2019-02-04 RX ADMIN — INSULIN DETEMIR 15 UNITS: 100 INJECTION, SOLUTION SUBCUTANEOUS at 21:28

## 2019-02-04 RX ADMIN — POTASSIUM CHLORIDE 40 MEQ: 1.5 POWDER, FOR SOLUTION ORAL at 11:14

## 2019-02-04 RX ADMIN — POTASSIUM CHLORIDE 40 MEQ: 750 CAPSULE, EXTENDED RELEASE ORAL at 01:43

## 2019-02-04 RX ADMIN — FUROSEMIDE 40 MG: 10 INJECTION, SOLUTION INTRAMUSCULAR; INTRAVENOUS at 11:14

## 2019-02-04 RX ADMIN — INSULIN LISPRO 2 UNITS: 100 INJECTION, SOLUTION INTRAVENOUS; SUBCUTANEOUS at 07:52

## 2019-02-04 RX ADMIN — INSULIN LISPRO 3 UNITS: 100 INJECTION, SOLUTION INTRAVENOUS; SUBCUTANEOUS at 11:18

## 2019-02-04 RX ADMIN — INSULIN LISPRO 2 UNITS: 100 INJECTION, SOLUTION INTRAVENOUS; SUBCUTANEOUS at 21:29

## 2019-02-04 RX ADMIN — GABAPENTIN 300 MG: 300 CAPSULE ORAL at 08:01

## 2019-02-04 RX ADMIN — OXYCODONE AND ACETAMINOPHEN 1 TABLET: 5; 325 TABLET ORAL at 20:00

## 2019-02-04 RX ADMIN — MAGNESIUM HYDROXIDE 10 ML: 2400 SUSPENSION ORAL at 11:14

## 2019-02-04 RX ADMIN — ROSUVASTATIN CALCIUM 40 MG: 20 TABLET, FILM COATED ORAL at 21:27

## 2019-02-04 RX ADMIN — GABAPENTIN 300 MG: 300 CAPSULE ORAL at 21:27

## 2019-02-04 RX ADMIN — HEPARIN SODIUM 5000 UNITS: 5000 INJECTION INTRAVENOUS; SUBCUTANEOUS at 21:28

## 2019-02-04 NOTE — PROGRESS NOTES
CTS Progress Note      POD 4 s/p CABG X 5       LOS: 4 days   Patient Care Team:  Terri Barreto APRN as PCP - General    Subjective  Usual incisional soreness  No pressors  Up in chair, cooperative    Objective    Vital Signs  Temp:  [98.4 °F (36.9 °C)-98.9 °F (37.2 °C)] 98.9 °F (37.2 °C)  Heart Rate:  [] 96  Resp:  [16-32] 18  BP: (107-150)/(50-84) 122/71    Physical Exam:   General Appearance: alert, appears stated age and cooperative   Lungs: clear to auscultation, respirations regular, respirations even and respirations unlabored   Heart: regular rhythm & normal rate, normal S1, S2, no murmur, no gallop, no rub and no click   Skin:  Incision c/d/i     Results   Results from last 7 days   Lab Units 02/03/19  0321   WBC 10*3/mm3 10.58   HEMOGLOBIN g/dL 11.7*   HEMATOCRIT % 34.8*   PLATELETS 10*3/mm3 146*     Results from last 7 days   Lab Units 02/04/19  0007   SODIUM mmol/L 136   POTASSIUM mmol/L 3.4*   CHLORIDE mmol/L 101   CO2 mmol/L 32.0*   BUN mg/dL 19   CREATININE mg/dL 1.08   GLUCOSE mg/dL 128*   CALCIUM mg/dL 8.2*           Imaging Results (last 24 hours)     ** No results found for the last 24 hours. **          Assessment  POD 4 s/p CABG X 5, expected recovery    CAD in native artery    Benign essential HTN    Type 2 diabetes mellitus (CMS/HCC)    Ischemic cardiomyopathy    S/P CABG x 5 1/31/19    Mixed hyperlipidemia  doing well        Plan   Awaiting telemetry bed  Ambulate  D/C central line  ASA, statin, BB  Home soon

## 2019-02-04 NOTE — PLAN OF CARE
Problem: Patient Care Overview  Goal: Plan of Care Review  Outcome: Ongoing (interventions implemented as appropriate)   02/04/19 0912   Coping/Psychosocial   Plan of Care Reviewed With patient   Plan of Care Review   Progress improving   OTHER   Outcome Summary Pt ambulated 100ft with RW and Sarah x2. Pt given VC's for upright posture and increased step length. Pt required increased encouragement for additional ambulation. Pt did not require any rest breaks. Continue to progress as appropriate.

## 2019-02-04 NOTE — PLAN OF CARE
Problem: Patient Care Overview  Goal: Plan of Care Review  Outcome: Ongoing (interventions implemented as appropriate)   02/04/19 0206   Coping/Psychosocial   Plan of Care Reviewed With patient;spouse   Plan of Care Review   Progress improving   OTHER   Outcome Summary No acute changes overnight. Pain well managed with PO pain medication. Still requiring 2L NC to maintain adequate SpO2. Ambulated 75 feet. Orders to tele.        Problem: Skin Injury Risk (Adult)  Goal: Identify Related Risk Factors and Signs and Symptoms  Outcome: Ongoing (interventions implemented as appropriate)   02/04/19 0206   Skin Injury Risk (Adult)   Related Risk Factors (Skin Injury Risk) advanced age;critical care admission;medical devices;mobility impaired       Problem: Cardiac Surgery (Adult)  Goal: Signs and Symptoms of Listed Potential Problems Will be Absent, Minimized or Managed (Cardiac Surgery)  Outcome: Ongoing (interventions implemented as appropriate)   02/04/19 0206   Goal/Outcome Evaluation   Problems Assessed (Cardiac Surgery) all   Problems Present (Cardiac Surgery) functional deficit;pain;situational response

## 2019-02-04 NOTE — THERAPY TREATMENT NOTE
Acute Care - Physical Therapy Treatment Note  [facility]     Patient Name: Jesse Brink  : 1955  MRN: [id number]  Today's Date: 2019  Onset of Illness/Injury or Date of Surgery: 19  Date of Referral to PT: 19  Referring Physician: MD Gu    Admit Date: 2019    Visit Dx:    ICD-10-CM ICD-9-CM   1. Impaired functional mobility, balance, gait, and endurance Z74.09 V49.89   2. CAD in native artery I25.10 414.01   3. Angina at rest (CMS/HCC) I20.8 413.9     Patient Active Problem List   Diagnosis   • Benign essential HTN   • Lumbosacral radiculopathy at S1   • Numbness of lower extremity   • Transient ischemic attack   • Rheumatoid arthritis (CMS/HCC)   • Obesity   • Idiopathic peripheral neuropathy   • Palpitations   • CAD in native artery   • Type 2 diabetes mellitus (CMS/HCC)   • Ischemic cardiomyopathy   • S/P CABG x 5 19   • Mixed hyperlipidemia       Therapy Treatment    Rehabilitation Treatment Summary     Row Name 19 0912             Treatment Time/Intention    Discipline  physical therapist  -KR      Document Type  therapy note (daily note)  -KR      Subjective Information  complains of;pain  -KR      Mode of Treatment  physical therapy  -KR      Care Plan Review  care plan/treatment goals reviewed;risks/benefits reviewed;patient/other agree to care plan  -KR      Therapy Frequency (PT Clinical Impression)  daily  -KR      Patient Effort  good  -KR      Existing Precautions/Restrictions  cardiac;fall;oxygen therapy device and L/min;sternal  -KR      Recorded by [KR] Pari Abbasi, PT 19 1413      Row Name 19 0912             Vital Signs    Pre Systolic BP Rehab  126  -KR      Pre Treatment Diastolic BP  70  -KR      Post Systolic BP Rehab  134  -KR      Post Treatment Diastolic BP  90  -KR      Pretreatment Heart Rate (beats/min)  91  -KR      Posttreatment Heart Rate (beats/min)  98  -KR      Pre SpO2 (%)  98  -KR      O2 Delivery Pre Treatment   supplemental O2  -KR      Post SpO2 (%)  98  -KR      O2 Delivery Post Treatment  supplemental O2  -KR      Pre Patient Position  Sitting  -KR      Intra Patient Position  Standing  -KR      Post Patient Position  Sitting  -KR      Recorded by [KR] Pari Abbasi, PT 02/04/19 1413      Row Name 02/04/19 0912             Cognitive Assessment/Intervention    Additional Documentation  Cognitive Assessment/Intervention (Group)  -KR      Recorded by [KR] Pari Abbasi, PT 02/04/19 1413      Row Name 02/04/19 0912             Cognitive Assessment/Intervention- PT/OT    Affect/Mental Status (Cognitive)  WFL  -KR      Orientation Status (Cognition)  oriented x 4  -KR      Follows Commands (Cognition)  WFL  -KR      Cognitive Function (Cognitive)  safety deficit  -KR      Safety Deficit (Cognitive)  mild deficit;insight into deficits/self awareness;safety precautions awareness;safety precautions follow-through/compliance  -KR      Personal Safety Interventions  fall prevention program maintained;gait belt;nonskid shoes/slippers when out of bed  -KR      Recorded by [KR] Pari Abbasi, PT 02/04/19 1413      Row Name 02/04/19 0912             Safety Issues, Functional Mobility    Safety Issues Affecting Function (Mobility)  insight into deficits/self awareness;safety precaution awareness;safety precautions follow-through/compliance  -KR      Impairments Affecting Function (Mobility)  balance;coordination;endurance/activity tolerance;shortness of breath;strength;pain  -KR      Recorded by [KR] Pari Abbasi, PT 02/04/19 1413      Row Name 02/04/19 0912             Bed Mobility Assessment/Treatment    Comment (Bed Mobility)  UIC  -KR      Recorded by [KR] Pari Abbasi, PT 02/04/19 1413      Row Name 02/04/19 0912             Transfer Assessment/Treatment    Transfer Assessment/Treatment  sit-stand transfer;stand-sit transfer  -KR      Comment (Transfers)  VC's for sequencing and hand placement to maintain sternal  precautions.   -KR      Recorded by [KR] Pari Abbasi, PT 02/04/19 1413      Row Name 02/04/19 0912             Sit-Stand Transfer    Sit-Stand [place] (Transfers)  minimum assist (75% patient effort);2 person assist;verbal cues  -KR      Assistive Device (Sit-Stand Transfers)  walker, front-wheeled  -KR      Recorded by [KR] Pari Abbasi, PT 02/04/19 1413      Row Name 02/04/19 0912             Stand-Sit Transfer    Stand-Sit [place] (Transfers)  minimum assist (75% patient effort);2 person assist;verbal cues  -KR      Assistive Device (Stand-Sit Transfers)  walker, front-wheeled  -KR      Recorded by [KR] Pari Abbasi, PT 02/04/19 1413      Row Name 02/04/19 0912             Gait/Stairs Assessment/Training    Gait/Stairs Assessment/Training  gait/ambulation assistive device  -KR      [place] Level (Gait)  minimum assist (75% patient effort);2 person assist;verbal cues  -KR      Assistive Device (Gait)  walker, front-wheeled  -KR      Distance in Feet (Gait)  100  -KR      Pattern (Gait)  step-through  -KR      Deviations/Abnormal Patterns (Gait)  base of support, wide;sara decreased;other (see comments) decreased step length  -KR      Bilateral Gait Deviations  forward flexed posture;heel strike decreased  -KR      Comment (Gait/Stairs)  Pt demonstrated step through gait pattern with slow sara and wide HUE. Pt given VC's for upright posture. Pt required increased encouragement for additional ambulation. Pt did not require any rest breaks.  -KR      Recorded by [KR] Pari Abbasi, PT 02/04/19 1413      Row Name 02/04/19 0912             Motor Skills Assessment/Interventions    Additional Documentation  Balance (Group);Therapeutic Exercise (Group)  -KR      Recorded by [KR] Pari Abbasi, PT 02/04/19 1516      Row Name 02/04/19 0912             Therapeutic Exercise    Therapeutic Exercise  seated, lower extremities  -KR      Additional Documentation  Therapeutic Exercise (Row)  -KR       63299 - PT Therapeutic Exercise Minutes  5  -KR      03141 - PT Therapeutic Activity Minutes  18  -KR      Recorded by [KR] Pari Abbasi, PT 02/04/19 1516      Row Name 02/04/19 0912             Lower Extremity Seated Therapeutic Exercise    Performed, Seated Lower Extremity (Therapeutic Exercise)  hip flexion/extension;ankle dorsiflexion/plantarflexion;LAQ (long arc quad), knee extension  -KR      Exercise Type, Seated Lower Extremity (Therapeutic Exercise)  AROM (active range of motion)  -KR      Sets/Reps Detail, Seated Lower Extremity (Therapeutic Exercise)  BLE 1/10  -KR      Recorded by [KR] Pari Abbasi, PT 02/04/19 1516      Row Name 02/04/19 0912             Balance    Balance  static sitting balance;static standing balance  -KR      Recorded by [KR] Pari Abbasi, PT 02/04/19 1516      Row Name 02/04/19 0912             Static Sitting Balance    Level of [place] (Unsupported Sitting, Static Balance)  supervision  -KR      Sitting Position (Unsupported Sitting, Static Balance)  sitting in chair  -KR      Recorded by [KR] Pari Abbasi, PT 02/04/19 1516      Row Name 02/04/19 0912             Static Standing Balance    Level of [place] (Supported Standing, Static Balance)  minimal assist, 75% patient effort  -KR      Assistive Device Utilized (Supported Standing, Static Balance)  walker, rolling  -KR      Recorded by [KR] Pari Abbasi, PT 02/04/19 1516      Row Name 02/04/19 0912             Positioning and Restraints    Pre-Treatment Position  sitting in chair/recliner  -KR      Post Treatment Position  chair  -KR      In Chair  notified nsg;reclined;call light within reach;encouraged to call for assist;with family/caregiver;RUE elevated;LUE elevated;legs elevated  -KR      Recorded by [KR] Pari Abbasi, PT 02/04/19 1516      Row Name 02/04/19 0912             Pain Assessment    Additional Documentation  Pain Scale: Numbers Pre/Post-Treatment (Group)  -KR      Recorded by [KR]  Pari Abbasi, PT 02/04/19 1516      Row Name 02/04/19 0912             Pain Scale: Numbers Pre/Post-Treatment    Pain Scale: Numbers, Pretreatment  0/10 - no pain  -KR      Pain Scale: Numbers, Post-Treatment  0/10 - no pain  -KR      Recorded by [KR] Pari Abbasi, PT 02/04/19 1516      Row Name                Wound 01/31/19 0900 chest incision    Wound - Properties Group Date first assessed: 01/31/19 [TF] Time first assessed: 0900 [TF] Location: chest [TF] Type: incision [TF] Recorded by:  [TF] Sarah Raines RN 01/31/19 0900    Row Name                Wound 01/31/19 0900 Right leg incision    Wound - Properties Group Date first assessed: 01/31/19 [TF] Time first assessed: 0900 [TF] Side: Right [TF] Location: leg [TF] Type: incision [TF] Recorded by:  [TF] Sarah Raines RN 01/31/19 0900    Row Name                Wound 01/31/19 1731 Right groin puncture    Wound - Properties Group Date first assessed: 01/31/19 [AD] Time first assessed: 1731 [AD] Side: Right [AD] Location: groin [AD] Type: puncture [AD], S/P VENOUS AND FEMORAL SHEATH  Recorded by:  [AD] Gissel Small RN 01/31/19 1732    Row Name 02/04/19 0912             Outcome Summary/Treatment Plan (PT)    Daily Summary of Progress (PT)  progress toward functional goals as expected  -KR      Recorded by [KR] Pari Abbasi, PT 02/04/19 1516        User Key  (r) = Recorded By, (t) = Taken By, (c) = Cosigned By    Initials Name Effective Dates Discipline    TF Sarah Raines, RN 06/16/16 -  Nurse    Gissel Jones RN 11/05/18 -  Nurse    Pari Noe, PT 04/03/18 -  PT          Wound 01/31/19 0900 chest incision (Active)   Dressing Appearance open to air 2/4/2019 12:00 PM   Closure Liquid skin adhesive 2/4/2019 12:00 PM   Base clean;dry 2/4/2019 12:00 PM   Periwound dry;intact;ecchymotic 2/4/2019 12:00 PM   Drainage Amount none 2/4/2019 12:00 PM   Care, Wound cleansed with;other (see comments) 2/3/2019  8:00 PM   Dressing Care,  Wound open to air 2/4/2019  2:00 AM   Periwound Care, Wound dry periwound area maintained 2/4/2019  2:00 AM       Wound 01/31/19 0900 Right leg incision (Active)   Dressing Appearance open to air 2/4/2019 12:00 PM   Closure Liquid skin adhesive 2/4/2019 12:00 PM   Base clean;dry 2/4/2019 12:00 PM   Periwound dry;intact;ecchymotic 2/4/2019 12:00 PM   Drainage Amount none 2/4/2019 12:00 PM   Care, Wound cleansed with;other (see comments) 2/3/2019  8:00 PM   Dressing Care, Wound open to air 2/4/2019  2:00 AM   Periwound Care, Wound dry periwound area maintained 2/4/2019  2:00 AM       Wound 01/31/19 1731 Right groin puncture (Active)   Dressing Appearance open to air 2/4/2019 12:00 PM   Base clean;dry 2/4/2019 12:00 PM   Periwound dry;intact;ecchymotic 2/4/2019 12:00 PM   Drainage Amount none 2/4/2019 12:00 PM   Care, Wound cleansed with;other (see comments) 2/3/2019  8:00 PM   Dressing Care, Wound open to air 2/4/2019  2:00 AM   Periwound Care, Wound dry periwound area maintained 2/4/2019  2:00 AM           Physical Therapy Education     Title: PT OT SLP Therapies (In Progress)     Topic: Physical Therapy (In Progress)     Point: Mobility training (In Progress)     Learning Progress Summary           Patient Acceptance, E, NR by LALO at 2/4/2019  9:12 AM    Acceptance, E, NR by LALO at 2/3/2019  8:36 AM    Acceptance, E, NR by FLAVIA at 2/1/2019  9:30 AM   Significant Other Acceptance, E, NR by LALO at 2/4/2019  9:12 AM    Acceptance, E, NR by LALO at 2/3/2019  8:36 AM    Acceptance, E, NR by FLAVIA at 2/1/2019  9:30 AM                   Point: Home exercise program (In Progress)     Learning Progress Summary           Patient Acceptance, E, NR by LALO at 2/4/2019  9:12 AM    Acceptance, E, NR by LALO at 2/3/2019  8:36 AM    Acceptance, E, NR by FLAVIA at 2/1/2019  9:30 AM   Significant Other Acceptance, E, NR by LALO at 2/4/2019  9:12 AM    Acceptance, E, NR by LALO at 2/3/2019  8:36 AM    Acceptance, E, NR by FLAVIA at 2/1/2019  9:30 AM                    Point: Body mechanics (In Progress)     Learning Progress Summary           Patient Acceptance, E, NR by KR at 2/4/2019  9:12 AM    Acceptance, E, NR by KR at 2/3/2019  8:36 AM    Acceptance, E, NR by FLAVIA at 2/1/2019  9:30 AM   Significant Other Acceptance, E, NR by KR at 2/4/2019  9:12 AM    Acceptance, E, NR by KR at 2/3/2019  8:36 AM    Acceptance, E, NR by FLAVIA at 2/1/2019  9:30 AM                   Point: Precautions (In Progress)     Learning Progress Summary           Patient Acceptance, E, NR by KR at 2/4/2019  9:12 AM    Acceptance, E, NR by KR at 2/3/2019  8:36 AM    Acceptance, E, NR by FLAVIA at 2/1/2019  9:30 AM   Significant Other Acceptance, E, NR by KR at 2/4/2019  9:12 AM    Acceptance, E, NR by KR at 2/3/2019  8:36 AM    Acceptance, E, NR by FLAVIA at 2/1/2019  9:30 AM                               User Key     Initials Effective Dates Name Provider Type Discipline    FLAVIA 06/19/15 -  Trupti An, PT Physical Therapist PT    KR 04/03/18 -  Pari Abbasi, PT Physical Therapist PT                PT Recommendation and Plan  Therapy Frequency (PT Clinical Impression): daily  Outcome Summary/Treatment Plan (PT)  Daily Summary of Progress (PT): progress toward functional goals as expected  Plan of Care Reviewed With: patient  Progress: improving  Outcome Summary: Pt ambulated 100ft with RW and Sarah x2. Pt given VC's for upright posture and increased step length. Pt required increased encouragement for additional ambulation. Pt did not require any rest breaks. Continue to progress as appropriate.   Outcome Measures     Row Name 02/04/19 0912 02/03/19 0836          How much help from another person do you currently need...    Turning from your back to your side while in flat bed without using bedrails?  2  -KR  2  -KR     Moving from lying on back to sitting on the side of a flat bed without bedrails?  2  -KR  2  -KR     Moving to and from a bed to a chair (including a wheelchair)?  3  -KR  3  -KR      Standing up from a chair using your arms (e.g., wheelchair, bedside chair)?  3  -KR  2  -KR     Climbing 3-5 steps with a railing?  2  -KR  2  -KR     To walk in hospital room?  3  -KR  3  -KR     AM-PAC 6 Clicks Score  15  -KR  14  -KR        Functional Assessment    Outcome Measure Options  AM-PAC 6 Clicks Basic Mobility (PT)  -KR  AM-PAC 6 Clicks Basic Mobility (PT)  -KR       User Key  (r) = Recorded By, (t) = Taken By, (c) = Cosigned By    Initials Name Provider Type    Pari Noe, PT Physical Therapist         Time Calculation:   PT Charges     Row Name 02/04/19 0912             Time Calculation    Start Time  0912  -KR      PT Received On  02/04/19  -KR      PT Goal Re-Cert Due Date  02/11/19  -KR         Time Calculation- PT    Total Timed Code Minutes- PT  23 minute(s)  -KR         Timed Charges    33972 - PT Therapeutic Exercise Minutes  5  -KR      81237 - PT Therapeutic Activity Minutes  18  -KR        User Key  (r) = Recorded By, (t) = Taken By, (c) = Cosigned By    Initials Name Provider Type    Pari Noe, PT Physical Therapist        Therapy Suggested Charges     Code   Minutes Charges    85499 (CPT®) Hc Pt Neuromusc Re Education Ea 15 Min      95088 (CPT®) Hc Pt Ther Proc Ea 15 Min 5 1    63490 (CPT®) Hc Gait Training Ea 15 Min      30274 (CPT®) Hc Pt Therapeutic Act Ea 15 Min 18 1    66261 (CPT®) Hc Pt Manual Therapy Ea 15 Min      99031 (CPT®) Hc Pt Iontophoresis Ea 15 Min      39079 (CPT®) Hc Pt Elec Stim Ea-Per 15 Min      44010 (CPT®) Hc Pt Ultrasound Ea 15 Min      98943 (CPT®) Hc Pt Self Care/Mgmt/Train Ea 15 Min      16058 (CPT®) Hc Pt Prosthetic (S) Train Initial Encounter, Each 15 Min      07488 (CPT®) Hc Pt Orthotic(S)/Prosthetic(S) Encounter, Each 15 Min      76403 (CPT®) Hc Orthotic(S) Mgmt/Train Initial Encounter, Each 15min      Total  23 2        Therapy Charges for Today     Code Description Service Date Service Provider Modifiers Qty    88881043576 HC PT THERAPEUTIC  ACT EA 15 MIN 2/3/2019 Pari Abbasi, PT GP 3    12187032074 HC PT THER SUPP EA 15 MIN 2/3/2019 Pari Abbasi, PT GP 3    12283315889 HC PT THER PROC EA 15 MIN 2/4/2019 aPri Abbasi, PT GP 1    44885202762 HC PT THERAPEUTIC ACT EA 15 MIN 2/4/2019 Pari Abbasi, PT GP 1    43102829383 HC PT THER SUPP EA 15 MIN 2/4/2019 Pari Abbasi, PT GP 2          PT G-Codes  Outcome Measure Options: AM-PAC 6 Clicks Basic Mobility (PT)  AM-PAC 6 Clicks Score: 15    Karis Abbasi, PT  2/4/2019

## 2019-02-04 NOTE — PLAN OF CARE
Problem: Patient Care Overview  Goal: Plan of Care Review  Outcome: Ongoing (interventions implemented as appropriate)   02/04/19 1835   Coping/Psychosocial   Plan of Care Reviewed With patient   Plan of Care Review   Progress improving   OTHER   Outcome Summary Ambulated 100 ft with PT this morning, pt refused ambulation this afternoon. On RA-2L NC. VSS. Awaiting tele bed.       Problem: Skin Injury Risk (Adult)  Goal: Identify Related Risk Factors and Signs and Symptoms  Outcome: Ongoing (interventions implemented as appropriate)   02/04/19 1835   Skin Injury Risk (Adult)   Related Risk Factors (Skin Injury Risk) critical care admission       Problem: Cardiac Surgery (Adult)  Goal: Signs and Symptoms of Listed Potential Problems Will be Absent, Minimized or Managed (Cardiac Surgery)  Outcome: Ongoing (interventions implemented as appropriate)   02/04/19 1835   Goal/Outcome Evaluation   Problems Assessed (Cardiac Surgery) all   Problems Present (Cardiac Surgery) pain;situational response       Problem: Fall Risk (Adult)  Goal: Identify Related Risk Factors and Signs and Symptoms  Outcome: Ongoing (interventions implemented as appropriate)   02/04/19 1835   Fall Risk (Adult)   Related Risk Factors (Fall Risk) fatigue/slow reaction;gait/mobility problems;polypharmacy;sleep pattern alteration;slippery/uneven surfaces;environment unfamiliar   Signs and Symptoms (Fall Risk) presence of risk factors

## 2019-02-04 NOTE — PROGRESS NOTES
Continued Stay Note   [facility]     Patient Name: Jesse Brink  MRN: [id number]  Today's Date: 2/4/2019    Admit Date: 1/31/2019    Discharge Plan     Row Name 02/04/19 1514       Plan    Plan  Home with spouse    Patient/Family in Agreement with Plan  -- Patient/spouse    Plan Comments Remains in ICU with plan to transfer to floor bed, sitting up in bed eating breakfast with family at bedside.  Case Management following for all needs/discharge planning.    Final Discharge Disposition Code 06 - home with home health care        Discharge Codes    No documentation.             DONNIE Lauren

## 2019-02-04 NOTE — PROGRESS NOTES
Clinical Nutrition     Multidisciplinary Rounds    Time: 20min  Patient Name: Jesse Brink  Date of Encounter: 02/04/19 1:23 PM  MRN: [id number]  Admission date: 1/31/2019      Reason for visit: MDR. RD to continue to follow per protocol.     Additional information obtained during MDR:  Awaiting a tele bed to transfer out of the ICU.  Tolerating current diet.     Current diet: Diet Regular; Thin; Consistent Carbohydrate, Cardiac  No active supplement orders  Intake: 67% x 3 meals     EMR reviewed     Intervention:  Follow treatment plan  Care plan reviewed    Follow up:   Per protocol      Humera Ambriz RD  1:23 PM

## 2019-02-04 NOTE — PROGRESS NOTES
INTENSIVIST / PULMONARY FOLLOW UP NOTE     Hospital:  LOS: 4 days   Mr. Jesse Brink, 63 y.o. male is followed for:     CAD in native artery    Benign essential HTN    Type 2 diabetes mellitus (CMS/HCC)    Ischemic cardiomyopathy    S/P CABG x 5 1/31/19    Mixed hyperlipidemia          SUBJECTIVE   Doing well, sitting in chair    The patient's relevant past medical, surgical, family, and social history were reviewed    Allergies and medications were reviewed    ROS:  Per subjective, all other systems were reviewed and were negative        OBJECTIVE     Vital Sign Min/Max for last 24 hours:  Temp  Min: 98.4 °F (36.9 °C)  Max: 98.9 °F (37.2 °C)   BP  Min: 107/50  Max: 150/84   Pulse  Min: 90  Max: 105   Resp  Min: 16  Max: 32   SpO2  Min: 92 %  Max: 98 %   No Data Recorded     Physical Exam:  General Appearance:  Conversant, in no acute distress  Eyes:  No scleral icterus or pallor, pupils normal  Ears, Nose, Mouth, Throat:  Atraumatic, oropharynx clear  Neck:  Trachea midline, thyroid normal  Respiratory:  Clear to auscultation bilaterally, normal effort, no tenderness to palpation  Cardiovascular:  Regular rate and rhythm, no murmurs, no peripheral edema, no thrill  Gastrointestinal:  Soft, non-tender, non-distended, no hepatosplenomegaly  Skin:  Normal temperature, no rash  Psychiatric:  Alert and oriented x 3, normal judgement and insight  Neuro:  No new focal neurologic deficits observed    Telemetry:                SpO2: 98 % SpO2  Min: 92 %  Max: 98 %   Device:      Flow Rate:   No Data Recorded     Intake/Ouptut 24 hrs (7:00AM - 6:59 AM)  Intake & Output (last 3 days)       02/01 0701 - 02/02 0700 02/02 0701 - 02/03 0700 02/03 0701 - 02/04 0700 02/04 0701 - 02/05 0700    P.O.      I.V. (mL/kg) 1116 (10.3) 59 (0.5)      IV Piggyback 100 98      Total Intake(mL/kg) 2041 (18.9) 707 (6.5) 1440 (13.3)     Urine (mL/kg/hr) 1950 (0.8) 1725 (0.7) 2150 (0.8)     Chest Tube 700 420 60     Total  Output 3171 5990 9254     Net -609 [phone] -770                   Lines, Drains & Airways    Active LDAs     Name:   Placement date:   Placement time:   Site:   Days:    Peripheral IV 01/31/19 0603 Left Arm   01/31/19    0603    Arm   4    Introducer- Double Lumen 01/31/19 Internal jugular Right   01/31/19    0800    Internal jugular   4                Hematology:  Results from last 7 days   Lab Units 02/03/19  0321 02/02/19 0416 02/01/19 0409 01/31/19 2326 01/31/19  1842 01/31/19  1517 01/31/19  1418  01/30/19  1250   WBC 10*3/mm3 10.58 10.93* 10.71 9.65 10.87* 14.19*  --   --  7.75   HEMOGLOBIN g/dL 11.7* 11.9* 12.4* 12.6* 12.9* 13.6  --   --  18.3*   HEMOGLOBIN, POC g/dL  --   --   --   --   --   --  12.6   < >  --    HEMATOCRIT % 34.8* 35.9* 36.9* 37.3* 38.1* 39.9  --   --  52.5*   HEMATOCRIT POC %  --   --   --   --   --   --  37*   < >  --    PLATELETS 10*3/mm3 146* 125* 136* 121* 112* 114*  --   --  189    < > = values in this interval not displayed.     Electrolytes, Magnesium and Phosphorus:  Results from last 7 days   Lab Units 02/04/19  0007 02/03/19 0321 02/02/19 0416 02/01/19 0409 01/31/19 2326 01/31/19  1842 01/31/19  1517 01/30/19  1250   SODIUM mmol/L 136 137 134 139 143 140 137 137   CHLORIDE mmol/L 101 102 100 108 113* 107 106 103   POTASSIUM mmol/L 3.4* 3.7 3.7 4.1 4.2 3.3* 4.1 3.8   CO2 mmol/L 32.0* 28.0 24.0 24.0 25.0 23.0 20.0 26.0   MAGNESIUM mg/dL  --   --  2.1 2.2  --  2.4 2.8* 1.9   PHOSPHORUS mg/dL  --   --   --  6.1* 3.9 1.5* 3.8  --      Renal:  Results from last 7 days   Lab Units 02/04/19  0007 02/03/19  0321 02/02/19  0416 02/01/19  0409 01/31/19  2326 01/31/19  1842 01/31/19  1517   CREATININE mg/dL 1.08 1.15 1.24 1.36* 1.33* 1.43* 1.34*   BUN mg/dL 19 22 18 15 16 17 15     Estimated Creatinine Clearance: 85.8 mL/min (by C-G formula based on SCr of 1.08 mg/dL).  Hepatic:  Results from last 7 days   Lab Units 01/30/19  1250   ALK PHOS U/L 55   BILIRUBIN mg/dL 0.9   ALT (SGPT) U/L  57*   AST (SGOT) U/L 38*     Arterial Blood Gases:  Results from last 7 days   Lab Units 01/31/19  2337 01/31/19  1936 01/31/19  1521 01/31/19  1418 01/31/19  1329 01/31/19  1252 01/31/19  1220   PH, ARTERIAL pH units 7.349* 7.325* 7.365 7.36 7.34* 7.33* 7.34*   PCO2, ARTERIAL mm Hg 46.0 48.3 38.4  --   --   --   --    PO2 ART mm Hg 110.0* 112.0* 196.0*  --   --   --   --    FIO2 % 40 40 100  --   --   --   --        Results from last 7 days   Lab Units 01/30/19  1250   HEMOGLOBIN A1C % 6.60*       No results found for: LACTATE    Relevant imaging studies and labs from 02/04/19 were reviewed and interpreted by me    Medications (drips):         aspirin 325 mg Oral Daily   gabapentin 300 mg Oral Q12H   heparin (porcine) 5,000 Units Subcutaneous Q8H   insulin detemir 15 Units Subcutaneous Nightly   insulin lispro 0-9 Units Subcutaneous 4x Daily With Meals & Nightly   insulin lispro 3 Units Subcutaneous TID With Meals   metoprolol tartrate 12.5 mg Oral Q12H   pharmacy consult - MTM  Does not apply Daily   rosuvastatin 40 mg Oral Nightly   sennosides-docusate sodium 2 tablet Oral BID   terazosin 5 mg Oral Nightly       Assessment/Plan   IMPRESSION / PLAN     Inpatient Problem List:  63 y.o.male:  Active Hospital Problems    Diagnosis   • **CAD in native artery     1. [facility]  1-18-19: Multivessel obstructive coronary disease: Proximal RCA .  Mid LAD .  95% proximal first diagonal, 95% first obtuse marginal branch, 70-80% proximal second obtuse marginal branch.    2.  CABG x 5 (1-31-19) LIMA--> LAD, GSV--> RCA, OM1, OM2, D1  · Left atrial appendage occlusion         • Type 2 diabetes mellitus (CMS/HCC)   • Ischemic cardiomyopathy     1.  Systolic and diastolic with EF 36-40% by echocardiogram 1/18/19     • S/P CABG x 5 1/31/19   • Mixed hyperlipidemia   • Benign essential HTN        Impression:  63 y.o.male with relevant PMH of MVCAD, T2DM, ICM EF 36-40%, HLD, HTN admitted 1/31/2019 5vCABG+EVH & MARIA LUISA by Dr. Gu on  1/31/19.    Plan:  Post op care - per CTS    Hypoxemia - diuresis w/ empiric KCL today    DM A1c 6.6 - titrate SQ insulin    D/c Cordis    Start SQ Heparin    Nutrition - Diet Regular; Thin; Consistent Carbohydrate, Cardiac    Plan of care and goals reviewed with mulitdisciplinary team at daily rounds    To tele       Dany Aponte MD  Intensive Care Medicine  02/04/19 11:49 AM

## 2019-02-04 NOTE — PROGRESS NOTES
Continued Stay Note  [facility]     Patient Name: Jesse Brink  MRN: [id number]  Today's Date: 2/4/2019    Admit Date: 1/31/2019    Discharge Plan     Row Name 02/04/19 1514       Plan    Plan Home with spouse    Patient/Family in Agreement with Plan  — Patient/spouse    Plan Comments Remains in ICU with plan to transfer to floor bed, sitting up in bed eating breakfast with family at bedside.  Case Management following for all needs/discharge planning.  Catherine Radford, Ext. [phone]    Final Discharge Disposition Code 06 - home with home health care        Discharge Codes    No documentation.             DONNIE Lauren

## 2019-02-05 ENCOUNTER — APPOINTMENT (OUTPATIENT)
Dept: GENERAL RADIOLOGY | Facility: HOSPITAL | Age: 64
End: 2019-02-05

## 2019-02-05 VITALS
HEART RATE: 110 BPM | OXYGEN SATURATION: 94 % | TEMPERATURE: 97.9 F | SYSTOLIC BLOOD PRESSURE: 133 MMHG | HEIGHT: 69 IN | RESPIRATION RATE: 18 BRPM | WEIGHT: 238.1 LBS | DIASTOLIC BLOOD PRESSURE: 70 MMHG | BODY MASS INDEX: 35.27 KG/M2

## 2019-02-05 LAB
ALBUMIN SERPL-MCNC: 3.45 G/DL (ref 3.2–4.8)
ALBUMIN/GLOB SERPL: 2.4 G/DL (ref 1.5–2.5)
ALP SERPL-CCNC: 47 U/L (ref 25–100)
ALT SERPL W P-5'-P-CCNC: 27 U/L (ref 7–40)
ANION GAP SERPL CALCULATED.3IONS-SCNC: 5 MMOL/L (ref 3–11)
AST SERPL-CCNC: 27 U/L (ref 0–33)
BASOPHILS # BLD AUTO: 0.02 10*3/MM3 (ref 0–0.2)
BASOPHILS NFR BLD AUTO: 0.2 % (ref 0–1)
BILIRUB SERPL-MCNC: 0.7 MG/DL (ref 0.3–1.2)
BUN BLD-MCNC: 17 MG/DL (ref 9–23)
BUN/CREAT SERPL: 17.5 (ref 7–25)
CALCIUM SPEC-SCNC: 8.7 MG/DL (ref 8.7–10.4)
CHLORIDE SERPL-SCNC: 101 MMOL/L (ref 99–109)
CO2 SERPL-SCNC: 31 MMOL/L (ref 20–31)
CREAT BLD-MCNC: 0.97 MG/DL (ref 0.6–1.3)
DEPRECATED RDW RBC AUTO: 43.2 FL (ref 37–54)
EOSINOPHIL # BLD AUTO: 0.07 10*3/MM3 (ref 0–0.3)
EOSINOPHIL NFR BLD AUTO: 0.7 % (ref 0–3)
ERYTHROCYTE [DISTWIDTH] IN BLOOD BY AUTOMATED COUNT: 13.1 % (ref 11.3–14.5)
GFR SERPL CREATININE-BSD FRML MDRD: 78 ML/MIN/1.73
GLOBULIN UR ELPH-MCNC: 1.5 GM/DL
GLUCOSE BLD-MCNC: 115 MG/DL (ref 70–100)
GLUCOSE BLDC GLUCOMTR-MCNC: 155 MG/DL (ref 70–130)
GLUCOSE BLDC GLUCOMTR-MCNC: 208 MG/DL (ref 70–130)
HCT VFR BLD AUTO: 39.3 % (ref 38.9–50.9)
HGB BLD-MCNC: 13 G/DL (ref 13.1–17.5)
IMM GRANULOCYTES # BLD AUTO: 0.04 10*3/MM3 (ref 0–0.03)
IMM GRANULOCYTES NFR BLD AUTO: 0.4 % (ref 0–0.6)
LYMPHOCYTES # BLD AUTO: 1.69 10*3/MM3 (ref 0.6–4.8)
LYMPHOCYTES NFR BLD AUTO: 17.4 % (ref 24–44)
MAGNESIUM SERPL-MCNC: 2.2 MG/DL (ref 1.3–2.7)
MCH RBC QN AUTO: 30.4 PG (ref 27–31)
MCHC RBC AUTO-ENTMCNC: 33.1 G/DL (ref 32–36)
MCV RBC AUTO: 92 FL (ref 80–99)
MONOCYTES # BLD AUTO: 1.03 10*3/MM3 (ref 0–1)
MONOCYTES NFR BLD AUTO: 10.6 % (ref 0–12)
NEUTROPHILS # BLD AUTO: 6.91 10*3/MM3 (ref 1.5–8.3)
NEUTROPHILS NFR BLD AUTO: 71.1 % (ref 41–71)
PHOSPHATE SERPL-MCNC: 2.5 MG/DL (ref 2.4–5.1)
PLATELET # BLD AUTO: 238 10*3/MM3 (ref 150–450)
PMV BLD AUTO: 9.9 FL (ref 6–12)
POTASSIUM BLD-SCNC: 4.3 MMOL/L (ref 3.5–5.5)
PROT SERPL-MCNC: 4.9 G/DL (ref 5.7–8.2)
RBC # BLD AUTO: 4.27 10*6/MM3 (ref 4.2–5.76)
SODIUM BLD-SCNC: 137 MMOL/L (ref 132–146)
WBC NRBC COR # BLD: 9.72 10*3/MM3 (ref 3.5–10.8)

## 2019-02-05 PROCEDURE — 25010000002 HEPARIN (PORCINE) PER 1000 UNITS: Performed by: INTERNAL MEDICINE

## 2019-02-05 PROCEDURE — 80053 COMPREHEN METABOLIC PANEL: CPT | Performed by: INTERNAL MEDICINE

## 2019-02-05 PROCEDURE — 85025 COMPLETE CBC W/AUTO DIFF WBC: CPT | Performed by: INTERNAL MEDICINE

## 2019-02-05 PROCEDURE — 71045 X-RAY EXAM CHEST 1 VIEW: CPT

## 2019-02-05 PROCEDURE — 99232 SBSQ HOSP IP/OBS MODERATE 35: CPT | Performed by: NURSE PRACTITIONER

## 2019-02-05 PROCEDURE — 83735 ASSAY OF MAGNESIUM: CPT | Performed by: INTERNAL MEDICINE

## 2019-02-05 PROCEDURE — 97530 THERAPEUTIC ACTIVITIES: CPT

## 2019-02-05 PROCEDURE — 82962 GLUCOSE BLOOD TEST: CPT

## 2019-02-05 PROCEDURE — 84100 ASSAY OF PHOSPHORUS: CPT | Performed by: INTERNAL MEDICINE

## 2019-02-05 RX ORDER — LOSARTAN POTASSIUM 25 MG/1
25 TABLET ORAL
Qty: 30 TABLET | Refills: 11 | Status: SHIPPED | OUTPATIENT
Start: 2019-02-05 | End: 2019-02-06

## 2019-02-05 RX ORDER — BUMETANIDE 0.25 MG/ML
2 INJECTION INTRAMUSCULAR; INTRAVENOUS ONCE
Status: COMPLETED | OUTPATIENT
Start: 2019-02-05 | End: 2019-02-05

## 2019-02-05 RX ORDER — HYDROCODONE BITARTRATE AND ACETAMINOPHEN 7.5; 325 MG/1; MG/1
1 TABLET ORAL EVERY 4 HOURS PRN
Qty: 30 TABLET | Refills: 0 | Status: SHIPPED | OUTPATIENT
Start: 2019-02-05 | End: 2019-03-14

## 2019-02-05 RX ORDER — LOSARTAN POTASSIUM 25 MG/1
25 TABLET ORAL
Qty: 30 TABLET | Refills: 11 | Status: CANCELLED | OUTPATIENT
Start: 2019-02-05

## 2019-02-05 RX ORDER — LOSARTAN POTASSIUM 25 MG/1
25 TABLET ORAL
Status: DISCONTINUED | OUTPATIENT
Start: 2019-02-05 | End: 2019-02-05 | Stop reason: HOSPADM

## 2019-02-05 RX ADMIN — HYDROCODONE BITARTRATE AND ACETAMINOPHEN 1 TABLET: 7.5; 325 TABLET ORAL at 08:58

## 2019-02-05 RX ADMIN — GABAPENTIN 300 MG: 300 CAPSULE ORAL at 08:55

## 2019-02-05 RX ADMIN — BUMETANIDE 2 MG: 0.25 INJECTION INTRAMUSCULAR; INTRAVENOUS at 10:31

## 2019-02-05 RX ADMIN — ASPIRIN 325 MG: 325 TABLET, DELAYED RELEASE ORAL at 08:55

## 2019-02-05 RX ADMIN — INSULIN LISPRO 3 UNITS: 100 INJECTION, SOLUTION INTRAVENOUS; SUBCUTANEOUS at 12:15

## 2019-02-05 RX ADMIN — INSULIN LISPRO 2 UNITS: 100 INJECTION, SOLUTION INTRAVENOUS; SUBCUTANEOUS at 08:58

## 2019-02-05 RX ADMIN — LOSARTAN POTASSIUM 25 MG: 25 TABLET, FILM COATED ORAL at 14:50

## 2019-02-05 RX ADMIN — HEPARIN SODIUM 5000 UNITS: 5000 INJECTION INTRAVENOUS; SUBCUTANEOUS at 14:50

## 2019-02-05 RX ADMIN — OXYCODONE AND ACETAMINOPHEN 1 TABLET: 5; 325 TABLET ORAL at 06:26

## 2019-02-05 RX ADMIN — HEPARIN SODIUM 5000 UNITS: 5000 INJECTION INTRAVENOUS; SUBCUTANEOUS at 06:20

## 2019-02-05 RX ADMIN — OXYCODONE AND ACETAMINOPHEN 1 TABLET: 5; 325 TABLET ORAL at 12:09

## 2019-02-05 RX ADMIN — METOPROLOL TARTRATE 12.5 MG: 25 TABLET, FILM COATED ORAL at 08:55

## 2019-02-05 RX ADMIN — INSULIN LISPRO 3 UNITS: 100 INJECTION, SOLUTION INTRAVENOUS; SUBCUTANEOUS at 08:58

## 2019-02-05 RX ADMIN — INSULIN LISPRO 4 UNITS: 100 INJECTION, SOLUTION INTRAVENOUS; SUBCUTANEOUS at 12:16

## 2019-02-05 ASSESSMENT — ENCOUNTER SYMPTOMS: COUGH: 1

## 2019-02-05 NOTE — PROGRESS NOTES
CTS Progress Note      POD 5 s/p CABG ×5       LOS: 5 days   Patient Care Team:  Terri Barreto APRN as PCP - General    Subjective  Slept better, no pressors.  Just back from bedside commode, pain under good control.      Objective    Vital Signs  Temp:  [97.7 °F (36.5 °C)-98.4 °F (36.9 °C)] 98.2 °F (36.8 °C)  Heart Rate:  [] 92  Resp:  [16-20] 16  BP: (102-145)/(61-87) 102/61    Physical Exam:   General Appearance: alert, appears stated age and cooperative   Lungs: clear to auscultation, respirations regular, respirations even and respirations unlabored   Heart: regular rhythm & normal rate, normal S1, S2, no murmur, no gallop, no rub and no click   Skin: , Dry, Incision c/d/i     Results   Results from last 7 days   Lab Units 02/05/19  0300   WBC 10*3/mm3 9.72   HEMOGLOBIN g/dL 13.0*   HEMATOCRIT % 39.3   PLATELETS 10*3/mm3 238     Results from last 7 days   Lab Units 02/05/19  0300   SODIUM mmol/L 137   POTASSIUM mmol/L 4.3   CHLORIDE mmol/L 101   CO2 mmol/L 31.0   BUN mg/dL 17   CREATININE mg/dL 0.97   GLUCOSE mg/dL 115*   CALCIUM mg/dL 8.7           Imaging Results (last 24 hours)     Procedure Component Value Units Date/Time    XR Chest 1 View [[id number]] Updated:  02/05/19 0429          Assessment  POD 5 s/p CABG ×5, expected recovery    CAD in native artery    Benign essential HTN    Type 2 diabetes mellitus (CMS/HCC)    Ischemic cardiomyopathy    S/P CABG x 5 1/31/19    Mixed hyperlipidemia  Looks better daily  Cardiovascular stable  Await telemetry bed      Plan   D/C home today  ASA, statin, BB  Ambulate

## 2019-02-05 NOTE — PLAN OF CARE
Problem: Patient Care Overview  Goal: Plan of Care Review  Outcome: Ongoing (interventions implemented as appropriate)   02/04/19 2000 02/05/19 0444   Coping/Psychosocial   Plan of Care Reviewed With patient;spouse --    Plan of Care Review   Progress --  improving   OTHER   Outcome Summary --  Pt A&Ox4, on RA, SR with PVCs, multiple loose BM, UOP adequate, ambulated 120ft, pain managed with minimal PO meds, VSS, will continue to monitor.        Problem: Skin Injury Risk (Adult)  Goal: Identify Related Risk Factors and Signs and Symptoms  Outcome: Ongoing (interventions implemented as appropriate)   02/05/19 0444   Skin Injury Risk (Adult)   Related Risk Factors (Skin Injury Risk) critical care admission     Goal: Skin Health and Integrity  Outcome: Ongoing (interventions implemented as appropriate)   02/05/19 0444   Skin Injury Risk (Adult)   Skin Health and Integrity making progress toward outcome       Problem: Cardiac Surgery (Adult)  Goal: Signs and Symptoms of Listed Potential Problems Will be Absent, Minimized or Managed (Cardiac Surgery)  Outcome: Ongoing (interventions implemented as appropriate)   02/05/19 0444   Goal/Outcome Evaluation   Problems Assessed (Cardiac Surgery) all   Problems Present (Cardiac Surgery) none       Problem: Fall Risk (Adult)  Goal: Identify Related Risk Factors and Signs and Symptoms  Outcome: Ongoing (interventions implemented as appropriate)   02/05/19 0444   Fall Risk (Adult)   Related Risk Factors (Fall Risk) fatigue/slow reaction;environment unfamiliar   Signs and Symptoms (Fall Risk) presence of risk factors     Goal: Absence of Fall  Outcome: Ongoing (interventions implemented as appropriate)   02/05/19 0444   Fall Risk (Adult)   Absence of Fall achieves outcome

## 2019-02-05 NOTE — PLAN OF CARE
Problem: Patient Care Overview  Goal: Plan of Care Review  Outcome: Ongoing (interventions implemented as appropriate)   02/05/19 0830   Coping/Psychosocial   Plan of Care Reviewed With patient;son   OTHER   Outcome Summary patient able to ambulate 200 ft without walker and no LOB he has met goals for bed mobility transfers and partially met gait goals. possibly home today with family to assist

## 2019-02-05 NOTE — CONSULTS
Diabetes Education    Patient Name:  Jesse Brink  YOB: 1955  MRN: [id number]  Admit Date:  1/31/2019      Electronically signed by:  Racquel Mir RN  02/05/19 10:48 AM  Discussed and taught patient about type 2 diabetes self-management, risk factors, and importance of blood glucose control to reduce complications. Target blood glucose readings and A1c goals per ADA were reviewed. Reviewed with patient current A1c and discussed its significance. Signs, symptoms and treatment of hyperglycemia and hypoglycemia were discussed. Lifestyle changes such as physical activity with MD approval and healthy eating were encouraged. Stressed the importance of strict blood sugar control after surgery to prevent complications such as infection and to promote healing of incision.  Pt instructed to  check blood sugar 4 times per day and to call PCP if  Blood glucose is higher than 180 two times or more.  Patient was encouraged to keep record of blood glucose readings to take to follow up appointment with PCP.  Pt was offered a free op follow up appointment however declined at this time.  Thank you for this referral. CHRIS Mir RN CDE

## 2019-02-05 NOTE — DISCHARGE INSTRUCTIONS
Do not drive for weeks for 4 or while taking pain medication.    Use gentle soap and water while cleaning incision.     Maintain a heart healthy low carbohydrate diet.

## 2019-02-05 NOTE — PROGRESS NOTES
Pt. Referred for Phase II Cardiac Rehab. Staff discussed benefits of exercise, program protocol, and educational material provided. Teach back verified.  Permission granted from patient for staff to fax referral information to outlying program at this time.  Staff to fax referral info to [facility].

## 2019-02-05 NOTE — THERAPY TREATMENT NOTE
Acute Care - Physical Therapy Treatment Note  [facility]     Patient Name: Jesse Brink  : 1955  MRN: [id number]  Today's Date: 2019  Onset of Illness/Injury or Date of Surgery: 19  Date of Referral to PT: 19  Referring Physician: MD Gu    Admit Date: 2019    Visit Dx:    ICD-10-CM ICD-9-CM   1. Impaired functional mobility, balance, gait, and endurance Z74.09 V49.89   2. CAD in native artery I25.10 414.01   3. Angina at rest (CMS/HCC) I20.8 413.9     Patient Active Problem List   Diagnosis   • Benign essential HTN   • Lumbosacral radiculopathy at S1   • Numbness of lower extremity   • Transient ischemic attack   • Rheumatoid arthritis (CMS/HCC)   • Obesity   • Idiopathic peripheral neuropathy   • Palpitations   • CAD in native artery   • Type 2 diabetes mellitus (CMS/HCC)   • Ischemic cardiomyopathy   • S/P CABG x 5 19   • Mixed hyperlipidemia       Therapy Treatment    Rehabilitation Treatment Summary     Row Name 19 0830             Treatment Time/Intention    Discipline  physical therapist  -FLAVIA      Document Type  therapy note (daily note)  -FLAVIA      Subjective Information  complains of;weakness  -FLAVIA      Mode of Treatment  physical therapy  -FLAVIA      Patient/Family Observations  family supportive  -FLAVIA      Care Plan Review  care plan/treatment goals reviewed;risks/benefits reviewed;patient/other agree to care plan  -FLAVIA      Therapy Frequency (PT Clinical Impression)  daily  -FLAVIA      Patient Effort  good  -FLAVIA      Existing Precautions/Restrictions  cardiac;sternal  -FLAVIA      Recorded by [FLAVIA] Trupti An, PT 19 1057      Row Name 19 0830             Vital Signs    Pre Systolic BP Rehab  130  -FLAVIA      Pre Treatment Diastolic BP  70  -FLAVIA      Post Systolic BP Rehab  150  -FLAVIA      Post Treatment Diastolic BP  89  -FLAVIA      Pretreatment Heart Rate (beats/min)  105  -FLAVIA      Intratreatment Heart Rate (beats/min)  120  -FLAVIA      Posttreatment Heart  Rate (beats/min)  110  -FLAVIA      Pre SpO2 (%)  94  -FLAVIA      O2 Delivery Pre Treatment  room air  -FLAVIA      Post SpO2 (%)  95  -FLAVIA      O2 Delivery Post Treatment  room air  -FLAVIA      Pre Patient Position  Sitting  -FLAVIA      Intra Patient Position  Standing  -FLAVIA      Post Patient Position  Sitting  -FLAVIA      Recorded by [FLAVIA] Trupti An, PT 02/05/19 1057      Row Name 02/05/19 0830             Cognitive Assessment/Intervention- PT/OT    Affect/Mental Status (Cognitive)  WFL  -FLAVIA      Orientation Status (Cognition)  oriented x 4  -FLAVIA      Follows Commands (Cognition)  WFL  -FLAVIA      Personal Safety Interventions  gait belt;nonskid shoes/slippers when out of bed  -FLAVIA      Recorded by [FLAVIA] Trupti An, PT 02/05/19 1057      Row Name 02/05/19 0830             Safety Issues, Functional Mobility    Safety Issues Affecting Function (Mobility)  safety precautions follow-through/compliance;safety precaution awareness  -FLAVIA      Impairments Affecting Function (Mobility)  balance;endurance/activity tolerance;shortness of breath  -FLAVIA      Recorded by [FLAVIA] Trupti An, PT 02/05/19 1057      Row Name 02/05/19 0830             Bed Mobility Assessment/Treatment    Comment (Bed Mobility)  patient is OOB and returns to the chair post activity  -FLAVIA      Recorded by [FLAVIA] Trupti An, PT 02/05/19 1057      Row Name 02/05/19 0830             Transfer Assessment/Treatment    Transfer Assessment/Treatment  sit-stand transfer;stand-sit transfer;toilet transfer  -FLAVIA      Recorded by [FLAVIA] Trupti An, PT 02/05/19 1057      Row Name 02/05/19 0830             Sit-Stand Transfer    Sit-Stand [place] (Transfers)  independent  -FLAVIA      Recorded by [FLAVIA] Trupti An, PT 02/05/19 1057      Row Name 02/05/19 0830             Stand-Sit Transfer    Stand-Sit [place] (Transfers)  independent  -FLAVIA      Recorded by [FLAVIA] Trupti An, PT 02/05/19 1057      Row Name 02/05/19 0830             Toilet Transfer     Type (Toilet Transfer)  sit-stand;stand-sit  -FLAVIA      [place] Level (Toilet Transfer)  independent  -FLAVIA      Assistive Device (Toilet Transfer)  commode, bedside without drop arms  -FLAVIA      Recorded by [FLAVIA] Trupti An, PT 02/05/19 1057      Row Name 02/05/19 0830             Gait/Stairs Assessment/Training    Gait/Stairs Assessment/Training  gait/ambulation independence  -FLAVIA      [place] Level (Gait)  supervision  -FLAVIA      Distance in Feet (Gait)  200  -FLAVIA      Pattern (Gait)  step-through  -FLAVIA      Comment (Gait/Stairs)  patient had one LOB but with increasing distance became more confident and no LOB. patient mainly self limiting with anxiety  -FLAVIA      Recorded by [FLAVIA] Trupti An, PT 02/05/19 1057      Row Name 02/05/19 0830             Therapeutic Exercise    50441 - PT Therapeutic Activity Minutes  24  -FLAVIA      Recorded by [FLAVIA] Trupti An, PT 02/05/19 1057      Row Name 02/05/19 0830             Lower Extremity Seated Therapeutic Exercise    Performed, Seated Lower Extremity (Therapeutic Exercise)  hip flexion/extension;ankle dorsiflexion/plantarflexion;knee flexion/extension  -FLAVIA      Exercise Type, Seated Lower Extremity (Therapeutic Exercise)  AROM (active range of motion)  -FLAVIA      Sets/Reps Detail, Seated Lower Extremity (Therapeutic Exercise)  1/10  -FLAVIA      Recorded by [FLAVIA] Trupti An, PT 02/05/19 1057      Row Name 02/05/19 0830             Static Sitting Balance    Level of [place] (Unsupported Sitting, Static Balance)  independent  -FLAVIA      Sitting Position (Unsupported Sitting, Static Balance)  sitting in chair  -FLAVIA      Time Able to Maintain Position (Unsupported Sitting, Static Balance)  more than 5 minutes  -FLAVIA      Recorded by [FLAVIA] Trupti An, PT 02/05/19 1057      Row Name 02/05/19 0830             Dynamic Sitting Balance    Level of [place], Reaches Outside Midline (Sitting, Dynamic Balance)  independent  -FLAVIA      Sitting Position,  Reaches Outside Midline (Sitting, Dynamic Balance)  sitting in chair  -FLAVIA      Recorded by [FLAVIA] Trupti An, PT 02/05/19 1057      Row Name 02/05/19 0830             Static Standing Balance    Level of [place] (Supported Standing, Static Balance)  supervision  -FLAVIA      Time Able to Maintain Position (Supported Standing, Static Balance)  3 to 4 minutes  -FLAVIA      Recorded by [FLAVIA] Trupti An, PT 02/05/19 1057      Row Name 02/05/19 0830             Dynamic Standing Balance    Level of [place], Reaches Outside Midline (Standing, Dynamic Balance)  supervision  -FLAVIA      Time Able to Maintain Position, Reaches Outside Midline (Standing, Dynamic Balance)  2 to 3 minutes  -FLAVIA      Recorded by [FLAVIA] Trupti An, PT 02/05/19 1057      Row Name 02/05/19 0830             Positioning and Restraints    Pre-Treatment Position  sitting in chair/recliner  -FLAVIA      Post Treatment Position  chair  -FLAVIA      In Chair  notified nsg;reclined;sitting;call light within reach;encouraged to call for assist;with family/caregiver  -FLAVIA      Recorded by [FLAVIA] Trupti An, PT 02/05/19 1057      Row Name 02/05/19 0830             Pain Scale: Numbers Pre/Post-Treatment    Pain Scale: Numbers, Pretreatment  0/10 - no pain  -FLAVIA      Pain Scale: Numbers, Post-Treatment  0/10 - no pain  -FLAVIA      Recorded by [FLAVIA] Trupti An, PT 02/05/19 1057      Row Name                Wound 01/31/19 0900 chest incision    Wound - Properties Group Date first assessed: 01/31/19 [TF] Time first assessed: 0900 [TF] Location: chest [TF] Type: incision [TF] Recorded by:  [TF] Sarah Raines RN 01/31/19 0900    Row Name                Wound 01/31/19 0900 Right leg incision    Wound - Properties Group Date first assessed: 01/31/19 [TF] Time first assessed: 0900 [TF] Side: Right [TF] Location: leg [TF] Type: incision [TF] Recorded by:  [TF] Sarah Raines RN 01/31/19 0900    Row Name                Wound 01/31/19 1731 Right  groin puncture    Wound - Properties Group Date first assessed: 01/31/19 [AD] Time first assessed: 1731 [AD] Side: Right [AD] Location: groin [AD] Type: puncture [AD], S/P VENOUS AND FEMORAL SHEATH  Recorded by:  [AD] Gissel Small RN 01/31/19 1732    Row Name 02/05/19 0830             Coping    Observed Emotional State  anxious;cooperative  -FLAVIA      Verbalized Emotional State  anxiety  -FLAVIA      Recorded by [FLAVIA] Trupti An, PT 02/05/19 1057      Row Name 02/05/19 0830             Plan of Care Review    Plan of Care Reviewed With  patient;son  -FLAVIA      Recorded by [FLAVIA] Trupti An, PT 02/05/19 1057      Row Name 02/05/19 0830             Outcome Summary/Treatment Plan (PT)    Daily Summary of Progress (PT)  progress towards functional goals is fair  -FLAVIA      Barriers to Overall Progress (PT)  patient is self limiting with anxiety   -FLAVIA      Anticipated Discharge Disposition (PT)  home with assist  -FLAVIA      Recorded by [FLAVIA] Trupti An, PT 02/05/19 105        User Key  (r) = Recorded By, (t) = Taken By, (c) = Cosigned By    Initials Name Effective Dates Discipline    FLAVIA Trupti An, PT 06/19/15 -  PT    TF Sarah Raines RN 06/16/16 -  Nurse    AD Gissel Small RN 11/05/18 -  Nurse          Wound 01/31/19 0900 chest incision (Active)   Dressing Appearance open to air 2/5/2019 10:00 AM   Closure Liquid skin adhesive 2/5/2019 10:00 AM   Base clean;dry 2/5/2019 10:00 AM   Periwound dry;intact;ecchymotic 2/5/2019 10:00 AM   Drainage Amount none 2/5/2019 10:00 AM   Care, Wound cleansed with;soap and water;antimicrobial agent applied 2/4/2019  8:00 PM   Dressing Care, Wound open to air 2/5/2019 10:00 AM       Wound 01/31/19 0900 Right leg incision (Active)   Dressing Appearance open to air 2/5/2019 10:00 AM   Closure Approximated;Liquid skin adhesive 2/5/2019 10:00 AM   Base clean;dry 2/5/2019 10:00 AM   Periwound dry;intact;ecchymotic 2/5/2019 10:00 AM   Drainage Amount none 2/5/2019  10:00 AM   Care, Wound cleansed with;soap and water;antimicrobial agent applied 2/4/2019  8:00 PM   Dressing Care, Wound open to air 2/5/2019 10:00 AM       Wound 01/31/19 1731 Right groin puncture (Active)   Dressing Appearance open to air 2/5/2019 10:00 AM   Closure Open to air 2/5/2019 10:00 AM   Base clean;dry 2/5/2019 10:00 AM   Periwound dry;intact;ecchymotic 2/5/2019 10:00 AM   Drainage Amount none 2/5/2019 10:00 AM   Care, Wound cleansed with;soap and water;antimicrobial agent applied 2/4/2019  8:00 PM   Dressing Care, Wound open to air 2/5/2019 10:00 AM       Rehab Goal Summary     Row Name 02/05/19 0830             Bed Mobility Goal 1 (PT)    Activity/Assistive Device (Bed Mobility Goal 1, PT)  sit to supine/supine to sit  -FLAVIA      [place] Level/Cues Needed (Bed Mobility Goal 1, PT)  independent  -FLAVIA      Time Frame (Bed Mobility Goal 1, PT)  long term goal (LTG);10 days  -FLAVIA      Progress/Outcomes (Bed Mobility Goal 1, PT)  goal met  -FLAVIA         Transfer Goal 1 (PT)    Activity/Assistive Device (Transfer Goal 1, PT)  sit-to-stand/stand-to-sit  -FLAVIA      [place] Level/Cues Needed (Transfer Goal 1, PT)  independent  -FLAVIA      Time Frame (Transfer Goal 1, PT)  long term goal (LTG);10 days  -FLAVIA      Progress/Outcome (Transfer Goal 1, PT)  goal met  -FLAVIA         Gait Training Goal 1 (PT)    Activity/Assistive Device (Gait Training Goal 1, PT)  gait (walking locomotion);assistive device use;walker, rolling  -FLAVIA      [place] Level (Gait Training Goal 1, PT)  contact guard assist  -FLAVIA      Distance (Gait Goal 1, PT)  300  -FLAVIA      Time Frame (Gait Training Goal 1, PT)  long term goal (LTG);10 days  -FLAVIA      Progress/Outcome (Gait Training Goal 1, PT)  goal partially met  -FLAVIA         Patient Education Goal (PT)    Activity (Patient Education Goal, PT)  HEP  -FLAVIA      [place]/Cues/Accuracy (Memory Goal 2, PT)  verbalizes understanding  -FLAVIA      Time Frame (Patient Education Goal, PT)  long term goal  (LTG);10 days  -FLAVIA      Progress/Outcome (Patient Education Goal, PT)  goal met  -FLAVIA        User Key  (r) = Recorded By, (t) = Taken By, (c) = Cosigned By    Initials Name Provider Type Discipline    Trupti Michelle PT Physical Therapist PT          Physical Therapy Education     Title: PT OT SLP Therapies (In Progress)     Topic: Physical Therapy (In Progress)     Point: Mobility training (In Progress)     Learning Progress Summary           Patient Acceptance, E, VU by FLAVIA at 2/5/2019  8:30 AM    Comment:  discussed the few steps patient needed to due to get into house. he will have family assist. patient is safe without walker. discussed home activity level and sternal precautions    Acceptance, E, NR by KR at 2/4/2019  9:12 AM    Acceptance, E, NR by KR at 2/3/2019  8:36 AM    Acceptance, E, NR by FLAVIA at 2/1/2019  9:30 AM   Family Acceptance, E, VU by FLAVIA at 2/5/2019  8:30 AM    Comment:  discussed the few steps patient needed to due to get into house. he will have family assist. patient is safe without walker. discussed home activity level and sternal precautions   Significant Other Acceptance, E, NR by KR at 2/4/2019  9:12 AM    Acceptance, E, NR by KR at 2/3/2019  8:36 AM    Acceptance, E, NR by FLAVIA at 2/1/2019  9:30 AM                   Point: Home exercise program (In Progress)     Learning Progress Summary           Patient Acceptance, E, VU by FLAVIA at 2/5/2019  8:30 AM    Comment:  discussed the few steps patient needed to due to get into house. he will have family assist. patient is safe without walker. discussed home activity level and sternal precautions    Acceptance, E, NR by KR at 2/4/2019  9:12 AM    Acceptance, E, NR by KR at 2/3/2019  8:36 AM    Acceptance, E, NR by FLAVIA at 2/1/2019  9:30 AM   Family Acceptance, E, VU by FLAVIA at 2/5/2019  8:30 AM    Comment:  discussed the few steps patient needed to due to get into house. he will have family assist. patient is safe without walker. discussed home  activity level and sternal precautions   Significant Other Acceptance, E, NR by KR at 2/4/2019  9:12 AM    Acceptance, E, NR by KR at 2/3/2019  8:36 AM    Acceptance, E, NR by FLAVIA at 2/1/2019  9:30 AM                   Point: Body mechanics (In Progress)     Learning Progress Summary           Patient Acceptance, E, VU by FLAVIA at 2/5/2019  8:30 AM    Comment:  discussed the few steps patient needed to due to get into house. he will have family assist. patient is safe without walker. discussed home activity level and sternal precautions    Acceptance, E, NR by KR at 2/4/2019  9:12 AM    Acceptance, E, NR by KR at 2/3/2019  8:36 AM    Acceptance, E, NR by FLAVIA at 2/1/2019  9:30 AM   Family Acceptance, E, VU by FLAVIA at 2/5/2019  8:30 AM    Comment:  discussed the few steps patient needed to due to get into house. he will have family assist. patient is safe without walker. discussed home activity level and sternal precautions   Significant Other Acceptance, E, NR by KR at 2/4/2019  9:12 AM    Acceptance, E, NR by KR at 2/3/2019  8:36 AM    Acceptance, E, NR by FLAVIA at 2/1/2019  9:30 AM                   Point: Precautions (In Progress)     Learning Progress Summary           Patient Acceptance, E, VU by FLAVIA at 2/5/2019  8:30 AM    Comment:  discussed the few steps patient needed to due to get into house. he will have family assist. patient is safe without walker. discussed home activity level and sternal precautions    Acceptance, E, NR by KR at 2/4/2019  9:12 AM    Acceptance, E, NR by KR at 2/3/2019  8:36 AM    Acceptance, E, NR by FLAVIA at 2/1/2019  9:30 AM   Family Acceptance, E, VU by FLAVIA at 2/5/2019  8:30 AM    Comment:  discussed the few steps patient needed to due to get into house. he will have family assist. patient is safe without walker. discussed home activity level and sternal precautions   Significant Other Acceptance, E, NR by KR at 2/4/2019  9:12 AM    Acceptance, E, NR by KR at 2/3/2019  8:36 AM    Acceptance, E, NR  by FLAVIA at 2/1/2019  9:30 AM                               User Key     Initials Effective Dates Name Provider Type Discipline    FLAVIA 06/19/15 -  Trupti An, PT Physical Therapist PT    LALO 04/03/18 -  Pari Abbasi, PT Physical Therapist PT                PT Recommendation and Plan  Anticipated Discharge Disposition (PT): home with assist  Planned Therapy Interventions (PT Eval): balance training, bed mobility training, gait training, home exercise program, strengthening, transfer training  Therapy Frequency (PT Clinical Impression): daily  Outcome Summary/Treatment Plan (PT)  Daily Summary of Progress (PT): progress towards functional goals is fair  Barriers to Overall Progress (PT): patient is self limiting with anxiety   Anticipated Equipment Needs at Discharge (PT): front wheeled walker  Anticipated Discharge Disposition (PT): home with assist  Plan of Care Reviewed With: patient, son  Outcome Summary: patient able to ambulate 200 ft without walker and no LOB he has met goals for bed mobility transfers and partially met gait goals. possibly home today with family to assist   Outcome Measures     Row Name 02/05/19 0830 02/04/19 0912 02/03/19 0836       How much help from another person do you currently need...    Turning from your back to your side while in flat bed without using bedrails?  4  -FLAVIA  2  -KR  2  -KR    Moving from lying on back to sitting on the side of a flat bed without bedrails?  4  -FLAVIA  2  -KR  2  -KR    Moving to and from a bed to a chair (including a wheelchair)?  4  -FLAVIA  3  -KR  3  -KR    Standing up from a chair using your arms (e.g., wheelchair, bedside chair)?  4  -FLAVIA  3  -KR  2  -KR    Climbing 3-5 steps with a railing?  3  -FLAVIA  2  -KR  2  -KR    To walk in hospital room?  3  -FLAVIA  3  -KR  3  -KR    AM-PAC 6 Clicks Score  22  -FLAVIA  15  -KR  14  -KR       Functional Assessment    Outcome Measure Options  AM-PAC 6 Clicks Basic Mobility (PT)  -FLAVIA  AM-PAC 6 Clicks Basic Mobility (PT)   -KR  AM-PAC 6 Clicks Basic Mobility (PT)  -KR      User Key  (r) = Recorded By, (t) = Taken By, (c) = Cosigned By    Initials Name Provider Type    Trupti Michelle, PT Physical Therapist    Pari Noe, PT Physical Therapist         Time Calculation:   PT Charges     Row Name 02/05/19 0830             Time Calculation    Start Time  0830  -FLAVIA      PT Received On  02/05/19  -FLAVIA      PT Goal Re-Cert Due Date  02/11/19  -FLAVIA         Time Calculation- PT    Total Timed Code Minutes- PT  24 minute(s)  -FLAVIA         Timed Charges    26776 - PT Therapeutic Activity Minutes  24  -FLAVIA        User Key  (r) = Recorded By, (t) = Taken By, (c) = Cosigned By    Initials Name Provider Type    Trupti Michelle, PT Physical Therapist        Therapy Suggested Charges     Code   Minutes Charges    21408 (CPT®) Hc Pt Neuromusc Re Education Ea 15 Min      06819 (CPT®) Hc Pt Ther Proc Ea 15 Min      13750 (CPT®) Hc Gait Training Ea 15 Min      23028 (CPT®) Hc Pt Therapeutic Act Ea 15 Min 24 2    35008 (CPT®) Hc Pt Manual Therapy Ea 15 Min      83036 (CPT®) Hc Pt Iontophoresis Ea 15 Min      87876 (CPT®) Hc Pt Elec Stim Ea-Per 15 Min      51626 (CPT®) Hc Pt Ultrasound Ea 15 Min      17299 (CPT®) Hc Pt Self Care/Mgmt/Train Ea 15 Min      50097 (CPT®) Hc Pt Prosthetic (S) Train Initial Encounter, Each 15 Min      25841 (CPT®) Hc Pt Orthotic(S)/Prosthetic(S) Encounter, Each 15 Min      52322 (CPT®) Hc Orthotic(S) Mgmt/Train Initial Encounter, Each 15min      Total  24 2        Therapy Charges for Today     Code Description Service Date Service Provider Modifiers Qty    75728414660 HC PT THERAPEUTIC ACT EA 15 MIN 2/5/2019 Trupti An, PT GP 2          PT G-Codes  Outcome Measure Options: AM-PAC 6 Clicks Basic Mobility (PT)  AM-PAC 6 Clicks Score: 22    Trupti An, PT  2/5/2019

## 2019-02-05 NOTE — PROGRESS NOTES
"Nutrition Education     Patient Name: Jesse Brink  Date of Encounter: 02/05/19 11:52 AM  MRN: [id number]  Admission date: 1/31/2019    Time: 30min   Reason for Visit: DM education - patient request     Comments:  Patient and family provided with education material regarding carb counting and meal planning.  Mid-way through education patient became drowsy and unable to focus on material being discussed.  Patient and family expressed interest in outpatient education, will make a referral to the outpatient education clinic to contact patient.    Patient reports dx of DMII for about 15years.  Attempted a really low carb diet after initial diagnoses for about one year and lost about 70#.  Has been introducing carbs slowly in the his diet.  Patient seemed confused on type of carbs to avoid vs ones to include in his diet.  Per patient \"I found out that corn and carrots have carbs in them, so I stopped eating them\".  Carb counting discussed with patient with emphasis on including healthy carbs and eliminating anitha suger food items.  Again, patient eager to learn, was just unable to maintain focus due to current clinical condition.     Pertinent Diagnosis:  Patient Active Problem List   Diagnosis   • Benign essential HTN   • Lumbosacral radiculopathy at S1   • Numbness of lower extremity   • Transient ischemic attack   • Rheumatoid arthritis (CMS/HCC)   • Obesity   • Idiopathic peripheral neuropathy   • Palpitations   • CAD in native artery   • Type 2 diabetes mellitus (CMS/HCC)   • Ischemic cardiomyopathy   • S/P CABG x 5 1/31/19   • Mixed hyperlipidemia       Problem/Intervetion:  Nutrition Diagnoses Problem 1   Problem 1:  Knowledge Deficit   Etiology (related to): MNT for Treatment/Condition   Signs/Symptoms: Demonstrated Information Deficit   (evidence by)    Intervention Goal:   General: Provide information regarding MNT for treatment/condition    Education/Evaluation  Education    Education Re:  Diabetes "    Education Topics: Carb counting, meal planning     Monitor/Evaluation    Per protocol    Humera Ambriz, RD  11:52 AM

## 2019-02-05 NOTE — PROGRESS NOTES
Case Management Discharge Note    Final Note: Discharge home with family today, doing well.  No needs identified at this time.    Destination      No service has been selected for the patient.      Durable Medical Equipment      No service has been selected for the patient.      Dialysis/Infusion      No service has been selected for the patient.      Home Medical Care      No service has been selected for the patient.      Community Resources      No service has been selected for the patient.             Final Discharge Disposition Code: 01 - home or self-care

## 2019-02-06 ENCOUNTER — READMISSION MANAGEMENT (OUTPATIENT)
Dept: CALL CENTER | Facility: HOSPITAL | Age: 64
End: 2019-02-06

## 2019-02-06 RX ORDER — LOSARTAN POTASSIUM 25 MG/1
25 TABLET ORAL
Qty: 30 TABLET | Refills: 11 | Status: SHIPPED | OUTPATIENT
Start: 2019-02-06 | End: 2019-03-14

## 2019-02-06 NOTE — DISCHARGE SUMMARY
CTS Discharge Summary    Patient Care Team:  Terri Barreto APRN as PCP - General      Date of Admission: 1/31/2019  5:36 AM  Date of Discharge:  02/05/2019    Discharge Diagnosis  Past Medical History:   Diagnosis Date   • Anxiety    • Cancer (CMS/HCC)     skin    • Chest pain syndrome    • Diabetes mellitus (CMS/HCC)     TYPE 2 DIAGNOSED SEVERAL YEARS AGO. TRIES TO TEST ONCE DAILY   • Diverticulitis    • Fibromyalgia    • Hearing aid worn    • History of cellulitis    • History of kidney stones    • History of malignant neoplasm    • History of MRI of spine     demonstrating buldging discs at L3/L4, L4/L5, L5/S1 levels   • History of panic attacks    • History of tobacco abuse     History of tobacco, quitting 28 years ago.   • Hypertension    • Irregular heart beat    • Migraine     occular   • Obesity    • Rheumatoid arthritis (CMS/HCC)    • Transient ischemic attack     Recent transient ischemic attack with normal echocardiogram, carotid duplex, and CT scan of the brain by verbal report.         CAD in native artery    Benign essential HTN    Type 2 diabetes mellitus (CMS/HCC)    Ischemic cardiomyopathy    S/P CABG x 5 1/31/19    Mixed hyperlipidemia    Patient Active Problem List   Diagnosis   • Benign essential HTN   • Lumbosacral radiculopathy at S1   • Numbness of lower extremity   • Transient ischemic attack   • Rheumatoid arthritis (CMS/HCC)   • Obesity   • Idiopathic peripheral neuropathy   • Palpitations   • CAD in native artery   • Type 2 diabetes mellitus (CMS/HCC)   • Ischemic cardiomyopathy   • S/P CABG x 5 1/31/19   • Mixed hyperlipidemia         History of Present Illness63-year-old  male with a history of hypertension, diabetes mellitus, remote tobacco abuse, obesity, transient ischemic attack and fibromyalgia who presents with chest pain.  For the past year, the patient has noticed substernal chest pressure that is aggravated with activity and alleviated with rest.  He will developed  chest pain with activities such as weed eating or sometimes a simple walk.  His chest pain radiates to the bilateral arms and is associated with some shortness of breath on exertion.               Hospital Course  Patient is a 63 y.o. male admitted for coronary artery bypass grafting.  After admission patient was taken the operating suite and underwent coronary artery bypass grafting ×5.  Patient telemetry procedure well.  Came off bypass promptly, was closed then taken to the cardiothoracic unit in stable condition.  Postop was seen by the hospital intensivist on the intensive care unit.  Postoperative day #1 is on a low-dose levo fed drip for support.  Chest tubes minimal drainage.  Patient is awake, alert, and extubated.  He is up in a chair.  A 3 postop chest tubes once again with minimal drainage.  They were able to be removed.  Pacing wires were also removed.  She was placed on transfer.  There was no bed available.  He remained in the intensive care unit for the next 48 hours.  Ambulating well with physical therapy.  Was weaned off of his oxygen.  Remained in a normal sinus rhythm was able to be discharged home on 02/05/2019.    Procedures Performed  Procedure(s):  MEDIAN STERNOTOMY, CORONARY ARTERY BYPASS GRAFT X5, UTILIZING THE LEFT INTERNAL MAMMARY ARTERY, EVH OF THE RIGHT GREATER SAPHENOUS VEIN  TRANSESOPHAGEAL ECHOCARDIOGRAM WITH ANESTHESIA  ATRIAL APPENDAGE OCCLUSION LEFT       Consults:   Consults     No orders found from 1/2/2019 to 2/1/2019.            Discharge Medications     Discharge Medications      New Medications      Instructions Start Date   aspirin 325 MG EC tablet  Replaces:  aspirin 81 MG tablet   325 mg, Oral, Daily      HYDROcodone-acetaminophen 7.5-325 MG per tablet  Commonly known as:  NORCO  Notes to patient:  For pain.    1 tablet, Oral, Every 4 Hours PRN      losartan 25 MG tablet  Commonly known as:  COZAAR   25 mg, Oral, Every 24 Hours Scheduled      metoprolol tartrate 25 MG  tablet  Commonly known as:  LOPRESSOR   25 mg, Oral, Every 12 Hours Scheduled         Continue These Medications      Instructions Start Date   baclofen 10 MG tablet  Commonly known as:  LIORESAL   10 mg, Oral, 3 Times Daily PRN      ENBREL SURECLICK 50 MG/ML solution auto-injector  Generic drug:  Etanercept   50 mg, Subcutaneous, Weekly      gabapentin 300 MG capsule  Commonly known as:  NEURONTIN   300 mg, Oral, 2 Times Daily      JARDIANCE 25 MG tablet  Generic drug:  Empagliflozin   25 mg, Oral, Daily      metFORMIN 500 MG tablet  Commonly known as:  GLUCOPHAGE   1,000 mg, Oral, 2 Times Daily      potassium chloride 10 MEQ CR tablet  Commonly known as:  K-DUR   10 mEq, Oral, Daily      rosuvastatin 40 MG tablet  Commonly known as:  CRESTOR  Notes to patient:  To lower your cholesterol and prevent future heart attack and stroke.    40 mg, Oral, Daily      tiZANidine 2 MG tablet  Commonly known as:  ZANAFLEX   2 mg, Oral, As Needed      Vitamin D3 2000 units capsule   2,000 Units, Oral, Daily         Stop These Medications    aspirin 81 MG tablet  Replaced by:  aspirin 325 MG EC tablet     doxazosin 4 MG tablet  Commonly known as:  CARDURA     isosorbide mononitrate 30 MG 24 hr tablet  Commonly known as:  IMDUR     losartan-hydrochlorothiazide 100-25 MG per tablet  Commonly known as:  HYZAAR     nebivolol 10 MG tablet  Commonly known as:  BYSTOLIC            Discharge Diet:   Diet Instructions     Diet: Cardiac, Consistent Carbohydrate; Thin      Discharge Diet:   Cardiac  Consistent Carbohydrate       Fluid Consistency:  Thin      This discharge took greater than 30 minutes to compile.    Activity at Discharge:   Activity Instructions     Discharge Activity Restrictions      1) No driving for 4 weeks and no longer taking narcotics.   2) Return to school / work in 4 week.  3) May shower / sponge bathe for now hours.  4) Do not lift / push / pull more then 10 lbs.        Do not drive while taking  narcotics    Follow-up Appointments  Future Appointments   Date Time Provider Department Center   3/14/2019 11:15 AM Dillon Neville III, MD [facility] None          MARIAN Nam  02/06/19  8:38 AM

## 2019-02-08 ENCOUNTER — TELEPHONE (OUTPATIENT)
Dept: NUTRITION | Facility: HOSPITAL | Age: 64
End: 2019-02-08

## 2019-02-08 ENCOUNTER — READMISSION MANAGEMENT (OUTPATIENT)
Dept: CALL CENTER | Facility: HOSPITAL | Age: 64
End: 2019-02-08

## 2019-02-08 NOTE — PROGRESS NOTES
Received message from inpatient RD regarding patient, as he voiced interest in outpatient nutrition education during recent admission. Called and spoke with patient today- states he is interested in general healthy eating and knowing what to eat for weight loss. RD to call PCP and obtain nutrition referral for scheduling outpatient counseling.

## 2019-02-08 NOTE — OUTREACH NOTE
CT Surgery Week 1 Survey      Responses   Facility patient discharged from?  [facility]   Does the patient have one of the following disease processes/diagnoses(primary or secondary)?  Cardiothoracic surgery   Is there a successful TCM telephone encounter documented?  No   Week 1 attempt successful?  Yes   Call start time  1329   Call end time  1334   Medication alerts for this patient  taking medications as directed   Meds reviewed with patient/caregiver?  Yes   Is the patient having any side effects they believe may be caused by any medication additions or changes?  Yes   Side effects comments   does have some sweating after taking the pain medication   Does the patient have all medications related to this admission filled (includes all antibiotics, pain medications, cardiac medications, etc.)  Yes   Is the patient taking all medications as directed (includes completed medication regime)?  Yes   Comments regarding appointments  has appointments confirmed and will be keeping them   Does the patient have a primary care provider?   Yes   Does the patient have an appointment scheduled with their C/T surgeon?  Yes   Has the patient kept scheduled appointments due by today?  N/A   Has home health visited the patient within 72 hours of discharge?  N/A   Psychosocial issues?  No   Did the patient receive a copy of their discharge instructions?  Yes   Nursing interventions  Reviewed instructions with patient   What is the patient's perception of their health status since discharge?  Improving [feels he has improved but is  tired]   Is the patient /caregiver able to teach back basic post-op care?  Continue use of incentive spirometry at least 1 week post discharge, Drive as instructed by MD in discharge instructions, Take showers only when approved by MD-sponge bathe until then, No tub bath, swimming, or hot tub until instructed by MD, Keep incision areas clean, dry and protected, Lifting as instructed by MD in discharge  instructions   Is the patient/caregiver able to teach back signs and symptoms of incisional infection?  Fever, Pus or odor from incision, Incisional warmth, Increased drainage or bleeding, Increased redness, swelling or pain at the incisonal site   Is the patient/caregiver able to teach back steps to recovery at home?  Weigh daily, Rest and rebuild strength, gradually increase activity, Eat a well-balance diet   Is the patient /caregiver able to teach back the importance of cardiac rehab?  Yes [is aware]   Nursing interventions  Provided education on importance of cardiac rehab   Is the patient/caregiver able to teach back the hierarchy of who to call/visit for symptoms/problems? PCP, Specialist, Home health nurse, Urgent Care, ED, 911  Yes   Week 1 call completed?  Yes   Wrap up additional comments  improving but is weak            Meeta Trevino RN

## 2019-02-12 ENCOUNTER — OFFICE VISIT (OUTPATIENT)
Dept: PRIMARY CARE CLINIC | Age: 64
End: 2019-02-12
Payer: MEDICARE

## 2019-02-12 VITALS
SYSTOLIC BLOOD PRESSURE: 110 MMHG | RESPIRATION RATE: 16 BRPM | HEIGHT: 68 IN | HEART RATE: 85 BPM | OXYGEN SATURATION: 98 % | BODY MASS INDEX: 35.16 KG/M2 | WEIGHT: 232 LBS | DIASTOLIC BLOOD PRESSURE: 80 MMHG

## 2019-02-12 DIAGNOSIS — K59.00 CONSTIPATION, UNSPECIFIED CONSTIPATION TYPE: ICD-10-CM

## 2019-02-12 DIAGNOSIS — F32.A DEPRESSION, UNSPECIFIED DEPRESSION TYPE: ICD-10-CM

## 2019-02-12 DIAGNOSIS — G89.18 POST-OP PAIN: ICD-10-CM

## 2019-02-12 DIAGNOSIS — R30.0 DYSURIA: Primary | ICD-10-CM

## 2019-02-12 LAB
BILIRUBIN, POC: ABNORMAL
BLOOD URINE, POC: ABNORMAL
CLARITY, POC: ABNORMAL
COLOR, POC: YELLOW
GLUCOSE URINE, POC: ABNORMAL
KETONES, POC: ABNORMAL
LEUKOCYTE EST, POC: ABNORMAL
NITRITE, POC: ABNORMAL
PH, POC: 6
PROTEIN, POC: 1
SPECIFIC GRAVITY, POC: 1.02
UROBILINOGEN, POC: 0.2

## 2019-02-12 PROCEDURE — 99213 OFFICE O/P EST LOW 20 MIN: CPT | Performed by: PEDIATRICS

## 2019-02-12 PROCEDURE — 1036F TOBACCO NON-USER: CPT | Performed by: PEDIATRICS

## 2019-02-12 PROCEDURE — G8598 ASA/ANTIPLAT THER USED: HCPCS | Performed by: PEDIATRICS

## 2019-02-12 PROCEDURE — 3017F COLORECTAL CA SCREEN DOC REV: CPT | Performed by: PEDIATRICS

## 2019-02-12 PROCEDURE — 81002 URINALYSIS NONAUTO W/O SCOPE: CPT | Performed by: PEDIATRICS

## 2019-02-12 PROCEDURE — G8482 FLU IMMUNIZE ORDER/ADMIN: HCPCS | Performed by: PEDIATRICS

## 2019-02-12 PROCEDURE — G8417 CALC BMI ABV UP PARAM F/U: HCPCS | Performed by: PEDIATRICS

## 2019-02-12 PROCEDURE — G8427 DOCREV CUR MEDS BY ELIG CLIN: HCPCS | Performed by: PEDIATRICS

## 2019-02-12 RX ORDER — HYDROCODONE BITARTRATE AND ACETAMINOPHEN 5; 325 MG/1; MG/1
1 TABLET ORAL EVERY 4 HOURS PRN
Qty: 18 TABLET | Refills: 0 | Status: SHIPPED | OUTPATIENT
Start: 2019-02-12 | End: 2019-02-15

## 2019-02-12 RX ORDER — POLYETHYLENE GLYCOL 3350 17 G/17G
17 POWDER, FOR SOLUTION ORAL DAILY PRN
Qty: 527 G | Refills: 1 | Status: SHIPPED | OUTPATIENT
Start: 2019-02-12 | End: 2019-03-14

## 2019-02-12 RX ORDER — ASPIRIN 325 MG
325 TABLET ORAL DAILY
COMMUNITY
End: 2019-09-18 | Stop reason: ALTCHOICE

## 2019-02-12 ASSESSMENT — ENCOUNTER SYMPTOMS
COUGH: 0
SINUS PRESSURE: 0
EYE DISCHARGE: 0
ABDOMINAL PAIN: 0
NAUSEA: 0
BACK PAIN: 0
VOMITING: 0
WHEEZING: 0
SHORTNESS OF BREATH: 0

## 2019-02-12 ASSESSMENT — PATIENT HEALTH QUESTIONNAIRE - PHQ9
SUM OF ALL RESPONSES TO PHQ QUESTIONS 1-9: 0
2. FEELING DOWN, DEPRESSED OR HOPELESS: 0
SUM OF ALL RESPONSES TO PHQ9 QUESTIONS 1 & 2: 0
1. LITTLE INTEREST OR PLEASURE IN DOING THINGS: 0
SUM OF ALL RESPONSES TO PHQ QUESTIONS 1-9: 0

## 2019-02-13 ENCOUNTER — TRANSCRIBE ORDERS (OUTPATIENT)
Dept: NUTRITION | Facility: HOSPITAL | Age: 64
End: 2019-02-13

## 2019-02-13 DIAGNOSIS — E66.3 OVERWEIGHT: Primary | ICD-10-CM

## 2019-02-15 ENCOUNTER — OFFICE VISIT (OUTPATIENT)
Dept: PRIMARY CARE CLINIC | Age: 64
End: 2019-02-15
Payer: MEDICARE

## 2019-02-15 ENCOUNTER — TELEPHONE (OUTPATIENT)
Dept: PRIMARY CARE CLINIC | Age: 64
End: 2019-02-15

## 2019-02-15 ENCOUNTER — HOSPITAL ENCOUNTER (OUTPATIENT)
Facility: HOSPITAL | Age: 64
Discharge: HOME | End: 2019-02-15
Payer: MEDICARE

## 2019-02-15 VITALS
HEART RATE: 100 BPM | WEIGHT: 229 LBS | DIASTOLIC BLOOD PRESSURE: 90 MMHG | SYSTOLIC BLOOD PRESSURE: 140 MMHG | OXYGEN SATURATION: 97 % | BODY MASS INDEX: 34.82 KG/M2

## 2019-02-15 DIAGNOSIS — I25.10 CORONARY ARTERY DISEASE INVOLVING NATIVE HEART WITHOUT ANGINA PECTORIS, UNSPECIFIED VESSEL OR LESION TYPE: Primary | ICD-10-CM

## 2019-02-15 DIAGNOSIS — R06.02 SHORTNESS OF BREATH: ICD-10-CM

## 2019-02-15 DIAGNOSIS — N39.0 URINARY TRACT INFECTION WITH HEMATURIA, SITE UNSPECIFIED: ICD-10-CM

## 2019-02-15 DIAGNOSIS — R53.1 WEAKNESS: ICD-10-CM

## 2019-02-15 DIAGNOSIS — Z95.1 HISTORY OF CORONARY ARTERY BYPASS GRAFT: ICD-10-CM

## 2019-02-15 DIAGNOSIS — R31.9 URINARY TRACT INFECTION WITH HEMATURIA, SITE UNSPECIFIED: ICD-10-CM

## 2019-02-15 DIAGNOSIS — I25.10 CORONARY ARTERY DISEASE INVOLVING NATIVE HEART WITHOUT ANGINA PECTORIS, UNSPECIFIED VESSEL OR LESION TYPE: ICD-10-CM

## 2019-02-15 DIAGNOSIS — E11.8 TYPE 2 DIABETES MELLITUS WITH COMPLICATION, WITHOUT LONG-TERM CURRENT USE OF INSULIN (HCC): ICD-10-CM

## 2019-02-15 LAB
A/G RATIO: 1.6 (ref 0.8–2)
ALBUMIN SERPL-MCNC: 3.9 G/DL (ref 3.4–4.8)
ALP BLD-CCNC: 75 U/L (ref 25–100)
ALT SERPL-CCNC: 26 U/L (ref 4–36)
ANION GAP SERPL CALCULATED.3IONS-SCNC: 12 MMOL/L (ref 3–16)
AST SERPL-CCNC: 25 U/L (ref 8–33)
BASOPHILS ABSOLUTE: 0.1 K/UL (ref 0–0.1)
BASOPHILS RELATIVE PERCENT: 0.7 %
BILIRUB SERPL-MCNC: 0.6 MG/DL (ref 0.3–1.2)
BILIRUBIN, POC: ABNORMAL
BLOOD URINE, POC: ABNORMAL
BUN BLDV-MCNC: 11 MG/DL (ref 6–20)
CALCIUM SERPL-MCNC: 9.6 MG/DL (ref 8.5–10.5)
CHLORIDE BLD-SCNC: 101 MMOL/L (ref 98–107)
CLARITY, POC: CLEAR
CO2: 25 MMOL/L (ref 20–30)
COLOR, POC: YELLOW
CREAT SERPL-MCNC: 1.1 MG/DL (ref 0.4–1.2)
EOSINOPHILS ABSOLUTE: 0.3 K/UL (ref 0–0.4)
EOSINOPHILS RELATIVE PERCENT: 2.4 %
GFR AFRICAN AMERICAN: >59
GFR NON-AFRICAN AMERICAN: >59
GLOBULIN: 2.5 G/DL
GLUCOSE BLD-MCNC: 124 MG/DL (ref 74–106)
GLUCOSE BLD-MCNC: 149 MG/DL
GLUCOSE URINE, POC: ABNORMAL
HCT VFR BLD CALC: 45 % (ref 40–54)
HEMOGLOBIN: 14.5 G/DL (ref 13–18)
IMMATURE GRANULOCYTES #: 0.1 K/UL
IMMATURE GRANULOCYTES %: 0.5 % (ref 0–5)
KETONES, POC: ABNORMAL
LEUKOCYTE EST, POC: ABNORMAL
LYMPHOCYTES ABSOLUTE: 2.8 K/UL (ref 1.5–4)
LYMPHOCYTES RELATIVE PERCENT: 22.9 %
MCH RBC QN AUTO: 30.5 PG (ref 27–32)
MCHC RBC AUTO-ENTMCNC: 32.2 G/DL (ref 31–35)
MCV RBC AUTO: 94.5 FL (ref 80–100)
MONOCYTES ABSOLUTE: 0.8 K/UL (ref 0.2–0.8)
MONOCYTES RELATIVE PERCENT: 6.7 %
NEUTROPHILS ABSOLUTE: 8.1 K/UL (ref 2–7.5)
NEUTROPHILS RELATIVE PERCENT: 66.8 %
NITRITE, POC: ABNORMAL
PDW BLD-RTO: 14.8 % (ref 11–16)
PH, POC: 6
PLATELET # BLD: 424 K/UL (ref 150–400)
PMV BLD AUTO: 9.3 FL (ref 6–10)
POTASSIUM SERPL-SCNC: 4.6 MMOL/L (ref 3.4–5.1)
PRO-BNP: 2045 PG/ML (ref 0–1800)
PROTEIN, POC: ABNORMAL
RBC # BLD: 4.76 M/UL (ref 4.5–6)
SODIUM BLD-SCNC: 138 MMOL/L (ref 136–145)
SPECIFIC GRAVITY, POC: 1.01
TOTAL PROTEIN: 6.4 G/DL (ref 6.4–8.3)
UROBILINOGEN, POC: 0.2
WBC # BLD: 12.2 K/UL (ref 4–11)

## 2019-02-15 PROCEDURE — 82962 GLUCOSE BLOOD TEST: CPT | Performed by: NURSE PRACTITIONER

## 2019-02-15 PROCEDURE — 1036F TOBACCO NON-USER: CPT | Performed by: NURSE PRACTITIONER

## 2019-02-15 PROCEDURE — G8427 DOCREV CUR MEDS BY ELIG CLIN: HCPCS | Performed by: NURSE PRACTITIONER

## 2019-02-15 PROCEDURE — 83880 ASSAY OF NATRIURETIC PEPTIDE: CPT

## 2019-02-15 PROCEDURE — G8598 ASA/ANTIPLAT THER USED: HCPCS | Performed by: NURSE PRACTITIONER

## 2019-02-15 PROCEDURE — 80053 COMPREHEN METABOLIC PANEL: CPT

## 2019-02-15 PROCEDURE — 81002 URINALYSIS NONAUTO W/O SCOPE: CPT | Performed by: NURSE PRACTITIONER

## 2019-02-15 PROCEDURE — 99214 OFFICE O/P EST MOD 30 MIN: CPT | Performed by: NURSE PRACTITIONER

## 2019-02-15 PROCEDURE — G8482 FLU IMMUNIZE ORDER/ADMIN: HCPCS | Performed by: NURSE PRACTITIONER

## 2019-02-15 PROCEDURE — 3017F COLORECTAL CA SCREEN DOC REV: CPT | Performed by: NURSE PRACTITIONER

## 2019-02-15 PROCEDURE — 36415 COLL VENOUS BLD VENIPUNCTURE: CPT

## 2019-02-15 PROCEDURE — 85025 COMPLETE CBC W/AUTO DIFF WBC: CPT

## 2019-02-15 PROCEDURE — G8417 CALC BMI ABV UP PARAM F/U: HCPCS | Performed by: NURSE PRACTITIONER

## 2019-02-15 PROCEDURE — 3046F HEMOGLOBIN A1C LEVEL >9.0%: CPT | Performed by: NURSE PRACTITIONER

## 2019-02-15 PROCEDURE — 96372 THER/PROPH/DIAG INJ SC/IM: CPT | Performed by: NURSE PRACTITIONER

## 2019-02-15 PROCEDURE — 2022F DILAT RTA XM EVC RTNOPTHY: CPT | Performed by: NURSE PRACTITIONER

## 2019-02-15 PROCEDURE — 1111F DSCHRG MED/CURRENT MED MERGE: CPT | Performed by: NURSE PRACTITIONER

## 2019-02-15 RX ORDER — CEFTRIAXONE 1 G/1
1 INJECTION, POWDER, FOR SOLUTION INTRAMUSCULAR; INTRAVENOUS ONCE
Status: COMPLETED | OUTPATIENT
Start: 2019-02-15 | End: 2019-02-15

## 2019-02-15 RX ORDER — CIPROFLOXACIN 500 MG/1
500 TABLET, FILM COATED ORAL 2 TIMES DAILY
Qty: 20 TABLET | Refills: 0 | Status: SHIPPED | OUTPATIENT
Start: 2019-02-15 | End: 2019-02-25

## 2019-02-15 RX ORDER — BACLOFEN 10 MG/1
10 TABLET ORAL 3 TIMES DAILY
COMMUNITY
End: 2019-04-23 | Stop reason: CLARIF

## 2019-02-15 RX ORDER — LOSARTAN POTASSIUM 25 MG/1
25 TABLET ORAL DAILY
COMMUNITY

## 2019-02-15 RX ADMIN — CEFTRIAXONE 1 G: 1 INJECTION, POWDER, FOR SOLUTION INTRAMUSCULAR; INTRAVENOUS at 12:14

## 2019-02-15 ASSESSMENT — ENCOUNTER SYMPTOMS
SORE THROAT: 0
COUGH: 0
ABDOMINAL PAIN: 0
VOMITING: 0
EYE PAIN: 0
NAUSEA: 0

## 2019-02-16 ENCOUNTER — READMISSION MANAGEMENT (OUTPATIENT)
Dept: CALL CENTER | Facility: HOSPITAL | Age: 64
End: 2019-02-16

## 2019-02-16 NOTE — OUTREACH NOTE
CT Surgery Week 2 Survey      Responses   Facility patient discharged from?  [facility]   Does the patient have one of the following disease processes/diagnoses(primary or secondary)?  Cardiothoracic surgery   Week 2 attempt successful?  Yes   Call start time  1249   Call end time  1252   Is patient permission given to speak with other caregiver?  Yes   List who call center can speak with  anthony Wild   Meds reviewed with patient/caregiver?  Yes   Is the patient taking all medications as directed (includes completed medication regime)?  Yes   Comments regarding appointments  Has  seen PCP already   Has the patient kept scheduled appointments due by today?  Yes   What is the patient's perception of their health status since discharge?  Improving   Week 2 call completed?  Yes   Wrap up additional comments  Incisions looking good, pain is under control, not taking anything for pain.  No new issues today.           Suzette Flores, RN

## 2019-02-18 ASSESSMENT — ENCOUNTER SYMPTOMS: SHORTNESS OF BREATH: 1

## 2019-02-22 ENCOUNTER — OFFICE VISIT (OUTPATIENT)
Dept: PRIMARY CARE CLINIC | Age: 64
End: 2019-02-22
Payer: MEDICARE

## 2019-02-22 VITALS
SYSTOLIC BLOOD PRESSURE: 130 MMHG | HEART RATE: 91 BPM | DIASTOLIC BLOOD PRESSURE: 70 MMHG | WEIGHT: 227.2 LBS | OXYGEN SATURATION: 98 % | RESPIRATION RATE: 18 BRPM | BODY MASS INDEX: 34.55 KG/M2

## 2019-02-22 DIAGNOSIS — Z95.1 HISTORY OF CORONARY ARTERY BYPASS GRAFT: ICD-10-CM

## 2019-02-22 DIAGNOSIS — I25.10 CORONARY ARTERY DISEASE INVOLVING NATIVE HEART WITHOUT ANGINA PECTORIS, UNSPECIFIED VESSEL OR LESION TYPE: ICD-10-CM

## 2019-02-22 DIAGNOSIS — R53.1 WEAKNESS: ICD-10-CM

## 2019-02-22 DIAGNOSIS — N39.0 URINARY TRACT INFECTION WITH HEMATURIA, SITE UNSPECIFIED: Primary | ICD-10-CM

## 2019-02-22 DIAGNOSIS — R00.0 TACHYCARDIA: ICD-10-CM

## 2019-02-22 DIAGNOSIS — R31.9 URINARY TRACT INFECTION WITH HEMATURIA, SITE UNSPECIFIED: Primary | ICD-10-CM

## 2019-02-22 PROCEDURE — G8598 ASA/ANTIPLAT THER USED: HCPCS | Performed by: NURSE PRACTITIONER

## 2019-02-22 PROCEDURE — 1036F TOBACCO NON-USER: CPT | Performed by: NURSE PRACTITIONER

## 2019-02-22 PROCEDURE — G8417 CALC BMI ABV UP PARAM F/U: HCPCS | Performed by: NURSE PRACTITIONER

## 2019-02-22 PROCEDURE — 99213 OFFICE O/P EST LOW 20 MIN: CPT | Performed by: NURSE PRACTITIONER

## 2019-02-22 PROCEDURE — G8427 DOCREV CUR MEDS BY ELIG CLIN: HCPCS | Performed by: NURSE PRACTITIONER

## 2019-02-22 PROCEDURE — G8482 FLU IMMUNIZE ORDER/ADMIN: HCPCS | Performed by: NURSE PRACTITIONER

## 2019-02-22 PROCEDURE — 3017F COLORECTAL CA SCREEN DOC REV: CPT | Performed by: NURSE PRACTITIONER

## 2019-02-22 RX ORDER — METOPROLOL TARTRATE 50 MG/1
50 TABLET, FILM COATED ORAL EVERY 12 HOURS
Qty: 60 TABLET | Refills: 5 | Status: SHIPPED | OUTPATIENT
Start: 2019-02-22 | End: 2019-07-25 | Stop reason: ALTCHOICE

## 2019-02-24 ASSESSMENT — ENCOUNTER SYMPTOMS
COUGH: 0
ABDOMINAL PAIN: 0
EYE PAIN: 0
VOMITING: 0
NAUSEA: 0
SHORTNESS OF BREATH: 0
SORE THROAT: 0

## 2019-02-26 ENCOUNTER — READMISSION MANAGEMENT (OUTPATIENT)
Dept: CALL CENTER | Facility: HOSPITAL | Age: 64
End: 2019-02-26

## 2019-02-26 NOTE — OUTREACH NOTE
CT Surgery Week 3 Survey      Responses   Facility patient discharged from?  [facility]   Does the patient have one of the following disease processes/diagnoses(primary or secondary)?  Cardiothoracic surgery   Week 3 attempt successful?  No   Unsuccessful attempts  Attempt 1          Yas Johnson RN

## 2019-02-27 ENCOUNTER — READMISSION MANAGEMENT (OUTPATIENT)
Dept: CALL CENTER | Facility: HOSPITAL | Age: 64
End: 2019-02-27

## 2019-02-27 NOTE — OUTREACH NOTE
CT Surgery Week 3 Survey      Responses   Facility patient discharged from?  [facility]   Does the patient have one of the following disease processes/diagnoses(primary or secondary)?  Cardiothoracic surgery   Week 3 attempt successful?  Yes   Call start time  1501   Call end time  1503   Discharge diagnosis  CABGx5   Meds reviewed with patient/caregiver?  Yes   Is the patient having any side effects they believe may be caused by any medication additions or changes?  No   Does the patient have all medications related to this admission filled (includes all antibiotics, pain medications, cardiac medications, etc.)  Yes   Is the patient taking all medications as directed (includes completed medication regime)?  Yes   Does the patient have a primary care provider?   Yes   Does the patient have an appointment scheduled with their C/T surgeon?  Yes   Has the patient kept scheduled appointments due by today?  Yes   Has home health visited the patient within 72 hours of discharge?  N/A   Psychosocial issues?  No   Comments  Wound is healing well.   Did the patient receive a copy of their discharge instructions?  Yes   Nursing interventions  Reviewed instructions with patient   What is the patient's perception of their health status since discharge?  Improving   Nursing interventions  Nurse provided patient education   Is the patient/caregiver able to teach back normal signs of recovery?  Pain or discomfort at incisional site   Nursing interventions  Reassured on normal signs of recovery   Is the patient /caregiver able to teach back basic post-op care?  Continue use of incentive spirometry at least 1 week post discharge, Drive as instructed by MD in discharge instructions, Take showers only when approved by MD-sponge bathe until then, No tub bath, swimming, or hot tub until instructed by MD, Keep incision areas clean, dry and protected, Lifting as instructed by MD in discharge instructions   Is the patient/caregiver able to  teach back signs and symptoms of incisional infection?  Fever, Pus or odor from incision, Incisional warmth, Increased drainage or bleeding, Increased redness, swelling or pain at the incisonal site   Is the patient/caregiver able to teach back steps to recovery at home?  Weigh daily, Rest and rebuild strength, gradually increase activity, Eat a well-balance diet   Nursing interventions  Provided education on importance of cardiac rehab   Is the patient/caregiver able to teach back the hierarchy of who to call/visit for symptoms/problems? PCP, Specialist, Home health nurse, Urgent Care, ED, 911  Yes   Week 3 call completed?  Yes          Bashir Sunshine RN   37.1

## 2019-02-28 ENCOUNTER — TELEPHONE (OUTPATIENT)
Dept: PRIMARY CARE CLINIC | Age: 64
End: 2019-02-28

## 2019-02-28 RX ORDER — GLUCOSAM/CHON-MSM1/C/MANG/BOSW 500-416.6
1 TABLET ORAL DAILY
Qty: 300 EACH | Refills: 3 | Status: SHIPPED | OUTPATIENT
Start: 2019-02-28 | End: 2019-11-27 | Stop reason: SDUPTHER

## 2019-02-28 RX ORDER — DIPHENHYDRAMINE HCL 25 MG
1 TABLET ORAL DAILY
Qty: 1 KIT | Refills: 0 | Status: SHIPPED | OUTPATIENT
Start: 2019-02-28 | End: 2019-11-27 | Stop reason: SDUPTHER

## 2019-03-01 DIAGNOSIS — Z95.1 S/P CABG (CORONARY ARTERY BYPASS GRAFT): Primary | ICD-10-CM

## 2019-03-05 ENCOUNTER — HOSPITAL ENCOUNTER (OUTPATIENT)
Dept: GENERAL RADIOLOGY | Facility: HOSPITAL | Age: 64
Discharge: HOME | End: 2019-03-05
Admitting: PHYSICIAN ASSISTANT

## 2019-03-05 ENCOUNTER — OFFICE VISIT (OUTPATIENT)
Dept: CARDIAC SURGERY | Facility: CLINIC | Age: 64
End: 2019-03-05

## 2019-03-05 ENCOUNTER — TELEPHONE (OUTPATIENT)
Dept: CARDIAC SURGERY | Facility: CLINIC | Age: 64
End: 2019-03-05

## 2019-03-05 VITALS
SYSTOLIC BLOOD PRESSURE: 150 MMHG | BODY MASS INDEX: 33.62 KG/M2 | HEIGHT: 69 IN | WEIGHT: 227 LBS | TEMPERATURE: 96.8 F | HEART RATE: 83 BPM | OXYGEN SATURATION: 99 % | DIASTOLIC BLOOD PRESSURE: 90 MMHG

## 2019-03-05 DIAGNOSIS — Z95.1 S/P CABG (CORONARY ARTERY BYPASS GRAFT): ICD-10-CM

## 2019-03-05 DIAGNOSIS — Z95.1 S/P CABG X 5: Primary | ICD-10-CM

## 2019-03-05 PROCEDURE — 99024 POSTOP FOLLOW-UP VISIT: CPT | Performed by: PHYSICIAN ASSISTANT

## 2019-03-05 PROCEDURE — 71046 X-RAY EXAM CHEST 2 VIEWS: CPT

## 2019-03-05 NOTE — PROGRESS NOTES
03/05/2019  Patient Information  Jesse Brink                                                                                          [address] [place] [id number]   1955  'PCP/Referring Physician'  Terri Barreto, APRN  [phone]  No ref. provider found    Chief Complaint   Patient presents with   • Post-op Follow-up     Hosp D/C CABG 1/31/19 for CAD       History of Present Illness:  Patient is a 63-year-old  male with history of hyperlipidemia, type 2 diabetes mellitus, rheumatoid arthritis, hypertension, palpitations and coronary artery disease who underwent CABG x5 with left atrial appendage ligation performed 1/31/2019 and presents to office for postop follow-up visit.  The patient denies any incisional pain, shortness of breath or difficulty with ambulation.  The patient is scheduled to begin cardiac rehab in [place], [place], and has an appointment to follow-up with his cardiologist, Dr. Neville, on 3/14/2019.  The patient is otherwise doing well.  Of note, I reviewed the patient's preoperative carotid duplex, which shows 0-49% stenosis bilaterally.    Patient Active Problem List   Diagnosis   • Benign essential HTN   • Lumbosacral radiculopathy at S1   • Numbness of lower extremity   • Transient ischemic attack   • Rheumatoid arthritis (CMS/HCC)   • Obesity   • Idiopathic peripheral neuropathy   • Palpitations   • CAD in native artery   • Type 2 diabetes mellitus (CMS/HCC)   • Ischemic cardiomyopathy   • S/P CABG x 5 1/31/19   • Mixed hyperlipidemia     Past Medical History:   Diagnosis Date   • Anxiety    • Cancer (CMS/HCC)     skin    • Chest pain syndrome    • Coronary artery disease    • Diabetes mellitus (CMS/HCC)     TYPE 2 DIAGNOSED SEVERAL YEARS AGO. TRIES TO TEST ONCE DAILY   • Diverticulitis    • Fibromyalgia    • Hearing aid worn    • History of cellulitis    • History of kidney stones    • History of malignant neoplasm    • History of MRI of spine     demonstrating  buldging discs at L3/L4, L4/L5, L5/S1 levels   • History of panic attacks    • History of tobacco abuse     History of tobacco, quitting 28 years ago.   • Hypertension    • Irregular heart beat    • Migraine     occular   • Obesity    • Rheumatoid arthritis (CMS/HCC)    • Transient ischemic attack     Recent transient ischemic attack with normal echocardiogram, carotid duplex, and CT scan of the brain by verbal report.     Past Surgical History:   Procedure Laterality Date   • ANAL FISTULA REPAIR  1999    Anal fistula repair, 1999 and 2008.   • ATRIAL APPENDAGE EXCLUSION LEFT WITH TRANSESOPHAGEAL ECHOCARDIOGRAM N/A 1/31/2019    Procedure: ATRIAL APPENDAGE OCCLUSION LEFT;  Surgeon: Dillon Gu MD;  Location: [facility] OR;  Service: Cardiothoracic   • CARDIAC CATHETERIZATION N/A 1/18/2019    Procedure: Left Heart Cath;  Surgeon: Dillon Neville III, MD;  Location: [facility] CATH INVASIVE LOCATION;  Service: Cardiovascular   • COLONOSCOPY  2016   • CORONARY ARTERY BYPASS GRAFT N/A 1/31/2019    Procedure: MEDIAN STERNOTOMY, CORONARY ARTERY BYPASS GRAFT X5, UTILIZING THE LEFT INTERNAL MAMMARY ARTERY, EVH OF THE RIGHT GREATER SAPHENOUS VEIN;  Surgeon: Dillon Gu MD;  Location:  [facility] OR;  Service: Cardiothoracic   • MYOMECTOMY  1980    Benign fibroid removed, 1980.   • PILONIDAL CYSTECTOMY  1977   • SKIN CANCER EXCISION      left shoulder   • TRANSESOPHAGEAL ECHOCARDIOGRAM (TIMOTHY) N/A 1/31/2019    Procedure: TRANSESOPHAGEAL ECHOCARDIOGRAM WITH ANESTHESIA;  Surgeon: Dillon Gu MD;  Location:  [facility] OR;  Service: Cardiothoracic       Current Outpatient Medications:   •  aspirin 325 MG EC tablet, Take 1 tablet by mouth Daily., Disp: 30 tablet, Rfl: 11  •  baclofen (LIORESAL) 10 MG tablet, Take 10 mg by mouth 3 (Three) Times a Day As Needed for Muscle Spasms., Disp: , Rfl:   •  Cholecalciferol (VITAMIN D3) 2000 UNITS capsule, Take 2,000 Units by mouth Daily., Disp: , Rfl:   •  Empagliflozin (JARDIANCE) 25 MG tablet, Take  25 mg by mouth Daily., Disp: , Rfl:   •  ENBREL SURECLICK 50 MG/ML solution auto-injector, Inject 50 mg under the skin into the appropriate area as directed 1 (One) Time Per Week., Disp: , Rfl:   •  gabapentin (NEURONTIN) 300 MG capsule, Take 300 mg by mouth 2 (Two) Times a Day., Disp: , Rfl:   •  losartan (COZAAR) 25 MG tablet, Take 1 tablet by mouth Daily., Disp: 30 tablet, Rfl: 11  •  metFORMIN (GLUCOPHAGE) 500 MG tablet, Take 1,000 mg by mouth 2 (Two) Times a Day., Disp: , Rfl:   •  metoprolol tartrate (LOPRESSOR) 25 MG tablet, Take 1 tablet by mouth Every 12 (Twelve) Hours. (Patient taking differently: Take 50 mg by mouth Every 12 (Twelve) Hours. 50 mg BID), Disp: 60 tablet, Rfl: 11  •  potassium chloride (K-DUR) 10 MEQ CR tablet, Take 10 mEq by mouth Daily., Disp: , Rfl:   •  rosuvastatin (CRESTOR) 40 MG tablet, Take 1 tablet by mouth Daily., Disp: 30 tablet, Rfl: 11  •  sertraline (ZOLOFT) 50 MG tablet, Take 50 mg by mouth Daily., Disp: , Rfl:   •  tiZANidine (ZANAFLEX) 2 MG tablet, Take 2 mg by mouth As Needed., Disp: , Rfl: 1  •  HYDROcodone-acetaminophen (NORCO) 7.5-325 MG per tablet, Take 1 tablet by mouth Every 4 (Four) Hours As Needed for Moderate Pain ., Disp: 30 tablet, Rfl: 0  No current facility-administered medications for this visit.     Facility-Administered Medications Ordered in Other Visits:   •  Chlorhexidine Gluconate Cloth 2 % pads 1 application, 1 application, Topical, Q12H PRN, Delisa-Marie Pedro PA-C  Allergies   Allergen Reactions   • Humira [Adalimumab] Other (See Comments)     HA, intractable ocular migraine     Social History     Socioeconomic History   • Marital status:      Spouse name: Not on file   • Number of children: 2   • Years of education: Not on file   • Highest education level: Not on file   Social Needs   • Financial resource strain: Not on file   • Food insecurity - worry: Not on file   • Food insecurity - inability: Not on file   • Transportation needs -  "medical: Not on file   • Transportation needs - non-medical: Not on file   Occupational History   • Occupation:      Comment: Retired   Tobacco Use   • Smoking status: Former Smoker     Packs/day: 1.00     Types: Cigarettes     Last attempt to quit: 3/5/1984     Years since quittin.0   • Smokeless tobacco: Never Used   • Tobacco comment: quit 35 years ago   Substance and Sexual Activity   • Alcohol use: Yes     Alcohol/week: 0.6 oz     Types: 1 Cans of beer per week     Comment: occas   • Drug use: No   • Sexual activity: Defer   Other Topics Concern   • Not on file   Social History Narrative    Lives in [place].     Family History   Problem Relation Age of Onset   • Heart disease Other    • Diabetes Other    • Cancer Other    • Hypertension Other    • Heart disease Mother    • Diabetes Father    • Hypertension Father    • Cancer Father      ROS: As per HPI, otherwise negative.  Vitals:    19 0954   BP: 150/90   BP Location: Left arm   Patient Position: Sitting   Pulse: 83   Temp: 96.8 °F (36 °C)   SpO2: 99%   Weight: 103 kg (227 lb)   Height: 175.3 cm (69\")      Physical Exam:  Gen - NAD, pleasant, cooperative  CV - Regular rate and rhythm, no murmur gallop or rub  Pulm - Lungs clear to auscultation without wheeze or rhonchi   GI - Soft, normoactive bowel sounds, non-tender  Ext - Without edema, right EVH site healing well  Incision - Sternum stable, no evidence of incisional dehiscence or cellulitis  Neuro - CN II - XII grossly intact, tongue midline, voice normal    Labs/Imaging: 3/5/2019 chest x-ray  COMPARISON: Chest x-ray 2019.     FINDINGS: Cardiac silhouette borderline enlarged without overt edema or  pulmonary vascularity increase. Minimal residual opacifications at the  left lung base far improved from prior comparison of likely atelectasis  and/or scarring without significant effusion component. No pneumothorax.  Degenerative changes of the spine.         IMPRESSION:  Improved " opacifications and lung volumes, particularly at  the left lung base, of decreased atelectasis and/or scarring with only  minimal residual remaining. No significant effusion.    Assessment/Plan:  Patient is a 63-year-old  male with history of hyperlipidemia, type 2 diabetes mellitus, rheumatoid arthritis, hypertension, palpitations and coronary artery disease who underwent CABG x5 with left atrial appendage ligation performed 1/31/2019 and presents to office for postop follow-up visit.  The patient has been having a steady postoperative course.  His incisions are healing well, his sternum is stable and his chest x-ray is within normal limits.  He is scheduled to begin cardiac rehab and follow-up with his cardiologist, who will continue to manage his cardiovascular medications.  The patient may begin driving within the next week, as long as he is not experiencing any dizziness or using pain medication. The patient may follow up as needed, but should call with any questions or concerns should any arise during the interval. If the patient experiences any acultey worsening symptoms, they should present to the nearest emergency department possible.     Patient Active Problem List   Diagnosis   • Benign essential HTN   • Lumbosacral radiculopathy at S1   • Numbness of lower extremity   • Transient ischemic attack   • Rheumatoid arthritis (CMS/HCC)   • Obesity   • Idiopathic peripheral neuropathy   • Palpitations   • CAD in native artery   • Type 2 diabetes mellitus (CMS/HCC)   • Ischemic cardiomyopathy   • S/P CABG x 5 1/31/19   • Mixed hyperlipidemia

## 2019-03-05 NOTE — TELEPHONE ENCOUNTER
Mr. Brink was in for a post op appointment today and wanted to clarify about an abnormal urine drug screen test he had when he went through [facility] for his surgery.  He had been prescribed Tussionex cough syrup the week before the appointment, and his primary care said this was probably why his drug screen was positive for opiods.

## 2019-03-06 ENCOUNTER — READMISSION MANAGEMENT (OUTPATIENT)
Dept: CALL CENTER | Facility: HOSPITAL | Age: 64
End: 2019-03-06

## 2019-03-06 NOTE — OUTREACH NOTE
"CT Surgery Week 4 Survey      Responses   Facility patient discharged from?  [facility]   Does the patient have one of the following disease processes/diagnoses(primary or secondary)?  Cardiothoracic surgery   Week 4 attempt successful?  Yes   Call start time  1205   Call end time  1208   Discharge diagnosis  CABGx5   Meds reviewed with patient/caregiver?  Yes   Is the patient taking all medications as directed (includes completed medication regime)?  Yes   Has the patient kept scheduled appointments due by today?  Yes   Is the patient still receiving Home Health Services?  No   What is the patient's perception of their health status since discharge?  Improving   Nursing interventions  Nurse provided patient education   Is the patient/caregiver able to teach back normal signs of recovery?  Constipation   Is the patient/caregiver able to teach back steps to recovery at home?  Rest and rebuild strength, gradually increase activity, Practice good oral hygiene, Eat a well-balance diet   Additional teach back comments  pt says he is \"not doing too bad\" Incision looks good. Sore around incision. No chest pain or SOB. Having abd pain sometimes.    Week 4 Call Completed?  Yes   Would the patient like one additional call?  No   Graduated  Yes          Tiffany Martinez RN  "

## 2019-03-14 ENCOUNTER — OFFICE VISIT (OUTPATIENT)
Dept: CARDIOLOGY | Facility: CLINIC | Age: 64
End: 2019-03-14

## 2019-03-14 VITALS
DIASTOLIC BLOOD PRESSURE: 90 MMHG | BODY MASS INDEX: 33.74 KG/M2 | WEIGHT: 227.8 LBS | HEART RATE: 83 BPM | OXYGEN SATURATION: 97 % | SYSTOLIC BLOOD PRESSURE: 148 MMHG | HEIGHT: 69 IN

## 2019-03-14 DIAGNOSIS — I10 BENIGN ESSENTIAL HTN: ICD-10-CM

## 2019-03-14 DIAGNOSIS — I25.5 ISCHEMIC CARDIOMYOPATHY: ICD-10-CM

## 2019-03-14 DIAGNOSIS — E78.2 MIXED HYPERLIPIDEMIA: ICD-10-CM

## 2019-03-14 DIAGNOSIS — I25.10 CAD IN NATIVE ARTERY: Primary | ICD-10-CM

## 2019-03-14 PROCEDURE — 99214 OFFICE O/P EST MOD 30 MIN: CPT | Performed by: INTERNAL MEDICINE

## 2019-03-14 RX ORDER — LOSARTAN POTASSIUM 50 MG/1
50 TABLET ORAL DAILY
Qty: 30 TABLET | Refills: 6 | Status: SHIPPED | OUTPATIENT
Start: 2019-03-14 | End: 2019-03-22 | Stop reason: DRUGHIGH

## 2019-03-14 NOTE — PROGRESS NOTES
[facility]  Office visit  Jesse Brink  1955  [phone]    VISIT DATE:  10/26/2017    PCP: Terri Barreto APRN  [address] [facility] [address]    CC:  Chief Complaint   Patient presents with   • Coronary Artery Disease   • Benign essential HTN       PROBLEM LIST:  1. Coronary artery disease  2. Transient ischemic attack with normal echocardiogram, carotid duplex, and CT scan of the brain by verbal report.  3. Benign hypertension.  4. History of tobacco, quitting 28 years ago.  5. Hyperglycemia, diet controlled.  6. Rheumatoid arthritis.  7. Obesity.  8. Family history of heart disease.  9. Surgical history:  a. Pilonidal cyst, 1977.  b. Benign fibroid removed, 1980.  c. Anal fistula repair, 1999 and 2008.    Previous cardiac studies and procedures  January 2019  Cardiac catheterization  · Multivessel obstructive coronary disease: Proximal RCA .  Mid LAD .  95% proximal first diagonal, 95% first obtuse marginal branch, 70-80% proximal second obtuse marginal branch.  · Mildly elevated LVEDP  · No aortic stenosis.  Echo   · Left ventricular wall thickness is consistent with mild concentric hypertrophy.  · Estimated EF appears to be in the range of 36 - 40%  · Left ventricular diastolic dysfunction (grade I a) consistent with impaired relaxation.  · The following left ventricular wall segments are hypokinetic: mid anterior, apical anterior, apical lateral, apical inferior, apical septal and apex hypokinetic.  · Regional contracility augments post PVC.  Carotid duplex  · Right internal carotid artery stenosis of 0-49%.  · Left internal carotid artery stenosis of 0-49%.    1. Coronary artery bypass graft x 5  -LIMA to LAD.    -Greater saphenous vein to RCA  -Greater saphenous vein to OM1  -Greater saphenous vein to OM2  -Greater saphenous vein to D1  2. Left atrial appendage ligation (35 mm Atricure clip placement)      aSSESSMENT:   Diagnosis Plan   1. CAD in native  "artery     2. Benign essential HTN     3. Mixed hyperlipidemia     4. Ischemic cardiomyopathy         PLAN:  Coronary artery disease: Status post surgical revascularization.  Starting cardiac rehab.  Repeat echo in 3 months.  Titrating medical therapy, increasing losartan to 50 mg p.o. Daily.  There are 2 shallow ulcerations along his midline incision.  These are both less than 0.5 cm in width and depth.  The superior ulceration has some tan colored exudate at its base.  There is no tenderness or some associated erythema.  Counseled on appropriate wound care, will follow closely, follow-up in 2 weeks.  If he develops purulence, pain or erythema he was instructed to call me immediately and I will arrange for an ID consultation and further evaluation.      Hypertension: Goal less than 140/90.  Adding imdur due to concomitant angina,  Otherwise continue current medical therapy.    Palpitations: Currently asymptomatic.  No high risk clinical features.  Overall appears consistent with symptomatic premature ventricular contractions.   Continue beta-blockade.      History of stroke: Continue aggressive risk factor modification.  Continue aspirin afterload reduction. LDL at goal with dietary modifications only. Low HDL levels and mildly elevated triglycerides, continue dietary modifications and regular exercise.    Subjective  Gradually improving functional capacity.  Reports some mild diffuse chest wall discomfort.  Blood pressures intermittently running higher than 140/90 mmHg.  Beta-blockade is been titrated by primary care provider up to 50 mg p.o. twice daily.  He reports to shallow clean-based ulcerations along his chest incision which have gradually been healing.  He is compliant with medical therapy.    PHYSICAL EXAMINATION:  Vitals:    03/14/19 1112   BP: 148/90   BP Location: Right arm   Patient Position: Sitting   Pulse: 83   SpO2: 97%   Weight: 103 kg (227 lb 12.8 oz)   Height: 175.3 cm (69\")     General " Appearance:    Alert, cooperative, no distress, appears stated age   Head:    Normocephalic, without obvious abnormality, atraumatic   Eyes:    conjunctiva/corneas clear   Nose:   Nares normal, septum midline, mucosa normal, no drainage   Throat:   Lips, teeth and gums normal   Neck:   Supple, symmetrical, trachea midline, no carotid    bruit or JVD   Lungs:     Clear to auscultation bilaterally, respirations unlabored   Chest Wall:    No tenderness or deformity    Heart:    Regular rate and rhythm, S1 and S2 normal, no murmur, rub   or gallop, normal carotid impulse bilaterally without bruit.   Abdomen:     Soft, non-tender   Extremities:   Extremities normal, atraumatic, no cyanosis or edema   Pulses:   2+ and symmetric all extremities   Skin:   Skin color, texture, turgor normal, no rashes or lesions       Diagnostic Data:  Procedures  Lab Results   Component Value Date    TRIG 168 (H) 01/18/2019    HDL 29 (L) 01/18/2019     Lab Results   Component Value Date    GLUCOSE 115 (H) 02/05/2019    BUN 17 02/05/2019    CREATININE 0.97 02/05/2019     02/05/2019    K 4.3 02/05/2019     02/05/2019    CO2 31.0 02/05/2019     Lab Results   Component Value Date    HGBA1C 6.60 (H) 01/30/2019     Lab Results   Component Value Date    WBC 9.72 02/05/2019    HGB 13.0 (L) 02/05/2019    HCT 39.3 02/05/2019     02/05/2019       Allergies  Allergies   Allergen Reactions   • Humira [Adalimumab] Other (See Comments)     HA, intractable ocular migraine       Current Medications    Current Outpatient Medications:   •  aspirin 325 MG EC tablet, Take 1 tablet by mouth Daily., Disp: 30 tablet, Rfl: 11  •  baclofen (LIORESAL) 10 MG tablet, Take 10 mg by mouth As Needed for Muscle Spasms., Disp: , Rfl:   •  Cholecalciferol (VITAMIN D3) 2000 UNITS capsule, Take 2,000 Units by mouth Daily., Disp: , Rfl:   •  Docusate Calcium (STOOL SOFTENER PO), Take 2 tablets by mouth Daily., Disp: , Rfl:   •  Empagliflozin (JARDIANCE) 25 MG  tablet, Take 25 mg by mouth Daily., Disp: , Rfl:   •  ENBREL SURECLICK 50 MG/ML solution auto-injector, Inject 50 mg under the skin into the appropriate area as directed 1 (One) Time Per Week., Disp: , Rfl:   •  gabapentin (NEURONTIN) 300 MG capsule, Take 300 mg by mouth 2 (Two) Times a Day., Disp: , Rfl:   •  losartan (COZAAR) 25 MG tablet, Take 1 tablet by mouth Daily., Disp: 30 tablet, Rfl: 11  •  metFORMIN (GLUCOPHAGE) 500 MG tablet, Take 1,000 mg by mouth 2 (Two) Times a Day., Disp: , Rfl:   •  metoprolol tartrate (LOPRESSOR) 25 MG tablet, Take 1 tablet by mouth Every 12 (Twelve) Hours. (Patient taking differently: Take 50 mg by mouth Every 12 (Twelve) Hours. 50 mg BID), Disp: 60 tablet, Rfl: 11  •  potassium chloride (K-DUR) 10 MEQ CR tablet, Take 10 mEq by mouth Daily., Disp: , Rfl:   •  rosuvastatin (CRESTOR) 40 MG tablet, Take 1 tablet by mouth Daily., Disp: 30 tablet, Rfl: 11  •  sertraline (ZOLOFT) 50 MG tablet, Take 50 mg by mouth Daily., Disp: , Rfl:   •  tiZANidine (ZANAFLEX) 2 MG tablet, Take 2 mg by mouth As Needed., Disp: , Rfl: 1  No current facility-administered medications for this visit.     Facility-Administered Medications Ordered in Other Visits:   •  Chlorhexidine Gluconate Cloth 2 % pads 1 application, 1 application, Topical, Q12H PRN, Marie Barnes PA-C ROS  Review of Systems   Cardiovascular: Negative for chest pain, claudication, dyspnea on exertion, irregular heartbeat and palpitations.   Respiratory: Negative for cough and shortness of breath.          SOCIAL HX  Social History     Socioeconomic History   • Marital status:      Spouse name: Not on file   • Number of children: 2   • Years of education: Not on file   • Highest education level: Not on file   Social Needs   • Financial resource strain: Not on file   • Food insecurity - worry: Not on file   • Food insecurity - inability: Not on file   • Transportation needs - medical: Not on file   • Transportation  needs - non-medical: Not on file   Occupational History   • Occupation:      Comment: Retired   Tobacco Use   • Smoking status: Former Smoker     Packs/day: 1.00     Types: Cigarettes     Last attempt to quit: 3/5/1984     Years since quittin.0   • Smokeless tobacco: Never Used   • Tobacco comment: quit 35 years ago   Substance and Sexual Activity   • Alcohol use: Yes     Alcohol/week: 0.6 oz     Types: 1 Cans of beer per week     Comment: occas   • Drug use: No   • Sexual activity: Defer   Other Topics Concern   • Not on file   Social History Narrative    Lives in [place].       FAMILY HX  Family History   Problem Relation Age of Onset   • Heart disease Other    • Diabetes Other    • Cancer Other    • Hypertension Other    • Heart disease Mother    • Diabetes Father    • Hypertension Father    • Cancer Father              Dillon Neville III, MD, FACC

## 2019-03-21 ENCOUNTER — APPOINTMENT (OUTPATIENT)
Dept: CARDIAC REHAB | Facility: HOSPITAL | Age: 64
End: 2019-03-21

## 2019-03-21 ENCOUNTER — TELEPHONE (OUTPATIENT)
Dept: CARDIOLOGY | Facility: CLINIC | Age: 64
End: 2019-03-21

## 2019-03-21 NOTE — TELEPHONE ENCOUNTER
B/P lo/18/19  B/P 175/104   HR 81  19  B/P  144/80    HR 76  19  B/P  194/121   HR  88  19  B/P  169/108   HR  78    Taking Lopressor 50 mg every 12 hr and Losartan 50 mg daily. Not any Imdur. Please advise.

## 2019-03-22 RX ORDER — LOSARTAN POTASSIUM 100 MG/1
200 TABLET ORAL DAILY
Qty: 60 TABLET | Refills: 3 | Status: SHIPPED | OUTPATIENT
Start: 2019-03-22 | End: 2019-04-25 | Stop reason: SDUPTHER

## 2019-03-22 NOTE — TELEPHONE ENCOUNTER
Patient notified to increase his Losartan to 200 mg by mouth daily. He verbalized understanding. Also, states the 2 sites along his sternal incision are continuing to heal. New Rx sent to his pharmacy.

## 2019-03-28 ENCOUNTER — OFFICE VISIT (OUTPATIENT)
Dept: CARDIOLOGY | Facility: CLINIC | Age: 64
End: 2019-03-28

## 2019-03-28 VITALS
HEIGHT: 69 IN | BODY MASS INDEX: 33.18 KG/M2 | WEIGHT: 224 LBS | DIASTOLIC BLOOD PRESSURE: 108 MMHG | OXYGEN SATURATION: 98 % | HEART RATE: 92 BPM | SYSTOLIC BLOOD PRESSURE: 170 MMHG

## 2019-03-28 DIAGNOSIS — I25.10 CAD IN NATIVE ARTERY: Primary | ICD-10-CM

## 2019-03-28 DIAGNOSIS — E78.2 MIXED HYPERLIPIDEMIA: ICD-10-CM

## 2019-03-28 DIAGNOSIS — I10 BENIGN ESSENTIAL HTN: ICD-10-CM

## 2019-03-28 PROCEDURE — 99214 OFFICE O/P EST MOD 30 MIN: CPT | Performed by: INTERNAL MEDICINE

## 2019-03-28 RX ORDER — AMLODIPINE BESYLATE 5 MG/1
5 TABLET ORAL
Qty: 30 TABLET | Refills: 11 | Status: SHIPPED | OUTPATIENT
Start: 2019-03-28 | End: 2019-04-25 | Stop reason: SDUPTHER

## 2019-03-28 NOTE — PROGRESS NOTES
[facility]  Office visit  Jesse Brink  1955  [phone]    VISIT DATE:  10/26/2017    PCP: Terri Barreto APRN  [address] [facility] [place] [address]    CC:  Chief Complaint   Patient presents with   • Coronary Artery Disease   • Hypertension       PROBLEM LIST:  1. Coronary artery disease  2. Transient ischemic attack with normal echocardiogram, carotid duplex, and CT scan of the brain by verbal report.  3. Benign hypertension.  4. History of tobacco, quitting 28 years ago.  5. Hyperglycemia, diet controlled.  6. Rheumatoid arthritis.  7. Obesity.  8. Family history of heart disease.  9. Surgical history:  a. Pilonidal cyst, 1977.  b. Benign fibroid removed, 1980.  c. Anal fistula repair, 1999 and 2008.    Previous cardiac studies and procedures  January 2019  Cardiac catheterization  · Multivessel obstructive coronary disease: Proximal RCA .  Mid LAD .  95% proximal first diagonal, 95% first obtuse marginal branch, 70-80% proximal second obtuse marginal branch.  · Mildly elevated LVEDP  · No aortic stenosis.  Echo   · Left ventricular wall thickness is consistent with mild concentric hypertrophy.  · Estimated EF appears to be in the range of 36 - 40%  · Left ventricular diastolic dysfunction (grade I a) consistent with impaired relaxation.  · The following left ventricular wall segments are hypokinetic: mid anterior, apical anterior, apical lateral, apical inferior, apical septal and apex hypokinetic.  · Regional contracility augments post PVC.  Carotid duplex  · Right internal carotid artery stenosis of 0-49%.  · Left internal carotid artery stenosis of 0-49%.    1. Coronary artery bypass graft x 5  -LIMA to LAD.    -Greater saphenous vein to RCA  -Greater saphenous vein to OM1  -Greater saphenous vein to OM2  -Greater saphenous vein to D1  2. Left atrial appendage ligation (35 mm Atricure clip placement)      aSSESSMENT:   Diagnosis Plan   1. CAD in native artery    "  2. Benign essential HTN     3. Mixed hyperlipidemia         PLAN:  Coronary artery disease: Status post surgical revascularization.  Starting cardiac rehab.  Repeat echo in 3 months.  Chest incision is healing well.    Hypertension: Goal less than 140/90.  He was instructed to decrease losartan to 150 mg p.o. daily.  Adding amlodipine 5 mg p.o. daily.  Will increase metoprolol to 100 mg p.o. twice daily in 1 week if blood pressure still not at goal.    Palpitations: Currently asymptomatic.  No high risk clinical features.  Overall appears consistent with symptomatic premature ventricular contractions.   Continue beta-blockade.      History of stroke: Continue aggressive risk factor modification.  Continue aspirin afterload reduction. LDL at goal with dietary modifications only. Low HDL levels and mildly elevated triglycerides, continue dietary modifications and regular exercise.    Subjective  Gradually improving functional capacity.  Reports some mild diffuse chest wall discomfort.  Blood pressures intermittently running higher than 140/90 mmHg.  Beta-blockade is been titrated by primary care provider up to 50 mg p.o. twice daily.  He reports to shallow clean-based ulcerations along his chest incision which have gradually been healing.  He is compliant with medical therapy.    PHYSICAL EXAMINATION:  Vitals:    03/28/19 0922   BP: (!) 170/108   BP Location: Left arm   Patient Position: Sitting   Pulse: 92   SpO2: 98%   Weight: 102 kg (224 lb)   Height: 175.3 cm (69\")     General Appearance:    Alert, cooperative, no distress, appears stated age   Head:    Normocephalic, without obvious abnormality, atraumatic   Eyes:    conjunctiva/corneas clear   Nose:   Nares normal, septum midline, mucosa normal, no drainage   Throat:   Lips, teeth and gums normal   Neck:   Supple, symmetrical, trachea midline, no carotid    bruit or JVD   Lungs:     Clear to auscultation bilaterally, respirations unlabored   Chest Wall:    No " tenderness or deformity    Heart:    Regular rate and rhythm, S1 and S2 normal, no murmur, rub   or gallop, normal carotid impulse bilaterally without bruit.   Abdomen:     Soft, non-tender   Extremities:   Extremities normal, atraumatic, no cyanosis or edema   Pulses:   2+ and symmetric all extremities   Skin:   Skin color, texture, turgor normal, no rashes or lesions       Diagnostic Data:  Procedures  Lab Results   Component Value Date    TRIG 168 (H) 01/18/2019    HDL 29 (L) 01/18/2019     Lab Results   Component Value Date    GLUCOSE 115 (H) 02/05/2019    BUN 17 02/05/2019    CREATININE 0.97 02/05/2019     02/05/2019    K 4.3 02/05/2019     02/05/2019    CO2 31.0 02/05/2019     Lab Results   Component Value Date    HGBA1C 6.60 (H) 01/30/2019     Lab Results   Component Value Date    WBC 9.72 02/05/2019    HGB 13.0 (L) 02/05/2019    HCT 39.3 02/05/2019     02/05/2019       Allergies  Allergies   Allergen Reactions   • Humira [Adalimumab] Other (See Comments)     HA, intractable ocular migraine       Current Medications    Current Outpatient Medications:   •  aspirin 325 MG EC tablet, Take 1 tablet by mouth Daily., Disp: 30 tablet, Rfl: 11  •  baclofen (LIORESAL) 10 MG tablet, Take 10 mg by mouth As Needed for Muscle Spasms., Disp: , Rfl:   •  Cholecalciferol (VITAMIN D3) 2000 UNITS capsule, Take 2,000 Units by mouth Daily., Disp: , Rfl:   •  Docusate Calcium (STOOL SOFTENER PO), Take 2 tablets by mouth Daily., Disp: , Rfl:   •  Empagliflozin (JARDIANCE) 25 MG tablet, Take 25 mg by mouth Daily., Disp: , Rfl:   •  ENBREL SURECLICK 50 MG/ML solution auto-injector, Inject 50 mg under the skin into the appropriate area as directed 1 (One) Time Per Week., Disp: , Rfl:   •  gabapentin (NEURONTIN) 300 MG capsule, Take 300 mg by mouth 2 (Two) Times a Day., Disp: , Rfl:   •  losartan (COZAAR) 100 MG tablet, Take 2 tablets by mouth Daily., Disp: 60 tablet, Rfl: 3  •  metFORMIN (GLUCOPHAGE) 500 MG tablet,  Take 1,000 mg by mouth 2 (Two) Times a Day., Disp: , Rfl:   •  metoprolol tartrate (LOPRESSOR) 25 MG tablet, Take 1 tablet by mouth Every 12 (Twelve) Hours. (Patient taking differently: Take 50 mg by mouth Every 12 (Twelve) Hours. 50 mg BID), Disp: 60 tablet, Rfl: 11  •  potassium chloride (K-DUR) 10 MEQ CR tablet, Take 10 mEq by mouth Daily., Disp: , Rfl:   •  rosuvastatin (CRESTOR) 40 MG tablet, Take 1 tablet by mouth Daily., Disp: 30 tablet, Rfl: 11  •  sertraline (ZOLOFT) 50 MG tablet, Take 50 mg by mouth Daily., Disp: , Rfl:   •  tiZANidine (ZANAFLEX) 2 MG tablet, Take 2 mg by mouth As Needed., Disp: , Rfl: 1  •  amLODIPine (NORVASC) 5 MG tablet, Take 1 tablet by mouth every night at bedtime., Disp: 30 tablet, Rfl: 11  No current facility-administered medications for this visit.     Facility-Administered Medications Ordered in Other Visits:   •  Chlorhexidine Gluconate Cloth 2 % pads 1 application, 1 application, Topical, Q12H PRN, Marie Barnes PA-C ROS  Review of Systems   Cardiovascular: Negative for chest pain, claudication, dyspnea on exertion, irregular heartbeat and palpitations.   Respiratory: Negative for cough and shortness of breath.          SOCIAL HX  Social History     Socioeconomic History   • Marital status:      Spouse name: Not on file   • Number of children: 2   • Years of education: Not on file   • Highest education level: Not on file   Occupational History   • Occupation:      Comment: Retired   Tobacco Use   • Smoking status: Former Smoker     Packs/day: 1.00     Types: Cigarettes     Last attempt to quit: 3/5/1984     Years since quittin.0   • Smokeless tobacco: Never Used   • Tobacco comment: quit 35 years ago   Substance and Sexual Activity   • Alcohol use: Yes     Alcohol/week: 0.6 oz     Types: 1 Cans of beer per week     Comment: occas   • Drug use: No   • Sexual activity: Defer   Social History Narrative    Lives in [place].       FAMILY  HX  Family History   Problem Relation Age of Onset   • Heart disease Other    • Diabetes Other    • Cancer Other    • Hypertension Other    • Heart disease Mother    • Diabetes Father    • Hypertension Father    • Cancer Father              Dillon Neville III, MD, FACC

## 2019-04-22 ENCOUNTER — TRANSCRIBE ORDERS (OUTPATIENT)
Dept: CARDIAC REHAB | Facility: HOSPITAL | Age: 64
End: 2019-04-22

## 2019-04-22 DIAGNOSIS — Z95.1 HX OF CABG: Primary | ICD-10-CM

## 2019-04-23 ENCOUNTER — OFFICE VISIT (OUTPATIENT)
Dept: PRIMARY CARE CLINIC | Age: 64
End: 2019-04-23
Payer: MEDICARE

## 2019-04-23 ENCOUNTER — TREATMENT (OUTPATIENT)
Dept: CARDIAC REHAB | Facility: HOSPITAL | Age: 64
End: 2019-04-23

## 2019-04-23 VITALS
DIASTOLIC BLOOD PRESSURE: 80 MMHG | SYSTOLIC BLOOD PRESSURE: 140 MMHG | WEIGHT: 220 LBS | BODY MASS INDEX: 33.45 KG/M2 | HEART RATE: 95 BPM | OXYGEN SATURATION: 97 %

## 2019-04-23 DIAGNOSIS — I25.10 CORONARY ARTERY DISEASE INVOLVING NATIVE HEART WITHOUT ANGINA PECTORIS, UNSPECIFIED VESSEL OR LESION TYPE: ICD-10-CM

## 2019-04-23 DIAGNOSIS — F32.A DEPRESSION, UNSPECIFIED DEPRESSION TYPE: ICD-10-CM

## 2019-04-23 DIAGNOSIS — E11.9 TYPE 2 DIABETES MELLITUS WITHOUT COMPLICATION, WITHOUT LONG-TERM CURRENT USE OF INSULIN (HCC): Primary | ICD-10-CM

## 2019-04-23 DIAGNOSIS — I10 ESSENTIAL HYPERTENSION: ICD-10-CM

## 2019-04-23 DIAGNOSIS — M06.9 RHEUMATOID ARTHRITIS, INVOLVING UNSPECIFIED SITE, UNSPECIFIED RHEUMATOID FACTOR PRESENCE: ICD-10-CM

## 2019-04-23 DIAGNOSIS — Z95.1 S/P CABG (CORONARY ARTERY BYPASS GRAFT): Primary | ICD-10-CM

## 2019-04-23 LAB — HBA1C MFR BLD: 5.9 %

## 2019-04-23 PROCEDURE — 3044F HG A1C LEVEL LT 7.0%: CPT | Performed by: NURSE PRACTITIONER

## 2019-04-23 PROCEDURE — 2022F DILAT RTA XM EVC RTNOPTHY: CPT | Performed by: NURSE PRACTITIONER

## 2019-04-23 PROCEDURE — 1036F TOBACCO NON-USER: CPT | Performed by: NURSE PRACTITIONER

## 2019-04-23 PROCEDURE — 93797 PHYS/QHP OP CAR RHAB WO ECG: CPT

## 2019-04-23 PROCEDURE — 83036 HEMOGLOBIN GLYCOSYLATED A1C: CPT | Performed by: NURSE PRACTITIONER

## 2019-04-23 PROCEDURE — 93798 PHYS/QHP OP CAR RHAB W/ECG: CPT

## 2019-04-23 PROCEDURE — G8598 ASA/ANTIPLAT THER USED: HCPCS | Performed by: NURSE PRACTITIONER

## 2019-04-23 PROCEDURE — 99213 OFFICE O/P EST LOW 20 MIN: CPT | Performed by: NURSE PRACTITIONER

## 2019-04-23 PROCEDURE — G8427 DOCREV CUR MEDS BY ELIG CLIN: HCPCS | Performed by: NURSE PRACTITIONER

## 2019-04-23 PROCEDURE — G8417 CALC BMI ABV UP PARAM F/U: HCPCS | Performed by: NURSE PRACTITIONER

## 2019-04-23 PROCEDURE — 3017F COLORECTAL CA SCREEN DOC REV: CPT | Performed by: NURSE PRACTITIONER

## 2019-04-23 RX ORDER — AMLODIPINE BESYLATE 5 MG/1
TABLET ORAL
COMMUNITY
Start: 2019-04-22 | End: 2021-05-07

## 2019-04-23 RX ORDER — DOCUSATE CALCIUM 240 MG
2 CAPSULE ORAL
COMMUNITY
End: 2020-01-28 | Stop reason: ALTCHOICE

## 2019-04-23 ASSESSMENT — ENCOUNTER SYMPTOMS
NAUSEA: 0
SORE THROAT: 0
COUGH: 0
ABDOMINAL PAIN: 0
EYE PAIN: 0
SHORTNESS OF BREATH: 0
VOMITING: 0

## 2019-04-23 NOTE — PROGRESS NOTES
Pt was seen today in [facility] for a Phase 2 visit.  Vital signs and session notes recorded in [employer] and will be scanned into Epic by HIM.

## 2019-04-23 NOTE — PATIENT INSTRUCTIONS
Stop Metformin. Discuss palpitations and BP with cardiology. Try anti-fungal cream OTC for rash on arm.

## 2019-04-24 ENCOUNTER — TELEPHONE (OUTPATIENT)
Dept: PRIMARY CARE CLINIC | Age: 64
End: 2019-04-24

## 2019-04-24 ENCOUNTER — TREATMENT (OUTPATIENT)
Dept: CARDIAC REHAB | Facility: HOSPITAL | Age: 64
End: 2019-04-24

## 2019-04-24 DIAGNOSIS — Z95.1 S/P CABG (CORONARY ARTERY BYPASS GRAFT): Primary | ICD-10-CM

## 2019-04-24 PROCEDURE — 93798 PHYS/QHP OP CAR RHAB W/ECG: CPT

## 2019-04-25 ENCOUNTER — OFFICE VISIT (OUTPATIENT)
Dept: CARDIOLOGY | Facility: CLINIC | Age: 64
End: 2019-04-25

## 2019-04-25 VITALS
HEIGHT: 69 IN | DIASTOLIC BLOOD PRESSURE: 80 MMHG | SYSTOLIC BLOOD PRESSURE: 170 MMHG | OXYGEN SATURATION: 98 % | BODY MASS INDEX: 32.56 KG/M2 | HEART RATE: 48 BPM | WEIGHT: 219.8 LBS

## 2019-04-25 DIAGNOSIS — I10 BENIGN ESSENTIAL HTN: ICD-10-CM

## 2019-04-25 DIAGNOSIS — E78.2 MIXED HYPERLIPIDEMIA: ICD-10-CM

## 2019-04-25 DIAGNOSIS — I25.10 CAD IN NATIVE ARTERY: Primary | ICD-10-CM

## 2019-04-25 DIAGNOSIS — I25.5 ISCHEMIC CARDIOMYOPATHY: ICD-10-CM

## 2019-04-25 PROCEDURE — 99214 OFFICE O/P EST MOD 30 MIN: CPT | Performed by: INTERNAL MEDICINE

## 2019-04-25 RX ORDER — CARVEDILOL 12.5 MG/1
12.5 TABLET ORAL 2 TIMES DAILY
Qty: 180 TABLET | Refills: 3 | Status: SHIPPED | OUTPATIENT
Start: 2019-04-25 | End: 2019-05-06 | Stop reason: DRUGHIGH

## 2019-04-25 RX ORDER — LOSARTAN POTASSIUM 100 MG/1
100 TABLET ORAL DAILY
Qty: 135 TABLET | Refills: 1 | Status: SHIPPED | OUTPATIENT
Start: 2019-04-25 | End: 2019-06-20 | Stop reason: DRUGHIGH

## 2019-04-25 RX ORDER — ROSUVASTATIN CALCIUM 40 MG/1
40 TABLET, COATED ORAL DAILY
Qty: 90 TABLET | Refills: 1 | Status: SHIPPED | OUTPATIENT
Start: 2019-04-25 | End: 2019-09-17 | Stop reason: SDUPTHER

## 2019-04-25 RX ORDER — AMLODIPINE BESYLATE 5 MG/1
5 TABLET ORAL
Qty: 90 TABLET | Refills: 4 | Status: SHIPPED | OUTPATIENT
Start: 2019-04-25 | End: 2020-07-07

## 2019-04-29 ENCOUNTER — TREATMENT (OUTPATIENT)
Dept: CARDIAC REHAB | Facility: HOSPITAL | Age: 64
End: 2019-04-29

## 2019-04-29 DIAGNOSIS — Z95.1 S/P CABG (CORONARY ARTERY BYPASS GRAFT): Primary | ICD-10-CM

## 2019-04-29 PROCEDURE — 93798 PHYS/QHP OP CAR RHAB W/ECG: CPT

## 2019-04-29 NOTE — PROGRESS NOTES
Pt was seen today in CR for a Phase 2 visit.  Vital signs and session notes recorded in [employer] and will be scanned into Epic by HIM.

## 2019-05-01 ENCOUNTER — TREATMENT (OUTPATIENT)
Dept: CARDIAC REHAB | Facility: HOSPITAL | Age: 64
End: 2019-05-01

## 2019-05-01 DIAGNOSIS — Z95.1 S/P CABG (CORONARY ARTERY BYPASS GRAFT): Primary | ICD-10-CM

## 2019-05-01 PROCEDURE — 93798 PHYS/QHP OP CAR RHAB W/ECG: CPT

## 2019-05-01 NOTE — PROGRESS NOTES
Pt was seen today in CR for a Phase 2 visit.  Vital signs and session notes recorded in [employer] and will be scanned into Epic by HIM.

## 2019-05-06 ENCOUNTER — TREATMENT (OUTPATIENT)
Dept: CARDIAC REHAB | Facility: HOSPITAL | Age: 64
End: 2019-05-06

## 2019-05-06 ENCOUNTER — TELEPHONE (OUTPATIENT)
Dept: CARDIOLOGY | Facility: CLINIC | Age: 64
End: 2019-05-06

## 2019-05-06 DIAGNOSIS — I25.5 ISCHEMIC CARDIOMYOPATHY: Primary | ICD-10-CM

## 2019-05-06 DIAGNOSIS — Z95.1 S/P CABG (CORONARY ARTERY BYPASS GRAFT): Primary | ICD-10-CM

## 2019-05-06 PROCEDURE — 93798 PHYS/QHP OP CAR RHAB W/ECG: CPT

## 2019-05-06 RX ORDER — CARVEDILOL 25 MG/1
25 TABLET ORAL 2 TIMES DAILY
Qty: 60 TABLET | Refills: 5 | Status: SHIPPED | OUTPATIENT
Start: 2019-05-06 | End: 2019-05-15 | Stop reason: SDUPTHER

## 2019-05-06 NOTE — TELEPHONE ENCOUNTER
Sally from Cardiac Rehab in [place] called and stated resting HR's . B/P 140/80. Also having ectopy on telemetry. Requested strips to be faxed to our office for  to review. Dr Neville reviewed strips in clinic. Per  told Sally to have patient increase his Coreg to 25 mg by mouth bid and limited echo to assess his EF this week. Told her script sent to his local pharmacy and  would call him for date and time of his echo this week.She verbalized understanding.

## 2019-05-06 NOTE — PROGRESS NOTES
Have you seen any other physician or provider since your last visit? yes - dr. Kenna Womack, cardiac rehab    Have you had any other diagnostic tests since your last visit? no    Have you changed or stopped any medications since your last visit including any over-the-counter medicines, vitamins, or herbal medicines? no     Are you taking all your prescribed medications? Yes  If NO, why? -  N/A      REVIEW OF SYSTEMS:  Review of Systems   Constitutional: Negative for chills and fever. HENT: Negative for ear pain and sore throat. Eyes: Negative for pain and visual disturbance. Respiratory: Negative for cough and shortness of breath. Cardiovascular: Negative for chest pain, palpitations and leg swelling. Gastrointestinal: Negative for abdominal pain, nausea and vomiting. Genitourinary: Negative for dysuria and hematuria. Musculoskeletal: Negative for joint swelling. Skin: Negative for rash. Neurological: Negative for dizziness and weakness. Psychiatric/Behavioral: Negative for sleep disturbance.
erythematous rash to the right forearm   Psychiatric: He has a normal mood and affect. Vitals reviewed. No results found for requested labs within last 30 days. Hemoglobin A1C (%)   Date Value   04/23/2019 5.9     Microscopic Examination (no units)   Date Value   12/15/2016 YES     LDL Calculated (mg/dL)   Date Value   05/14/2018 85         Lab Results   Component Value Date    WBC 12.2 (H) 02/15/2019    NEUTROABS 8.1 (H) 02/15/2019    HGB 14.5 02/15/2019    HCT 45.0 02/15/2019    MCV 94.5 02/15/2019     (H) 02/15/2019       Lab Results   Component Value Date    TSH 0.972 07/19/2014       Prior to Visit Medications    Medication Sig Taking? Authorizing Provider   docusate calcium (SURFAK) 240 MG capsule Take 2 tablets by mouth Yes Historical Provider, MD   amLODIPine (NORVASC) 5 MG tablet  Yes Historical Provider, MD   sertraline (ZOLOFT) 50 MG tablet Take 1 tablet by mouth daily Yes WILDER Del Real   Blood Glucose Monitoring Suppl (TRUE METRIX AIR GLUCOSE METER) w/Device KIT 1 each by Does not apply route daily E11. 9-DX Yes WILDER Del Real   blood glucose test strips (TRUE METRIX BLOOD GLUCOSE TEST) strip 1 each by In Vitro route daily As needed.  DX:  E11.9 Yes WILDER Del Real   TRUEPLUS LANCETS 33G MISC 1 each by Does not apply route daily Dx:E11.9 Yes WILDER Del Real   metoprolol tartrate (LOPRESSOR) 50 MG tablet Take 1 tablet by mouth every 12 hours Yes WILDER Del Real   losartan (COZAAR) 25 MG tablet Take 25 mg by mouth daily Yes Historical Provider, MD   aspirin 325 MG tablet Take 325 mg by mouth daily Yes Historical Provider, MD   rosuvastatin (CRESTOR) 40 MG tablet Take 40 mg by mouth Yes Historical Provider, MD   potassium chloride (KLOR-CON M) 20 MEQ extended release tablet Take 1 tablet by mouth daily Yes WILDER Del Real   metFORMIN (GLUCOPHAGE) 500 MG tablet Take 2 tablets by mouth 2 times daily (with meals) Yes WILDER Del Real   ENBREL SURECLICK 50

## 2019-05-06 NOTE — PROGRESS NOTES
Pt was seen today in [facility]  with a Phase I visit.  Vital signs and session notes recorded in [facility] and will be scanned into Epic by HIM.

## 2019-05-08 ENCOUNTER — HOSPITAL ENCOUNTER (OUTPATIENT)
Dept: CARDIOLOGY | Facility: HOSPITAL | Age: 64
Discharge: HOME | End: 2019-05-08
Admitting: INTERNAL MEDICINE

## 2019-05-08 ENCOUNTER — TREATMENT (OUTPATIENT)
Dept: CARDIAC REHAB | Facility: HOSPITAL | Age: 64
End: 2019-05-08

## 2019-05-08 VITALS
SYSTOLIC BLOOD PRESSURE: 140 MMHG | BODY MASS INDEX: 32.33 KG/M2 | HEIGHT: 69 IN | WEIGHT: 218.26 LBS | DIASTOLIC BLOOD PRESSURE: 77 MMHG

## 2019-05-08 DIAGNOSIS — I25.5 ISCHEMIC CARDIOMYOPATHY: ICD-10-CM

## 2019-05-08 DIAGNOSIS — Z95.1 S/P CABG (CORONARY ARTERY BYPASS GRAFT): Primary | ICD-10-CM

## 2019-05-08 PROCEDURE — 93798 PHYS/QHP OP CAR RHAB W/ECG: CPT

## 2019-05-08 PROCEDURE — 93308 TTE F-UP OR LMTD: CPT | Performed by: INTERNAL MEDICINE

## 2019-05-08 PROCEDURE — 93325 DOPPLER ECHO COLOR FLOW MAPG: CPT | Performed by: INTERNAL MEDICINE

## 2019-05-08 PROCEDURE — 93325 DOPPLER ECHO COLOR FLOW MAPG: CPT

## 2019-05-08 PROCEDURE — 93308 TTE F-UP OR LMTD: CPT

## 2019-05-08 PROCEDURE — 93321 DOPPLER ECHO F-UP/LMTD STD: CPT

## 2019-05-08 PROCEDURE — 93321 DOPPLER ECHO F-UP/LMTD STD: CPT | Performed by: INTERNAL MEDICINE

## 2019-05-08 NOTE — PROGRESS NOTES
"Nutrition Services    Patient Name:  Jesse Brink  YOB: 1955  MRN: [id number]  Admit Date:  (Not on file)    Review Heart Healthy Diet/ Healthy Eating; review of chapter 2 \"Living Well with Heart Disease\" Cardona Book. Provided handouts and power-point presentation emphasis’s on choose to eat healthier, portions size \"plate Method\", reading nutrition fact label, heart healthy-shopping, Choose this instead of that, and eating at home verses eating out.  Provided Fast Food Facts list. Patient was very receptive and asks appropriate questions. Provided business card and encourage to call for any questions or concerns.    Electronically signed by:  Zaida Melissa RD  05/08/19 4:41 PM   "

## 2019-05-10 ENCOUNTER — TELEPHONE (OUTPATIENT)
Dept: CARDIOLOGY | Facility: CLINIC | Age: 64
End: 2019-05-10

## 2019-05-10 LAB
BH CV ECHO MEAS - AO MAX PG (FULL): 5.4 MMHG
BH CV ECHO MEAS - AO MAX PG: 8 MMHG
BH CV ECHO MEAS - AO MEAN PG (FULL): 3 MMHG
BH CV ECHO MEAS - AO MEAN PG: 4 MMHG
BH CV ECHO MEAS - AO ROOT AREA (BSA CORRECTED): 1.6
BH CV ECHO MEAS - AO ROOT AREA: 9.6 CM^2
BH CV ECHO MEAS - AO ROOT DIAM: 3.5 CM
BH CV ECHO MEAS - AO V2 MAX: 138 CM/SEC
BH CV ECHO MEAS - AO V2 MEAN: 93.2 CM/SEC
BH CV ECHO MEAS - AO V2 VTI: 28.3 CM
BH CV ECHO MEAS - AVA(I,A): 2.5 CM^2
BH CV ECHO MEAS - AVA(I,D): 2.5 CM^2
BH CV ECHO MEAS - AVA(V,A): 2.7 CM^2
BH CV ECHO MEAS - AVA(V,D): 2.7 CM^2
BH CV ECHO MEAS - BSA(HAYCOCK): 2.2 M^2
BH CV ECHO MEAS - BSA: 2.1 M^2
BH CV ECHO MEAS - BZI_BMI: 32.3 KILOGRAMS/M^2
BH CV ECHO MEAS - BZI_METRIC_HEIGHT: 175.3 CM
BH CV ECHO MEAS - BZI_METRIC_WEIGHT: 99.3 KG
BH CV ECHO MEAS - EDV(CUBED): 139.8 ML
BH CV ECHO MEAS - EDV(MOD-SP4): 105 ML
BH CV ECHO MEAS - EDV(TEICH): 128.9 ML
BH CV ECHO MEAS - EF(CUBED): 67.5 %
BH CV ECHO MEAS - EF(MOD-BP): 55 %
BH CV ECHO MEAS - EF(MOD-SP4): 50.5 %
BH CV ECHO MEAS - EF(TEICH): 58.6 %
BH CV ECHO MEAS - ESV(CUBED): 45.5 ML
BH CV ECHO MEAS - ESV(MOD-SP4): 52 ML
BH CV ECHO MEAS - ESV(TEICH): 53.3 ML
BH CV ECHO MEAS - FS: 31.2 %
BH CV ECHO MEAS - IVS/LVPW: 0.81
BH CV ECHO MEAS - IVSD: 0.85 CM
BH CV ECHO MEAS - LA DIMENSION: 4.5 CM
BH CV ECHO MEAS - LA/AO: 1.3
BH CV ECHO MEAS - LAD MAJOR: 5.7 CM
BH CV ECHO MEAS - LV DIASTOLIC VOL/BSA (35-75): 48.9 ML/M^2
BH CV ECHO MEAS - LV MASS(C)D: 178.9 GRAMS
BH CV ECHO MEAS - LV MASS(C)DI: 83.3 GRAMS/M^2
BH CV ECHO MEAS - LV MAX PG: 2.6 MMHG
BH CV ECHO MEAS - LV MEAN PG: 1 MMHG
BH CV ECHO MEAS - LV SYSTOLIC VOL/BSA (12-30): 24.2 ML/M^2
BH CV ECHO MEAS - LV V1 MAX: 81.2 CM/SEC
BH CV ECHO MEAS - LV V1 MEAN: 47.9 CM/SEC
BH CV ECHO MEAS - LV V1 VTI: 15.6 CM
BH CV ECHO MEAS - LVIDD: 5.2 CM
BH CV ECHO MEAS - LVIDS: 3.6 CM
BH CV ECHO MEAS - LVLD AP4: 7.2 CM
BH CV ECHO MEAS - LVLS AP4: 6.4 CM
BH CV ECHO MEAS - LVOT AREA (M): 4.5 CM^2
BH CV ECHO MEAS - LVOT AREA: 4.5 CM^2
BH CV ECHO MEAS - LVOT DIAM: 2.4 CM
BH CV ECHO MEAS - LVPWD: 1 CM
BH CV ECHO MEAS - SI(AO): 126.8 ML/M^2
BH CV ECHO MEAS - SI(CUBED): 43.9 ML/M^2
BH CV ECHO MEAS - SI(LVOT): 32.9 ML/M^2
BH CV ECHO MEAS - SI(MOD-SP4): 24.7 ML/M^2
BH CV ECHO MEAS - SI(TEICH): 35.2 ML/M^2
BH CV ECHO MEAS - SV(AO): 272.3 ML
BH CV ECHO MEAS - SV(CUBED): 94.3 ML
BH CV ECHO MEAS - SV(LVOT): 70.6 ML
BH CV ECHO MEAS - SV(MOD-SP4): 53 ML
BH CV ECHO MEAS - SV(TEICH): 75.6 ML
BH CV VAS BP LEFT ARM: NORMAL MMHG
LEFT ATRIUM VOLUME INDEX: 42.4 ML/M^2
LEFT ATRIUM VOLUME: 91 ML

## 2019-05-10 NOTE — TELEPHONE ENCOUNTER
----- Message from Dillon Neville III, MD sent at 5/10/2019  1:52 PM EDT -----  Heart function normal

## 2019-05-13 ENCOUNTER — TREATMENT (OUTPATIENT)
Dept: CARDIAC REHAB | Facility: HOSPITAL | Age: 64
End: 2019-05-13

## 2019-05-13 DIAGNOSIS — Z95.1 S/P CABG (CORONARY ARTERY BYPASS GRAFT): Primary | ICD-10-CM

## 2019-05-13 PROCEDURE — 93798 PHYS/QHP OP CAR RHAB W/ECG: CPT

## 2019-05-13 NOTE — PROGRESS NOTES
Pt was seen today in CR for a Phase 2 visit.  Vital signs and session notes recorded in [employer] and will be scanned into Epic by HIM.

## 2019-05-15 ENCOUNTER — TELEPHONE (OUTPATIENT)
Dept: CARDIOLOGY | Facility: CLINIC | Age: 64
End: 2019-05-15

## 2019-05-15 ENCOUNTER — TREATMENT (OUTPATIENT)
Dept: CARDIAC REHAB | Facility: HOSPITAL | Age: 64
End: 2019-05-15

## 2019-05-15 DIAGNOSIS — Z95.1 S/P CABG (CORONARY ARTERY BYPASS GRAFT): Primary | ICD-10-CM

## 2019-05-15 PROCEDURE — 93798 PHYS/QHP OP CAR RHAB W/ECG: CPT

## 2019-05-15 RX ORDER — CARVEDILOL 25 MG/1
37.5 TABLET ORAL 2 TIMES DAILY
Qty: 90 TABLET | Refills: 5 | Status: SHIPPED | OUTPATIENT
Start: 2019-05-15 | End: 2019-06-20 | Stop reason: SDUPTHER

## 2019-05-15 NOTE — TELEPHONE ENCOUNTER
"Cardiac rehab called to report abnormal rhythm strip today.  Patient complained that his heart \"quivered\" during the episode.  BP was 130/88.    Per Dr. Neville - increase carvedilol to 37.5mg BID.  Continue all other medications.    If patient reports drop in BP - amlodipine can be discontinued.    Notified patient and cardiac rehab.  New RX sent in.  Strips to be scanned.  "
English

## 2019-05-20 ENCOUNTER — TREATMENT (OUTPATIENT)
Dept: CARDIAC REHAB | Facility: HOSPITAL | Age: 64
End: 2019-05-20

## 2019-05-20 DIAGNOSIS — Z95.1 S/P CABG (CORONARY ARTERY BYPASS GRAFT): Primary | ICD-10-CM

## 2019-05-20 PROCEDURE — 93798 PHYS/QHP OP CAR RHAB W/ECG: CPT

## 2019-05-21 ENCOUNTER — TELEPHONE (OUTPATIENT)
Dept: PRIMARY CARE CLINIC | Age: 64
End: 2019-05-21

## 2019-05-22 ENCOUNTER — TREATMENT (OUTPATIENT)
Dept: CARDIAC REHAB | Facility: HOSPITAL | Age: 64
End: 2019-05-22

## 2019-05-22 DIAGNOSIS — Z95.1 S/P CABG (CORONARY ARTERY BYPASS GRAFT): Primary | ICD-10-CM

## 2019-05-22 PROCEDURE — 93798 PHYS/QHP OP CAR RHAB W/ECG: CPT

## 2019-05-22 NOTE — PROGRESS NOTES
Pt was seen today in CR for a Phase 2 visit.  Vital signs and session notes recorded in [employer] and will be scanned into Epic by HIM.

## 2019-05-23 ENCOUNTER — HOSPITAL ENCOUNTER (OUTPATIENT)
Dept: DIABETES SERVICES | Facility: HOSPITAL | Age: 64
Discharge: HOME | End: 2019-05-23
Admitting: NURSE PRACTITIONER

## 2019-05-23 PROCEDURE — G0109 DIAB MANAGE TRN IND/GROUP: HCPCS

## 2019-05-23 NOTE — PROGRESS NOTES
Diabetes Education    Patient Name:  Jsese Brink  YOB: 1955  MRN: [id number]  Admit Date:  5/23/2019        See scanned notes in Media Tab in Epic.      Electronically signed by:  Chelsea Smith RD  05/23/19 12:53 PM

## 2019-05-28 ENCOUNTER — TREATMENT (OUTPATIENT)
Dept: CARDIAC REHAB | Facility: HOSPITAL | Age: 64
End: 2019-05-28

## 2019-05-28 DIAGNOSIS — Z95.1 S/P CABG (CORONARY ARTERY BYPASS GRAFT): Primary | ICD-10-CM

## 2019-05-28 PROCEDURE — 93798 PHYS/QHP OP CAR RHAB W/ECG: CPT

## 2019-05-28 NOTE — PROGRESS NOTES
Pt was seen today in CR for a Phase 2 visit.  Vital signs and session notes recorded in [employer] and will be scanned into Epic by HIM.

## 2019-05-29 ENCOUNTER — APPOINTMENT (OUTPATIENT)
Dept: CARDIAC REHAB | Facility: HOSPITAL | Age: 64
End: 2019-05-29

## 2019-06-03 ENCOUNTER — TREATMENT (OUTPATIENT)
Dept: CARDIAC REHAB | Facility: HOSPITAL | Age: 64
End: 2019-06-03

## 2019-06-03 DIAGNOSIS — Z95.1 S/P CABG (CORONARY ARTERY BYPASS GRAFT): Primary | ICD-10-CM

## 2019-06-03 PROCEDURE — 93798 PHYS/QHP OP CAR RHAB W/ECG: CPT

## 2019-06-03 NOTE — PROGRESS NOTES
Pt was seen today in CR for a Phase 2 visit.  Vital signs and session notes recorded in [employer] and will be scanned into Epic by HIM.

## 2019-06-05 ENCOUNTER — TREATMENT (OUTPATIENT)
Dept: CARDIAC REHAB | Facility: HOSPITAL | Age: 64
End: 2019-06-05

## 2019-06-05 DIAGNOSIS — Z95.1 S/P CABG (CORONARY ARTERY BYPASS GRAFT): Primary | ICD-10-CM

## 2019-06-05 PROCEDURE — 93798 PHYS/QHP OP CAR RHAB W/ECG: CPT

## 2019-06-05 NOTE — PROGRESS NOTES
Pt was seen today in CR for a Phase 2 visit.  Vital signs and session notes recorded in [employer] and will be scanned into Epic by HIM.

## 2019-06-10 ENCOUNTER — TREATMENT (OUTPATIENT)
Dept: CARDIAC REHAB | Facility: HOSPITAL | Age: 64
End: 2019-06-10

## 2019-06-10 DIAGNOSIS — Z95.1 S/P CABG (CORONARY ARTERY BYPASS GRAFT): Primary | ICD-10-CM

## 2019-06-10 PROCEDURE — 93798 PHYS/QHP OP CAR RHAB W/ECG: CPT

## 2019-06-10 NOTE — PROGRESS NOTES
Pt was seen today in CR for a Phase 2 visit.  Vital signs and session notes recorded in [employer] and will be scanned into Epic by HIM.

## 2019-06-12 ENCOUNTER — TREATMENT (OUTPATIENT)
Dept: CARDIAC REHAB | Facility: HOSPITAL | Age: 64
End: 2019-06-12

## 2019-06-12 DIAGNOSIS — Z95.1 S/P CABG (CORONARY ARTERY BYPASS GRAFT): Primary | ICD-10-CM

## 2019-06-12 PROCEDURE — 93798 PHYS/QHP OP CAR RHAB W/ECG: CPT

## 2019-06-12 NOTE — PROGRESS NOTES
Pt was seen today in CR for a Phase 2 visit.  Vital signs and session notes recorded in [employer] and will be scanned into Epic by HIM.

## 2019-06-14 RX ORDER — LANCETS 33 GAUGE
EACH MISCELLANEOUS
Qty: 150 EACH | Refills: 3 | Status: SHIPPED | OUTPATIENT
Start: 2019-06-14

## 2019-06-17 ENCOUNTER — TREATMENT (OUTPATIENT)
Dept: CARDIAC REHAB | Facility: HOSPITAL | Age: 64
End: 2019-06-17

## 2019-06-17 DIAGNOSIS — Z95.1 S/P CABG (CORONARY ARTERY BYPASS GRAFT): Primary | ICD-10-CM

## 2019-06-17 PROCEDURE — 93798 PHYS/QHP OP CAR RHAB W/ECG: CPT

## 2019-06-17 NOTE — PROGRESS NOTES
Pt was seen today in CR for a Phase 2 visit.  Vital signs and session notes recorded in [employer] and will be scanned into Epic by HIM.

## 2019-06-19 ENCOUNTER — TREATMENT (OUTPATIENT)
Dept: CARDIAC REHAB | Facility: HOSPITAL | Age: 64
End: 2019-06-19

## 2019-06-19 DIAGNOSIS — Z95.1 S/P CABG (CORONARY ARTERY BYPASS GRAFT): Primary | ICD-10-CM

## 2019-06-19 PROCEDURE — 93798 PHYS/QHP OP CAR RHAB W/ECG: CPT

## 2019-06-19 NOTE — PROGRESS NOTES
Pt was seen today in CR for a Phase 2 visit.  Vital signs and session notes recorded in [employer] and will be scanned into Epic by HIM.

## 2019-06-20 ENCOUNTER — TELEPHONE (OUTPATIENT)
Dept: PRIMARY CARE CLINIC | Age: 64
End: 2019-06-20

## 2019-06-20 ENCOUNTER — TELEPHONE (OUTPATIENT)
Dept: CARDIOLOGY | Facility: CLINIC | Age: 64
End: 2019-06-20

## 2019-06-20 DIAGNOSIS — G62.9 PERIPHERAL POLYNEUROPATHY: Primary | ICD-10-CM

## 2019-06-20 RX ORDER — CARVEDILOL 25 MG/1
37.5 TABLET ORAL 2 TIMES DAILY
Qty: 270 TABLET | Refills: 1 | Status: SHIPPED | OUTPATIENT
Start: 2019-06-20 | End: 2019-12-23

## 2019-06-20 RX ORDER — LOSARTAN POTASSIUM 100 MG/1
150 TABLET ORAL DAILY
Qty: 135 TABLET | Refills: 1 | Status: SHIPPED | OUTPATIENT
Start: 2019-06-20 | End: 2019-12-23

## 2019-06-20 RX ORDER — GABAPENTIN 300 MG/1
300 CAPSULE ORAL 3 TIMES DAILY
Qty: 90 CAPSULE | Refills: 5 | Status: SHIPPED | OUTPATIENT
Start: 2019-06-20 | End: 2019-07-25 | Stop reason: SDUPTHER

## 2019-06-20 NOTE — TELEPHONE ENCOUNTER
Patient came to the office today for more samples of Jardiance 25 mg. This was prescribed by Ana Ugalde. Patient was given 3 box/boxes. Qty. 21, Lot # [id number], , Exp. Date 5/21 & 8/20.

## 2019-06-20 NOTE — TELEPHONE ENCOUNTER
Requesting refills. States taking Losartan 150 mg daily. Last office note has 100 mg daily. Can I sent in new Rx for Losartan 150 mg daily? States his b/p and HR have been fine.Please advise.   Mohs Rapid Report Verbiage: The area of clinically evident tumor was marked with skin marking ink and appropriately hatched.  The initial incision was made following the Mohs approach through the skin.  The specimen was taken to the lab, divided into the necessary number of pieces, chromacoded and processed according to the Mohs protocol.  This was repeated in successive stages until a tumor free defect was achieved.

## 2019-06-24 ENCOUNTER — APPOINTMENT (OUTPATIENT)
Dept: CARDIAC REHAB | Facility: HOSPITAL | Age: 64
End: 2019-06-24

## 2019-06-24 ENCOUNTER — TREATMENT (OUTPATIENT)
Dept: CARDIAC REHAB | Facility: HOSPITAL | Age: 64
End: 2019-06-24

## 2019-06-24 DIAGNOSIS — Z95.1 S/P CABG (CORONARY ARTERY BYPASS GRAFT): Primary | ICD-10-CM

## 2019-06-24 PROCEDURE — 93798 PHYS/QHP OP CAR RHAB W/ECG: CPT

## 2019-06-24 NOTE — PROGRESS NOTES
Pt was seen today in CR for a Phase 2 visit.  Vital signs and session notes recorded in [employer] and will be scanned into Epic by HIM.

## 2019-06-26 ENCOUNTER — TREATMENT (OUTPATIENT)
Dept: CARDIAC REHAB | Facility: HOSPITAL | Age: 64
End: 2019-06-26

## 2019-06-26 DIAGNOSIS — Z95.1 S/P CABG (CORONARY ARTERY BYPASS GRAFT): Primary | ICD-10-CM

## 2019-06-26 PROCEDURE — 93798 PHYS/QHP OP CAR RHAB W/ECG: CPT

## 2019-06-26 PROCEDURE — 93797 PHYS/QHP OP CAR RHAB WO ECG: CPT

## 2019-06-26 NOTE — PROGRESS NOTES
Pt was seen today in CR for a Phase 2 visit.  Vital signs and session notes recorded in [employer] and will be scanned into Epic by HIM.

## 2019-07-01 ENCOUNTER — TREATMENT (OUTPATIENT)
Dept: CARDIAC REHAB | Facility: HOSPITAL | Age: 64
End: 2019-07-01

## 2019-07-01 DIAGNOSIS — Z95.1 S/P CABG (CORONARY ARTERY BYPASS GRAFT): Primary | ICD-10-CM

## 2019-07-01 PROCEDURE — 93798 PHYS/QHP OP CAR RHAB W/ECG: CPT

## 2019-07-03 ENCOUNTER — TREATMENT (OUTPATIENT)
Dept: CARDIAC REHAB | Facility: HOSPITAL | Age: 64
End: 2019-07-03

## 2019-07-03 DIAGNOSIS — Z95.1 S/P CABG (CORONARY ARTERY BYPASS GRAFT): Primary | ICD-10-CM

## 2019-07-03 PROCEDURE — 93798 PHYS/QHP OP CAR RHAB W/ECG: CPT

## 2019-07-05 ENCOUNTER — APPOINTMENT (OUTPATIENT)
Dept: CARDIAC REHAB | Facility: HOSPITAL | Age: 64
End: 2019-07-05

## 2019-07-08 ENCOUNTER — TREATMENT (OUTPATIENT)
Dept: CARDIAC REHAB | Facility: HOSPITAL | Age: 64
End: 2019-07-08

## 2019-07-08 DIAGNOSIS — Z95.1 S/P CABG (CORONARY ARTERY BYPASS GRAFT): Primary | ICD-10-CM

## 2019-07-08 PROCEDURE — 93798 PHYS/QHP OP CAR RHAB W/ECG: CPT

## 2019-07-08 NOTE — PROGRESS NOTES
Pt was seen today in CR for a Phase 2 visit.  Vital signs and session notes recorded in [employer] and will be scanned into Epic by HIM.

## 2019-07-10 ENCOUNTER — TREATMENT (OUTPATIENT)
Dept: CARDIAC REHAB | Facility: HOSPITAL | Age: 64
End: 2019-07-10

## 2019-07-10 DIAGNOSIS — Z95.1 S/P CABG (CORONARY ARTERY BYPASS GRAFT): Primary | ICD-10-CM

## 2019-07-10 PROCEDURE — 93798 PHYS/QHP OP CAR RHAB W/ECG: CPT

## 2019-07-10 NOTE — PROGRESS NOTES
Pt was seen today in CR for a Phase 2 visit.  Vital signs and session notes recorded in [employer] and will be scanned into Epic by HIM.

## 2019-07-15 ENCOUNTER — TREATMENT (OUTPATIENT)
Dept: CARDIAC REHAB | Facility: HOSPITAL | Age: 64
End: 2019-07-15

## 2019-07-15 DIAGNOSIS — Z95.1 S/P CABG (CORONARY ARTERY BYPASS GRAFT): Primary | ICD-10-CM

## 2019-07-15 PROCEDURE — 93798 PHYS/QHP OP CAR RHAB W/ECG: CPT

## 2019-07-15 NOTE — PROGRESS NOTES
Pt was seen today in CR for a Phase 2 visit.  Vital signs and session notes recorded in [employer] and will be scanned into Epic by HIM.

## 2019-07-17 ENCOUNTER — TREATMENT (OUTPATIENT)
Dept: CARDIAC REHAB | Facility: HOSPITAL | Age: 64
End: 2019-07-17

## 2019-07-17 DIAGNOSIS — Z95.1 S/P CABG (CORONARY ARTERY BYPASS GRAFT): Primary | ICD-10-CM

## 2019-07-17 PROCEDURE — 93798 PHYS/QHP OP CAR RHAB W/ECG: CPT

## 2019-07-17 NOTE — PROGRESS NOTES
Pt was seen today in CR for a Phase 2 visit.  Vital signs and session notes recorded in [employer] and will be scanned into Epic by HIM.

## 2019-07-19 ENCOUNTER — APPOINTMENT (OUTPATIENT)
Dept: CARDIAC REHAB | Facility: HOSPITAL | Age: 64
End: 2019-07-19

## 2019-07-22 ENCOUNTER — TREATMENT (OUTPATIENT)
Dept: CARDIAC REHAB | Facility: HOSPITAL | Age: 64
End: 2019-07-22

## 2019-07-22 DIAGNOSIS — Z95.1 S/P CABG (CORONARY ARTERY BYPASS GRAFT): Primary | ICD-10-CM

## 2019-07-22 PROCEDURE — 93798 PHYS/QHP OP CAR RHAB W/ECG: CPT

## 2019-07-22 NOTE — PROGRESS NOTES
Pt was seen today in CR for a Phase 2 visit.  Vital signs and session notes recorded in [employer] and will be scanned into Epic by HIM.

## 2019-07-24 ENCOUNTER — TREATMENT (OUTPATIENT)
Dept: CARDIAC REHAB | Facility: HOSPITAL | Age: 64
End: 2019-07-24

## 2019-07-24 DIAGNOSIS — Z95.1 S/P CABG (CORONARY ARTERY BYPASS GRAFT): Primary | ICD-10-CM

## 2019-07-24 PROCEDURE — 93798 PHYS/QHP OP CAR RHAB W/ECG: CPT

## 2019-07-24 NOTE — PROGRESS NOTES
Pt was seen today in CR for a Phase 2 visit.  Vital signs and session notes recorded in [employer] and will be scanned into Epic by HIM.

## 2019-07-25 ENCOUNTER — OFFICE VISIT (OUTPATIENT)
Dept: PRIMARY CARE CLINIC | Age: 64
End: 2019-07-25
Payer: MEDICARE

## 2019-07-25 ENCOUNTER — TELEPHONE (OUTPATIENT)
Dept: PRIMARY CARE CLINIC | Age: 64
End: 2019-07-25

## 2019-07-25 VITALS
SYSTOLIC BLOOD PRESSURE: 110 MMHG | BODY MASS INDEX: 34.36 KG/M2 | HEART RATE: 65 BPM | DIASTOLIC BLOOD PRESSURE: 70 MMHG | OXYGEN SATURATION: 98 % | WEIGHT: 226 LBS

## 2019-07-25 DIAGNOSIS — Z12.5 PROSTATE CANCER SCREENING: ICD-10-CM

## 2019-07-25 DIAGNOSIS — G62.9 PERIPHERAL POLYNEUROPATHY: ICD-10-CM

## 2019-07-25 DIAGNOSIS — M06.9 RHEUMATOID ARTHRITIS, INVOLVING UNSPECIFIED SITE, UNSPECIFIED RHEUMATOID FACTOR PRESENCE: ICD-10-CM

## 2019-07-25 DIAGNOSIS — R35.1 NOCTURIA: ICD-10-CM

## 2019-07-25 DIAGNOSIS — E11.9 TYPE 2 DIABETES MELLITUS WITHOUT COMPLICATION, WITHOUT LONG-TERM CURRENT USE OF INSULIN (HCC): Primary | ICD-10-CM

## 2019-07-25 DIAGNOSIS — I10 ESSENTIAL HYPERTENSION: ICD-10-CM

## 2019-07-25 LAB — HBA1C MFR BLD: 6.1 %

## 2019-07-25 PROCEDURE — G8417 CALC BMI ABV UP PARAM F/U: HCPCS | Performed by: NURSE PRACTITIONER

## 2019-07-25 PROCEDURE — 3017F COLORECTAL CA SCREEN DOC REV: CPT | Performed by: NURSE PRACTITIONER

## 2019-07-25 PROCEDURE — G8427 DOCREV CUR MEDS BY ELIG CLIN: HCPCS | Performed by: NURSE PRACTITIONER

## 2019-07-25 PROCEDURE — 90732 PPSV23 VACC 2 YRS+ SUBQ/IM: CPT | Performed by: NURSE PRACTITIONER

## 2019-07-25 PROCEDURE — 99214 OFFICE O/P EST MOD 30 MIN: CPT | Performed by: NURSE PRACTITIONER

## 2019-07-25 PROCEDURE — 83036 HEMOGLOBIN GLYCOSYLATED A1C: CPT | Performed by: NURSE PRACTITIONER

## 2019-07-25 PROCEDURE — G0009 ADMIN PNEUMOCOCCAL VACCINE: HCPCS | Performed by: NURSE PRACTITIONER

## 2019-07-25 PROCEDURE — G8598 ASA/ANTIPLAT THER USED: HCPCS | Performed by: NURSE PRACTITIONER

## 2019-07-25 PROCEDURE — 3044F HG A1C LEVEL LT 7.0%: CPT | Performed by: NURSE PRACTITIONER

## 2019-07-25 PROCEDURE — 2022F DILAT RTA XM EVC RTNOPTHY: CPT | Performed by: NURSE PRACTITIONER

## 2019-07-25 PROCEDURE — 1036F TOBACCO NON-USER: CPT | Performed by: NURSE PRACTITIONER

## 2019-07-25 RX ORDER — GABAPENTIN 300 MG/1
300 CAPSULE ORAL 3 TIMES DAILY
Qty: 270 CAPSULE | Refills: 1 | Status: SHIPPED | OUTPATIENT
Start: 2019-07-25 | End: 2019-12-27

## 2019-07-25 RX ORDER — CARVEDILOL 12.5 MG/1
TABLET ORAL
Refills: 3 | COMMUNITY
Start: 2019-04-25 | End: 2020-09-04

## 2019-07-25 RX ORDER — CARVEDILOL 25 MG/1
TABLET ORAL
COMMUNITY
Start: 2019-06-21

## 2019-07-25 RX ORDER — POTASSIUM CHLORIDE 750 MG/1
10 TABLET, FILM COATED, EXTENDED RELEASE ORAL
COMMUNITY
End: 2020-12-09 | Stop reason: DRUGHIGH

## 2019-07-25 RX ORDER — TAMSULOSIN HYDROCHLORIDE 0.4 MG/1
0.4 CAPSULE ORAL NIGHTLY
Qty: 90 CAPSULE | Refills: 3 | Status: SHIPPED | OUTPATIENT
Start: 2019-07-25 | End: 2020-05-07

## 2019-07-25 ASSESSMENT — ENCOUNTER SYMPTOMS
ABDOMINAL PAIN: 0
SHORTNESS OF BREATH: 0
COUGH: 0
NAUSEA: 0
EYE PAIN: 0
SORE THROAT: 0
VOMITING: 0

## 2019-07-29 ENCOUNTER — APPOINTMENT (OUTPATIENT)
Dept: CARDIAC REHAB | Facility: HOSPITAL | Age: 64
End: 2019-07-29

## 2019-07-31 ENCOUNTER — TREATMENT (OUTPATIENT)
Dept: CARDIAC REHAB | Facility: HOSPITAL | Age: 64
End: 2019-07-31

## 2019-07-31 DIAGNOSIS — Z95.1 S/P CABG (CORONARY ARTERY BYPASS GRAFT): Primary | ICD-10-CM

## 2019-07-31 PROCEDURE — 93798 PHYS/QHP OP CAR RHAB W/ECG: CPT

## 2019-07-31 NOTE — PROGRESS NOTES
Pt was seen today in CR for a Phase 2 visit.  Vital signs and session notes recorded in [employer] and will be scanned into Epic by HIM.

## 2019-08-01 ENCOUNTER — HOSPITAL ENCOUNTER (OUTPATIENT)
Facility: HOSPITAL | Age: 64
Discharge: HOME | End: 2019-08-01
Payer: MEDICARE

## 2019-08-01 DIAGNOSIS — I10 ESSENTIAL HYPERTENSION: ICD-10-CM

## 2019-08-01 DIAGNOSIS — R35.1 NOCTURIA: ICD-10-CM

## 2019-08-01 DIAGNOSIS — Z12.5 PROSTATE CANCER SCREENING: ICD-10-CM

## 2019-08-01 DIAGNOSIS — E11.9 TYPE 2 DIABETES MELLITUS WITHOUT COMPLICATION, WITHOUT LONG-TERM CURRENT USE OF INSULIN (HCC): ICD-10-CM

## 2019-08-01 PROCEDURE — 36415 COLL VENOUS BLD VENIPUNCTURE: CPT

## 2019-08-05 ENCOUNTER — TREATMENT (OUTPATIENT)
Dept: CARDIAC REHAB | Facility: HOSPITAL | Age: 64
End: 2019-08-05

## 2019-08-05 DIAGNOSIS — Z95.1 S/P CABG (CORONARY ARTERY BYPASS GRAFT): Primary | ICD-10-CM

## 2019-08-05 PROCEDURE — 93798 PHYS/QHP OP CAR RHAB W/ECG: CPT

## 2019-08-05 NOTE — PROGRESS NOTES
Pt was seen today in CR for a Phase 2 visit.  Vital signs and session notes recorded in [employer] and will be scanned into Epic by HIM.

## 2019-08-07 ENCOUNTER — TREATMENT (OUTPATIENT)
Dept: CARDIAC REHAB | Facility: HOSPITAL | Age: 64
End: 2019-08-07

## 2019-08-07 DIAGNOSIS — Z95.1 S/P CABG (CORONARY ARTERY BYPASS GRAFT): Primary | ICD-10-CM

## 2019-08-07 PROCEDURE — 93798 PHYS/QHP OP CAR RHAB W/ECG: CPT

## 2019-08-07 NOTE — PROGRESS NOTES
Pt was seen today in CR for a Phase 2 visit.  Vital signs and session notes recorded in [employer] and will be scanned into Epic by HIM.

## 2019-08-12 ENCOUNTER — TREATMENT (OUTPATIENT)
Dept: CARDIAC REHAB | Facility: HOSPITAL | Age: 64
End: 2019-08-12

## 2019-08-12 DIAGNOSIS — Z95.1 S/P CABG (CORONARY ARTERY BYPASS GRAFT): Primary | ICD-10-CM

## 2019-08-12 PROCEDURE — 93798 PHYS/QHP OP CAR RHAB W/ECG: CPT

## 2019-08-12 NOTE — PROGRESS NOTES
Have you seen any other physician or provider since your last visit? no    Have you had any other diagnostic tests since your last visit? no    Have you changed or stopped any medications since your last visit including any over-the-counter medicines, vitamins, or herbal medicines? no     Are you taking all your prescribed medications? Yes  If NO, why? -  N/A      REVIEW OF SYSTEMS:  Review of Systems   Constitutional: Negative for chills and fever. HENT: Negative for ear pain and sore throat. Eyes: Negative for pain and visual disturbance. Respiratory: Negative for cough and shortness of breath. Cardiovascular: Negative for chest pain, palpitations and leg swelling. Gastrointestinal: Negative for abdominal pain, nausea and vomiting. Genitourinary: Negative for dysuria and hematuria. Musculoskeletal: Negative for joint swelling. Skin: Negative for rash. Neurological: Negative for dizziness and weakness. Psychiatric/Behavioral: Negative for sleep disturbance.
place, and time. Skin: Skin is warm and dry. Psychiatric: He has a normal mood and affect. Vitals reviewed. Results in Past 30 Days  Result Component Current Result Ref Range Previous Result Ref Range   Alb 4.3 (8/1/2019) 3.6 - 4.8 g/dL Not in Time Range    Albumin/Globulin Ratio 2.0 (8/1/2019) 1.2 - 2.2 Not in Time Range    Alkaline Phosphatase 74 (8/1/2019) 39 - 117 IU/L Not in Time Range    ALT 18 (8/1/2019) 0 - 44 IU/L Not in Time Range    AST 16 (8/1/2019) 0 - 40 IU/L Not in Time Range    BUN 14 (8/1/2019) 8 - 27 mg/dL Not in Time Range    Calcium 9.3 (8/1/2019) 8.6 - 10.2 mg/dL Not in Time Range    Chloride 104 (8/1/2019) 96 - 106 mmol/L Not in Time Range    CO2 23 (8/1/2019) 20 - 29 mmol/L Not in Time Range    CREATININE 1.36 (H) (8/1/2019) 0.76 - 1.27 mg/dL Not in Time Range    GFR  64 (8/1/2019) >59 mL/min/1.73 Not in Time Range    GFR Non- 55 (L) (8/1/2019) >59 mL/min/1.73 Not in Time Range    Globulin 2.1 (8/1/2019) 1.5 - 4.5 g/dL Not in Time Range    Glucose 116 (H) (8/1/2019) 65 - 99 mg/dL Not in Time Range    Potassium 4.2 (8/1/2019) 3.5 - 5.2 mmol/L Not in Time Range    Sodium 142 (8/1/2019) 134 - 144 mmol/L Not in Time Range    Total Bilirubin 0.5 (8/1/2019) 0.0 - 1.2 mg/dL Not in Time Range    Total Protein 6.4 (8/1/2019) 6.0 - 8.5 g/dL Not in Time Range        Hemoglobin A1C (%)   Date Value   07/25/2019 6.1     Microscopic Examination (no units)   Date Value   12/15/2016 YES     LDL Calculated (mg/dL)   Date Value   08/01/2019 46         Lab Results   Component Value Date    WBC 5.6 08/01/2019    NEUTROABS 2.4 08/01/2019    HGB 15.7 08/01/2019    HCT 46.2 08/01/2019    MCV 88 08/01/2019     08/01/2019       Lab Results   Component Value Date    TSH 0.972 07/19/2014       Prior to Visit Medications    Medication Sig Taking?  Authorizing Provider   carvedilol (COREG) 25 MG tablet  Yes Historical Provider, MD   carvedilol (COREG) 12.5 MG tablet TAKE

## 2019-08-12 NOTE — PROGRESS NOTES
Pt was seen today in CR for a Phase 2 visit.  Vital signs and session notes recorded in [employer] and will be scanned into Epic by HIM.

## 2019-08-14 ENCOUNTER — TREATMENT (OUTPATIENT)
Dept: CARDIAC REHAB | Facility: HOSPITAL | Age: 64
End: 2019-08-14

## 2019-08-14 DIAGNOSIS — Z95.1 S/P CABG (CORONARY ARTERY BYPASS GRAFT): Primary | ICD-10-CM

## 2019-08-14 PROCEDURE — 93798 PHYS/QHP OP CAR RHAB W/ECG: CPT

## 2019-08-14 NOTE — PROGRESS NOTES
Pt was seen today in CR for a Phase 2 visit.  Vital signs and session notes recorded in [employer] and will be scanned into Epic by HIM.

## 2019-08-19 ENCOUNTER — APPOINTMENT (OUTPATIENT)
Dept: CARDIAC REHAB | Facility: HOSPITAL | Age: 64
End: 2019-08-19

## 2019-08-21 ENCOUNTER — APPOINTMENT (OUTPATIENT)
Dept: CARDIAC REHAB | Facility: HOSPITAL | Age: 64
End: 2019-08-21

## 2019-08-26 ENCOUNTER — TREATMENT (OUTPATIENT)
Dept: CARDIAC REHAB | Facility: HOSPITAL | Age: 64
End: 2019-08-26

## 2019-08-26 DIAGNOSIS — Z95.1 S/P CABG (CORONARY ARTERY BYPASS GRAFT): Primary | ICD-10-CM

## 2019-08-26 PROCEDURE — 93798 PHYS/QHP OP CAR RHAB W/ECG: CPT

## 2019-08-26 NOTE — PROGRESS NOTES
Pt was seen today in CR for a Phase 2 visit.  Vital signs and session notes recorded in [employer] and will be scanned into Epic by HIM.

## 2019-08-27 ENCOUNTER — TELEPHONE (OUTPATIENT)
Dept: PRIMARY CARE CLINIC | Age: 64
End: 2019-08-27

## 2019-08-28 ENCOUNTER — OFFICE VISIT (OUTPATIENT)
Dept: CARDIAC REHAB | Facility: HOSPITAL | Age: 64
End: 2019-08-28

## 2019-08-28 DIAGNOSIS — Z95.1 S/P CABG (CORONARY ARTERY BYPASS GRAFT): Primary | ICD-10-CM

## 2019-08-30 ENCOUNTER — APPOINTMENT (OUTPATIENT)
Dept: CARDIAC REHAB | Facility: HOSPITAL | Age: 64
End: 2019-08-30

## 2019-09-04 ENCOUNTER — OFFICE VISIT (OUTPATIENT)
Dept: CARDIAC REHAB | Facility: HOSPITAL | Age: 64
End: 2019-09-04

## 2019-09-04 DIAGNOSIS — Z95.1 S/P CABG (CORONARY ARTERY BYPASS GRAFT): Primary | ICD-10-CM

## 2019-09-06 ENCOUNTER — APPOINTMENT (OUTPATIENT)
Dept: CARDIAC REHAB | Facility: HOSPITAL | Age: 64
End: 2019-09-06

## 2019-09-09 ENCOUNTER — OFFICE VISIT (OUTPATIENT)
Dept: CARDIAC REHAB | Facility: HOSPITAL | Age: 64
End: 2019-09-09

## 2019-09-09 DIAGNOSIS — Z95.1 S/P CABG (CORONARY ARTERY BYPASS GRAFT): Primary | ICD-10-CM

## 2019-09-11 ENCOUNTER — APPOINTMENT (OUTPATIENT)
Dept: CARDIAC REHAB | Facility: HOSPITAL | Age: 64
End: 2019-09-11

## 2019-09-13 ENCOUNTER — APPOINTMENT (OUTPATIENT)
Dept: CARDIAC REHAB | Facility: HOSPITAL | Age: 64
End: 2019-09-13

## 2019-09-16 ENCOUNTER — OFFICE VISIT (OUTPATIENT)
Dept: CARDIAC REHAB | Facility: HOSPITAL | Age: 64
End: 2019-09-16

## 2019-09-16 DIAGNOSIS — Z95.1 S/P CABG (CORONARY ARTERY BYPASS GRAFT): Primary | ICD-10-CM

## 2019-09-17 ENCOUNTER — OFFICE VISIT (OUTPATIENT)
Dept: CARDIOLOGY | Facility: CLINIC | Age: 64
End: 2019-09-17

## 2019-09-17 VITALS
SYSTOLIC BLOOD PRESSURE: 148 MMHG | BODY MASS INDEX: 32.7 KG/M2 | HEIGHT: 69 IN | DIASTOLIC BLOOD PRESSURE: 72 MMHG | WEIGHT: 220.8 LBS | OXYGEN SATURATION: 99 % | HEART RATE: 67 BPM

## 2019-09-17 DIAGNOSIS — E78.2 MIXED HYPERLIPIDEMIA: ICD-10-CM

## 2019-09-17 DIAGNOSIS — I10 BENIGN ESSENTIAL HTN: ICD-10-CM

## 2019-09-17 DIAGNOSIS — I25.10 CAD IN NATIVE ARTERY: Primary | ICD-10-CM

## 2019-09-17 PROCEDURE — 99214 OFFICE O/P EST MOD 30 MIN: CPT | Performed by: INTERNAL MEDICINE

## 2019-09-17 RX ORDER — TAMSULOSIN HYDROCHLORIDE 0.4 MG/1
1 CAPSULE ORAL DAILY
COMMUNITY

## 2019-09-17 NOTE — PROGRESS NOTES
[facility] at [facility]  Office visit  Jesse Brink  1955  [phone]    VISIT DATE:  9/17/2019      PCP: Terri Barreto APRN  [address] [facility] [address]    CC:  Chief Complaint   Patient presents with   • Coronary Artery Disease   • Hypertension       PROBLEM LIST:  1. Coronary artery disease  2. Transient ischemic attack with normal echocardiogram, carotid duplex, and CT scan of the brain by verbal report.  3. Benign hypertension.  4. History of tobacco, quitting 28 years ago.  5. Hyperglycemia, diet controlled.  6. Rheumatoid arthritis.  7. Obesity.  8. Family history of heart disease.  9. Surgical history:  a. Pilonidal cyst, 1977.  b. Benign fibroid removed, 1980.  c. Anal fistula repair, 1999 and 2008.    Previous cardiac studies and procedures  January 2019  Cardiac catheterization  · Multivessel obstructive coronary disease: Proximal RCA .  Mid LAD .  95% proximal first diagonal, 95% first obtuse marginal branch, 70-80% proximal second obtuse marginal branch.  · Mildly elevated LVEDP  · No aortic stenosis.  Echo   · Left ventricular wall thickness is consistent with mild concentric hypertrophy.  · Estimated EF appears to be in the range of 36 - 40%  · Left ventricular diastolic dysfunction (grade I a) consistent with impaired relaxation.  · The following left ventricular wall segments are hypokinetic: mid anterior, apical anterior, apical lateral, apical inferior, apical septal and apex hypokinetic.  · Regional contracility augments post PVC.  Carotid duplex  · Right internal carotid artery stenosis of 0-49%.  · Left internal carotid artery stenosis of 0-49%.    1. Coronary artery bypass graft x 5  -LIMA to LAD.    -Greater saphenous vein to RCA  -Greater saphenous vein to OM1  -Greater saphenous vein to OM2  -Greater saphenous vein to D1  2. Left atrial appendage ligation (35 mm Atricure clip placement)    May 2019 echo  · Left ventricular systolic function is  "normal.  · Calculated EF = 55%  · Left atrial cavity size is mildly dilated.      ASSESSMENT:   Diagnosis Plan   1. CAD in native artery     2. Mixed hyperlipidemia     3. Benign essential HTN         PLAN:  Coronary artery disease: Status post surgical revascularization.  Continue cardiac rehab.  Normal EF, continue current medical therapy.    Hypertension: Goal less than 130/90.  Currently well controlled, continue current medical therapy.    Palpitations: Consistent with symptomatic premature ventricular contractions.   Continue beta-blockade.  Currently well controlled.    Hyperlipidemia: Continue high intensity statin therapy, goal LDL less than 70.    History of stroke: Continue aggressive risk factor modification.  Continue current medical therapy.    Subjective  Currently in phase III cardiac rehab. Intermittent isolated palps when lying in bed at night.  Blood pressures running less than 130/80 mmHg, reviewed cardiac rehab summary and home blood pressure log.  He is compliant with medical therapy. Reviewed most recent echo and labwork.    PHYSICAL EXAMINATION:  Vitals:    09/17/19 1514   BP: 148/72   BP Location: Right arm   Patient Position: Sitting   Pulse: 67   SpO2: 99%   Weight: 100 kg (220 lb 12.8 oz)   Height: 175.3 cm (69\")     General Appearance:    Alert, cooperative, no distress, appears stated age   Head:    Normocephalic, without obvious abnormality, atraumatic   Eyes:    conjunctiva/corneas clear   Nose:   Nares normal, septum midline, mucosa normal, no drainage   Throat:   Lips, teeth and gums normal   Neck:   Supple, symmetrical, trachea midline, no carotid    bruit or JVD   Lungs:     Clear to auscultation bilaterally, respirations unlabored   Chest Wall:    No tenderness or deformity    Heart:    Regular rate and rhythm, S1 and S2 normal, no murmur, rub   or gallop, normal carotid impulse bilaterally without bruit.   Abdomen:     Soft, non-tender   Extremities:   Extremities normal, " atraumatic, no cyanosis or edema   Pulses:   2+ and symmetric all extremities   Skin:   Skin color, texture, turgor normal, no rashes or lesions       Diagnostic Data:  Procedures  Lab Results   Component Value Date    TRIG 168 (H) 01/18/2019    HDL 29 (L) 01/18/2019     Lab Results   Component Value Date    GLUCOSE 149 02/15/2019    BUN 17 02/05/2019    CREATININE 0.97 02/05/2019     02/05/2019    K 4.3 02/05/2019     02/05/2019    CO2 31.0 02/05/2019     Lab Results   Component Value Date    HGBA1C 5.9 04/23/2019     Lab Results   Component Value Date    WBC 9.72 02/05/2019    HGB 13.0 (L) 02/05/2019    HCT 39.3 02/05/2019     02/05/2019       Allergies  Allergies   Allergen Reactions   • Humira [Adalimumab] Other (See Comments)     HA, intractable ocular migraine       Current Medications    Current Outpatient Medications:   •  amLODIPine (NORVASC) 5 MG tablet, Take 1 tablet by mouth every night at bedtime., Disp: 90 tablet, Rfl: 4  •  aspirin 325 MG EC tablet, Take 1 tablet by mouth Daily., Disp: 90 tablet, Rfl: 1  •  baclofen (LIORESAL) 10 MG tablet, Take 10 mg by mouth As Needed for Muscle Spasms., Disp: , Rfl:   •  carvedilol (COREG) 25 MG tablet, Take 1.5 tablets by mouth 2 (Two) Times a Day., Disp: 270 tablet, Rfl: 1  •  Cholecalciferol (VITAMIN D3) 2000 UNITS capsule, Take 2,000 Units by mouth Daily., Disp: , Rfl:   •  Docusate Calcium (STOOL SOFTENER PO), Take 1 tablet by mouth 2 (Two) Times a Day., Disp: , Rfl:   •  Empagliflozin (JARDIANCE) 25 MG tablet, Take 25 mg by mouth Daily., Disp: , Rfl:   •  ENBREL SURECLICK 50 MG/ML solution auto-injector, Inject 50 mg under the skin into the appropriate area as directed 1 (One) Time Per Week., Disp: , Rfl:   •  gabapentin (NEURONTIN) 300 MG capsule, Take 300 mg by mouth 2 (Two) Times a Day., Disp: , Rfl:   •  losartan (COZAAR) 100 MG tablet, Take 1.5 tablets by mouth Daily., Disp: 135 tablet, Rfl: 1  •  potassium chloride (K-DUR) 10 MEQ CR  tablet, Take 10 mEq by mouth Daily., Disp: , Rfl:   •  rosuvastatin (CRESTOR) 40 MG tablet, Take 1 tablet by mouth Daily., Disp: 90 tablet, Rfl: 1  •  sertraline (ZOLOFT) 50 MG tablet, Take 50 mg by mouth Daily., Disp: , Rfl:   •  tamsulosin (FLOMAX) 0.4 MG capsule 24 hr capsule, Take 1 capsule by mouth Daily., Disp: , Rfl:   No current facility-administered medications for this visit.     Facility-Administered Medications Ordered in Other Visits:   •  Chlorhexidine Gluconate Cloth 2 % pads 1 application, 1 application, Topical, Q12H PRN, Marie Barnes PA-C ROS  Review of Systems   Cardiovascular: Positive for leg swelling. Negative for chest pain, claudication, dyspnea on exertion, irregular heartbeat and palpitations.   Respiratory: Negative for cough and shortness of breath.          SOCIAL HX  Social History     Socioeconomic History   • Marital status:      Spouse name: Not on file   • Number of children: 2   • Years of education: Not on file   • Highest education level: Not on file   Occupational History   • Occupation:      Comment: Retired   Tobacco Use   • Smoking status: Former Smoker     Packs/day: 1.00     Types: Cigarettes     Last attempt to quit: 3/5/1984     Years since quittin.5   • Smokeless tobacco: Never Used   Substance and Sexual Activity   • Alcohol use: Yes     Alcohol/week: 0.6 oz     Types: 1 Cans of beer per week     Comment: occas   • Drug use: No   • Sexual activity: Defer   Social History Narrative    Lives in [place].       FAMILY HX  Family History   Problem Relation Age of Onset   • Heart disease Other    • Diabetes Other    • Cancer Other    • Hypertension Other    • Heart disease Mother    • Diabetes Father    • Hypertension Father    • Cancer Father              Dillon Neville III, MD, FACC

## 2019-09-18 ENCOUNTER — OFFICE VISIT (OUTPATIENT)
Dept: PRIMARY CARE CLINIC | Age: 64
End: 2019-09-18
Payer: MEDICARE

## 2019-09-18 ENCOUNTER — TELEPHONE (OUTPATIENT)
Dept: PRIMARY CARE CLINIC | Age: 64
End: 2019-09-18

## 2019-09-18 ENCOUNTER — OFFICE VISIT (OUTPATIENT)
Dept: CARDIAC REHAB | Facility: HOSPITAL | Age: 64
End: 2019-09-18

## 2019-09-18 VITALS
DIASTOLIC BLOOD PRESSURE: 70 MMHG | SYSTOLIC BLOOD PRESSURE: 122 MMHG | BODY MASS INDEX: 32.99 KG/M2 | HEART RATE: 71 BPM | WEIGHT: 217 LBS | OXYGEN SATURATION: 99 %

## 2019-09-18 DIAGNOSIS — M06.9 RHEUMATOID ARTHRITIS, INVOLVING UNSPECIFIED SITE, UNSPECIFIED RHEUMATOID FACTOR PRESENCE: ICD-10-CM

## 2019-09-18 DIAGNOSIS — Z95.1 S/P CABG (CORONARY ARTERY BYPASS GRAFT): Primary | ICD-10-CM

## 2019-09-18 DIAGNOSIS — E11.42 TYPE 2 DIABETES MELLITUS WITH DIABETIC POLYNEUROPATHY, WITHOUT LONG-TERM CURRENT USE OF INSULIN (HCC): Primary | ICD-10-CM

## 2019-09-18 DIAGNOSIS — F32.A DEPRESSION, UNSPECIFIED DEPRESSION TYPE: ICD-10-CM

## 2019-09-18 DIAGNOSIS — I10 ESSENTIAL HYPERTENSION: ICD-10-CM

## 2019-09-18 PROCEDURE — 3017F COLORECTAL CA SCREEN DOC REV: CPT | Performed by: NURSE PRACTITIONER

## 2019-09-18 PROCEDURE — 2022F DILAT RTA XM EVC RTNOPTHY: CPT | Performed by: NURSE PRACTITIONER

## 2019-09-18 PROCEDURE — 99214 OFFICE O/P EST MOD 30 MIN: CPT | Performed by: NURSE PRACTITIONER

## 2019-09-18 PROCEDURE — G8598 ASA/ANTIPLAT THER USED: HCPCS | Performed by: NURSE PRACTITIONER

## 2019-09-18 PROCEDURE — 3044F HG A1C LEVEL LT 7.0%: CPT | Performed by: NURSE PRACTITIONER

## 2019-09-18 PROCEDURE — 1036F TOBACCO NON-USER: CPT | Performed by: NURSE PRACTITIONER

## 2019-09-18 PROCEDURE — G8417 CALC BMI ABV UP PARAM F/U: HCPCS | Performed by: NURSE PRACTITIONER

## 2019-09-18 PROCEDURE — G8427 DOCREV CUR MEDS BY ELIG CLIN: HCPCS | Performed by: NURSE PRACTITIONER

## 2019-09-18 RX ORDER — DULOXETIN HYDROCHLORIDE 30 MG/1
30 CAPSULE, DELAYED RELEASE ORAL DAILY
Qty: 90 CAPSULE | Refills: 3 | Status: SHIPPED | OUTPATIENT
Start: 2019-09-18 | End: 2020-01-28 | Stop reason: ALTCHOICE

## 2019-09-18 RX ORDER — ROSUVASTATIN CALCIUM 40 MG/1
TABLET, COATED ORAL
Qty: 90 TABLET | Refills: 1 | Status: SHIPPED | OUTPATIENT
Start: 2019-09-18 | End: 2019-12-23

## 2019-09-18 ASSESSMENT — ENCOUNTER SYMPTOMS
SORE THROAT: 0
EYE PAIN: 0
VOMITING: 0
ABDOMINAL PAIN: 0
SHORTNESS OF BREATH: 0
NAUSEA: 0
COUGH: 0

## 2019-09-18 NOTE — PATIENT INSTRUCTIONS
Finish supply of Zoloft, then start Cymbalta. Let me know if you feel it needs to be stronger after about 3-4 weeks.

## 2019-09-20 ENCOUNTER — APPOINTMENT (OUTPATIENT)
Dept: CARDIAC REHAB | Facility: HOSPITAL | Age: 64
End: 2019-09-20

## 2019-09-23 ENCOUNTER — APPOINTMENT (OUTPATIENT)
Dept: CARDIAC REHAB | Facility: HOSPITAL | Age: 64
End: 2019-09-23

## 2019-09-25 ENCOUNTER — OFFICE VISIT (OUTPATIENT)
Dept: CARDIAC REHAB | Facility: HOSPITAL | Age: 64
End: 2019-09-25

## 2019-09-25 DIAGNOSIS — Z95.1 S/P CABG (CORONARY ARTERY BYPASS GRAFT): Primary | ICD-10-CM

## 2019-09-27 ENCOUNTER — APPOINTMENT (OUTPATIENT)
Dept: CARDIAC REHAB | Facility: HOSPITAL | Age: 64
End: 2019-09-27

## 2019-09-29 NOTE — PROGRESS NOTES
lesions/pre-ulcerative calluses: absent  Edema: right- negative, left- negative    Sensory exam:  Monofilament sensation: abnormal - decreased sensation in the heel area  (minimum of 5 random plantar locations tested, avoiding callused areas - > 1 area with absence of sensation is + for neuropathy)    Plus at least one of the following:  Pulses: normal,   Pinprick: N/A  Proprioception: N/A  Vibration (128 Hz): N/A  Callusing to the heels    No results found for requested labs within last 30 days. Hemoglobin A1C (%)   Date Value   07/25/2019 6.1     Microscopic Examination (no units)   Date Value   12/15/2016 YES     LDL Calculated (mg/dL)   Date Value   08/01/2019 46         Lab Results   Component Value Date    WBC 5.6 08/01/2019    NEUTROABS 2.4 08/01/2019    HGB 15.7 08/01/2019    HCT 46.2 08/01/2019    MCV 88 08/01/2019     08/01/2019       Lab Results   Component Value Date    TSH 0.972 07/19/2014       Prior to Visit Medications    Medication Sig Taking?  Authorizing Provider   aspirin 81 MG tablet Take 81 mg by mouth daily Yes Historical Provider, MD   DULoxetine (CYMBALTA) 30 MG extended release capsule Take 1 capsule by mouth daily Yes WILDER Coley   empagliflozin (JARDIANCE) 25 MG tablet Samples given in office Yes WILDER Coley   carvedilol (COREG) 25 MG tablet  Yes Historical Provider, MD   carvedilol (COREG) 12.5 MG tablet TAKE ONE TABLET BY MOUTH TWO TIMES A DAY Yes Historical Provider, MD   potassium chloride (KLOR-CON) 10 MEQ extended release tablet Take 10 mEq by mouth Yes Historical Provider, MD   tamsulosin (FLOMAX) 0.4 MG capsule Take 1 capsule by mouth nightly For prostate Yes WILDER Coley   [facility] MISC USE TO TEST BLOOD SUGAR 4 TIMES DAILY Yes WILDER Coley   docusate calcium (SURFAK) 240 MG capsule Take 2 tablets by mouth Yes Historical Provider, MD   amLODIPine (NORVASC) 5 MG tablet  Yes Historical Provider, MD   sertraline

## 2019-09-30 ENCOUNTER — OFFICE VISIT (OUTPATIENT)
Dept: CARDIAC REHAB | Facility: HOSPITAL | Age: 64
End: 2019-09-30

## 2019-09-30 DIAGNOSIS — Z95.1 S/P CABG (CORONARY ARTERY BYPASS GRAFT): Primary | ICD-10-CM

## 2019-10-02 ENCOUNTER — OFFICE VISIT (OUTPATIENT)
Dept: CARDIAC REHAB | Facility: HOSPITAL | Age: 64
End: 2019-10-02

## 2019-10-02 DIAGNOSIS — Z95.1 S/P CABG (CORONARY ARTERY BYPASS GRAFT): Primary | ICD-10-CM

## 2019-10-04 ENCOUNTER — APPOINTMENT (OUTPATIENT)
Dept: CARDIAC REHAB | Facility: HOSPITAL | Age: 64
End: 2019-10-04

## 2019-10-07 ENCOUNTER — OFFICE VISIT (OUTPATIENT)
Dept: CARDIAC REHAB | Facility: HOSPITAL | Age: 64
End: 2019-10-07

## 2019-10-07 DIAGNOSIS — Z95.1 S/P CABG (CORONARY ARTERY BYPASS GRAFT): Primary | ICD-10-CM

## 2019-10-09 ENCOUNTER — OFFICE VISIT (OUTPATIENT)
Dept: CARDIAC REHAB | Facility: HOSPITAL | Age: 64
End: 2019-10-09

## 2019-10-09 DIAGNOSIS — Z95.1 S/P CABG (CORONARY ARTERY BYPASS GRAFT): Primary | ICD-10-CM

## 2019-10-11 ENCOUNTER — APPOINTMENT (OUTPATIENT)
Dept: CARDIAC REHAB | Facility: HOSPITAL | Age: 64
End: 2019-10-11

## 2019-10-14 ENCOUNTER — OFFICE VISIT (OUTPATIENT)
Dept: CARDIAC REHAB | Facility: HOSPITAL | Age: 64
End: 2019-10-14

## 2019-10-14 DIAGNOSIS — Z95.1 S/P CABG (CORONARY ARTERY BYPASS GRAFT): Primary | ICD-10-CM

## 2019-10-16 ENCOUNTER — OFFICE VISIT (OUTPATIENT)
Dept: CARDIAC REHAB | Facility: HOSPITAL | Age: 64
End: 2019-10-16

## 2019-10-16 DIAGNOSIS — Z95.1 S/P CABG (CORONARY ARTERY BYPASS GRAFT): Primary | ICD-10-CM

## 2019-10-18 ENCOUNTER — APPOINTMENT (OUTPATIENT)
Dept: CARDIAC REHAB | Facility: HOSPITAL | Age: 64
End: 2019-10-18

## 2019-10-21 ENCOUNTER — APPOINTMENT (OUTPATIENT)
Dept: CARDIAC REHAB | Facility: HOSPITAL | Age: 64
End: 2019-10-21

## 2019-10-23 ENCOUNTER — OFFICE VISIT (OUTPATIENT)
Dept: CARDIAC REHAB | Facility: HOSPITAL | Age: 64
End: 2019-10-23

## 2019-10-23 DIAGNOSIS — Z95.1 S/P CABG (CORONARY ARTERY BYPASS GRAFT): Primary | ICD-10-CM

## 2019-10-25 ENCOUNTER — OFFICE VISIT (OUTPATIENT)
Dept: PRIMARY CARE CLINIC | Age: 64
End: 2019-10-25
Payer: MEDICARE

## 2019-10-25 ENCOUNTER — APPOINTMENT (OUTPATIENT)
Dept: CARDIAC REHAB | Facility: HOSPITAL | Age: 64
End: 2019-10-25

## 2019-10-25 VITALS
WEIGHT: 219.6 LBS | BODY MASS INDEX: 33.39 KG/M2 | SYSTOLIC BLOOD PRESSURE: 130 MMHG | HEART RATE: 83 BPM | DIASTOLIC BLOOD PRESSURE: 80 MMHG | OXYGEN SATURATION: 98 %

## 2019-10-25 DIAGNOSIS — I25.119 CORONARY ARTERY DISEASE INVOLVING NATIVE HEART WITH ANGINA PECTORIS, UNSPECIFIED VESSEL OR LESION TYPE (HCC): ICD-10-CM

## 2019-10-25 DIAGNOSIS — I10 ESSENTIAL HYPERTENSION: ICD-10-CM

## 2019-10-25 DIAGNOSIS — M06.9 RHEUMATOID ARTHRITIS, INVOLVING UNSPECIFIED SITE, UNSPECIFIED RHEUMATOID FACTOR PRESENCE: Primary | ICD-10-CM

## 2019-10-25 DIAGNOSIS — E11.42 TYPE 2 DIABETES MELLITUS WITH DIABETIC POLYNEUROPATHY, WITHOUT LONG-TERM CURRENT USE OF INSULIN (HCC): ICD-10-CM

## 2019-10-25 LAB — HBA1C MFR BLD: 6 %

## 2019-10-25 PROCEDURE — G0008 ADMIN INFLUENZA VIRUS VAC: HCPCS | Performed by: NURSE PRACTITIONER

## 2019-10-25 PROCEDURE — G8417 CALC BMI ABV UP PARAM F/U: HCPCS | Performed by: NURSE PRACTITIONER

## 2019-10-25 PROCEDURE — 99213 OFFICE O/P EST LOW 20 MIN: CPT | Performed by: NURSE PRACTITIONER

## 2019-10-25 PROCEDURE — G8482 FLU IMMUNIZE ORDER/ADMIN: HCPCS | Performed by: NURSE PRACTITIONER

## 2019-10-25 PROCEDURE — G8598 ASA/ANTIPLAT THER USED: HCPCS | Performed by: NURSE PRACTITIONER

## 2019-10-25 PROCEDURE — 83036 HEMOGLOBIN GLYCOSYLATED A1C: CPT | Performed by: NURSE PRACTITIONER

## 2019-10-25 PROCEDURE — G8427 DOCREV CUR MEDS BY ELIG CLIN: HCPCS | Performed by: NURSE PRACTITIONER

## 2019-10-25 PROCEDURE — 3017F COLORECTAL CA SCREEN DOC REV: CPT | Performed by: NURSE PRACTITIONER

## 2019-10-25 PROCEDURE — 1036F TOBACCO NON-USER: CPT | Performed by: NURSE PRACTITIONER

## 2019-10-25 PROCEDURE — 3044F HG A1C LEVEL LT 7.0%: CPT | Performed by: NURSE PRACTITIONER

## 2019-10-25 PROCEDURE — 90688 IIV4 VACCINE SPLT 0.5 ML IM: CPT | Performed by: NURSE PRACTITIONER

## 2019-10-25 PROCEDURE — 2022F DILAT RTA XM EVC RTNOPTHY: CPT | Performed by: NURSE PRACTITIONER

## 2019-10-25 RX ORDER — TRAMADOL HYDROCHLORIDE 50 MG/1
50 TABLET ORAL EVERY 8 HOURS PRN
Qty: 90 TABLET | Refills: 2 | Status: SHIPPED | OUTPATIENT
Start: 2019-10-25 | End: 2021-05-07 | Stop reason: SDUPTHER

## 2019-10-25 ASSESSMENT — ENCOUNTER SYMPTOMS
NAUSEA: 0
EYE PAIN: 0
COUGH: 0
ABDOMINAL PAIN: 0
SHORTNESS OF BREATH: 0
VOMITING: 0
SORE THROAT: 0

## 2019-10-28 ENCOUNTER — OFFICE VISIT (OUTPATIENT)
Dept: CARDIAC REHAB | Facility: HOSPITAL | Age: 64
End: 2019-10-28

## 2019-10-28 DIAGNOSIS — Z95.1 S/P CABG (CORONARY ARTERY BYPASS GRAFT): Primary | ICD-10-CM

## 2019-10-30 ENCOUNTER — OFFICE VISIT (OUTPATIENT)
Dept: CARDIAC REHAB | Facility: HOSPITAL | Age: 64
End: 2019-10-30

## 2019-10-30 DIAGNOSIS — Z95.1 S/P CABG (CORONARY ARTERY BYPASS GRAFT): Primary | ICD-10-CM

## 2019-11-01 ENCOUNTER — APPOINTMENT (OUTPATIENT)
Dept: CARDIAC REHAB | Facility: HOSPITAL | Age: 64
End: 2019-11-01

## 2019-11-04 ENCOUNTER — OFFICE VISIT (OUTPATIENT)
Dept: CARDIAC REHAB | Facility: HOSPITAL | Age: 64
End: 2019-11-04

## 2019-11-04 DIAGNOSIS — Z95.1 S/P CABG (CORONARY ARTERY BYPASS GRAFT): Primary | ICD-10-CM

## 2019-11-06 ENCOUNTER — OFFICE VISIT (OUTPATIENT)
Dept: CARDIAC REHAB | Facility: HOSPITAL | Age: 64
End: 2019-11-06

## 2019-11-06 DIAGNOSIS — Z95.1 S/P CABG (CORONARY ARTERY BYPASS GRAFT): Primary | ICD-10-CM

## 2019-11-08 ENCOUNTER — APPOINTMENT (OUTPATIENT)
Dept: CARDIAC REHAB | Facility: HOSPITAL | Age: 64
End: 2019-11-08

## 2019-11-11 ENCOUNTER — OFFICE VISIT (OUTPATIENT)
Dept: CARDIAC REHAB | Facility: HOSPITAL | Age: 64
End: 2019-11-11

## 2019-11-11 DIAGNOSIS — Z95.1 S/P CABG (CORONARY ARTERY BYPASS GRAFT): Primary | ICD-10-CM

## 2019-11-13 ENCOUNTER — OFFICE VISIT (OUTPATIENT)
Dept: CARDIAC REHAB | Facility: HOSPITAL | Age: 64
End: 2019-11-13

## 2019-11-13 DIAGNOSIS — Z95.1 S/P CABG (CORONARY ARTERY BYPASS GRAFT): Primary | ICD-10-CM

## 2019-11-18 ENCOUNTER — APPOINTMENT (OUTPATIENT)
Dept: CARDIAC REHAB | Facility: HOSPITAL | Age: 64
End: 2019-11-18

## 2019-11-20 ENCOUNTER — APPOINTMENT (OUTPATIENT)
Dept: CARDIAC REHAB | Facility: HOSPITAL | Age: 64
End: 2019-11-20

## 2019-11-22 ENCOUNTER — APPOINTMENT (OUTPATIENT)
Dept: CARDIAC REHAB | Facility: HOSPITAL | Age: 64
End: 2019-11-22

## 2019-11-25 ENCOUNTER — OFFICE VISIT (OUTPATIENT)
Dept: CARDIAC REHAB | Facility: HOSPITAL | Age: 64
End: 2019-11-25

## 2019-11-25 DIAGNOSIS — Z95.1 S/P CABG (CORONARY ARTERY BYPASS GRAFT): Primary | ICD-10-CM

## 2019-11-27 ENCOUNTER — OFFICE VISIT (OUTPATIENT)
Dept: CARDIAC REHAB | Facility: HOSPITAL | Age: 64
End: 2019-11-27

## 2019-11-27 DIAGNOSIS — Z95.1 S/P CABG (CORONARY ARTERY BYPASS GRAFT): Primary | ICD-10-CM

## 2019-11-27 RX ORDER — DIPHENHYDRAMINE HCL 25 MG
1 TABLET ORAL DAILY
Qty: 1 KIT | Refills: 0 | Status: SHIPPED | OUTPATIENT
Start: 2019-11-27

## 2019-11-27 RX ORDER — GLUCOSAM/CHON-MSM1/C/MANG/BOSW 500-416.6
1 TABLET ORAL DAILY
Qty: 300 EACH | Refills: 3 | Status: SHIPPED | OUTPATIENT
Start: 2019-11-27 | End: 2020-01-28 | Stop reason: ALTCHOICE

## 2019-11-29 ENCOUNTER — APPOINTMENT (OUTPATIENT)
Dept: CARDIAC REHAB | Facility: HOSPITAL | Age: 64
End: 2019-11-29

## 2019-12-02 ENCOUNTER — APPOINTMENT (OUTPATIENT)
Dept: CARDIAC REHAB | Facility: HOSPITAL | Age: 64
End: 2019-12-02

## 2019-12-04 ENCOUNTER — OFFICE VISIT (OUTPATIENT)
Dept: CARDIAC REHAB | Facility: HOSPITAL | Age: 64
End: 2019-12-04

## 2019-12-04 DIAGNOSIS — Z95.1 S/P CABG (CORONARY ARTERY BYPASS GRAFT): Primary | ICD-10-CM

## 2019-12-06 ENCOUNTER — APPOINTMENT (OUTPATIENT)
Dept: CARDIAC REHAB | Facility: HOSPITAL | Age: 64
End: 2019-12-06

## 2019-12-09 ENCOUNTER — OFFICE VISIT (OUTPATIENT)
Dept: CARDIAC REHAB | Facility: HOSPITAL | Age: 64
End: 2019-12-09

## 2019-12-09 DIAGNOSIS — Z95.1 S/P CABG (CORONARY ARTERY BYPASS GRAFT): Primary | ICD-10-CM

## 2019-12-11 ENCOUNTER — OFFICE VISIT (OUTPATIENT)
Dept: CARDIAC REHAB | Facility: HOSPITAL | Age: 64
End: 2019-12-11

## 2019-12-11 DIAGNOSIS — Z95.1 S/P CABG (CORONARY ARTERY BYPASS GRAFT): Primary | ICD-10-CM

## 2019-12-13 ENCOUNTER — APPOINTMENT (OUTPATIENT)
Dept: CARDIAC REHAB | Facility: HOSPITAL | Age: 64
End: 2019-12-13

## 2019-12-13 ENCOUNTER — OFFICE VISIT (OUTPATIENT)
Dept: PRIMARY CARE CLINIC | Age: 64
End: 2019-12-13
Payer: MEDICARE

## 2019-12-13 VITALS
DIASTOLIC BLOOD PRESSURE: 70 MMHG | BODY MASS INDEX: 33.83 KG/M2 | WEIGHT: 223.2 LBS | SYSTOLIC BLOOD PRESSURE: 120 MMHG | HEART RATE: 78 BPM | OXYGEN SATURATION: 98 % | HEIGHT: 68 IN

## 2019-12-13 DIAGNOSIS — R05.9 COUGH: ICD-10-CM

## 2019-12-13 DIAGNOSIS — F32.A DEPRESSION, UNSPECIFIED DEPRESSION TYPE: ICD-10-CM

## 2019-12-13 DIAGNOSIS — M06.9 RHEUMATOID ARTHRITIS, INVOLVING UNSPECIFIED SITE, UNSPECIFIED RHEUMATOID FACTOR PRESENCE: ICD-10-CM

## 2019-12-13 DIAGNOSIS — E11.42 TYPE 2 DIABETES MELLITUS WITH DIABETIC POLYNEUROPATHY, WITHOUT LONG-TERM CURRENT USE OF INSULIN (HCC): ICD-10-CM

## 2019-12-13 DIAGNOSIS — J01.90 ACUTE SINUSITIS, RECURRENCE NOT SPECIFIED, UNSPECIFIED LOCATION: ICD-10-CM

## 2019-12-13 DIAGNOSIS — R25.2 HAND CRAMPS: Primary | ICD-10-CM

## 2019-12-13 DIAGNOSIS — I10 ESSENTIAL HYPERTENSION: ICD-10-CM

## 2019-12-13 PROCEDURE — 2022F DILAT RTA XM EVC RTNOPTHY: CPT | Performed by: NURSE PRACTITIONER

## 2019-12-13 PROCEDURE — 3044F HG A1C LEVEL LT 7.0%: CPT | Performed by: NURSE PRACTITIONER

## 2019-12-13 PROCEDURE — 99213 OFFICE O/P EST LOW 20 MIN: CPT | Performed by: NURSE PRACTITIONER

## 2019-12-13 PROCEDURE — 1036F TOBACCO NON-USER: CPT | Performed by: NURSE PRACTITIONER

## 2019-12-13 PROCEDURE — G8598 ASA/ANTIPLAT THER USED: HCPCS | Performed by: NURSE PRACTITIONER

## 2019-12-13 PROCEDURE — G8417 CALC BMI ABV UP PARAM F/U: HCPCS | Performed by: NURSE PRACTITIONER

## 2019-12-13 PROCEDURE — G8482 FLU IMMUNIZE ORDER/ADMIN: HCPCS | Performed by: NURSE PRACTITIONER

## 2019-12-13 PROCEDURE — G8427 DOCREV CUR MEDS BY ELIG CLIN: HCPCS | Performed by: NURSE PRACTITIONER

## 2019-12-13 PROCEDURE — 3017F COLORECTAL CA SCREEN DOC REV: CPT | Performed by: NURSE PRACTITIONER

## 2019-12-13 RX ORDER — AMOXICILLIN AND CLAVULANATE POTASSIUM 875; 125 MG/1; MG/1
1 TABLET, FILM COATED ORAL 2 TIMES DAILY WITH MEALS
Qty: 20 TABLET | Refills: 0 | Status: SHIPPED | OUTPATIENT
Start: 2019-12-13 | End: 2019-12-23

## 2019-12-13 RX ORDER — HYDROCODONE POLISTIREX AND CHLORPHENIRAMINE POLISTIREX 10; 8 MG/5ML; MG/5ML
5 SUSPENSION, EXTENDED RELEASE ORAL EVERY 12 HOURS PRN
Qty: 30 ML | Refills: 0 | Status: SHIPPED | OUTPATIENT
Start: 2019-12-13 | End: 2019-12-16

## 2019-12-13 ASSESSMENT — ENCOUNTER SYMPTOMS
NAUSEA: 0
SORE THROAT: 1
VOMITING: 0
SHORTNESS OF BREATH: 0
ABDOMINAL PAIN: 0
EYE PAIN: 0
COUGH: 1

## 2019-12-16 ENCOUNTER — OFFICE VISIT (OUTPATIENT)
Dept: CARDIAC REHAB | Facility: HOSPITAL | Age: 64
End: 2019-12-16

## 2019-12-16 DIAGNOSIS — Z95.1 S/P CABG (CORONARY ARTERY BYPASS GRAFT): Primary | ICD-10-CM

## 2019-12-18 ENCOUNTER — OFFICE VISIT (OUTPATIENT)
Dept: CARDIAC REHAB | Facility: HOSPITAL | Age: 64
End: 2019-12-18

## 2019-12-18 DIAGNOSIS — Z95.1 S/P CABG (CORONARY ARTERY BYPASS GRAFT): Primary | ICD-10-CM

## 2019-12-20 ENCOUNTER — APPOINTMENT (OUTPATIENT)
Dept: CARDIAC REHAB | Facility: HOSPITAL | Age: 64
End: 2019-12-20

## 2019-12-22 DIAGNOSIS — G62.9 PERIPHERAL POLYNEUROPATHY: ICD-10-CM

## 2019-12-23 ENCOUNTER — OFFICE VISIT (OUTPATIENT)
Dept: CARDIAC REHAB | Facility: HOSPITAL | Age: 64
End: 2019-12-23

## 2019-12-23 DIAGNOSIS — Z95.1 S/P CABG (CORONARY ARTERY BYPASS GRAFT): Primary | ICD-10-CM

## 2019-12-23 RX ORDER — LOSARTAN POTASSIUM 100 MG/1
TABLET ORAL
Qty: 135 TABLET | Refills: 1 | Status: SHIPPED | OUTPATIENT
Start: 2019-12-23 | End: 2020-05-07

## 2019-12-23 RX ORDER — CARVEDILOL 25 MG/1
TABLET ORAL
Qty: 270 TABLET | Refills: 1 | Status: SHIPPED | OUTPATIENT
Start: 2019-12-23 | End: 2020-05-07

## 2019-12-23 RX ORDER — ROSUVASTATIN CALCIUM 40 MG/1
TABLET, COATED ORAL
Qty: 90 TABLET | Refills: 1 | Status: SHIPPED | OUTPATIENT
Start: 2019-12-23 | End: 2022-05-17

## 2019-12-27 ENCOUNTER — APPOINTMENT (OUTPATIENT)
Dept: CARDIAC REHAB | Facility: HOSPITAL | Age: 64
End: 2019-12-27

## 2019-12-27 RX ORDER — GABAPENTIN 300 MG/1
CAPSULE ORAL
Qty: 90 CAPSULE | Refills: 0 | Status: SHIPPED | OUTPATIENT
Start: 2019-12-27 | End: 2020-07-29 | Stop reason: SDUPTHER

## 2019-12-30 ENCOUNTER — OFFICE VISIT (OUTPATIENT)
Dept: CARDIAC REHAB | Facility: HOSPITAL | Age: 64
End: 2019-12-30

## 2019-12-30 DIAGNOSIS — Z95.1 S/P CABG (CORONARY ARTERY BYPASS GRAFT): Primary | ICD-10-CM

## 2020-01-03 ENCOUNTER — APPOINTMENT (OUTPATIENT)
Dept: CARDIAC REHAB | Facility: HOSPITAL | Age: 65
End: 2020-01-03

## 2020-01-06 ENCOUNTER — OFFICE VISIT (OUTPATIENT)
Dept: CARDIAC REHAB | Facility: HOSPITAL | Age: 65
End: 2020-01-06

## 2020-01-06 DIAGNOSIS — Z95.1 S/P CABG (CORONARY ARTERY BYPASS GRAFT): Primary | ICD-10-CM

## 2020-01-07 ENCOUNTER — HOSPITAL ENCOUNTER (OUTPATIENT)
Facility: HOSPITAL | Age: 65
Discharge: HOME | End: 2020-01-07
Payer: MEDICARE

## 2020-01-07 PROCEDURE — 36415 COLL VENOUS BLD VENIPUNCTURE: CPT

## 2020-01-08 ENCOUNTER — OFFICE VISIT (OUTPATIENT)
Dept: CARDIAC REHAB | Facility: HOSPITAL | Age: 65
End: 2020-01-08

## 2020-01-08 DIAGNOSIS — Z95.1 S/P CABG (CORONARY ARTERY BYPASS GRAFT): Primary | ICD-10-CM

## 2020-01-09 RX ORDER — VITAMIN B COMPLEX
2500 TABLET ORAL DAILY
Qty: 90 TABLET | Refills: 3 | Status: SHIPPED | OUTPATIENT
Start: 2020-01-09

## 2020-01-09 RX ORDER — POTASSIUM CHLORIDE 20 MEQ/1
TABLET, EXTENDED RELEASE ORAL
Qty: 90 TABLET | Refills: 3 | Status: SHIPPED | OUTPATIENT
Start: 2020-01-09 | End: 2020-01-28 | Stop reason: ALTCHOICE

## 2020-01-09 RX ORDER — ROSUVASTATIN CALCIUM 20 MG/1
TABLET, COATED ORAL
Qty: 45 TABLET | Refills: 3 | Status: SHIPPED | OUTPATIENT
Start: 2020-01-09 | End: 2020-09-04

## 2020-01-10 ENCOUNTER — APPOINTMENT (OUTPATIENT)
Dept: CARDIAC REHAB | Facility: HOSPITAL | Age: 65
End: 2020-01-10

## 2020-01-13 ENCOUNTER — OFFICE VISIT (OUTPATIENT)
Dept: CARDIAC REHAB | Facility: HOSPITAL | Age: 65
End: 2020-01-13

## 2020-01-13 DIAGNOSIS — Z95.1 S/P CABG (CORONARY ARTERY BYPASS GRAFT): Primary | ICD-10-CM

## 2020-01-15 ENCOUNTER — APPOINTMENT (OUTPATIENT)
Dept: CARDIAC REHAB | Facility: HOSPITAL | Age: 65
End: 2020-01-15

## 2020-01-17 ENCOUNTER — OFFICE VISIT (OUTPATIENT)
Dept: CARDIAC REHAB | Facility: HOSPITAL | Age: 65
End: 2020-01-17

## 2020-01-17 DIAGNOSIS — Z95.1 S/P CABG (CORONARY ARTERY BYPASS GRAFT): Primary | ICD-10-CM

## 2020-01-20 ENCOUNTER — OFFICE VISIT (OUTPATIENT)
Dept: CARDIAC REHAB | Facility: HOSPITAL | Age: 65
End: 2020-01-20

## 2020-01-20 DIAGNOSIS — Z95.1 S/P CABG (CORONARY ARTERY BYPASS GRAFT): Primary | ICD-10-CM

## 2020-01-22 ENCOUNTER — OFFICE VISIT (OUTPATIENT)
Dept: CARDIAC REHAB | Facility: HOSPITAL | Age: 65
End: 2020-01-22

## 2020-01-22 DIAGNOSIS — Z95.1 S/P CABG (CORONARY ARTERY BYPASS GRAFT): Primary | ICD-10-CM

## 2020-01-24 ENCOUNTER — OFFICE VISIT (OUTPATIENT)
Dept: CARDIAC REHAB | Facility: HOSPITAL | Age: 65
End: 2020-01-24

## 2020-01-24 DIAGNOSIS — Z95.1 S/P CABG (CORONARY ARTERY BYPASS GRAFT): Primary | ICD-10-CM

## 2020-01-27 ENCOUNTER — OFFICE VISIT (OUTPATIENT)
Dept: CARDIAC REHAB | Facility: HOSPITAL | Age: 65
End: 2020-01-27

## 2020-01-27 DIAGNOSIS — Z95.1 S/P CABG (CORONARY ARTERY BYPASS GRAFT): Primary | ICD-10-CM

## 2020-01-28 ENCOUNTER — OFFICE VISIT (OUTPATIENT)
Dept: PRIMARY CARE CLINIC | Age: 65
End: 2020-01-28
Payer: MEDICARE

## 2020-01-28 VITALS
HEART RATE: 71 BPM | WEIGHT: 223 LBS | DIASTOLIC BLOOD PRESSURE: 60 MMHG | OXYGEN SATURATION: 98 % | BODY MASS INDEX: 33.91 KG/M2 | SYSTOLIC BLOOD PRESSURE: 112 MMHG

## 2020-01-28 LAB — HBA1C MFR BLD: 6.1 %

## 2020-01-28 PROCEDURE — 2022F DILAT RTA XM EVC RTNOPTHY: CPT | Performed by: NURSE PRACTITIONER

## 2020-01-28 PROCEDURE — G8482 FLU IMMUNIZE ORDER/ADMIN: HCPCS | Performed by: NURSE PRACTITIONER

## 2020-01-28 PROCEDURE — 83036 HEMOGLOBIN GLYCOSYLATED A1C: CPT | Performed by: NURSE PRACTITIONER

## 2020-01-28 PROCEDURE — G8427 DOCREV CUR MEDS BY ELIG CLIN: HCPCS | Performed by: NURSE PRACTITIONER

## 2020-01-28 PROCEDURE — 99213 OFFICE O/P EST LOW 20 MIN: CPT | Performed by: NURSE PRACTITIONER

## 2020-01-28 PROCEDURE — G8417 CALC BMI ABV UP PARAM F/U: HCPCS | Performed by: NURSE PRACTITIONER

## 2020-01-28 PROCEDURE — 96372 THER/PROPH/DIAG INJ SC/IM: CPT | Performed by: NURSE PRACTITIONER

## 2020-01-28 PROCEDURE — 3017F COLORECTAL CA SCREEN DOC REV: CPT | Performed by: NURSE PRACTITIONER

## 2020-01-28 PROCEDURE — 3044F HG A1C LEVEL LT 7.0%: CPT | Performed by: NURSE PRACTITIONER

## 2020-01-28 PROCEDURE — 1036F TOBACCO NON-USER: CPT | Performed by: NURSE PRACTITIONER

## 2020-01-28 RX ORDER — CYANOCOBALAMIN 1000 UG/ML
1000 INJECTION INTRAMUSCULAR; SUBCUTANEOUS ONCE
Status: COMPLETED | OUTPATIENT
Start: 2020-01-28 | End: 2020-01-28

## 2020-01-28 RX ORDER — TRAMADOL HYDROCHLORIDE 50 MG/1
50 TABLET ORAL EVERY 6 HOURS PRN
COMMUNITY

## 2020-01-28 RX ORDER — ROSUVASTATIN CALCIUM 40 MG/1
TABLET, COATED ORAL
Qty: 68 TABLET | Refills: 3 | Status: SHIPPED | OUTPATIENT
Start: 2020-01-28 | End: 2020-12-09 | Stop reason: SDUPTHER

## 2020-01-28 RX ADMIN — CYANOCOBALAMIN 1000 MCG: 1000 INJECTION INTRAMUSCULAR; SUBCUTANEOUS at 10:47

## 2020-01-28 ASSESSMENT — PATIENT HEALTH QUESTIONNAIRE - PHQ9
SUM OF ALL RESPONSES TO PHQ QUESTIONS 1-9: 0
1. LITTLE INTEREST OR PLEASURE IN DOING THINGS: 0
2. FEELING DOWN, DEPRESSED OR HOPELESS: 0
SUM OF ALL RESPONSES TO PHQ QUESTIONS 1-9: 0
SUM OF ALL RESPONSES TO PHQ9 QUESTIONS 1 & 2: 0

## 2020-01-28 ASSESSMENT — ENCOUNTER SYMPTOMS
COUGH: 0
VOMITING: 0
NAUSEA: 0
SHORTNESS OF BREATH: 0
EYE PAIN: 0
SORE THROAT: 0
ABDOMINAL PAIN: 0

## 2020-01-29 ENCOUNTER — APPOINTMENT (OUTPATIENT)
Dept: CARDIAC REHAB | Facility: HOSPITAL | Age: 65
End: 2020-01-29

## 2020-01-31 ENCOUNTER — OFFICE VISIT (OUTPATIENT)
Dept: CARDIAC REHAB | Facility: HOSPITAL | Age: 65
End: 2020-01-31

## 2020-01-31 DIAGNOSIS — Z95.1 S/P CABG (CORONARY ARTERY BYPASS GRAFT): Primary | ICD-10-CM

## 2020-02-03 ENCOUNTER — OFFICE VISIT (OUTPATIENT)
Dept: CARDIAC REHAB | Facility: HOSPITAL | Age: 65
End: 2020-02-03

## 2020-02-03 DIAGNOSIS — Z95.1 S/P CABG (CORONARY ARTERY BYPASS GRAFT): Primary | ICD-10-CM

## 2020-02-03 NOTE — PROGRESS NOTES
Chief Complaint   Patient presents with    Diabetes     Patient here today for a follow up with DM. He states his sugar has been doing well. Have you seen any other physician or provider since your last visit? no    Have you had any other diagnostic tests since your last visit? no    Have you changed or stopped any medications since your last visit including any over-the-counter medicines, vitamins, or herbal medicines? no     Are you taking all your prescribed medications? Yes  If NO, why? -  N/A      REVIEW OF SYSTEMS:  Review of Systems   Constitutional: Negative for chills and fever. HENT: Negative for ear pain and sore throat. Eyes: Negative for pain and visual disturbance. Respiratory: Negative for cough and shortness of breath. Cardiovascular: Negative for chest pain, palpitations and leg swelling. Gastrointestinal: Negative for abdominal pain, nausea and vomiting. Genitourinary: Negative for dysuria and hematuria. Musculoskeletal: Negative for joint swelling. Skin: Negative for rash. Neurological: Negative for dizziness and weakness. Psychiatric/Behavioral: Negative for sleep disturbance.
soft.      Tenderness: There is no abdominal tenderness. Lymphadenopathy:      Cervical: No cervical adenopathy. Skin:     General: Skin is warm and dry. Neurological:      Mental Status: He is alert and oriented to person, place, and time.          Results in Past 30 Days  Result Component Current Result Ref Range Previous Result Ref Range   Alb 4.2 (1/7/2020) 3.6 - 4.8 g/dL Not in Time Range    Albumin/Globulin Ratio 1.9 (1/7/2020) 1.2 - 2.2 Not in Time Range    Alkaline Phosphatase 65 (1/7/2020) 39 - 117 IU/L Not in Time Range    ALT 27 (1/7/2020) 0 - 44 IU/L Not in Time Range    AST 23 (1/7/2020) 0 - 40 IU/L Not in Time Range    BUN 18 (1/7/2020) 8 - 27 mg/dL Not in Time Range    Calcium 9.6 (1/7/2020) 8.6 - 10.2 mg/dL Not in Time Range    Chloride 104 (1/7/2020) 96 - 106 mmol/L Not in Time Range    CO2 25 (1/7/2020) 20 - 29 mmol/L Not in Time Range    CREATININE 1.29 (H) (1/7/2020) 0.76 - 1.27 mg/dL Not in Time Range    GFR  68 (1/7/2020) >59 mL/min/1.73 Not in Time Range    GFR Non- 59 (L) (1/7/2020) >59 mL/min/1.73 Not in Time Range    Globulin 2.2 (1/7/2020) 1.5 - 4.5 g/dL Not in Time Range    Glucose 116 (H) (1/7/2020) 65 - 99 mg/dL Not in Time Range    Potassium 4.5 (1/7/2020) 3.5 - 5.2 mmol/L Not in Time Range    Sodium 143 (1/7/2020) 134 - 144 mmol/L Not in Time Range    Total Bilirubin 0.6 (1/7/2020) 0.0 - 1.2 mg/dL Not in Time Range    Total Protein 6.4 (1/7/2020) 6.0 - 8.5 g/dL Not in Time Range      Hemoglobin A1C (%)   Date Value   01/28/2020 6.1     Microscopic Examination (no units)   Date Value   12/15/2016 YES     LDL Calculated (mg/dL)   Date Value   01/07/2020 37       Lab Results   Component Value Date    WBC 7.2 01/07/2020    NEUTROABS 4.0 01/07/2020    HGB 16.5 01/07/2020    HCT 47.7 01/07/2020    MCV 91 01/07/2020     01/07/2020     Lab Results   Component Value Date    TSH 0.972 07/19/2014       Prior to Visit Medications    Medication Sig

## 2020-02-05 ENCOUNTER — OFFICE VISIT (OUTPATIENT)
Dept: CARDIAC REHAB | Facility: HOSPITAL | Age: 65
End: 2020-02-05

## 2020-02-05 DIAGNOSIS — Z95.1 S/P CABG (CORONARY ARTERY BYPASS GRAFT): Primary | ICD-10-CM

## 2020-02-07 ENCOUNTER — APPOINTMENT (OUTPATIENT)
Dept: CARDIAC REHAB | Facility: HOSPITAL | Age: 65
End: 2020-02-07

## 2020-02-10 ENCOUNTER — OFFICE VISIT (OUTPATIENT)
Dept: CARDIAC REHAB | Facility: HOSPITAL | Age: 65
End: 2020-02-10

## 2020-02-10 DIAGNOSIS — Z95.1 S/P CABG (CORONARY ARTERY BYPASS GRAFT): Primary | ICD-10-CM

## 2020-02-12 ENCOUNTER — OFFICE VISIT (OUTPATIENT)
Dept: CARDIAC REHAB | Facility: HOSPITAL | Age: 65
End: 2020-02-12

## 2020-02-12 DIAGNOSIS — Z95.1 S/P CABG (CORONARY ARTERY BYPASS GRAFT): Primary | ICD-10-CM

## 2020-02-14 ENCOUNTER — OFFICE VISIT (OUTPATIENT)
Dept: CARDIAC REHAB | Facility: HOSPITAL | Age: 65
End: 2020-02-14

## 2020-02-14 DIAGNOSIS — Z95.1 S/P CABG (CORONARY ARTERY BYPASS GRAFT): Primary | ICD-10-CM

## 2020-02-17 ENCOUNTER — OFFICE VISIT (OUTPATIENT)
Dept: CARDIAC REHAB | Facility: HOSPITAL | Age: 65
End: 2020-02-17

## 2020-02-17 DIAGNOSIS — Z95.1 S/P CABG (CORONARY ARTERY BYPASS GRAFT): Primary | ICD-10-CM

## 2020-02-19 ENCOUNTER — APPOINTMENT (OUTPATIENT)
Dept: GENERAL RADIOLOGY | Facility: HOSPITAL | Age: 65
End: 2020-02-19

## 2020-02-19 ENCOUNTER — HOSPITAL ENCOUNTER (EMERGENCY)
Facility: HOSPITAL | Age: 65
Discharge: HOME | End: 2020-02-19
Attending: EMERGENCY MEDICINE | Admitting: EMERGENCY MEDICINE

## 2020-02-19 ENCOUNTER — OFFICE VISIT (OUTPATIENT)
Dept: CARDIAC REHAB | Facility: HOSPITAL | Age: 65
End: 2020-02-19

## 2020-02-19 VITALS
BODY MASS INDEX: 33.34 KG/M2 | RESPIRATION RATE: 18 BRPM | OXYGEN SATURATION: 92 % | DIASTOLIC BLOOD PRESSURE: 78 MMHG | TEMPERATURE: 98.1 F | WEIGHT: 220 LBS | HEART RATE: 55 BPM | HEIGHT: 68 IN | SYSTOLIC BLOOD PRESSURE: 121 MMHG

## 2020-02-19 DIAGNOSIS — R07.9 CHEST PAIN, UNSPECIFIED TYPE: Primary | ICD-10-CM

## 2020-02-19 DIAGNOSIS — Z95.1 S/P CABG (CORONARY ARTERY BYPASS GRAFT): Primary | ICD-10-CM

## 2020-02-19 LAB
ALBUMIN SERPL-MCNC: 4.4 G/DL (ref 3.5–5.2)
ALBUMIN/GLOB SERPL: 1.7 G/DL
ALP SERPL-CCNC: 72 U/L (ref 39–117)
ALT SERPL W P-5'-P-CCNC: 23 U/L (ref 1–41)
ANION GAP SERPL CALCULATED.3IONS-SCNC: 11.3 MMOL/L (ref 5–15)
AST SERPL-CCNC: 25 U/L (ref 1–40)
BASOPHILS # BLD AUTO: 0.06 10*3/MM3 (ref 0–0.2)
BASOPHILS NFR BLD AUTO: 0.8 % (ref 0–1.5)
BILIRUB SERPL-MCNC: 0.5 MG/DL (ref 0.2–1.2)
BUN BLD-MCNC: 15 MG/DL (ref 8–23)
BUN/CREAT SERPL: 12.9 (ref 7–25)
CALCIUM SPEC-SCNC: 9.7 MG/DL (ref 8.6–10.5)
CHLORIDE SERPL-SCNC: 103 MMOL/L (ref 98–107)
CO2 SERPL-SCNC: 24.7 MMOL/L (ref 22–29)
CREAT BLD-MCNC: 1.16 MG/DL (ref 0.76–1.27)
DEPRECATED RDW RBC AUTO: 40.8 FL (ref 37–54)
EOSINOPHIL # BLD AUTO: 0.29 10*3/MM3 (ref 0–0.4)
EOSINOPHIL NFR BLD AUTO: 3.9 % (ref 0.3–6.2)
ERYTHROCYTE [DISTWIDTH] IN BLOOD BY AUTOMATED COUNT: 12.5 % (ref 12.3–15.4)
GFR SERPL CREATININE-BSD FRML MDRD: 64 ML/MIN/1.73
GLOBULIN UR ELPH-MCNC: 2.6 GM/DL
GLUCOSE BLD-MCNC: 142 MG/DL (ref 65–99)
HCT VFR BLD AUTO: 49.5 % (ref 37.5–51)
HGB BLD-MCNC: 17.1 G/DL (ref 13–17.7)
HOLD SPECIMEN: NORMAL
HOLD SPECIMEN: NORMAL
IMM GRANULOCYTES # BLD AUTO: 0.01 10*3/MM3 (ref 0–0.05)
IMM GRANULOCYTES NFR BLD AUTO: 0.1 % (ref 0–0.5)
LYMPHOCYTES # BLD AUTO: 2.42 10*3/MM3 (ref 0.7–3.1)
LYMPHOCYTES NFR BLD AUTO: 32.8 % (ref 19.6–45.3)
MCH RBC QN AUTO: 31.3 PG (ref 26.6–33)
MCHC RBC AUTO-ENTMCNC: 34.5 G/DL (ref 31.5–35.7)
MCV RBC AUTO: 90.5 FL (ref 79–97)
MONOCYTES # BLD AUTO: 0.79 10*3/MM3 (ref 0.1–0.9)
MONOCYTES NFR BLD AUTO: 10.7 % (ref 5–12)
NEUTROPHILS # BLD AUTO: 3.8 10*3/MM3 (ref 1.7–7)
NEUTROPHILS NFR BLD AUTO: 51.7 % (ref 42.7–76)
NRBC BLD AUTO-RTO: 0 /100 WBC (ref 0–0.2)
PLATELET # BLD AUTO: 179 10*3/MM3 (ref 140–450)
PMV BLD AUTO: 9.5 FL (ref 6–12)
POTASSIUM BLD-SCNC: 4.3 MMOL/L (ref 3.5–5.2)
PROT SERPL-MCNC: 7 G/DL (ref 6–8.5)
RBC # BLD AUTO: 5.47 10*6/MM3 (ref 4.14–5.8)
SODIUM BLD-SCNC: 139 MMOL/L (ref 136–145)
TROPONIN I SERPL-MCNC: 0.01 NG/ML (ref 0–0.05)
TROPONIN T SERPL-MCNC: <0.01 NG/ML (ref 0–0.03)
TROPONIN T SERPL-MCNC: <0.01 NG/ML (ref 0–0.03)
WBC NRBC COR # BLD: 7.37 10*3/MM3 (ref 3.4–10.8)
WHOLE BLOOD HOLD SPECIMEN: NORMAL
WHOLE BLOOD HOLD SPECIMEN: NORMAL

## 2020-02-19 PROCEDURE — 93005 ELECTROCARDIOGRAM TRACING: CPT | Performed by: EMERGENCY MEDICINE

## 2020-02-19 PROCEDURE — 99284 EMERGENCY DEPT VISIT MOD MDM: CPT

## 2020-02-19 PROCEDURE — 71045 X-RAY EXAM CHEST 1 VIEW: CPT

## 2020-02-19 PROCEDURE — 84484 ASSAY OF TROPONIN QUANT: CPT | Performed by: EMERGENCY MEDICINE

## 2020-02-19 PROCEDURE — 80053 COMPREHEN METABOLIC PANEL: CPT | Performed by: EMERGENCY MEDICINE

## 2020-02-19 PROCEDURE — 85025 COMPLETE CBC W/AUTO DIFF WBC: CPT

## 2020-02-19 RX ORDER — ASPIRIN 81 MG/1
81 TABLET, CHEWABLE ORAL DAILY
COMMUNITY
End: 2020-08-18

## 2020-02-19 RX ORDER — SODIUM CHLORIDE 0.9 % (FLUSH) 0.9 %
10 SYRINGE (ML) INJECTION AS NEEDED
Status: DISCONTINUED | OUTPATIENT
Start: 2020-02-19 | End: 2020-02-19 | Stop reason: HOSPADM

## 2020-02-19 RX ORDER — FAMOTIDINE 20 MG/1
20 TABLET, FILM COATED ORAL NIGHTLY
Qty: 30 TABLET | Refills: 0 | Status: SHIPPED | OUTPATIENT
Start: 2020-02-19 | End: 2020-08-18

## 2020-02-19 RX ORDER — ASPIRIN 325 MG
325 TABLET ORAL ONCE
Status: COMPLETED | OUTPATIENT
Start: 2020-02-19 | End: 2020-02-19

## 2020-02-19 RX ADMIN — ASPIRIN 325 MG ORAL TABLET 325 MG: 325 PILL ORAL at 10:01

## 2020-02-19 RX ADMIN — LIDOCAINE HYDROCHLORIDE: 20 SOLUTION ORAL; TOPICAL at 10:25

## 2020-02-19 NOTE — ED PROVIDER NOTES
"Subjective   64-year-old male presents to the ED with a chief complaint of chest pain.  The patient states \"I think I am having heartburn\".  He is describing a retrosternal burning sensation that radiates up into his throat.  Pain started this morning around 8 AM.  The pain does not radiate.  Rates pain as a burning sensation.  Rates it 6 out of 10.  He denies shortness of breath.  No cough or wheeze.  No nausea vomiting diarrhea abdominal pain.  No lightheadedness or dizziness.  No diaphoresis.  No prior treatments or alleviating factors.  No other complaints at this time.          Review of Systems   Respiratory: Negative for shortness of breath.    Cardiovascular: Positive for chest pain.   All other systems reviewed and are negative.      Past Medical History:   Diagnosis Date   • Anxiety    • Cancer (CMS/HCC)     skin    • Chest pain syndrome    • Coronary artery disease    • Diabetes mellitus (CMS/HCC)     TYPE 2 DIAGNOSED SEVERAL YEARS AGO. TRIES TO TEST ONCE DAILY   • Diverticulitis    • Fibromyalgia    • Hearing aid worn    • History of cellulitis    • History of kidney stones    • History of malignant neoplasm    • History of MRI of spine     demonstrating buldging discs at L3/L4, L4/L5, L5/S1 levels   • History of panic attacks    • History of tobacco abuse     History of tobacco, quitting 28 years ago.   • Hypertension    • Irregular heart beat    • Migraine     occular   • Obesity    • Rheumatoid arthritis (CMS/HCC)    • Transient ischemic attack     Recent transient ischemic attack with normal echocardiogram, carotid duplex, and CT scan of the brain by verbal report.       Allergies   Allergen Reactions   • Humira [Adalimumab] Other (See Comments)     HA, intractable ocular migraine       Past Surgical History:   Procedure Laterality Date   • ANAL FISTULA REPAIR  1999    Anal fistula repair, 1999 and 2008.   • ATRIAL APPENDAGE EXCLUSION LEFT WITH TRANSESOPHAGEAL ECHOCARDIOGRAM N/A 1/31/2019    " Procedure: ATRIAL APPENDAGE OCCLUSION LEFT;  Surgeon: Dillon Gu MD;  Location:  [facility];  Service: Cardiothoracic   • CARDIAC CATHETERIZATION N/A 2019    Procedure: Left Heart Cath;  Surgeon: Dillon Neville III, MD;  Location: [facility] CATH INVASIVE LOC[facility];  Service: Cardiovascular   • COLONOSCOPY  2016   • CORONARY ARTERY BYPASS GRAFT N/A 2019    Procedure: MEDIAN STERNOTOMY, CORONARY ARTERY BYPASS GRAFT X5, UTILIZING THE LEFT INTERNAL MAMMARY ARTERY, EVH OF THE RIGHT GREATER SAPHENOUS VEIN;  Surgeon: Dillon Gu MD;  Location:  [facility];  Service: Cardiothoracic   • MYOMECTOMY      Benign fibroid removed, .   • PILONIDAL CYSTECTOMY     • SKIN CANCER EXCISION      left shoulder   • TRANSESOPHAGEAL ECHOCARDIOGRAM (TIMOTHY) N/A 2019    Procedure: TRANSESOPHAGEAL ECHOCARDIOGRAM WITH ANESTHESIA;  Surgeon: Dillon Gu MD;  Location:  [facility];  Service: Cardiothoracic       Family History   Problem Relation Age of Onset   • Heart disease Other    • Diabetes Other    • Cancer Other    • Hypertension Other    • Heart disease Mother    • Diabetes Father    • Hypertension Father    • Cancer Father        Social History     Socioeconomic History   • Marital status:      Spouse name: Not on file   • Number of children: 2   • Years of education: Not on file   • Highest education level: Not on file   Occupational History   • Occupation:      Comment: Retired   Tobacco Use   • Smoking status: Former Smoker     Packs/day: 1.00     Types: Cigarettes     Last attempt to quit: 3/5/1984     Years since quittin.9   • Smokeless tobacco: Never Used   Substance and Sexual Activity   • Alcohol use: Yes     Alcohol/week: 1.0 standard drinks     Types: 1 Cans of beer per week     Comment: occas   • Drug use: No   • Sexual activity: Defer   Social History Narrative    Lives in [place].           Objective   Physical Exam   Constitutional: He is oriented to person, place, and time. He  appears well-developed and well-nourished. No distress.   HENT:   Head: Normocephalic and atraumatic.   Nose: Nose normal.   Eyes: Pupils are equal, round, and reactive to light. Conjunctivae and EOM are normal.   Cardiovascular: Normal rate, regular rhythm and intact distal pulses.   No murmur heard.  Pulmonary/Chest: Effort normal and breath sounds normal. No respiratory distress.   Abdominal: Soft. He exhibits no distension. There is no tenderness.   Musculoskeletal: He exhibits no edema or deformity.   Neurological: He is alert and oriented to person, place, and time. No cranial nerve deficit. Coordination normal.   Skin: He is not diaphoretic.   Nursing note and vitals reviewed.      Procedures           ED Course                                           MDM  EKG interpreted by me.  Sinus rhythm.  Rate of 69.  Nonspecific Q wave.  No ST segment or T wave abnormalities.  Abnormal EKG    64-year-old male presents with burning type chest pain.  This did improve with GI cocktail.  EKG was nonischemic.  Initial troponin was negative.  Monitored in the ED and had a repeat troponin that was 5 hours after the onset of his chest pain.  It was negative as well.  Given the fact that his pain resolved and the troponin was negative x2.  Feel that he is appropriate for discharge to follow-up with his cardiologist.  He is agreeable to this plan      Final diagnoses:   Chest pain, unspecified type            Deacon Thapa, DO  02/19/20 1410

## 2020-02-21 ENCOUNTER — OFFICE VISIT (OUTPATIENT)
Dept: CARDIAC REHAB | Facility: HOSPITAL | Age: 65
End: 2020-02-21

## 2020-02-21 DIAGNOSIS — Z95.1 S/P CABG (CORONARY ARTERY BYPASS GRAFT): Primary | ICD-10-CM

## 2020-02-24 ENCOUNTER — OFFICE VISIT (OUTPATIENT)
Dept: CARDIAC REHAB | Facility: HOSPITAL | Age: 65
End: 2020-02-24

## 2020-02-24 DIAGNOSIS — Z95.1 S/P CABG (CORONARY ARTERY BYPASS GRAFT): Primary | ICD-10-CM

## 2020-02-26 ENCOUNTER — OFFICE VISIT (OUTPATIENT)
Dept: CARDIAC REHAB | Facility: HOSPITAL | Age: 65
End: 2020-02-26

## 2020-02-26 DIAGNOSIS — Z95.1 S/P CABG (CORONARY ARTERY BYPASS GRAFT): Primary | ICD-10-CM

## 2020-02-28 ENCOUNTER — OFFICE VISIT (OUTPATIENT)
Dept: CARDIAC REHAB | Facility: HOSPITAL | Age: 65
End: 2020-02-28

## 2020-02-28 DIAGNOSIS — Z95.1 S/P CABG (CORONARY ARTERY BYPASS GRAFT): Primary | ICD-10-CM

## 2020-03-02 ENCOUNTER — OFFICE VISIT (OUTPATIENT)
Dept: CARDIAC REHAB | Facility: HOSPITAL | Age: 65
End: 2020-03-02

## 2020-03-02 DIAGNOSIS — Z95.1 S/P CABG (CORONARY ARTERY BYPASS GRAFT): Primary | ICD-10-CM

## 2020-03-04 ENCOUNTER — OFFICE VISIT (OUTPATIENT)
Dept: CARDIAC REHAB | Facility: HOSPITAL | Age: 65
End: 2020-03-04

## 2020-03-04 DIAGNOSIS — Z95.1 S/P CABG (CORONARY ARTERY BYPASS GRAFT): Primary | ICD-10-CM

## 2020-03-06 ENCOUNTER — OFFICE VISIT (OUTPATIENT)
Dept: CARDIAC REHAB | Facility: HOSPITAL | Age: 65
End: 2020-03-06

## 2020-03-06 DIAGNOSIS — Z95.1 S/P CABG (CORONARY ARTERY BYPASS GRAFT): Primary | ICD-10-CM

## 2020-03-09 ENCOUNTER — OFFICE VISIT (OUTPATIENT)
Dept: CARDIAC REHAB | Facility: HOSPITAL | Age: 65
End: 2020-03-09

## 2020-03-09 DIAGNOSIS — Z95.1 S/P CABG (CORONARY ARTERY BYPASS GRAFT): Primary | ICD-10-CM

## 2020-03-11 ENCOUNTER — OFFICE VISIT (OUTPATIENT)
Dept: CARDIAC REHAB | Facility: HOSPITAL | Age: 65
End: 2020-03-11

## 2020-03-11 DIAGNOSIS — Z95.1 S/P CABG (CORONARY ARTERY BYPASS GRAFT): Primary | ICD-10-CM

## 2020-03-13 ENCOUNTER — OFFICE VISIT (OUTPATIENT)
Dept: CARDIAC REHAB | Facility: HOSPITAL | Age: 65
End: 2020-03-13

## 2020-03-13 DIAGNOSIS — Z95.1 S/P CABG (CORONARY ARTERY BYPASS GRAFT): Primary | ICD-10-CM

## 2020-03-16 ENCOUNTER — APPOINTMENT (OUTPATIENT)
Dept: CARDIAC REHAB | Facility: HOSPITAL | Age: 65
End: 2020-03-16

## 2020-03-18 ENCOUNTER — APPOINTMENT (OUTPATIENT)
Dept: CARDIAC REHAB | Facility: HOSPITAL | Age: 65
End: 2020-03-18

## 2020-03-20 ENCOUNTER — APPOINTMENT (OUTPATIENT)
Dept: CARDIAC REHAB | Facility: HOSPITAL | Age: 65
End: 2020-03-20

## 2020-03-23 ENCOUNTER — APPOINTMENT (OUTPATIENT)
Dept: CARDIAC REHAB | Facility: HOSPITAL | Age: 65
End: 2020-03-23

## 2020-03-25 ENCOUNTER — APPOINTMENT (OUTPATIENT)
Dept: CARDIAC REHAB | Facility: HOSPITAL | Age: 65
End: 2020-03-25

## 2020-03-27 ENCOUNTER — APPOINTMENT (OUTPATIENT)
Dept: CARDIAC REHAB | Facility: HOSPITAL | Age: 65
End: 2020-03-27

## 2020-03-30 ENCOUNTER — APPOINTMENT (OUTPATIENT)
Dept: CARDIAC REHAB | Facility: HOSPITAL | Age: 65
End: 2020-03-30

## 2020-04-01 ENCOUNTER — APPOINTMENT (OUTPATIENT)
Dept: CARDIAC REHAB | Facility: HOSPITAL | Age: 65
End: 2020-04-01

## 2020-04-03 ENCOUNTER — APPOINTMENT (OUTPATIENT)
Dept: CARDIAC REHAB | Facility: HOSPITAL | Age: 65
End: 2020-04-03

## 2020-04-06 ENCOUNTER — APPOINTMENT (OUTPATIENT)
Dept: CARDIAC REHAB | Facility: HOSPITAL | Age: 65
End: 2020-04-06

## 2020-05-07 RX ORDER — CARVEDILOL 25 MG/1
TABLET ORAL
Qty: 270 TABLET | Refills: 0 | Status: SHIPPED | OUTPATIENT
Start: 2020-05-07 | End: 2020-08-13

## 2020-05-07 RX ORDER — LOSARTAN POTASSIUM 100 MG/1
TABLET ORAL
Qty: 135 TABLET | Refills: 0 | Status: SHIPPED | OUTPATIENT
Start: 2020-05-07 | End: 2020-08-13

## 2020-05-07 RX ORDER — TAMSULOSIN HYDROCHLORIDE 0.4 MG/1
0.4 CAPSULE ORAL NIGHTLY
Qty: 90 CAPSULE | Refills: 3 | Status: SHIPPED | OUTPATIENT
Start: 2020-05-07 | End: 2021-03-03

## 2020-06-03 ENCOUNTER — VIRTUAL VISIT (OUTPATIENT)
Dept: PRIMARY CARE CLINIC | Age: 65
End: 2020-06-03
Payer: MEDICARE

## 2020-06-03 PROCEDURE — 3044F HG A1C LEVEL LT 7.0%: CPT | Performed by: NURSE PRACTITIONER

## 2020-06-03 PROCEDURE — 3017F COLORECTAL CA SCREEN DOC REV: CPT | Performed by: NURSE PRACTITIONER

## 2020-06-03 PROCEDURE — G2025 DIS SITE TELE SVCS RHC/FQHC: HCPCS | Performed by: NURSE PRACTITIONER

## 2020-06-03 PROCEDURE — 2022F DILAT RTA XM EVC RTNOPTHY: CPT | Performed by: NURSE PRACTITIONER

## 2020-06-03 PROCEDURE — G8428 CUR MEDS NOT DOCUMENT: HCPCS | Performed by: NURSE PRACTITIONER

## 2020-06-28 ASSESSMENT — ENCOUNTER SYMPTOMS
VOMITING: 0
EYE PAIN: 0
NAUSEA: 0
COUGH: 0
SORE THROAT: 0
ABDOMINAL PAIN: 0
SHORTNESS OF BREATH: 0

## 2020-06-28 NOTE — PROGRESS NOTES
Pulmonary:      Effort: Pulmonary effort is normal.   Neurological:      Mental Status: He is alert and oriented to person, place, and time. No results found for requested labs within last 30 days. Hemoglobin A1C (%)   Date Value   01/28/2020 6.1     Microscopic Examination (no units)   Date Value   12/15/2016 YES     LDL Calculated (mg/dL)   Date Value   01/07/2020 37       Lab Results   Component Value Date    WBC 7.2 01/07/2020    NEUTROABS 4.0 01/07/2020    HGB 16.5 01/07/2020    HCT 47.7 01/07/2020    MCV 91 01/07/2020     01/07/2020     Lab Results   Component Value Date    TSH 0.972 07/19/2014       Prior to Visit Medications    Medication Sig Taking? Authorizing Provider   tamsulosin (FLOMAX) 0.4 MG capsule TAKE 1 CAPSULE BY MOUTH NIGHTLY FOR PROSTATE  WILDER Ignacio   JARDIANCE 25 MG tablet TAKE 1 TABLET BY MOUTH DAILY 340 B  WILDER Ignacio   traMADol (ULTRAM) 50 MG tablet Take 50 mg by mouth every 6 hours as needed for Pain. Historical Provider, MD   rosuvastatin (CRESTOR) 40 MG tablet 20mg QOD, alternating with 40mg QOD  WILDER Ignacio   Cyanocobalamin 2500 MCG SUBL Place 2,500 mcg under the tongue daily  WILDER Ignacio   rosuvastatin (CRESTOR) 20 MG tablet ALternate with 40mg every other day  WILDER Ignacio   gabapentin (NEURONTIN) 300 MG capsule TAKE 1 CAPSULE BY MOUTH 3 TIMES DAILY    Ilcesar LineaWILDER   sertraline (ZOLOFT) 50 MG tablet Take 1 tablet by mouth daily  WILDER Ignacio   Blood Glucose Monitoring Suppl (TRUE METRIX AIR GLUCOSE METER) w/Device KIT 1 each by Does not apply route daily E11. 9-DX  WILDER Ignacio   blood glucose test strips (TRUE METRIX BLOOD GLUCOSE TEST) strip 1 each by In Vitro route daily As needed.  DX:  E11.9  WILDER Ignacio   aspirin 81 MG tablet Take 81 mg by mouth daily  Historical Provider, MD   carvedilol (COREG) 25 MG tablet   Historical Provider, MD   carvedilol (COREG) 12.5 MG tablet TAKE ONE

## 2020-07-01 ENCOUNTER — HOSPITAL ENCOUNTER (OUTPATIENT)
Facility: HOSPITAL | Age: 65
Discharge: HOME | End: 2020-07-01
Payer: MEDICARE

## 2020-07-01 PROCEDURE — 36415 COLL VENOUS BLD VENIPUNCTURE: CPT

## 2020-07-07 RX ORDER — AMLODIPINE BESYLATE 5 MG/1
5 TABLET ORAL
Qty: 90 TABLET | Refills: 0 | Status: SHIPPED | OUTPATIENT
Start: 2020-07-07 | End: 2020-10-06

## 2020-07-30 RX ORDER — GABAPENTIN 300 MG/1
CAPSULE ORAL
Qty: 270 CAPSULE | Refills: 1 | Status: SHIPPED | OUTPATIENT
Start: 2020-07-30 | End: 2020-12-09 | Stop reason: SDUPTHER

## 2020-08-13 RX ORDER — LOSARTAN POTASSIUM 100 MG/1
TABLET ORAL
Qty: 135 TABLET | Refills: 0 | Status: SHIPPED | OUTPATIENT
Start: 2020-08-13 | End: 2020-10-30

## 2020-08-13 RX ORDER — CARVEDILOL 25 MG/1
37.5 TABLET ORAL 2 TIMES DAILY
Qty: 270 TABLET | Refills: 0 | Status: SHIPPED | OUTPATIENT
Start: 2020-08-13 | End: 2020-10-30

## 2020-08-13 RX ORDER — CALCIUM CITRATE/VITAMIN D3 200MG-6.25
TABLET ORAL
Qty: 100 STRIP | Refills: 0 | Status: SHIPPED | OUTPATIENT
Start: 2020-08-13

## 2020-08-18 ENCOUNTER — OFFICE VISIT (OUTPATIENT)
Dept: CARDIOLOGY | Facility: CLINIC | Age: 65
End: 2020-08-18

## 2020-08-18 VITALS
SYSTOLIC BLOOD PRESSURE: 128 MMHG | OXYGEN SATURATION: 93 % | WEIGHT: 224.7 LBS | HEART RATE: 71 BPM | BODY MASS INDEX: 34.05 KG/M2 | HEIGHT: 68 IN | DIASTOLIC BLOOD PRESSURE: 66 MMHG

## 2020-08-18 DIAGNOSIS — I10 BENIGN ESSENTIAL HTN: ICD-10-CM

## 2020-08-18 DIAGNOSIS — E78.2 MIXED HYPERLIPIDEMIA: ICD-10-CM

## 2020-08-18 DIAGNOSIS — I25.10 CAD IN NATIVE ARTERY: Primary | ICD-10-CM

## 2020-08-18 PROCEDURE — 99214 OFFICE O/P EST MOD 30 MIN: CPT | Performed by: INTERNAL MEDICINE

## 2020-08-18 RX ORDER — ASPIRIN 81 MG/1
81 TABLET ORAL DAILY
COMMUNITY

## 2020-08-18 NOTE — PROGRESS NOTES
[facility]  Office visit  Jesse Brink  1955  [phone]    VISIT DATE:  8/18/2020      PCP: Terri Barreto APRN  [address] [facility] [address]    CC:  Chief Complaint   Patient presents with   • Coronary Artery Disease       PROBLEM LIST:  1. Coronary artery disease  2. Transient ischemic attack with normal echocardiogram, carotid duplex, and CT scan of the brain by verbal report.  3. Benign hypertension.  4. History of tobacco, quitting 28 years ago.  5. Hyperglycemia, diet controlled.  6. Rheumatoid arthritis.  7. Obesity.  8. Family history of heart disease.  9. Surgical history:  a. Pilonidal cyst, 1977.  b. Benign fibroid removed, 1980.  c. Anal fistula repair, 1999 and 2008.    Previous cardiac studies and procedures  January 2019  Cardiac catheterization  · Multivessel obstructive coronary disease: Proximal RCA .  Mid LAD .  95% proximal first diagonal, 95% first obtuse marginal branch, 70-80% proximal second obtuse marginal branch.  · Mildly elevated LVEDP  · No aortic stenosis.  Echo   · Left ventricular wall thickness is consistent with mild concentric hypertrophy.  · Estimated EF appears to be in the range of 36 - 40%  · Left ventricular diastolic dysfunction (grade I a) consistent with impaired relaxation.  · The following left ventricular wall segments are hypokinetic: mid anterior, apical anterior, apical lateral, apical inferior, apical septal and apex hypokinetic.  · Regional contracility augments post PVC.    Carotid duplex  · Right internal carotid artery stenosis of 0-49%.  · Left internal carotid artery stenosis of 0-49%.    1. Coronary artery bypass graft x 5  -LIMA to LAD.    -Greater saphenous vein to RCA  -Greater saphenous vein to OM1  -Greater saphenous vein to OM2  -Greater saphenous vein to D1  2. Left atrial appendage ligation (35 mm Atricure clip placement)    May 2019 echo  · Left ventricular systolic function is normal.  · Calculated  "EF = 55%  · Left atrial cavity size is mildly dilated.      ASSESSMENT:   Diagnosis Plan   1. CAD in native artery     2. Mixed hyperlipidemia     3. Benign essential HTN         PLAN:  Coronary artery disease: Status post surgical revascularization.  Normal EF, continue current medical therapy.    Hypertension: Goal less than 130/90.  Currently well controlled, continue current medical therapy.    Palpitations: Consistent with symptomatic premature ventricular contractions.   Continue beta-blockade.  Currently well controlled.    Hyperlipidemia: Continue high intensity statin therapy, goal LDL less than 70.  Currently taking alternating doses of 40 and 20 mg of rosuvastatin.    History of stroke: Continue aggressive risk factor modification.  Continue current medical therapy.    Subjective  Stable functional capacity.  Able intermittent isolated palps when lying in bed at night.  Blood pressures running less than 130/80 mmHg, phase 3 cardiac rehab is not been available during COVID-19.  He is compliant with medical therapy. Reviewed most recent labwork.  Reports some chest wall sensitivity which is been chronic and stable.    PHYSICAL EXAMINATION:  Vitals:    08/18/20 1447   BP: 128/66   BP Location: Right arm   Patient Position: Sitting   Pulse: 71   SpO2: 93%   Weight: 102 kg (224 lb 11.2 oz)   Height: 172.7 cm (68\")     General Appearance:    Alert, cooperative, no distress, appears stated age   Head:    Normocephalic, without obvious abnormality, atraumatic   Eyes:    conjunctiva/corneas clear   Nose:   Nares normal, septum midline, mucosa normal, no drainage   Throat:   Lips, teeth and gums normal   Neck:   Supple, symmetrical, trachea midline, no carotid    bruit or JVD   Lungs:     Clear to auscultation bilaterally, respirations unlabored   Chest Wall:    No tenderness or deformity    Heart:    Regular rate and rhythm, S1 and S2 normal, no murmur, rub   or gallop, normal carotid impulse bilaterally without " bruit.   Abdomen:     Soft, non-tender   Extremities:   Extremities normal, atraumatic, no cyanosis or edema   Pulses:   2+ and symmetric all extremities   Skin:   Skin color, texture, turgor normal, no rashes or lesions       Diagnostic Data:  Procedures  Lab Results   Component Value Date    TRIG 168 (H) 01/18/2019    HDL 29 (L) 01/18/2019     Lab Results   Component Value Date    GLUCOSE 142 (H) 02/19/2020    BUN 15 02/19/2020    CREATININE 1.16 02/19/2020     02/19/2020    K 4.3 02/19/2020     02/19/2020    CO2 24.7 02/19/2020     Lab Results   Component Value Date    HGBA1C 6.60 (H) 01/30/2019     Lab Results   Component Value Date    WBC 7.37 02/19/2020    HGB 17.1 02/19/2020    HCT 49.5 02/19/2020     02/19/2020       Allergies  Allergies   Allergen Reactions   • Humira [Adalimumab] Other (See Comments)     HA, intractable ocular migraine       Current Medications    Current Outpatient Medications:   •  amLODIPine (NORVASC) 5 MG tablet, Take 1 tablet by mouth every night at bedtime. Need f/u appt for further refills., Disp: 90 tablet, Rfl: 0  •  aspirin 81 MG EC tablet, Take 81 mg by mouth Daily., Disp: , Rfl:   •  baclofen (LIORESAL) 10 MG tablet, Take 10 mg by mouth As Needed for Muscle Spasms., Disp: , Rfl:   •  carvedilol (COREG) 25 MG tablet, Take 1.5 tablets by mouth 2 (Two) Times a Day. Need f/u appt for further refills. Otherwise defer to PCP., Disp: 270 tablet, Rfl: 0  •  Cholecalciferol (VITAMIN D3) 2000 UNITS capsule, Take 2,000 Units by mouth Daily., Disp: , Rfl:   •  Empagliflozin (JARDIANCE) 25 MG tablet, Take 25 mg by mouth Daily., Disp: , Rfl:   •  ENBREL SURECLICK 50 MG/ML solution auto-injector, Inject 50 mg under the skin into the appropriate area as directed 1 (One) Time Per Week., Disp: , Rfl:   •  gabapentin (NEURONTIN) 300 MG capsule, Take 300 mg by mouth 2 (Two) Times a Day., Disp: , Rfl:   •  losartan (COZAAR) 100 MG tablet, TAKE 1 AND 1/2 TABLETS EVERY DAY, Disp:  135 tablet, Rfl: 0  •  potassium chloride (K-DUR) 10 MEQ CR tablet, Take 10 mEq by mouth Daily., Disp: , Rfl:   •  rosuvastatin (CRESTOR) 40 MG tablet, TAKE 1 TABLET EVERY DAY, Disp: 90 tablet, Rfl: 1  •  sertraline (ZOLOFT) 50 MG tablet, Take 50 mg by mouth Daily., Disp: , Rfl:   •  tamsulosin (FLOMAX) 0.4 MG capsule 24 hr capsule, Take 1 capsule by mouth Daily., Disp: , Rfl:   No current facility-administered medications for this visit.     Facility-Administered Medications Ordered in Other Visits:   •  Chlorhexidine Gluconate Cloth 2 % pads 1 application, 1 application, Topical, Q12H PRN, Marie Barnes PA-C ROS  Review of Systems   Cardiovascular: Positive for leg swelling. Negative for chest pain, claudication, dyspnea on exertion, irregular heartbeat and palpitations.   Respiratory: Negative for cough and shortness of breath.          SOCIAL HX  Social History     Socioeconomic History   • Marital status:      Spouse name: Not on file   • Number of children: 2   • Years of education: Not on file   • Highest education level: Not on file   Occupational History   • Occupation:      Comment: Retired   Tobacco Use   • Smoking status: Former Smoker     Packs/day: 1.00     Types: Cigarettes     Last attempt to quit: 3/5/1984     Years since quittin.4   • Smokeless tobacco: Never Used   Substance and Sexual Activity   • Alcohol use: Yes     Alcohol/week: 1.0 standard drinks     Types: 1 Cans of beer per week     Comment: occas   • Drug use: No   • Sexual activity: Defer   Social History Narrative    Lives in [place].       FAMILY HX  Family History   Problem Relation Age of Onset   • Heart disease Other    • Diabetes Other    • Cancer Other    • Hypertension Other    • Heart disease Mother    • Diabetes Father    • Hypertension Father    • Cancer Father              Dillon Neville III, MD, [facility]

## 2020-09-04 ENCOUNTER — OFFICE VISIT (OUTPATIENT)
Dept: PRIMARY CARE CLINIC | Age: 65
End: 2020-09-04
Payer: MEDICARE

## 2020-09-04 VITALS
HEIGHT: 68 IN | OXYGEN SATURATION: 98 % | RESPIRATION RATE: 16 BRPM | HEART RATE: 68 BPM | DIASTOLIC BLOOD PRESSURE: 70 MMHG | SYSTOLIC BLOOD PRESSURE: 120 MMHG | WEIGHT: 225 LBS | TEMPERATURE: 98 F | BODY MASS INDEX: 34.1 KG/M2

## 2020-09-04 LAB
CREATININE URINE POCT: 60.4
MICROALBUMIN/CREAT 24H UR: 5 MG/G{CREAT}
MICROALBUMIN/CREAT UR-RTO: 8.3

## 2020-09-04 PROCEDURE — 3017F COLORECTAL CA SCREEN DOC REV: CPT | Performed by: NURSE PRACTITIONER

## 2020-09-04 PROCEDURE — 99213 OFFICE O/P EST LOW 20 MIN: CPT | Performed by: NURSE PRACTITIONER

## 2020-09-04 PROCEDURE — 82044 UR ALBUMIN SEMIQUANTITATIVE: CPT | Performed by: NURSE PRACTITIONER

## 2020-09-04 PROCEDURE — 2022F DILAT RTA XM EVC RTNOPTHY: CPT | Performed by: NURSE PRACTITIONER

## 2020-09-04 PROCEDURE — 3044F HG A1C LEVEL LT 7.0%: CPT | Performed by: NURSE PRACTITIONER

## 2020-09-04 PROCEDURE — G8417 CALC BMI ABV UP PARAM F/U: HCPCS | Performed by: NURSE PRACTITIONER

## 2020-09-04 PROCEDURE — G8427 DOCREV CUR MEDS BY ELIG CLIN: HCPCS | Performed by: NURSE PRACTITIONER

## 2020-09-04 PROCEDURE — 1036F TOBACCO NON-USER: CPT | Performed by: NURSE PRACTITIONER

## 2020-09-04 ASSESSMENT — ENCOUNTER SYMPTOMS
CONSTIPATION: 0
EYE REDNESS: 0
ABDOMINAL PAIN: 0
NAUSEA: 0
SORE THROAT: 0
COUGH: 0
EYE ITCHING: 0
VOMITING: 0
SHORTNESS OF BREATH: 0
DIARRHEA: 0
RHINORRHEA: 0
EYE DISCHARGE: 0

## 2020-09-04 NOTE — PATIENT INSTRUCTIONS
dispose of used patches by folding them in half so that the sticky sides meet, and then flushing them down a toilet. They should not be placed in the household trash where children or pets can find them. · If you have any questions, ask your provider or pharmacist for more information. · Be sure to keep all appointments for provider visits or tests. We are committed to providing you with the best care possible. In order to help us achieve these goals please remember to bring all medications, herbal products, and over the counter supplements with you to each visit. If your provider has ordered testing for you, please be sure to follow up with our office if you have not received results within 7 days after the testing took place. *If you receive a survey after visiting one of our offices, please take time to share your experience concerning your physician office visit. These surveys are confidential and no health information about you is shared. We are eager to improve for you and we are counting on your feedback to help make that happen. Thank you for requesting your Continuity of Care Document (CCD) electronically. Please follow the instructions below to securely access your online medical record. [facility] allows you to send messages to your doctor, view your test results, renew your prescriptions, schedule appointments, and more. How Do I Access my CCD? In your Internet browser, go to [email or web address] Enter your user name and password   Click on My medical Record  --> Download Summary --> Enter Password --> Download --> Save or Open Document    Additional Information  If you have questions, please contact your physician practice where you receive care. Remember, [employer] is NOT to be used for urgent needs. For medical emergencies, dial 911. Keep a list of your medicines with you.  List all of the prescription medicines, nonprescription medicines, supplements, natural the household trash where children or pets can find them. If you have any questions, ask your provider or pharmacist for more information. Be sure to keep all appointments for provider visits or tests. We are committed to providing you with the best care possible. In order to help us achieve these goals please remember to bring all medications, herbal products, and over the counter supplements with you to each visit. If your provider has ordered testing for you, please be sure to follow up with our office if you have not received results within 7 days after the testing took place. *If you receive a survey after visiting one of our offices, please take time to share your experience concerning your physician office visit. These surveys are confidential and no health information about you is shared. We are eager to improve for you and we are counting on your feedback to help make that happen. Thank you for requesting your Continuity of Care Document (CCD) electronically. Please follow the instructions below to securely access your online medical record. [facility] allows you to send messages to your doctor, view your test results, renew your prescriptions, schedule appointments, and more. How Do I Access my CCD? In your Internet browser, go to [email or web address] Enter your user name and password   Click on My medical Record  --> Download Summary --> Enter Password --> Download --> Save or Open Document    Additional Information  If you have questions, please contact your physician practice where you receive care. Remember, [facility] is NOT to be used for urgent needs. For medical emergencies, dial 911.

## 2020-09-04 NOTE — PROGRESS NOTES
SUBJECTIVE:    Patient ID: Gregorio Hobson is a 60 y.o. male. Medical History Review  Past Medical, Family, and Social History reviewed and does contribute to the patient presenting condition    Health Maintenance Due   Topic Date Due    Diabetic foot exam  09/12/1966    HIV screen  09/12/1971    DTaP/Tdap/Td vaccine (1 - Tdap) 09/12/1975    Shingles Vaccine (1 of 2) 09/12/2006    Diabetic retinal exam  08/18/2017    Annual Wellness Visit (AWV)  05/29/2019    Flu vaccine (1) 09/01/2020       HPI:   Chief Complaint   Patient presents with    Diabetes    Hypertension   He saw cardiology who did not make any changes. His home BP has been good. His sugar has been good most of the time. He had one reading at 288. Patient's medications, allergies, past medical, surgical, social and family histories were reviewed and updated as appropriate. Review of Systems Reviewed and acurate. See MA note. OBJECTIVE:  /70 (Site: Right Upper Arm, Position: Sitting)   Pulse 68   Temp 98 °F (36.7 °C) (Temporal)   Resp 16   Ht 5' 8\" (1.727 m)   Wt 225 lb (102.1 kg)   SpO2 98% Comment: room air  BMI 34.21 kg/m²    Physical Exam  Vitals signs reviewed. Constitutional:       General: He is not in acute distress. Appearance: He is well-developed. HENT:      Head: Normocephalic. Right Ear: Tympanic membrane normal.      Left Ear: Tympanic membrane normal.      Mouth/Throat:      Pharynx: No oropharyngeal exudate. Eyes:      General: Lids are normal.   Neck:      Musculoskeletal: Neck supple. Cardiovascular:      Rate and Rhythm: Normal rate and regular rhythm. Heart sounds: Normal heart sounds. Pulmonary:      Effort: Pulmonary effort is normal.      Breath sounds: Normal breath sounds. Abdominal:      General: Bowel sounds are normal. There is no distension. Palpations: Abdomen is soft. Tenderness: There is no abdominal tenderness.    Lymphadenopathy:      Cervical: No cervical adenopathy. Skin:     General: Skin is warm and dry. Neurological:      Mental Status: He is alert and oriented to person, place, and time. No results found for requested labs within last 30 days. Hemoglobin A1C (%)   Date Value   07/01/2020 6.2 (H)     Microscopic Examination (no units)   Date Value   12/15/2016 YES     LDL Calculated (mg/dL)   Date Value   07/01/2020 42       Lab Results   Component Value Date    WBC 6.7 07/01/2020    NEUTROABS 3.4 07/01/2020    HGB 16.7 07/01/2020    HCT 47.2 07/01/2020    MCV 89 07/01/2020     07/01/2020     Lab Results   Component Value Date    TSH 0.972 07/19/2014       Prior to Visit Medications    Medication Sig Taking? Authorizing Provider   gabapentin (NEURONTIN) 300 MG capsule TAKE 1 CAPSULE BY MOUTH 3 TIMES DAILY Yes WILDER Braun   tamsulosin (FLOMAX) 0.4 MG capsule TAKE 1 CAPSULE BY MOUTH NIGHTLY FOR PROSTATE Yes WILDER Braun   JARDIANCE 25 MG tablet TAKE 1 TABLET BY MOUTH DAILY 340 B Yes WILDER Braun   traMADol (ULTRAM) 50 MG tablet Take 50 mg by mouth every 6 hours as needed for Pain. Yes Historical Provider, MD   rosuvastatin (CRESTOR) 40 MG tablet 20mg QOD, alternating with 40mg QOD Yes WILDER Braun   Cyanocobalamin 2500 MCG SUBL Place 2,500 mcg under the tongue daily Yes WILDER Braun   sertraline (ZOLOFT) 50 MG tablet Take 1 tablet by mouth daily Yes WILDER Braun   Blood Glucose Monitoring Suppl (TRUE METRIX AIR GLUCOSE METER) w/Device KIT 1 each by Does not apply route daily E11. 9-DX Yes WILDER Braun   aspirin 81 MG tablet Take 81 mg by mouth daily Yes Historical Provider, MD   carvedilol (COREG) 25 MG tablet 1.5 tabs twice daily Yes Historical Provider, MD   potassium chloride (KLOR-CON) 10 MEQ extended release tablet Take 10 mEq by mouth Yes Historical Provider, MD [employer] LANCETS 78H MISC USE TO TEST BLOOD SUGAR 4 TIMES DAILY Yes WILDER Braun   amLODIPine may be good for you may be harmful to others. · If you are no longer taking a prescribed medication and you have pills left please take your pills out of their original containers. Mix crushed pills with an undesirable substance, such as cat litter or used coffee grounds. Put the mixture into a disposable container with a lid, such as an empty margarine tub, or into a sealable bag. Cover up or remove any of your personal information on the empty containers by covering it with black permanent marker or duct tape. Place the sealed container with the mixture, and the empty drug containers, in the trash. · If you use a medication that is in the form of a patch, dispose of used patches by folding them in half so that the sticky sides meet, and then flushing them down a toilet. They should not be placed in the household trash where children or pets can find them. · If you have any questions, ask your provider or pharmacist for more information. · Be sure to keep all appointments for provider visits or tests. We are committed to providing you with the best care possible. In order to help us achieve these goals please remember to bring all medications, herbal products, and over the counter supplements with you to each visit. If your provider has ordered testing for you, please be sure to follow up with our office if you have not received results within 7 days after the testing took place. *If you receive a survey after visiting one of our offices, please take time to share your experience concerning your physician office visit. These surveys are confidential and no health information about you is shared. We are eager to improve for you and we are counting on your feedback to help make that happen. Thank you for requesting your Continuity of Care Document (CCD) electronically. Please follow the instructions below to securely access your online medical record.  [facility] allows you to send messages to your doctor, view your test results, renew your prescriptions, schedule appointments, and more. How Do I Access my CCD? In your Internet browser, go to [email or web address] Enter your user name and password   Click on My medical Record  --> Download Summary --> Enter Password --> Download --> Save or Open Document    Additional Information  If you have questions, please contact your physician practice where you receive care. Remember, MyChart is NOT to be used for urgent needs. For medical emergencies, dial 911. Keep a list of your medicines with you. List all of the prescription medicines, nonprescription medicines, supplements, natural remedies, and vitamins that you take. Tell your healthcare providers who treat you about all of the products you are taking. Your provider can provide you with a form to keep track of them. Just ask. Follow the directions that come with your medicine, including information about food or alcohol. Make sure you know how and when to take your medicine. Do not take more or less than you are supposed to take. Keep all medicines out of the reach of children. Store medicines according to the directions on the label. Monitor yourself. Learn to know how your body reacts to your new medicine and keep track of how it makes you feel before attempting (If your provider has allowed you to do so) to drive or go to work. Seek emergency medical attention if you think you have used too much of this medicine. An overdose of any prescription medicine can be fatal. Overdose symptoms may include extreme drowsiness, muscle weakness, confusion, cold and clammy skin, pinpoint pupils, shallow breathing, slow heart rate, fainting, or coma. Don't share prescription medicines with others, even when they seem to have the same symptoms. What may be good for you may be harmful to others.   If you are no longer taking a prescribed medication and you have pills left please take your pills out of their original containers. Mix crushed pills with an undesirable substance, such as cat litter or used coffee grounds. Put the mixture into a disposable container with a lid, such as an empty margarine tub, or into a sealable bag. Cover up or remove any of your personal information on the empty containers by covering it with black permanent marker or duct tape. Place the sealed container with the mixture, and the empty drug containers, in the trash. If you use a medication that is in the form of a patch, dispose of used patches by folding them in half so that the sticky sides meet, and then flushing them down a toilet. They should not be placed in the household trash where children or pets can find them. If you have any questions, ask your provider or pharmacist for more information. Be sure to keep all appointments for provider visits or tests. We are committed to providing you with the best care possible. In order to help us achieve these goals please remember to bring all medications, herbal products, and over the counter supplements with you to each visit. If your provider has ordered testing for you, please be sure to follow up with our office if you have not received results within 7 days after the testing took place. *If you receive a survey after visiting one of our offices, please take time to share your experience concerning your physician office visit. These surveys are confidential and no health information about you is shared. We are eager to improve for you and we are counting on your feedback to help make that happen. Thank you for requesting your Continuity of Care Document (CCD) electronically. Please follow the instructions below to securely access your online medical record. [facility] allows you to send messages to your doctor, view your test results, renew your prescriptions, schedule appointments, and more. How Do I Access my CCD?   In [employer], go to [email or web address] Enter your user name and password   Click on My medical Record  --> Download Summary --> Enter Password --> Download --> Save or Open Document    Additional Information  If you have questions, please contact your physician practice where you receive care. Remember, [facility] is NOT to be used for urgent needs. For medical emergencies, dial 911. Return in about 3 months (around 12/4/2020).

## 2020-09-04 NOTE — PROGRESS NOTES
Chief Complaint   Patient presents with    Diabetes    Hypertension       Have you seen any other physician or provider since your last visit dr. Landon Gómez    Have you had any other diagnostic tests since your last visit? no    Have you changed or stopped any medications since your last visit? no     Review of Systems   Constitutional: Negative for chills, fatigue and fever. HENT: Negative for congestion, ear pain, rhinorrhea and sore throat. Eyes: Negative for discharge, redness and itching. Respiratory: Negative for cough and shortness of breath. Cardiovascular: Negative for chest pain, palpitations and leg swelling. Gastrointestinal: Negative for abdominal pain, constipation, diarrhea, nausea and vomiting. Endocrine: Negative for cold intolerance and heat intolerance. Genitourinary: Negative for dysuria. Musculoskeletal: Negative for arthralgias and joint swelling. Pain R side hip & leg   Skin: Negative for rash and wound. Neurological: Negative for weakness and headaches. Hematological: Negative for adenopathy. Psychiatric/Behavioral: Negative for dysphoric mood and sleep disturbance. The patient is not nervous/anxious. Health Maintenance Due This Visit   Colonoscopy No   Mammogram No   Annual Wellness Visit Yes   Microalbumin Yes   HgbA1C No   Diabetic Eye Exam Yes- will sched own appt.     Adrianna Dooley One Due This Visit   JENNIFER No   UDS Yes   Contract Yes

## 2020-10-06 RX ORDER — AMLODIPINE BESYLATE 5 MG/1
5 TABLET ORAL
Qty: 90 TABLET | Refills: 1 | Status: SHIPPED | OUTPATIENT
Start: 2020-10-06 | End: 2021-03-01

## 2020-10-30 RX ORDER — LOSARTAN POTASSIUM 100 MG/1
TABLET ORAL
Qty: 135 TABLET | Refills: 1 | Status: SHIPPED | OUTPATIENT
Start: 2020-10-30 | End: 2021-03-11 | Stop reason: SDUPTHER

## 2020-10-30 RX ORDER — CARVEDILOL 25 MG/1
TABLET ORAL
Qty: 270 TABLET | Refills: 1 | Status: SHIPPED | OUTPATIENT
Start: 2020-10-30 | End: 2021-03-11 | Stop reason: SDUPTHER

## 2020-11-18 RX ORDER — EMPAGLIFLOZIN 25 MG/1
TABLET, FILM COATED ORAL
Qty: 90 TABLET | Refills: 3 | Status: SHIPPED | OUTPATIENT
Start: 2020-11-18 | End: 2021-04-28

## 2020-12-09 ENCOUNTER — OFFICE VISIT (OUTPATIENT)
Dept: PRIMARY CARE CLINIC | Age: 65
End: 2020-12-09
Payer: MEDICARE

## 2020-12-09 VITALS
HEART RATE: 73 BPM | RESPIRATION RATE: 16 BRPM | HEIGHT: 69 IN | OXYGEN SATURATION: 98 % | BODY MASS INDEX: 33.92 KG/M2 | WEIGHT: 229 LBS | TEMPERATURE: 97 F | DIASTOLIC BLOOD PRESSURE: 70 MMHG | SYSTOLIC BLOOD PRESSURE: 126 MMHG

## 2020-12-09 PROCEDURE — G0008 ADMIN INFLUENZA VIRUS VAC: HCPCS | Performed by: NURSE PRACTITIONER

## 2020-12-09 PROCEDURE — G8482 FLU IMMUNIZE ORDER/ADMIN: HCPCS | Performed by: NURSE PRACTITIONER

## 2020-12-09 PROCEDURE — G0438 PPPS, INITIAL VISIT: HCPCS | Performed by: NURSE PRACTITIONER

## 2020-12-09 PROCEDURE — 90688 IIV4 VACCINE SPLT 0.5 ML IM: CPT | Performed by: NURSE PRACTITIONER

## 2020-12-09 PROCEDURE — 3044F HG A1C LEVEL LT 7.0%: CPT | Performed by: NURSE PRACTITIONER

## 2020-12-09 PROCEDURE — 3017F COLORECTAL CA SCREEN DOC REV: CPT | Performed by: NURSE PRACTITIONER

## 2020-12-09 RX ORDER — METHOCARBAMOL 500 MG/1
500 TABLET, FILM COATED ORAL 3 TIMES DAILY
COMMUNITY

## 2020-12-09 RX ORDER — ROSUVASTATIN CALCIUM 40 MG/1
TABLET, COATED ORAL
Qty: 68 TABLET | Refills: 3 | Status: SHIPPED | OUTPATIENT
Start: 2020-12-09

## 2020-12-09 RX ORDER — GABAPENTIN 300 MG/1
CAPSULE ORAL
Qty: 270 CAPSULE | Refills: 1 | Status: SHIPPED | OUTPATIENT
Start: 2020-12-09 | End: 2021-08-06 | Stop reason: SDUPTHER

## 2020-12-09 ASSESSMENT — PATIENT HEALTH QUESTIONNAIRE - PHQ9
2. FEELING DOWN, DEPRESSED OR HOPELESS: 0
SUM OF ALL RESPONSES TO PHQ QUESTIONS 1-9: 1
1. LITTLE INTEREST OR PLEASURE IN DOING THINGS: 1
SUM OF ALL RESPONSES TO PHQ QUESTIONS 1-9: 1
SUM OF ALL RESPONSES TO PHQ QUESTIONS 1-9: 1
SUM OF ALL RESPONSES TO PHQ9 QUESTIONS 1 & 2: 1

## 2020-12-09 ASSESSMENT — ENCOUNTER SYMPTOMS
SORE THROAT: 0
COUGH: 0
ABDOMINAL PAIN: 0
NAUSEA: 0
RHINORRHEA: 0
EYE REDNESS: 0
CONSTIPATION: 0
EYE DISCHARGE: 0
VOMITING: 0
SHORTNESS OF BREATH: 0
DIARRHEA: 0
EYE ITCHING: 0

## 2020-12-09 ASSESSMENT — LIFESTYLE VARIABLES
HOW OFTEN DURING THE LAST YEAR HAVE YOU BEEN UNABLE TO REMEMBER WHAT HAPPENED THE NIGHT BEFORE BECAUSE YOU HAD BEEN DRINKING: 0
HOW OFTEN DURING THE LAST YEAR HAVE YOU NEEDED AN ALCOHOLIC DRINK FIRST THING IN THE MORNING TO GET YOURSELF GOING AFTER A NIGHT OF HEAVY DRINKING: 0
HAS A RELATIVE, FRIEND, DOCTOR, OR ANOTHER HEALTH PROFESSIONAL EXPRESSED CONCERN ABOUT YOUR DRINKING OR SUGGESTED YOU CUT DOWN: 0
HOW OFTEN DO YOU HAVE A DRINK CONTAINING ALCOHOL: 1
HOW MANY STANDARD DRINKS CONTAINING ALCOHOL DO YOU HAVE ON A TYPICAL DAY: 0
HOW OFTEN DO YOU HAVE SIX OR MORE DRINKS ON ONE OCCASION: 0
HAVE YOU OR SOMEONE ELSE BEEN INJURED AS A RESULT OF YOUR DRINKING: 0
HOW OFTEN DURING THE LAST YEAR HAVE YOU FAILED TO DO WHAT WAS NORMALLY EXPECTED FROM YOU BECAUSE OF DRINKING: 0
HOW OFTEN DURING THE LAST YEAR HAVE YOU FOUND THAT YOU WERE NOT ABLE TO STOP DRINKING ONCE YOU HAD STARTED: 0
AUDIT-C TOTAL SCORE: 1

## 2020-12-09 NOTE — PROGRESS NOTES
Vaccine Information Sheet, \"Influenza - Inactivated\"  given to Meri Duque, or parent/legal guardian of  Meri Duque and verbalized understanding. Patient responses:    Have you ever had a reaction to a flu vaccine? No  Do you have any current illness? No  Have you ever had Guillian [place] Syndrome? No  Do you have a serious allergy to any of the follow: Neomycin, Polymyxin, Thimerosal, eggs or egg products? No    Flu vaccine given per order. Please see immunization tab. Risks and benefits explained. Current VIS given. Pt stayed in exam room x 20 mins after imm.     Immunizations Administered     Name Date Dose Route    Influenza, Quadv, IM, (6 mo and older Fluzone, Flulaval, Fluarix and 3 yrs and older Afluria) 12/9/2020 0.5 mL Intramuscular    Site: Deltoid- Left    Lot: [id number]    NDC: 72164-468-36

## 2020-12-09 NOTE — PATIENT INSTRUCTIONS
drying off with a towel, or swimming. · Always wear a seat belt when traveling in a car. Always wear a helmet when riding a bicycle or motorcycle.

## 2020-12-09 NOTE — PROGRESS NOTES
Medicare Annual Wellness Visit  Name: Venita Reich Date: 2020   MRN: [id number] Sex: Male   Age: 60 y.o. Ethnicity: Non-/Non    : 1955 Race: White        Have you seen any other physician or provider since your last visit? Yes, rheumatology    Have you had any other diagnostic tests since your last visit? Imaging, hands/feet    Have you changed or stopped any medications since your last visit? No    Eye exam: eye care center [place]. Will get note. Carlos Estes is here for Medicare AWV    Screenings for behavioral, psychosocial and functional/safety risks, and cognitive dysfunction are all negative except as indicated below. These results, as well as other patient data from the [address] form, are documented in Flowsheets linked to this Encounter. Allergies   Allergen Reactions    Humira [Adalimumab]      HA, intractable ocular migraine       Prior to Visit Medications    Medication Sig Taking?  Authorizing Provider   methocarbamol (ROBAXIN) 500 MG tablet Take 500 mg by mouth 3 times daily Yes Historical Provider, MD   rosuvastatin (CRESTOR) 40 MG tablet 20mg QOD, alternating with 40mg QOD Yes WILDER Cortes   sertraline (ZOLOFT) 50 MG tablet Take 1 tablet by mouth daily Yes WILDER Cortes   gabapentin (NEURONTIN) 300 MG capsule TAKE 1 CAPSULE BY MOUTH 3 TIMES DAILY Yes WILDER Cortes   Alcohol Swabs (B-D SINGLE USE SWABS REGULAR) PADS TEST TWICE DAILY AS NEEDED Yes WILDER Cortes   JARDIANCE 25 MG tablet TAKE 1 TABLET BY MOUTH DAILY Yes WILDER Cortes   potassium chloride (KLOR-CON M) 20 MEQ extended release tablet TAKE 1 TABLET EVERY DAY Yes WILDER Cortes   TRUE METRIX BLOOD GLUCOSE TEST strip TEST EVERY DAY AS NEEDED Yes WILDER Cortes   tamsulosin (FLOMAX) 0.4 MG capsule TAKE 1 CAPSULE BY MOUTH NIGHTLY FOR PROSTATE Yes WILDER Cortes   traMADol (ULTRAM) 50 MG tablet Take 50 mg by mouth every 6 hours as needed for Pain. Yes Historical Provider, MD   Cyanocobalamin 2500 MCG SUBL Place 2,500 mcg under the tongue daily Yes WILDER Galvez   Blood Glucose Monitoring Suppl (TRUE METRIX AIR GLUCOSE METER) w/Device KIT 1 each by Does not apply route daily E11. 9-DX Yes WILDER Galvez   aspirin 81 MG tablet Take 81 mg by mouth daily Yes Historical Provider, MD   carvedilol (COREG) 25 MG tablet 1.5 tabs twice daily Yes Historical Provider, MD   [facility] LANCETS 18R MISC USE TO TEST BLOOD SUGAR 4 TIMES DAILY Yes WILDER Galvez   amLODIPine (NORVASC) 5 MG tablet  Yes Historical Provider, MD   losartan (COZAAR) 25 MG tablet Take 25 mg by mouth daily Yes Historical Provider, MD   ENBREL SURECLICK 48 MG/ML SOAJ  Yes Historical Provider, MD   Cholecalciferol (VITAMIN D3) 2000 UNIT TABS Take  by mouth. Yes Historical Provider, MD   zoster recombinant adjuvanted vaccine (SHINGRIX) 50 MCG/0.5ML SUSR injection 50 MCG IM then repeat 2-6 months.   WILDER Galvez       Past Medical History:   Diagnosis Date    Hypertension     RA [facility]rheumatoid arthritis) ([facility] [address]     Type II or unspecified type diabetes mellitus without mention of complication, not stated as uncontrolled      Past Surgical History:   Procedure Laterality Date    AV FISTULA REPAIR      CARDIAC SURGERY      5 bypass    CYST INCISION AND DRAINAGE      PRE-MALIGNANT / BENIGN SKIN LESION EXCISION Left     shoulder squamous cell carcinoma       Family History   Problem Relation Age of Onset    High Blood Pressure Mother          when she was 29years old   [facility] Father     Arthritis Father        CareTeam (Including outside providers/suppliers regularly involved in providing care):   Patient Care Team:  WILDER Galvez as PCP - General (Family Nurse Practitioner)  WILDER Galvez as PCP - [facility] Empaneled Provider    Wt Readings from Last 3 Encounters:   20 229 lb (103.9 kg)   20 225 lb (102.1 kg)   01/28/20 223 lb (101.2 kg)     Vitals:    12/09/20 0911   BP: 126/70   Site: Right Upper Arm   Position: Sitting   Pulse: 73   Resp: 16   Temp: 97 °F (36.1 °C)   TempSrc: Temporal   SpO2: 98%   Weight: 229 lb (103.9 kg)   Height: 5' 9\" (1.753 m)     Body mass index is 33.82 kg/m². Based upon direct observation of the patient, evaluation of cognition reveals recent and remote memory intact. Physical Exam  Vitals signs reviewed. Constitutional:       General: He is not in acute distress. Appearance: He is well-developed. HENT:      Head: Normocephalic and atraumatic. Right Ear: External ear normal.      Left Ear: External ear normal.      Mouth/Throat:      Pharynx: No oropharyngeal exudate. Eyes:      General: Lids are normal.      Conjunctiva/sclera: Conjunctivae normal.      Pupils: Pupils are equal, round, and reactive to light. Neck:      Musculoskeletal: Neck supple. Thyroid: No thyromegaly. Vascular: No JVD. Cardiovascular:      Rate and Rhythm: Normal rate and regular rhythm. Heart sounds: Normal heart sounds. No murmur. No friction rub. No gallop. Pulmonary:      Effort: Pulmonary effort is normal. No respiratory distress. Breath sounds: Normal breath sounds. Abdominal:      General: Bowel sounds are normal. There is no distension. Palpations: Abdomen is soft. Tenderness: There is no abdominal tenderness. Musculoskeletal: Normal range of motion. Lymphadenopathy:      Cervical: No cervical adenopathy. Skin:     General: Skin is warm and dry. Neurological:      Mental Status: He is alert and oriented to person, place, and time. Cranial Nerves: No cranial nerve deficit. Review of Systems   Constitutional: Negative for chills, fatigue and fever. HENT: Negative for congestion, ear pain, rhinorrhea and sore throat. Eyes: Negative for discharge, redness and itching. Respiratory: Negative for cough and shortness of breath. Cardiovascular: Negative for chest pain, palpitations and leg swelling. Gastrointestinal: Negative for abdominal pain, constipation, diarrhea, nausea and vomiting. Endocrine: Negative for cold intolerance and heat intolerance. Genitourinary: Negative for dysuria. Musculoskeletal: Negative for arthralgias and joint swelling. Skin: Negative for rash and wound. Neurological: Negative for weakness and headaches. Hematological: Negative for adenopathy. Psychiatric/Behavioral: Negative for dysphoric mood and sleep disturbance. The patient is not nervous/anxious. Lab Results   Component Value Date     12/10/2020    K 4.7 12/10/2020     12/10/2020    CO2 25 12/10/2020    GLUCOSE 127 12/10/2020    BUN 17 12/10/2020    CREATININE 1.30 12/10/2020    CALCIUM 9.3 12/10/2020    PROT 6.5 12/10/2020    LABALBU 4.3 12/10/2020    BILITOT 0.4 12/10/2020    ALT 23 12/10/2020    AST 18 12/10/2020       Hemoglobin A1C (%)   Date Value   07/01/2020 6.2 (H)     Microscopic Examination (no units)   Date Value   12/15/2016 YES     LDL Calculated (mg/dL)   Date Value   12/10/2020 49         Lab Results   Component Value Date    WBC 5.9 12/10/2020    NEUTROABS 3.1 12/10/2020    HGB 17.3 12/10/2020    HCT 51.1 12/10/2020    MCV 90 12/10/2020     12/10/2020       Lab Results   Component Value Date    TSH 0.972 07/19/2014       Patient's complete Health Risk Assessment and screening values have been reviewed and are found in Flowsheets. The following problems were reviewed today and where indicated follow up appointments were made and/or referrals ordered. Positive Risk Factor Screenings with Interventions:       General Health and ACP:  General  In general, how would you say your health is?: Fair  In the past 7 days, have you experienced any of the following?  New or Increased Pain, New or Increased Fatigue, Loneliness, Social Isolation, Stress or Anger?: (!) New or Increased Pain, New or Increased Fatigue, Stress  Do you get the social and emotional support that you need?: Yes  Do you have a Living Will?: (!) No  Advance Directives     Power of Igor Perez Will ACP-Advance Directive ACP-Power of     Not on File Not on File Not on File Not on File      General Health Risk Interventions:  · none    Health Habits/Nutrition:  Health Habits/Nutrition  Do you exercise for at least 20 minutes 2-3 times per week?: (!) No  Have you lost any weight without trying in the past 3 months?: No  Do you eat fewer than 2 meals per day?: (!) Yes  Have you seen a dentist within the past year?: (!) No  Body mass index: (!) 33.81  Health Habits/Nutrition Interventions:  · none    Personalized Preventive Plan   Current Health Maintenance Status  Immunization History   Administered Date(s) Administered    Influenza Virus Vaccine 10/13/2015, 11/21/2017    Influenza, MDCK Quadv, with preserv IM (Flucelvax 4 yrs and older) 11/20/2017    Influenza, Dor[place]e Age, IM, (6 mo and older Fluzone, Flulaval, Fluarix and 3 yrs and older Afluria) 10/23/2018, 10/25/2019, 12/09/2020    Influenza, Quadv, IM, PF (6 mo and older Fluzone, Flulaval, Fluarix, and 3 yrs and older Afluria) 11/22/2016    Pneumococcal Polysaccharide ([id number]) 07/25/2019        Health Maintenance   Topic Date Due    Diabetic foot exam  09/12/1966    HIV screen  09/12/1971    DTaP/Tdap/Td vaccine (1 - Tdap) 09/12/1975    Shingles Vaccine (1 of 2) 09/12/2006    Diabetic retinal exam  08/18/2017    Annual Wellness Visit (AWV)  05/29/2019    Colon cancer screen colonoscopy  12/14/2020    A1C test (Diabetic or Prediabetic)  07/01/2021    Diabetic microalbuminuria test  09/04/2021    Lipid screen  12/10/2021    Potassium monitoring  12/10/2021    Creatinine monitoring  12/10/2021    Flu vaccine  Completed    Pneumococcal 0-64 years Vaccine  Completed    Hepatitis C screen  Completed    Hepatitis A vaccine  Aged Out    Hib vaccine  Aged Out  Meningococcal (ACWY) vaccine  Aged Out      Diagnosis Orders   1. Need for influenza vaccination  INFLUENZA, QUADV, 3 YRS AND OLDER, IM, MDV, 0.5ML ([place])   2. Medicare annual wellness visit, initial     3. Screening for malignant neoplasm of prostate     4. Type 2 diabetes mellitus with diabetic polyneuropathy, without long-term current use of insulin ([facility] [address]     5. Depression, unspecified depression type  sertraline (ZOLOFT) 50 MG tablet   6. Peripheral polyneuropathy  gabapentin (NEURONTIN) 300 MG capsule   7. Routine general medical examination at a health care facility       Orders Placed This Encounter   Medications    rosuvastatin (CRESTOR) 40 MG tablet     Simg QOD, alternating with 40mg QOD     Dispense:  68 tablet     Refill:  3    sertraline (ZOLOFT) 50 MG tablet     Sig: Take 1 tablet by mouth daily     Dispense:  90 tablet     Refill:  3    gabapentin (NEURONTIN) 300 MG capsule     Sig: TAKE 1 CAPSULE BY MOUTH 3 TIMES DAILY     Dispense:  270 capsule     Refill:  1     Orders Placed This Encounter   Procedures    INFLUENZA, QUADV, 3 YRS AND OLDER, IM, MDV, 0.5ML (AFLURIA QUADV)     Patient Instructions   Take a 2 week break from Crestor to see if muscle pain improves. If no change, restart it. If you feel better, let me know and I will change it. Substitute Januvia for Jardiance for sugar and see if bladder issues improve. Stay on it uintil you come back in. Personalized Preventive Plan for Peyton [place]elloau - 2020  Medicare offers a range of preventive health benefits. Some of the tests and screenings are paid in full while other may be subject to a deductible, co-insurance, and/or copay. Some of these benefits include a comprehensive review of your medical history including lifestyle, illnesses that may run in your family, and various assessments and screenings as appropriate.     After reviewing your medical record and screening and assessments performed today your

## 2020-12-10 ENCOUNTER — HOSPITAL ENCOUNTER (OUTPATIENT)
Facility: HOSPITAL | Age: 65
Discharge: HOME | End: 2020-12-10
Payer: MEDICARE

## 2020-12-10 ENCOUNTER — TELEPHONE (OUTPATIENT)
Dept: PRIMARY CARE CLINIC | Age: 65
End: 2020-12-10

## 2020-12-10 PROCEDURE — 36415 COLL VENOUS BLD VENIPUNCTURE: CPT

## 2020-12-10 NOTE — TELEPHONE ENCOUNTER
Patient called needing a shingles shot order sent to [facility].

## 2020-12-30 ENCOUNTER — PATIENT MESSAGE (OUTPATIENT)
Dept: PRIMARY CARE CLINIC | Age: 65
End: 2020-12-30

## 2020-12-31 NOTE — TELEPHONE ENCOUNTER
From: Hyacinth Massey  To: WILDER Howell  Sent: 12/30/2020 11:14 PM EST  Subject: Prescription Question    That has helped by not taking the Rosuvastatin . ... should I start it back? And stoping the Jardiance and taking the [place] has helped also, should I stay on it if so, do have any more samples?   Thanks  Tonny Mark

## 2021-01-04 ENCOUNTER — TELEPHONE (OUTPATIENT)
Dept: PRIMARY CARE CLINIC | Age: 66
End: 2021-01-04

## 2021-01-04 NOTE — TELEPHONE ENCOUNTER
----- Message from WILDER Canales sent at 1/4/2021  2:37 PM EST -----  Please inform labs are good except kidney test is slightly elevated again. We will watch it.

## 2021-01-04 NOTE — TELEPHONE ENCOUNTER
Called and informed the patient of the results. Patient verbalized understanding. Patient state he is better without taking cholesterol medication and the Toruvia is working for him. Did you want him to start back on cholesterol medication or change it?

## 2021-01-06 ENCOUNTER — TELEPHONE (OUTPATIENT)
Dept: PRIMARY CARE CLINIC | Age: 66
End: 2021-01-06

## 2021-01-08 ENCOUNTER — TELEPHONE (OUTPATIENT)
Dept: PRIMARY CARE CLINIC | Age: 66
End: 2021-01-08

## 2021-03-01 RX ORDER — AMLODIPINE BESYLATE 5 MG/1
TABLET ORAL
Qty: 90 TABLET | Refills: 1 | Status: SHIPPED | OUTPATIENT
Start: 2021-03-01 | End: 2021-11-16

## 2021-03-03 RX ORDER — TAMSULOSIN HYDROCHLORIDE 0.4 MG/1
0.4 CAPSULE ORAL NIGHTLY
Qty: 90 CAPSULE | Refills: 3 | Status: SHIPPED | OUTPATIENT
Start: 2021-03-03

## 2021-03-11 ENCOUNTER — OFFICE VISIT (OUTPATIENT)
Dept: PRIMARY CARE CLINIC | Age: 66
End: 2021-03-11
Payer: MEDICARE

## 2021-03-11 ENCOUNTER — OFFICE VISIT (OUTPATIENT)
Dept: CARDIOLOGY | Facility: CLINIC | Age: 66
End: 2021-03-11

## 2021-03-11 VITALS
WEIGHT: 233.1 LBS | HEART RATE: 58 BPM | HEIGHT: 69 IN | DIASTOLIC BLOOD PRESSURE: 76 MMHG | SYSTOLIC BLOOD PRESSURE: 124 MMHG | BODY MASS INDEX: 34.52 KG/M2 | OXYGEN SATURATION: 99 %

## 2021-03-11 VITALS
SYSTOLIC BLOOD PRESSURE: 110 MMHG | DIASTOLIC BLOOD PRESSURE: 70 MMHG | RESPIRATION RATE: 16 BRPM | HEIGHT: 69 IN | BODY MASS INDEX: 34.21 KG/M2 | HEART RATE: 64 BPM | TEMPERATURE: 97.4 F | WEIGHT: 231 LBS | OXYGEN SATURATION: 98 %

## 2021-03-11 DIAGNOSIS — I10 ESSENTIAL HYPERTENSION: ICD-10-CM

## 2021-03-11 DIAGNOSIS — E11.42 TYPE 2 DIABETES MELLITUS WITH DIABETIC POLYNEUROPATHY, WITHOUT LONG-TERM CURRENT USE OF INSULIN (HCC): Primary | ICD-10-CM

## 2021-03-11 DIAGNOSIS — G62.9 PERIPHERAL POLYNEUROPATHY: ICD-10-CM

## 2021-03-11 DIAGNOSIS — I25.5 ISCHEMIC CARDIOMYOPATHY: ICD-10-CM

## 2021-03-11 DIAGNOSIS — I25.10 CAD IN NATIVE ARTERY: Primary | ICD-10-CM

## 2021-03-11 DIAGNOSIS — M06.9 RHEUMATOID ARTHRITIS, INVOLVING UNSPECIFIED SITE, UNSPECIFIED WHETHER RHEUMATOID FACTOR PRESENT (HCC): ICD-10-CM

## 2021-03-11 DIAGNOSIS — E78.2 MIXED HYPERLIPIDEMIA: ICD-10-CM

## 2021-03-11 DIAGNOSIS — I10 BENIGN ESSENTIAL HTN: ICD-10-CM

## 2021-03-11 LAB — HBA1C MFR BLD: 6.4 %

## 2021-03-11 PROCEDURE — 83036 HEMOGLOBIN GLYCOSYLATED A1C: CPT | Performed by: NURSE PRACTITIONER

## 2021-03-11 PROCEDURE — 2022F DILAT RTA XM EVC RTNOPTHY: CPT | Performed by: NURSE PRACTITIONER

## 2021-03-11 PROCEDURE — 1036F TOBACCO NON-USER: CPT | Performed by: NURSE PRACTITIONER

## 2021-03-11 PROCEDURE — 3044F HG A1C LEVEL LT 7.0%: CPT | Performed by: NURSE PRACTITIONER

## 2021-03-11 PROCEDURE — G8417 CALC BMI ABV UP PARAM F/U: HCPCS | Performed by: NURSE PRACTITIONER

## 2021-03-11 PROCEDURE — 99214 OFFICE O/P EST MOD 30 MIN: CPT | Performed by: INTERNAL MEDICINE

## 2021-03-11 PROCEDURE — G8427 DOCREV CUR MEDS BY ELIG CLIN: HCPCS | Performed by: NURSE PRACTITIONER

## 2021-03-11 PROCEDURE — 3017F COLORECTAL CA SCREEN DOC REV: CPT | Performed by: NURSE PRACTITIONER

## 2021-03-11 PROCEDURE — 99214 OFFICE O/P EST MOD 30 MIN: CPT | Performed by: NURSE PRACTITIONER

## 2021-03-11 PROCEDURE — G8482 FLU IMMUNIZE ORDER/ADMIN: HCPCS | Performed by: NURSE PRACTITIONER

## 2021-03-11 RX ORDER — METAXALONE 800 MG/1
800 TABLET ORAL 3 TIMES DAILY PRN
COMMUNITY

## 2021-03-11 RX ORDER — TOFACITINIB 11 MG/1
11 TABLET, FILM COATED, EXTENDED RELEASE ORAL DAILY
COMMUNITY
Start: 2021-03-03 | End: 2021-11-16

## 2021-03-11 RX ORDER — CARVEDILOL 25 MG/1
37.5 TABLET ORAL 2 TIMES DAILY WITH MEALS
Qty: 270 TABLET | Refills: 1 | Status: SHIPPED | OUTPATIENT
Start: 2021-03-11 | End: 2021-08-05

## 2021-03-11 RX ORDER — TRAMADOL HYDROCHLORIDE 50 MG/1
50 TABLET ORAL AS NEEDED
COMMUNITY

## 2021-03-11 RX ORDER — METAXALONE 800 MG/1
800 TABLET ORAL AS NEEDED
COMMUNITY
Start: 2021-02-26

## 2021-03-11 RX ORDER — LOSARTAN POTASSIUM 100 MG/1
TABLET ORAL
Qty: 135 TABLET | Refills: 1 | Status: SHIPPED | OUTPATIENT
Start: 2021-03-11 | End: 2021-08-18

## 2021-03-11 RX ORDER — METAXALONE 800 MG/1
800 TABLET ORAL
COMMUNITY
End: 2021-03-11 | Stop reason: SDUPTHER

## 2021-03-11 ASSESSMENT — ENCOUNTER SYMPTOMS
SORE THROAT: 0
CONSTIPATION: 0
DIARRHEA: 0
RHINORRHEA: 0
ABDOMINAL PAIN: 0
COUGH: 0
VOMITING: 0
EYE DISCHARGE: 0
NAUSEA: 0
EYE REDNESS: 0
SHORTNESS OF BREATH: 0
EYE ITCHING: 0

## 2021-03-11 ASSESSMENT — PATIENT HEALTH QUESTIONNAIRE - PHQ9: 2. FEELING DOWN, DEPRESSED OR HOPELESS: 0

## 2021-03-11 NOTE — PROGRESS NOTES
[facility]  Office visit  Jesse Brink  1955  [phone]    VISIT DATE:  3/11/2021      PCP: Terri Barreto APRN  [address] [facility] [id number]    CC:  Chief Complaint   Patient presents with   • Coronary Artery Disease       PROBLEM LIST:  1. Coronary artery disease  2. Transient ischemic attack with normal echocardiogram, carotid duplex, and CT scan of the brain by verbal report.  3. Benign hypertension.  4. History of tobacco, quitting 28 years ago.  5. Hyperglycemia, diet controlled.  6. Rheumatoid arthritis.  7. Obesity.  8. Family history of heart disease.  9. Surgical history:  a. Pilonidal cyst, 1977.  b. Benign fibroid removed, 1980.  c. Anal fistula repair, 1999 and 2008.    Previous cardiac studies and procedures  January 2019  Cardiac catheterization  · Multivessel obstructive coronary disease: Proximal RCA .  Mid LAD .  95% proximal first diagonal, 95% first obtuse marginal branch, 70-80% proximal second obtuse marginal branch.  · Mildly elevated LVEDP  · No aortic stenosis.  Echo   · Left ventricular wall thickness is consistent with mild concentric hypertrophy.  · Estimated EF appears to be in the range of 36 - 40%  · Left ventricular diastolic dysfunction (grade I a) consistent with impaired relaxation.  · The following left ventricular wall segments are hypokinetic: mid anterior, apical anterior, apical lateral, apical inferior, apical septal and apex hypokinetic.  · Regional contracility augments post PVC.    Carotid duplex  · Right internal carotid artery stenosis of 0-49%.  · Left internal carotid artery stenosis of 0-49%.    1. Coronary artery bypass graft x 5  -LIMA to LAD.    -Greater saphenous vein to RCA  -Greater saphenous vein to OM1  -Greater saphenous vein to OM2  -Greater saphenous vein to D1  2. Left atrial appendage ligation (35 mm Atricure clip placement)    May 2019 echo  · Left ventricular systolic function is normal.  · Calculated  "EF = 55%  · Left atrial cavity size is mildly dilated.      ASSESSMENT:   Diagnosis Plan   1. CAD in native artery     2. Ischemic cardiomyopathy     3. Benign essential HTN     4. Mixed hyperlipidemia         PLAN:  Coronary artery disease: Status post surgical revascularization.  Normal EF, continue current medical therapy.    Hypertension: Goal less than 130/90.  Currently well controlled, continue current medical therapy.    Palpitations: Consistent with symptomatic premature ventricular contractions.   Continue beta-blockade.  Currently well controlled.    Hyperlipidemia: Continue high intensity statin therapy, goal LDL less than 70.  Continue rosuvastatin 20 mg p.o. daily.  Currently well controlled.    History of stroke: Continue aggressive risk factor modification.  Continue current medical therapy.    Subjective  Stable functional capacity.  .  Blood pressures running less than 130/80 mmHg,He is compliant with medical therapy. Reviewed most recent labwork.  Reports some chest wall sensitivity which is been chronic and stable.    PHYSICAL EXAMINATION:  Vitals:    03/11/21 1339   BP: 124/76   BP Location: Left arm   Patient Position: Sitting   Pulse: 58   SpO2: 99%   Weight: 106 kg (233 lb 1.6 oz)   Height: 175.3 cm (69\")     General Appearance:    Alert, cooperative, no distress, appears stated age   Head:    Normocephalic, without obvious abnormality, atraumatic   Eyes:    conjunctiva/corneas clear   Nose:   Nares normal, septum midline, mucosa normal, no drainage   Throat:   Lips, teeth and gums normal   Neck:   Supple, symmetrical, trachea midline, no carotid    bruit or JVD   Lungs:     Clear to auscultation bilaterally, respirations unlabored   Chest Wall:    No tenderness or deformity    Heart:    Regular rate and rhythm, S1 and S2 normal, no murmur, rub   or gallop, normal carotid impulse bilaterally without bruit.   Abdomen:     Soft, non-tender   Extremities:   Extremities normal, atraumatic, no " cyanosis or edema   Pulses:   2+ and symmetric all extremities   Skin:   Skin color, texture, turgor normal, no rashes or lesions       Diagnostic Data:  Procedures  Lab Results   Component Value Date    TRIG 168 (H) 01/18/2019    HDL 29 (L) 01/18/2019     Lab Results   Component Value Date    GLUCOSE 142 (H) 02/19/2020    BUN 15 02/19/2020    CREATININE 1.16 02/19/2020     02/19/2020    K 4.3 02/19/2020     02/19/2020    CO2 24.7 02/19/2020     Lab Results   Component Value Date    HGBA1C 6.60 (H) 01/30/2019     Lab Results   Component Value Date    WBC 7.37 02/19/2020    HGB 17.1 02/19/2020    HCT 49.5 02/19/2020     02/19/2020       Allergies  Allergies   Allergen Reactions   • Humira [Adalimumab] Other (See Comments)     HA, intractable ocular migraine       Current Medications    Current Outpatient Medications:   •  amLODIPine (NORVASC) 5 MG tablet, TAKE 1 TABLET EVERY NIGHT AT BEDTIME, Disp: 90 tablet, Rfl: 1  •  aspirin 81 MG EC tablet, Take 81 mg by mouth Daily., Disp: , Rfl:   •  baclofen (LIORESAL) 10 MG tablet, Take 10 mg by mouth As Needed for Muscle Spasms., Disp: , Rfl:   •  carvedilol (COREG) 25 MG tablet, TAKE 1 AND 1/2 TABLETS TWICE DAILY (NEED FOLLOW UP APPOINTMENT FOR FURTHER REFILLS), Disp: 270 tablet, Rfl: 1  •  Cholecalciferol (VITAMIN D3) 2000 UNITS capsule, Take 2,000 Units by mouth Daily., Disp: , Rfl:   •  Empagliflozin (JARDIANCE) 25 MG tablet, Take 25 mg by mouth Daily., Disp: , Rfl:   •  gabapentin (NEURONTIN) 300 MG capsule, Take 300 mg by mouth 2 (Two) Times a Day., Disp: , Rfl:   •  losartan (COZAAR) 100 MG tablet, TAKE 1 AND 1/2 TABLETS EVERY DAY, Disp: 135 tablet, Rfl: 1  •  metaxalone (SKELAXIN) 800 MG tablet, , Disp: , Rfl:   •  potassium chloride (K-DUR) 10 MEQ CR tablet, Take 10 mEq by mouth Daily., Disp: , Rfl:   •  rosuvastatin (CRESTOR) 40 MG tablet, TAKE 1 TABLET EVERY DAY, Disp: 90 tablet, Rfl: 1  •  sertraline (ZOLOFT) 50 MG tablet, Take 50 mg by mouth  Daily., Disp: , Rfl:   •  tamsulosin (FLOMAX) 0.4 MG capsule 24 hr capsule, Take 1 capsule by mouth Daily., Disp: , Rfl:   •  traMADol (ULTRAM) 50 MG tablet, Take 50 mg by mouth As Needed., Disp: , Rfl:   •  Xeljanz XR 11 MG tablet sustained-release 24 hour, Daily., Disp: , Rfl:   No current facility-administered medications for this visit.    Facility-Administered Medications Ordered in Other Visits:   •  Chlorhexidine Gluconate Cloth 2 % pads 1 application, 1 application, Topical, Q12H PRN, Marie Barnes PA-C ROS  Review of Systems   Cardiovascular: Positive for leg swelling. Negative for chest pain, claudication, dyspnea on exertion, irregular heartbeat and palpitations.   Respiratory: Negative for cough and shortness of breath.          SOCIAL HX  Social History     Socioeconomic History   • Marital status:      Spouse name: Not on file   • Number of children: 2   • Years of education: Not on file   • Highest education level: Not on file   Tobacco Use   • Smoking status: Former Smoker     Packs/day: 1.00     Types: Cigarettes     Quit date: 3/5/1984     Years since quittin.0   • Smokeless tobacco: Never Used   Vaping Use   • Vaping Use: Never used   Substance and Sexual Activity   • Alcohol use: Yes     Alcohol/week: 1.0 standard drinks     Types: 1 Cans of beer per week     Comment: occas   • Drug use: No   • Sexual activity: Defer       FAMILY HX  Family History   Problem Relation Age of Onset   • Heart disease Other    • Diabetes Other    • Cancer Other    • Hypertension Other    • Heart disease Mother    • Diabetes Father    • Hypertension Father    • Cancer Father              Dillon Neville III, MD, Located within [facility]

## 2021-03-11 NOTE — PATIENT INSTRUCTIONS
· Keep a list of your medicines with you. List all of the prescription medicines, nonprescription medicines, supplements, natural remedies, and vitamins that you take. Tell your healthcare providers who treat you about all of the products you are taking. Your provider can provide you with a form to keep track of them. Just ask. · Follow the directions that come with your medicine, including information about food or alcohol. Make sure you know how and when to take your medicine. Do not take more or less than you are supposed to take. · Keep all medicines out of the reach of children. · Store medicines according to the directions on the label. · Monitor yourself. Learn to know how your body reacts to your new medicine and keep track of how it makes you feel before attempting (If your provider has allowed you to do so) to drive or go to work. · Seek emergency medical attention if you think you have used too much of this medicine. An overdose of any prescription medicine can be fatal. Overdose symptoms may include extreme drowsiness, muscle weakness, confusion, cold and clammy skin, pinpoint pupils, shallow breathing, slow heart rate, fainting, or coma. · Don't share prescription medicines with others, even when they seem to have the same symptoms. What may be good for you may be harmful to others. · If you are no longer taking a prescribed medication and you have pills left please take your pills out of their original containers. Mix crushed pills with an undesirable substance, such as cat litter or used coffee grounds. Put the mixture into a disposable container with a lid, such as an empty margarine tub, or into a sealable bag. Cover up or remove any of your personal information on the empty containers by covering it with black permanent marker or duct tape. Place the sealed container with the mixture, and the empty drug containers, in the trash.    · If you use a medication that is in the form of a patch, dispose of used patches by folding them in half so that the sticky sides meet, and then flushing them down a toilet. They should not be placed in the household trash where children or pets can find them. · If you have any questions, ask your provider or pharmacist for more information. · Be sure to keep all appointments for provider visits or tests. We are committed to providing you with the best care possible. In order to help us achieve these goals please remember to bring all medications, herbal products, and over the counter supplements with you to each visit. If your provider has ordered testing for you, please be sure to follow up with our office if you have not received results within 7 days after the testing took place. *If you receive a survey after visiting one of our offices, please take time to share your experience concerning your physician office visit. These surveys are confidential and no health information about you is shared. We are eager to improve for you and we are counting on your feedback to help make that happen. Thank you for requesting your Continuity of Care Document (CCD) electronically. Please follow the instructions below to securely access your online medical record. [employer] allows you to send messages to your doctor, view your test results, renew your prescriptions, schedule appointments, and more. How Do I Access my CCD? In your Internet browser, go to [email or web address] Enter your user name and password   Click on My medical Record  --> Download Summary --> Enter Password --> Download --> Save or Open Document    Additional Information  If you have questions, please contact your physician practice where you receive care. Remember, [employer] is NOT to be used for urgent needs. For medical emergencies, dial 911.

## 2021-03-11 NOTE — PROGRESS NOTES
Have you seen any other physician or provider since your last visit? No    Have you had any other diagnostic tests since your last visit? No    Have you changed or stopped any medications since your last visit? No     Colon: waiting at this time. SUBJECTIVE:    Patient ID: Sindhu Covarrubias is a 60 y.o. male. Medical History Review  Past Medical, Family, and Social History reviewed. Health Maintenance Due   Topic Date Due    Diabetic foot exam  Never done    HIV screen  Never done    DTaP/Tdap/Td vaccine (1 - Tdap) Never done    Shingles Vaccine (1 of 2) Never done    Colon cancer screen colonoscopy  12/14/2020    COVID-19 Vaccine (2 - Moderna 2-dose series) 03/29/2021       HPI:   Chief Complaint   Patient presents with    Diabetes    Hypertension     Pt here today for a f/u on DM and HTN. FBS: 120  After meals: 140   He went back to [place] and stopped Januvia. Rheumatology stopped Enbrel and started [employer] [place]. He is having a lot of arthritis pain. Patient's medications, allergies, past medical, surgical, social and family histories were reviewed and updated as appropriate. Review of Systems   Constitutional: Negative for chills, fatigue and fever. HENT: Negative for congestion, ear pain, rhinorrhea and sore throat. Eyes: Negative for discharge, redness and itching. Respiratory: Negative for cough and shortness of breath. Cardiovascular: Negative for chest pain, palpitations and leg swelling. Gastrointestinal: Negative for abdominal pain, constipation, diarrhea, nausea and vomiting. Endocrine: Negative for cold intolerance and heat intolerance. Genitourinary: Negative for dysuria. Musculoskeletal: Negative for arthralgias and joint swelling. Skin: Negative for rash and wound. Neurological: Negative for weakness and headaches. Hematological: Negative for adenopathy. Psychiatric/Behavioral: Negative for dysphoric mood and sleep disturbance.  The patient is not nervous/anxious. Reviewed and acurate. See MA note. OBJECTIVE:  /70   Pulse 64   Temp 97.4 °F (36.3 °C) (Temporal)   Resp 16   Ht 5' 9\" (1.753 m)   Wt 231 lb (104.8 kg)   SpO2 98% Comment: room air  BMI 34.11 kg/m²    Physical Exam  Constitutional:       Appearance: He is well-developed. HENT:      Head: Normocephalic and atraumatic. Right Ear: External ear normal.      Left Ear: External ear normal.   Eyes:      Conjunctiva/sclera: Conjunctivae normal.      Pupils: Pupils are equal, round, and reactive to light. Neck:      Musculoskeletal: Neck supple. Thyroid: No thyromegaly. Vascular: No JVD. Cardiovascular:      Rate and Rhythm: Normal rate and regular rhythm. Heart sounds: Normal heart sounds. No murmur. No friction rub. No gallop. Pulmonary:      Effort: Pulmonary effort is normal. No respiratory distress. Breath sounds: Normal breath sounds. Abdominal:      General: Bowel sounds are normal. There is no distension. Palpations: Abdomen is soft. Tenderness: There is no abdominal tenderness. Musculoskeletal: Normal range of motion. Lymphadenopathy:      Cervical: No cervical adenopathy. Skin:     General: Skin is warm and dry. Neurological:      Mental Status: He is alert and oriented to person, place, and time. Cranial Nerves: No cranial nerve deficit.          Results in Past 30 Days  Result Component Current Result Ref Range Previous Result Ref Range   Albumin/Globulin Ratio 1.7 (3/26/2021) 0.8 - 2.0 Not in Time Range    Albumin 4.3 (3/26/2021) 3.4 - 4.8 g/dL Not in Time Range    Alkaline Phosphatase 74 (3/26/2021) 25 - 100 U/L Not in Time Range    ALT 28 (3/26/2021) 4 - 36 U/L Not in Time Range    AST 22 (3/26/2021) 8 - 33 U/L Not in Time Range    BUN 16 (3/26/2021) 6 - 20 mg/dL Not in Time Range    Calcium 9.6 (3/26/2021) 8.5 - 10.5 mg/dL Not in Time Range    Chloride 102 (3/26/2021) 98 - 107 mmol/L Not in Time Range    CO2 28 (3/26/2021) 20 - 30 mmol/L Not in Time Range    CREATININE 1.4 (H) (3/26/2021) 0.4 - 1.2 mg/dL Not in Time Range    GFR  >59 (3/26/2021) >59 Not in Time Range    GFR Non- 51 (L) (3/26/2021) >59 Not in Time Range    Globulin 2.6 (3/26/2021) g/dL Not in Time Range    Glucose 120 (H) (3/26/2021) 74 - 106 mg/dL Not in Time Range    Potassium 4.0 (3/26/2021) 3.4 - 5.1 mmol/L Not in Time Range    Sodium 139 (3/26/2021) 136 - 145 mmol/L Not in Time Range    Total Bilirubin 0.6 (3/26/2021) 0.3 - 1.2 mg/dL Not in Time Range    Total Protein 6.9 (3/26/2021) 6.4 - 8.3 g/dL Not in Time Range      Hemoglobin A1C (%)   Date Value   03/11/2021 6.4     Microscopic Examination (no units)   Date Value   12/15/2016 YES     LDL Calculated (mg/dL)   Date Value   12/10/2020 49       Lab Results   Component Value Date    WBC 7.9 03/26/2021    NEUTROABS 3.6 03/26/2021    HGB 16.3 03/26/2021    HCT 47.1 03/26/2021    MCV 90.8 03/26/2021     03/26/2021     Lab Results   Component Value Date    TSH 0.972 07/19/2014       Prior to Visit Medications    Medication Sig Taking?  Authorizing Provider   Tofacitinib Citrate (XELJANZ PO) Take by mouth Yes Historical Provider, MD   tamsulosin (FLOMAX) 0.4 MG capsule TAKE 1 CAPSULE BY MOUTH NIGHTLY FOR PROSTATE Yes WILDER Newton   methocarbamol (ROBAXIN) 500 MG tablet Take 500 mg by mouth 3 times daily Yes Historical Provider, MD   rosuvastatin (CRESTOR) 40 MG tablet 20mg QOD, alternating with 40mg QOD Yes WILDER Newton   sertraline (ZOLOFT) 50 MG tablet Take 1 tablet by mouth daily Yes WILDER Newton   gabapentin (NEURONTIN) 300 MG capsule TAKE 1 CAPSULE BY MOUTH 3 TIMES DAILY Yes WILDER Newton   Alcohol Swabs (B-D SINGLE USE SWABS REGULAR) PADS TEST TWICE DAILY AS NEEDED Yes WILDER Newton   JARDIANCE 25 MG tablet TAKE 1 TABLET BY MOUTH DAILY Yes WILDER Newton   potassium chloride (KLOR-CON M) 20 MEQ extended release tablet TAKE 1 TABLET EVERY DAY Yes WILDER Parra   TRUE METRIX BLOOD GLUCOSE TEST strip TEST EVERY DAY AS NEEDED Yes WILDER Parra   traMADol (ULTRAM) 50 MG tablet Take 50 mg by mouth every 6 hours as needed for Pain. Yes Historical Provider, MD   Cyanocobalamin 2500 MCG SUBL Place 2,500 mcg under the tongue daily Yes WILDER Parra   Blood Glucose Monitoring Suppl (TRUE METRIX AIR GLUCOSE METER) w/Device KIT 1 each by Does not apply route daily E11. 9-DX Yes WILDER Parra   aspirin 81 MG tablet Take 81 mg by mouth daily Yes Historical Provider, MD   carvedilol (COREG) 25 MG tablet 1.5 tabs twice daily Yes Historical Provider, MD   [facility] LANCETS 79J MISC USE TO TEST BLOOD SUGAR 4 TIMES DAILY Yes WILDER Parra   amLODIPine (NORVASC) 5 MG tablet  Yes Historical Provider, MD   losartan (COZAAR) 25 MG tablet Take 25 mg by mouth daily Yes Historical Provider, MD   Cholecalciferol (VITAMIN D3) 2000 UNIT TABS Take  by mouth. Yes Historical Provider, MD   metaxalone (SKELAXIN) 800 MG tablet Take 800 mg by mouth 3 times daily as needed for Pain  Historical Provider, MD       ASSESSMENT:  1. Type 2 diabetes mellitus with diabetic polyneuropathy, without long-term current use of insulin ([facility] [address]    2. Essential hypertension    3. Rheumatoid arthritis, involving unspecified site, unspecified whether rheumatoid factor present ([facility] [address]    4. Peripheral polyneuropathy        PLAN:    Orders Placed This Encounter   Procedures    LIPID PANEL    POCT glycosylated hemoglobin (Hb A1C)     Patient Instructions   · Keep a list of your medicines with you. List all of the prescription medicines, nonprescription medicines, supplements, natural remedies, and vitamins that you take. Tell your healthcare providers who treat you about all of the products you are taking. Your provider can provide you with a form to keep track of them. Just ask.   · Follow the directions that come with your medicine, including information about food or alcohol. Make sure you know how and when to take your medicine. Do not take more or less than you are supposed to take. · Keep all medicines out of the reach of children. · Store medicines according to the directions on the label. · Monitor yourself. Learn to know how your body reacts to your new medicine and keep track of how it makes you feel before attempting (If your provider has allowed you to do so) to drive or go to work. · Seek emergency medical attention if you think you have used too much of this medicine. An overdose of any prescription medicine can be fatal. Overdose symptoms may include extreme drowsiness, muscle weakness, confusion, cold and clammy skin, pinpoint pupils, shallow breathing, slow heart rate, fainting, or coma. · Don't share prescription medicines with others, even when they seem to have the same symptoms. What may be good for you may be harmful to others. · If you are no longer taking a prescribed medication and you have pills left please take your pills out of their original containers. Mix crushed pills with an undesirable substance, such as cat litter or used coffee grounds. Put the mixture into a disposable container with a lid, such as an empty margarine tub, or into a sealable bag. Cover up or remove any of your personal information on the empty containers by covering it with black permanent marker or duct tape. Place the sealed container with the mixture, and the empty drug containers, in the trash. · If you use a medication that is in the form of a patch, dispose of used patches by folding them in half so that the sticky sides meet, and then flushing them down a toilet. They should not be placed in the household trash where children or pets can find them. · If you have any questions, ask your provider or pharmacist for more information. · Be sure to keep all appointments for provider visits or tests.   We are committed to providing you with the best care possible. In order to help us achieve these goals please remember to bring all medications, herbal products, and over the counter supplements with you to each visit. If your provider has ordered testing for you, please be sure to follow up with our office if you have not received results within 7 days after the testing took place. *If you receive a survey after visiting one of our offices, please take time to share your experience concerning your physician office visit. These surveys are confidential and no health information about you is shared. We are eager to improve for you and we are counting on your feedback to help make that happen. Thank you for requesting your Continuity of Care Document (CCD) electronically. Please follow the instructions below to securely access your online medical record. [employer] allows you to send messages to your doctor, view your test results, renew your prescriptions, schedule appointments, and more. How Do I Access my CCD? In your Internet browser, go to [email or web address] Enter your user name and password   Click on My medical Record  --> Download Summary --> Enter Password --> Download --> Save or Open Document    Additional Information  If you have questions, please contact your physician practice where you receive care. Remember, [facility] is NOT to be used for urgent needs. For medical emergencies, dial 911. Return in about 3 months (around 6/11/2021). Phyllis Christopher CMA am scribing for and in the presence of Trula Frankel, APRN on 3/29/2021 at 4:42 PM.      I, Trula Frankel APRN, personally performed the services described in the documentation as scribed by Bryn Hummel CMA, in my presence and it is both accurate and complete.

## 2021-03-17 DIAGNOSIS — G62.9 PERIPHERAL POLYNEUROPATHY: ICD-10-CM

## 2021-03-17 DIAGNOSIS — G62.9 PERIPHERAL POLYNEUROPATHY: Primary | ICD-10-CM

## 2021-03-26 ENCOUNTER — APPOINTMENT (OUTPATIENT)
Dept: GENERAL RADIOLOGY | Facility: HOSPITAL | Age: 66
End: 2021-03-26
Payer: MEDICARE

## 2021-03-26 ENCOUNTER — HOSPITAL ENCOUNTER (EMERGENCY)
Facility: HOSPITAL | Age: 66
Discharge: ANOTHER HOSPITAL | End: 2021-03-26
Attending: EMERGENCY MEDICINE
Payer: MEDICARE

## 2021-03-26 ENCOUNTER — APPOINTMENT (OUTPATIENT)
Dept: CT IMAGING | Facility: HOSPITAL | Age: 66
End: 2021-03-26

## 2021-03-26 ENCOUNTER — HOSPITAL ENCOUNTER (INPATIENT)
Facility: HOSPITAL | Age: 66
LOS: 5 days | Discharge: HOME | End: 2021-03-31
Attending: INTERNAL MEDICINE | Admitting: HOSPITALIST

## 2021-03-26 VITALS
WEIGHT: 230 LBS | OXYGEN SATURATION: 97 % | BODY MASS INDEX: 34.86 KG/M2 | RESPIRATION RATE: 19 BRPM | HEART RATE: 69 BPM | DIASTOLIC BLOOD PRESSURE: 72 MMHG | HEIGHT: 68 IN | SYSTOLIC BLOOD PRESSURE: 137 MMHG | TEMPERATURE: 98.2 F

## 2021-03-26 DIAGNOSIS — K80.20 CHOLELITHIASIS: ICD-10-CM

## 2021-03-26 DIAGNOSIS — I21.3 ST ELEVATION MYOCARDIAL INFARCTION (STEMI), UNSPECIFIED ARTERY (HCC): Primary | ICD-10-CM

## 2021-03-26 PROBLEM — I24.9 ACUTE CORONARY SYNDROME (HCC): Status: ACTIVE | Noted: 2021-03-26

## 2021-03-26 LAB
A/G RATIO: 1.7 (ref 0.8–2)
ACT BLD: 120 SECONDS (ref 82–152)
ALBUMIN SERPL-MCNC: 3.9 G/DL (ref 3.5–5.2)
ALBUMIN SERPL-MCNC: 4.3 G/DL (ref 3.4–4.8)
ALBUMIN/GLOB SERPL: 1.7 G/DL
ALP BLD-CCNC: 74 U/L (ref 25–100)
ALP SERPL-CCNC: 72 U/L (ref 39–117)
ALT SERPL W P-5'-P-CCNC: 52 U/L (ref 1–41)
ALT SERPL-CCNC: 28 U/L (ref 4–36)
ANION GAP SERPL CALCULATED.3IONS-SCNC: 9 MMOL/L (ref 3–16)
ANION GAP SERPL CALCULATED.3IONS-SCNC: 9 MMOL/L (ref 5–15)
APTT: 26.7 SEC (ref 22.5–34.9)
AST SERPL-CCNC: 22 U/L (ref 8–33)
AST SERPL-CCNC: 81 U/L (ref 1–40)
BASOPHILS ABSOLUTE: 0.1 K/UL (ref 0–0.1)
BASOPHILS RELATIVE PERCENT: 0.6 %
BILIRUB SERPL-MCNC: 0.6 MG/DL (ref 0.3–1.2)
BILIRUB SERPL-MCNC: 1.4 MG/DL (ref 0–1.2)
BUN BLDV-MCNC: 16 MG/DL (ref 6–20)
BUN SERPL-MCNC: 15 MG/DL (ref 8–23)
BUN/CREAT SERPL: 10.6 (ref 7–25)
CALCIUM SERPL-MCNC: 9.6 MG/DL (ref 8.5–10.5)
CALCIUM SPEC-SCNC: 8.7 MG/DL (ref 8.6–10.5)
CHLORIDE BLD-SCNC: 102 MMOL/L (ref 98–107)
CHLORIDE SERPL-SCNC: 103 MMOL/L (ref 98–107)
CO2 SERPL-SCNC: 26 MMOL/L (ref 22–29)
CO2: 28 MMOL/L (ref 20–30)
CREAT SERPL-MCNC: 1.4 MG/DL (ref 0.4–1.2)
CREAT SERPL-MCNC: 1.42 MG/DL (ref 0.76–1.27)
EOSINOPHILS ABSOLUTE: 0.2 K/UL (ref 0–0.4)
EOSINOPHILS RELATIVE PERCENT: 2.4 %
GFR AFRICAN AMERICAN: >59
GFR NON-AFRICAN AMERICAN: 51
GFR SERPL CREATININE-BSD FRML MDRD: 50 ML/MIN/1.73
GLOBULIN UR ELPH-MCNC: 2.3 GM/DL
GLOBULIN: 2.6 G/DL
GLUCOSE BLD-MCNC: 120 MG/DL (ref 74–106)
GLUCOSE BLDC GLUCOMTR-MCNC: 133 MG/DL (ref 70–130)
GLUCOSE SERPL-MCNC: 138 MG/DL (ref 65–99)
HCT VFR BLD CALC: 47.1 % (ref 40–54)
HEMOGLOBIN: 16.3 G/DL (ref 13–18)
IMMATURE GRANULOCYTES #: 0 K/UL
IMMATURE GRANULOCYTES %: 0.3 % (ref 0–5)
INR BLD: 1.02 (ref 0.88–1.11)
LYMPHOCYTES ABSOLUTE: 3.2 K/UL (ref 1.5–4)
LYMPHOCYTES RELATIVE PERCENT: 40.1 %
MCH RBC QN AUTO: 31.4 PG (ref 27–32)
MCHC RBC AUTO-ENTMCNC: 34.6 G/DL (ref 31–35)
MCV RBC AUTO: 90.8 FL (ref 80–100)
MONOCYTES ABSOLUTE: 0.9 K/UL (ref 0.2–0.8)
MONOCYTES RELATIVE PERCENT: 10.9 %
NEUTROPHILS ABSOLUTE: 3.6 K/UL (ref 2–7.5)
NEUTROPHILS RELATIVE PERCENT: 45.7 %
PDW BLD-RTO: 13.4 % (ref 11–16)
PLATELET # BLD: 198 K/UL (ref 150–400)
PMV BLD AUTO: 9.8 FL (ref 6–10)
POTASSIUM SERPL-SCNC: 4 MMOL/L (ref 3.4–5.1)
POTASSIUM SERPL-SCNC: 4.2 MMOL/L (ref 3.5–5.2)
PROT SERPL-MCNC: 6.2 G/DL (ref 6–8.5)
PROTHROMBIN TIME: 12.9 SEC (ref 11.6–13.8)
RBC # BLD: 5.19 M/UL (ref 4.5–6)
SARS-COV-2, NAAT: NOT DETECTED
SODIUM BLD-SCNC: 139 MMOL/L (ref 136–145)
SODIUM SERPL-SCNC: 138 MMOL/L (ref 136–145)
TOTAL PROTEIN: 6.9 G/DL (ref 6.4–8.3)
TROPONIN T SERPL-MCNC: <0.01 NG/ML (ref 0–0.03)
TROPONIN: <0.3 NG/ML
WBC # BLD: 7.9 K/UL (ref 4–11)

## 2021-03-26 PROCEDURE — 85347 COAGULATION TIME ACTIVATED: CPT

## 2021-03-26 PROCEDURE — 87635 SARS-COV-2 COVID-19 AMP PRB: CPT

## 2021-03-26 PROCEDURE — 85730 THROMBOPLASTIN TIME PARTIAL: CPT

## 2021-03-26 PROCEDURE — 80053 COMPREHEN METABOLIC PANEL: CPT

## 2021-03-26 PROCEDURE — 6360000002 HC RX W HCPCS: Performed by: EMERGENCY MEDICINE

## 2021-03-26 PROCEDURE — 84484 ASSAY OF TROPONIN QUANT: CPT | Performed by: INTERNAL MEDICINE

## 2021-03-26 PROCEDURE — B2131ZZ FLUOROSCOPY OF MULTIPLE CORONARY ARTERY BYPASS GRAFTS USING LOW OSMOLAR CONTRAST: ICD-10-PCS | Performed by: INTERNAL MEDICINE

## 2021-03-26 PROCEDURE — 0 IOPAMIDOL PER 1 ML: Performed by: INTERNAL MEDICINE

## 2021-03-26 PROCEDURE — 85025 COMPLETE CBC W/AUTO DIFF WBC: CPT

## 2021-03-26 PROCEDURE — C1769 GUIDE WIRE: HCPCS | Performed by: INTERNAL MEDICINE

## 2021-03-26 PROCEDURE — 25010000002 ONDANSETRON PER 1 MG: Performed by: SURGERY

## 2021-03-26 PROCEDURE — B2151ZZ FLUOROSCOPY OF LEFT HEART USING LOW OSMOLAR CONTRAST: ICD-10-PCS | Performed by: INTERNAL MEDICINE

## 2021-03-26 PROCEDURE — 84484 ASSAY OF TROPONIN QUANT: CPT

## 2021-03-26 PROCEDURE — 80053 COMPREHEN METABOLIC PANEL: CPT | Performed by: INTERNAL MEDICINE

## 2021-03-26 PROCEDURE — 25010000002 MIDAZOLAM PER 1 MG: Performed by: INTERNAL MEDICINE

## 2021-03-26 PROCEDURE — 99291 CRITICAL CARE FIRST HOUR: CPT

## 2021-03-26 PROCEDURE — 25010000002 MORPHINE PER 10 MG: Performed by: INTERNAL MEDICINE

## 2021-03-26 PROCEDURE — 99223 1ST HOSP IP/OBS HIGH 75: CPT | Performed by: INTERNAL MEDICINE

## 2021-03-26 PROCEDURE — 93459 L HRT ART/GRFT ANGIO: CPT | Performed by: INTERNAL MEDICINE

## 2021-03-26 PROCEDURE — 96374 THER/PROPH/DIAG INJ IV PUSH: CPT

## 2021-03-26 PROCEDURE — 82962 GLUCOSE BLOOD TEST: CPT

## 2021-03-26 PROCEDURE — G0378 HOSPITAL OBSERVATION PER HR: HCPCS

## 2021-03-26 PROCEDURE — 25010000002 ONDANSETRON PER 1 MG: Performed by: NURSE PRACTITIONER

## 2021-03-26 PROCEDURE — 36415 COLL VENOUS BLD VENIPUNCTURE: CPT

## 2021-03-26 PROCEDURE — C1894 INTRO/SHEATH, NON-LASER: HCPCS | Performed by: INTERNAL MEDICINE

## 2021-03-26 PROCEDURE — 6370000000 HC RX 637 (ALT 250 FOR IP): Performed by: EMERGENCY MEDICINE

## 2021-03-26 PROCEDURE — 71275 CT ANGIOGRAPHY CHEST: CPT

## 2021-03-26 PROCEDURE — 71045 X-RAY EXAM CHEST 1 VIEW: CPT

## 2021-03-26 PROCEDURE — B2181ZZ FLUOROSCOPY OF LEFT INTERNAL MAMMARY BYPASS GRAFT USING LOW OSMOLAR CONTRAST: ICD-10-PCS | Performed by: INTERNAL MEDICINE

## 2021-03-26 PROCEDURE — 85610 PROTHROMBIN TIME: CPT

## 2021-03-26 PROCEDURE — 4A023N7 MEASUREMENT OF CARDIAC SAMPLING AND PRESSURE, LEFT HEART, PERCUTANEOUS APPROACH: ICD-10-PCS | Performed by: INTERNAL MEDICINE

## 2021-03-26 PROCEDURE — 99284 EMERGENCY DEPT VISIT MOD MDM: CPT

## 2021-03-26 PROCEDURE — 25010000002 FENTANYL CITRATE (PF) 100 MCG/2ML SOLUTION: Performed by: INTERNAL MEDICINE

## 2021-03-26 PROCEDURE — B2111ZZ FLUOROSCOPY OF MULTIPLE CORONARY ARTERIES USING LOW OSMOLAR CONTRAST: ICD-10-PCS | Performed by: INTERNAL MEDICINE

## 2021-03-26 RX ORDER — SODIUM CHLORIDE 9 MG/ML
100 INJECTION, SOLUTION INTRAVENOUS CONTINUOUS
Status: ACTIVE | OUTPATIENT
Start: 2021-03-26 | End: 2021-03-26

## 2021-03-26 RX ORDER — TAMSULOSIN HYDROCHLORIDE 0.4 MG/1
0.4 CAPSULE ORAL DAILY
Status: DISCONTINUED | OUTPATIENT
Start: 2021-03-27 | End: 2021-03-31 | Stop reason: HOSPADM

## 2021-03-26 RX ORDER — ASPIRIN 81 MG/1
81 TABLET ORAL DAILY
Status: DISCONTINUED | OUTPATIENT
Start: 2021-03-27 | End: 2021-03-31 | Stop reason: HOSPADM

## 2021-03-26 RX ORDER — FENTANYL CITRATE 50 UG/ML
INJECTION, SOLUTION INTRAMUSCULAR; INTRAVENOUS AS NEEDED
Status: DISCONTINUED | OUTPATIENT
Start: 2021-03-26 | End: 2021-03-26 | Stop reason: HOSPADM

## 2021-03-26 RX ORDER — ALPRAZOLAM 0.25 MG/1
0.25 TABLET ORAL 3 TIMES DAILY PRN
Status: DISPENSED | OUTPATIENT
Start: 2021-03-26 | End: 2021-03-27

## 2021-03-26 RX ORDER — NALOXONE HCL 0.4 MG/ML
0.4 VIAL (ML) INJECTION
Status: DISCONTINUED | OUTPATIENT
Start: 2021-03-26 | End: 2021-03-31 | Stop reason: HOSPADM

## 2021-03-26 RX ORDER — LIDOCAINE HYDROCHLORIDE 10 MG/ML
INJECTION, SOLUTION EPIDURAL; INFILTRATION; INTRACAUDAL; PERINEURAL AS NEEDED
Status: DISCONTINUED | OUTPATIENT
Start: 2021-03-26 | End: 2021-03-26 | Stop reason: HOSPADM

## 2021-03-26 RX ORDER — MIDAZOLAM HYDROCHLORIDE 1 MG/ML
INJECTION INTRAMUSCULAR; INTRAVENOUS AS NEEDED
Status: DISCONTINUED | OUTPATIENT
Start: 2021-03-26 | End: 2021-03-26 | Stop reason: HOSPADM

## 2021-03-26 RX ORDER — LOSARTAN POTASSIUM 50 MG/1
150 TABLET ORAL
Status: DISCONTINUED | OUTPATIENT
Start: 2021-03-27 | End: 2021-03-31 | Stop reason: HOSPADM

## 2021-03-26 RX ORDER — ROSUVASTATIN CALCIUM 20 MG/1
40 TABLET, COATED ORAL DAILY
Status: DISCONTINUED | OUTPATIENT
Start: 2021-03-27 | End: 2021-03-31 | Stop reason: HOSPADM

## 2021-03-26 RX ORDER — GABAPENTIN 300 MG/1
300 CAPSULE ORAL 2 TIMES DAILY
Status: DISCONTINUED | OUTPATIENT
Start: 2021-03-26 | End: 2021-03-31 | Stop reason: HOSPADM

## 2021-03-26 RX ORDER — ACETAMINOPHEN 325 MG/1
650 TABLET ORAL EVERY 4 HOURS PRN
Status: DISCONTINUED | OUTPATIENT
Start: 2021-03-26 | End: 2021-03-31 | Stop reason: HOSPADM

## 2021-03-26 RX ORDER — MORPHINE SULFATE 2 MG/ML
1 INJECTION, SOLUTION INTRAMUSCULAR; INTRAVENOUS EVERY 4 HOURS PRN
Status: DISPENSED | OUTPATIENT
Start: 2021-03-26 | End: 2021-03-27

## 2021-03-26 RX ORDER — ASPIRIN 325 MG
325 TABLET ORAL ONCE
Status: COMPLETED | OUTPATIENT
Start: 2021-03-26 | End: 2021-03-26

## 2021-03-26 RX ORDER — CARVEDILOL 12.5 MG/1
37.5 TABLET ORAL 2 TIMES DAILY WITH MEALS
Status: DISCONTINUED | OUTPATIENT
Start: 2021-03-26 | End: 2021-03-31 | Stop reason: HOSPADM

## 2021-03-26 RX ORDER — HEPARIN SODIUM 5000 [USP'U]/ML
70 INJECTION, SOLUTION INTRAVENOUS; SUBCUTANEOUS ONCE
Status: COMPLETED | OUTPATIENT
Start: 2021-03-26 | End: 2021-03-26

## 2021-03-26 RX ORDER — CLOPIDOGREL BISULFATE 75 MG/1
600 TABLET ORAL ONCE
Status: COMPLETED | OUTPATIENT
Start: 2021-03-26 | End: 2021-03-26

## 2021-03-26 RX ORDER — ONDANSETRON 2 MG/ML
4 INJECTION INTRAMUSCULAR; INTRAVENOUS EVERY 6 HOURS PRN
Status: DISCONTINUED | OUTPATIENT
Start: 2021-03-26 | End: 2021-03-31 | Stop reason: HOSPADM

## 2021-03-26 RX ORDER — METAXALONE 800 MG/1
400 TABLET ORAL EVERY 8 HOURS SCHEDULED
Status: DISCONTINUED | OUTPATIENT
Start: 2021-03-26 | End: 2021-03-31 | Stop reason: HOSPADM

## 2021-03-26 RX ORDER — AMLODIPINE BESYLATE 5 MG/1
5 TABLET ORAL
Status: DISCONTINUED | OUTPATIENT
Start: 2021-03-27 | End: 2021-03-31 | Stop reason: HOSPADM

## 2021-03-26 RX ORDER — POTASSIUM CHLORIDE 750 MG/1
10 CAPSULE, EXTENDED RELEASE ORAL
Status: DISCONTINUED | OUTPATIENT
Start: 2021-03-27 | End: 2021-03-31 | Stop reason: HOSPADM

## 2021-03-26 RX ORDER — LOSARTAN POTASSIUM 50 MG/1
50 TABLET ORAL
Status: DISCONTINUED | OUTPATIENT
Start: 2021-03-27 | End: 2021-03-26

## 2021-03-26 RX ORDER — BACLOFEN 10 MG/1
10 TABLET ORAL 3 TIMES DAILY PRN
Status: DISCONTINUED | OUTPATIENT
Start: 2021-03-26 | End: 2021-03-31 | Stop reason: HOSPADM

## 2021-03-26 RX ORDER — SODIUM CHLORIDE 9 MG/ML
250 INJECTION, SOLUTION INTRAVENOUS ONCE AS NEEDED
Status: COMPLETED | OUTPATIENT
Start: 2021-03-26 | End: 2021-03-28

## 2021-03-26 RX ORDER — HYDROCODONE BITARTRATE AND ACETAMINOPHEN 7.5; 325 MG/1; MG/1
1 TABLET ORAL EVERY 4 HOURS PRN
Status: DISCONTINUED | OUTPATIENT
Start: 2021-03-26 | End: 2021-03-31 | Stop reason: HOSPADM

## 2021-03-26 RX ORDER — TRAMADOL HYDROCHLORIDE 50 MG/1
50 TABLET ORAL EVERY 6 HOURS PRN
Status: DISCONTINUED | OUTPATIENT
Start: 2021-03-26 | End: 2021-03-31 | Stop reason: HOSPADM

## 2021-03-26 RX ORDER — PROMETHAZINE HYDROCHLORIDE 12.5 MG/1
12.5 TABLET ORAL EVERY 6 HOURS PRN
Status: DISCONTINUED | OUTPATIENT
Start: 2021-03-26 | End: 2021-03-26

## 2021-03-26 RX ADMIN — ONDANSETRON 4 MG: 2 INJECTION INTRAMUSCULAR; INTRAVENOUS at 23:10

## 2021-03-26 RX ADMIN — HYDROCODONE BITARTRATE AND ACETAMINOPHEN 1 TABLET: 7.5; 325 TABLET ORAL at 22:42

## 2021-03-26 RX ADMIN — MORPHINE SULFATE 1 MG: 2 INJECTION, SOLUTION INTRAMUSCULAR; INTRAVENOUS at 21:39

## 2021-03-26 RX ADMIN — CLOPIDOGREL BISULFATE 600 MG: 75 TABLET ORAL at 18:21

## 2021-03-26 RX ADMIN — ASPIRIN 325 MG: 325 TABLET ORAL at 18:24

## 2021-03-26 RX ADMIN — HEPARIN SODIUM 7300 UNITS: 5000 INJECTION, SOLUTION INTRAVENOUS; SUBCUTANEOUS at 18:23

## 2021-03-26 ASSESSMENT — PAIN DESCRIPTION - FREQUENCY: FREQUENCY: CONTINUOUS

## 2021-03-26 ASSESSMENT — PAIN DESCRIPTION - PROGRESSION: CLINICAL_PROGRESSION: GRADUALLY WORSENING

## 2021-03-26 ASSESSMENT — PAIN DESCRIPTION - ORIENTATION: ORIENTATION: MID

## 2021-03-26 ASSESSMENT — PAIN DESCRIPTION - PAIN TYPE: TYPE: ACUTE PAIN

## 2021-03-26 NOTE — ED NOTES
[facility] called back stating 24 mins ETA. Called dispatch to ask them to transport to [facility].      GENNARO Gerber  03/26/21 [id number]

## 2021-03-26 NOTE — ED NOTES
Fanta Brower from [id number] called to confirm ground transport. Stated [employer] [facility] was 15 mins out.      GENNARO Tiwari  03/26/21 1906

## 2021-03-26 NOTE — ED PROVIDER NOTES
CHIEF COMPLAINT  Chest Pain, Shortness of Breath, and Back Pain      HISTORY OF PRESENT ILLNESS  Donna Spence is a 60 y.o. male presents to the ED with chest pain and shortness of breath. The patient states about an hour ago he started feeling a heavy pressure in his chest which went to his shoulders. He states it feels like when he last had a heart attack. The patient had bypass surgery in 2019. He was feeling fine prior to that. It is associated with nausea and diaphoresis. .  No other complaints, modifying factors or associated symptoms. I have reviewed the following from the nursing documentation.     Past Medical History:   Diagnosis Date    Hypertension     RA (r[facility]umatoid arthritis) ([facility] [address]     Type II or unspecified type diabetes mellitus without mention of complication, not stated as uncontrolled      Past Surgical History:   Procedure Laterality Date    AV FISTULA REPAIR      CARDIAC SURGERY      5 bypass    CYST INCISION AND DRAINAGE      PRE-MALIGNANT / BENIGN SKIN LESION EXCISION Left     shoulder squamous cell carcinoma     Family History   Problem Relation Age of Onset    High Blood Pressure Mother          when she was 28years old   [employer] High Blood Pressure Father     Arthritis Father      Social History     Socioeconomic History    Marital status:      Spouse name: Not on file    Number of children: Not on file    Years of education: Not on file    Highest education level: Not on file   Occupational History    Not on file   Social Needs    Financial resource strain: Not on file    Food insecurity     Worry: Not on file     Inability: Not on file   [employer] needs     Medical: Not on file     Non-medical: Not on file   Tobacco Use    Smoking status: Former Smoker     Packs/day: 1.50     Years: 10.00     Pack years: 15.00     Quit date:      Years since quittin.2    Smokeless tobacco: Never Used   Substance and Sexual Activity    Alcohol use: No    Drug use: No    Sexual activity: Not on file   Lifestyle    Physical activity     Days per week: Not on file     Minutes per session: Not on file    Stress: Not on file   Relationships    Social connections     Talks on phone: Not on file     Gets together: Not on file     Attends Yarsani service: Not on file     Active member of club or organization: Not on file     Attends meetings of clubs or organizations: Not on file     Relationship status: Not on file    Intimate partner violence     Fear of current or ex partner: Not on file     Emotionally abused: Not on file     Physically abused: Not on file     Forced sexual activity: Not on file   Other Topics Concern    Not on file   Social History Narrative    Not on file     No current facility-administered medications for this encounter. Current Outpatient Medications   Medication Sig Dispense Refill    Tofacitinib Citrate (XELJANZ PO) Take by mouth      metaxalone (SKELAXIN) 800 MG tablet Take 800 mg by mouth 3 times daily as needed for Pain      tamsulosin (FLOMAX) 0.4 MG capsule TAKE 1 CAPSULE BY MOUTH NIGHTLY FOR PROSTATE 90 capsule 3    methocarbamol (ROBAXIN) 500 MG tablet Take 500 mg by mouth 3 times daily      rosuvastatin (CRESTOR) 40 MG tablet 20mg QOD, alternating with 40mg QOD 68 tablet 3    sertraline (ZOLOFT) 50 MG tablet Take 1 tablet by mouth daily 90 tablet 3    gabapentin (NEURONTIN) 300 MG capsule TAKE 1 CAPSULE BY MOUTH 3 TIMES DAILY 270 capsule 1    JARDIANCE 25 MG tablet TAKE 1 TABLET BY MOUTH DAILY 90 tablet 3    potassium chloride (KLOR-CON M) 20 MEQ extended release tablet TAKE 1 TABLET EVERY DAY 90 tablet 3    traMADol (ULTRAM) 50 MG tablet Take 50 mg by mouth every 6 hours as needed for Pain.       Cyanocobalamin 2500 MCG SUBL Place 2,500 mcg under the tongue daily 90 tablet 3    aspirin 81 MG tablet Take 81 mg by mouth daily      carvedilol (COREG) 25 MG tablet 1.5 tabs twice daily      amLODIPine (NORVASC) 5 MG tablet       losartan (COZAAR) 25 MG tablet Take 25 mg by mouth daily      Cholecalciferol (VITAMIN D3) 2000 UNIT TABS Take  by mouth.  Alcohol Swabs (B-D SINGLE USE SWABS REGULAR) PADS TEST TWICE DAILY AS NEEDED 100 each 5    TRUE METRIX BLOOD GLUCOSE TEST strip TEST EVERY DAY AS NEEDED 100 strip 0    Blood Glucose Monitoring Suppl (TRUE METRIX AIR GLUCOSE METER) w/Device KIT 1 each by Does not apply route daily E11. 9-DX 1 kit 0    ONETOUCH DELICA LANCETS 93T MISC USE TO TEST BLOOD SUGAR 4 TIMES DAILY 150 each 3     Allergies   Allergen Reactions    Humira [Adalimumab]      HA, intractable ocular migraine       REVIEW OF SYSTEMS  10 systems reviewed, pertinent positives per HPI otherwise noted to be negative. PHYSICAL EXAM  BP (!) 188/75   Pulse 67   Temp 98.2 °F (36.8 °C) (Oral)   Resp 16   Ht 5' 8\" (1.727 m)   Wt 230 lb (104.3 kg)   SpO2 98%   BMI 34.97 kg/m²   GENERAL APPEARANCE: Awake and alert. Cooperative. Uncomfortable in mild distress  HEAD: Normocephalic. Atraumatic. EYES: PERRL. EOM's grossly intact. ENT: Mucous membranes are moist.   NECK: Supple. HEART: RRR. No murmurs. Midline scar from prior bypass surgery. LUNGS: Respirations unlabored. Lungs are clear bilaterally. ABDOMEN: Soft. Non-distended. Non-tender. No guarding or rebound. Normal bowel sounds. EXTREMITIES: No peripheral edema. Moves all extremities equally. All extremities neurovascularly intact. SKIN: Warm and dry. No acute rashes. NEUROLOGICAL: Alert and oriented. No gross facial drooping. Strength 5/5, sensation intact. No truncal ataxia. PSYCHIATRIC: Normal mood and affect. LABS  I have reviewed all labs for this visit.    Results for orders placed or performed during the hospital encounter of 03/26/21   CBC Auto Differential   Result Value Ref Range    WBC 7.9 4.0 - 11.0 K/uL    RBC 5.19 4.50 - 6.00 M/uL    Hemoglobin 16.3 13.0 - 18.0 g/dL    Hematocrit 47.1 40.0 - 54.0 %    MCV 90.8 80.0 - 100.0 fL    MCH 31.4 27.0 - 32.0 pg    MCHC 34.6 31.0 - 35.0 g/dL    RDW 13.4 11.0 - 16.0 %    Platelets 767 596 - 529 K/uL    MPV 9.8 6.0 - 10.0 fL    Neutrophils % 45.7 %    Immature Granulocytes % 0.3 0.0 - 5.0 %    Lymphocytes % 40.1 %    Monocytes % 10.9 %    Eosinophils % 2.4 %    Basophils % 0.6 %    Neutrophils Absolute 3.6 2.0 - 7.5 K/uL    Immature Granulocytes # 0.0 K/uL    Lymphocytes Absolute 3.2 1.5 - 4.0 K/uL    Monocytes Absolute 0.9 (H) 0.2 - 0.8 K/uL    Eosinophils Absolute 0.2 0.0 - 0.4 K/uL    Basophils Absolute 0.1 0.0 - 0.1 K/uL       EKG  The Ekg performed at 1805 interpreted by me shows  normal sinus rhythm with a rate of 66  Axis is   Normal  QTc is  normal  Intervals and Durations are unremarkable. Inferior Q waves are noted  ST Segments: normal  Second EKG performed at 1809 shows normal sinus rhythm rate of 67 bpm there is now ST elevation which is upsloping in leads II and III and minimally in aVF. There is associated ST depression in leads V2 and V3. ED COURSE/MDM  Patient seen and evaluated. Old records reviewed. Labs and imaging reviewed and results discussed with patient. Patient came in with high concerns of acute coronary syndrome with pain similar to prior heart attack-like pain, first EKG was nondiagnostic, he told the nurse the pain is getting worse she repeated the EKG in evaluating this EKG along with prior he has new changes, worrisome for an ST elevation myocardial infarction, I called [facility] [place] our partner for STEMI, talked with the interventional cardiologist, they accepted the patient to the Cath Lab patient is awaiting transport this time he has received 325 mg of aspirin, a bolus of heparin, 600 mg of Plavix all per protocol. Total critical care time excluding separately billable procedures is 30 minutes.       CLINICAL IMPRESSION  1. ST elevation myocardial infarction (STEMI), unspecified artery (HCC)        Blood pressure (!) 188/75, pulse 67, temperature 98.2 °F (36.8 °C), temperature source Oral, resp. rate 16, height 5' 8\" (1.727 m), weight 230 lb (104.3 kg), SpO2 98 %. DISPOSITION  Vianca Rodriguez was transferred in stable condition.                Lexx Mendieta MD  03/26/21 0368

## 2021-03-26 NOTE — ED NOTES
Started STEMI protocol by calling [phone] and speaking to marcus with [facility] in [place]. Gave her patient information and diagnosis and then transferred call to Dr. Alda Jackson.      [address]  03/26/21 1829

## 2021-03-26 NOTE — ED NOTES
Code AMI called at this time       Rajat Martinez RN  03/26/21 800 [facility] [address]

## 2021-03-26 NOTE — ED NOTES
Called [facility] back to get a possible ETA. Manny Nichols stated they were finishing up pre lifting assessments then they would be on their way. She will be calling back when they are on the way.   GENNARO Ríos  03/26/21 5198

## 2021-03-26 NOTE — ED NOTES
Pt leaving with EMS at this time en route to the [facility].       Catina Del Valle, RENEE  03/26/21 44 [facility], RN  03/26/21 9870 The patient is a 27y Male complaining of knee pain/injury.

## 2021-03-26 NOTE — ED NOTES
Spoke with Antonia Bhatia at [facility]. She stated it would be a 26 min wait but then asked if a 20 min delay due to Shift change was okay. Dr. Dejan Velazco stated it was okay.  We accepted the wait time/ GENNARO Alvarez Brine  03/26/21 1828

## 2021-03-26 NOTE — ED NOTES
EMS here to transport to to [facility], report complete at this time and pt being loaded onto stretcher.       Rajat Martinez RN  03/26/21 8142

## 2021-03-27 ENCOUNTER — APPOINTMENT (OUTPATIENT)
Dept: MRI IMAGING | Facility: HOSPITAL | Age: 66
End: 2021-03-27

## 2021-03-27 ENCOUNTER — APPOINTMENT (OUTPATIENT)
Dept: CT IMAGING | Facility: HOSPITAL | Age: 66
End: 2021-03-27

## 2021-03-27 ENCOUNTER — APPOINTMENT (OUTPATIENT)
Dept: CARDIOLOGY | Facility: HOSPITAL | Age: 66
End: 2021-03-27

## 2021-03-27 PROBLEM — N23 RENAL COLIC: Status: ACTIVE | Noted: 2021-03-27

## 2021-03-27 LAB
ALBUMIN SERPL-MCNC: 3.7 G/DL (ref 3.5–5.2)
ALBUMIN/GLOB SERPL: 1.9 G/DL
ALP SERPL-CCNC: 74 U/L (ref 39–117)
ALT SERPL W P-5'-P-CCNC: 69 U/L (ref 1–41)
ANION GAP SERPL CALCULATED.3IONS-SCNC: 7 MMOL/L (ref 5–15)
AST SERPL-CCNC: 60 U/L (ref 1–40)
BH CV ECHO MEAS - AO MAX PG (FULL): 7.5 MMHG
BH CV ECHO MEAS - AO MAX PG: 11.7 MMHG
BH CV ECHO MEAS - AO MEAN PG (FULL): 5 MMHG
BH CV ECHO MEAS - AO MEAN PG: 7 MMHG
BH CV ECHO MEAS - AO ROOT AREA (BSA CORRECTED): 1.6
BH CV ECHO MEAS - AO ROOT AREA: 9.6 CM^2
BH CV ECHO MEAS - AO ROOT DIAM: 3.5 CM
BH CV ECHO MEAS - AO V2 MAX: 171 CM/SEC
BH CV ECHO MEAS - AO V2 MEAN: 131 CM/SEC
BH CV ECHO MEAS - AO V2 VTI: 40 CM
BH CV ECHO MEAS - AVA(I,A): 1.7 CM^2
BH CV ECHO MEAS - AVA(I,D): 1.7 CM^2
BH CV ECHO MEAS - AVA(V,A): 1.9 CM^2
BH CV ECHO MEAS - AVA(V,D): 1.9 CM^2
BH CV ECHO MEAS - BSA(HAYCOCK): 2.3 M^2
BH CV ECHO MEAS - BSA: 2.2 M^2
BH CV ECHO MEAS - BZI_BMI: 35.9 KILOGRAMS/M^2
BH CV ECHO MEAS - BZI_METRIC_HEIGHT: 172.7 CM
BH CV ECHO MEAS - BZI_METRIC_WEIGHT: 107.1 KG
BH CV ECHO MEAS - EDV(CUBED): 98 ML
BH CV ECHO MEAS - EDV(MOD-SP2): 172 ML
BH CV ECHO MEAS - EDV(MOD-SP4): 179 ML
BH CV ECHO MEAS - EDV(TEICH): 97.8 ML
BH CV ECHO MEAS - EF(CUBED): 71.9 %
BH CV ECHO MEAS - EF(MOD-BP): 63 %
BH CV ECHO MEAS - EF(MOD-SP2): 63.4 %
BH CV ECHO MEAS - EF(MOD-SP4): 62 %
BH CV ECHO MEAS - EF(TEICH): 63.6 %
BH CV ECHO MEAS - ESV(CUBED): 27.5 ML
BH CV ECHO MEAS - ESV(MOD-SP2): 63 ML
BH CV ECHO MEAS - ESV(MOD-SP4): 68 ML
BH CV ECHO MEAS - ESV(TEICH): 35.6 ML
BH CV ECHO MEAS - FS: 34.5 %
BH CV ECHO MEAS - IVS/LVPW: 0.84
BH CV ECHO MEAS - IVSD: 0.98 CM
BH CV ECHO MEAS - LA DIMENSION: 5 CM
BH CV ECHO MEAS - LA/AO: 1.4
BH CV ECHO MEAS - LAD MAJOR: 5.3 CM
BH CV ECHO MEAS - LAT PEAK E' VEL: 11.2 CM/SEC
BH CV ECHO MEAS - LATERAL E/E' RATIO: 9.6
BH CV ECHO MEAS - LV DIASTOLIC VOL/BSA (35-75): 81.6 ML/M^2
BH CV ECHO MEAS - LV IVRT: 0.13 SEC
BH CV ECHO MEAS - LV MASS(C)D: 176.6 GRAMS
BH CV ECHO MEAS - LV MASS(C)DI: 80.5 GRAMS/M^2
BH CV ECHO MEAS - LV MAX PG: 4.2 MMHG
BH CV ECHO MEAS - LV MEAN PG: 2 MMHG
BH CV ECHO MEAS - LV SYSTOLIC VOL/BSA (12-30): 31 ML/M^2
BH CV ECHO MEAS - LV V1 MAX: 102 CM/SEC
BH CV ECHO MEAS - LV V1 MEAN: 70 CM/SEC
BH CV ECHO MEAS - LV V1 VTI: 21.7 CM
BH CV ECHO MEAS - LVIDD: 4.6 CM
BH CV ECHO MEAS - LVIDS: 3 CM
BH CV ECHO MEAS - LVLD AP2: 8.2 CM
BH CV ECHO MEAS - LVLD AP4: 8 CM
BH CV ECHO MEAS - LVLS AP2: 6.8 CM
BH CV ECHO MEAS - LVLS AP4: 7 CM
BH CV ECHO MEAS - LVOT AREA (M): 3.1 CM^2
BH CV ECHO MEAS - LVOT AREA: 3.1 CM^2
BH CV ECHO MEAS - LVOT DIAM: 2 CM
BH CV ECHO MEAS - LVPWD: 1 CM
BH CV ECHO MEAS - MED PEAK E' VEL: 6.2 CM/SEC
BH CV ECHO MEAS - MEDIAL E/E' RATIO: 17.3
BH CV ECHO MEAS - MV A MAX VEL: 82.9 CM/SEC
BH CV ECHO MEAS - MV DEC TIME: 0.2 SEC
BH CV ECHO MEAS - MV E MAX VEL: 108 CM/SEC
BH CV ECHO MEAS - MV E/A: 1.3
BH CV ECHO MEAS - MV MAX PG: 7.2 MMHG
BH CV ECHO MEAS - MV MEAN PG: 2 MMHG
BH CV ECHO MEAS - MV V2 MAX: 134 CM/SEC
BH CV ECHO MEAS - MV V2 MEAN: 69.8 CM/SEC
BH CV ECHO MEAS - MV V2 VTI: 41.7 CM
BH CV ECHO MEAS - MVA(VTI): 1.6 CM^2
BH CV ECHO MEAS - PA ACC SLOPE: 442 CM/SEC^2
BH CV ECHO MEAS - PA ACC TIME: 0.16 SEC
BH CV ECHO MEAS - PA MAX PG: 2.6 MMHG
BH CV ECHO MEAS - PA PR(ACCEL): 7.9 MMHG
BH CV ECHO MEAS - PA V2 MAX: 80.1 CM/SEC
BH CV ECHO MEAS - SI(AO): 175.5 ML/M^2
BH CV ECHO MEAS - SI(CUBED): 32.1 ML/M^2
BH CV ECHO MEAS - SI(LVOT): 31.1 ML/M^2
BH CV ECHO MEAS - SI(MOD-SP2): 49.7 ML/M^2
BH CV ECHO MEAS - SI(MOD-SP4): 50.6 ML/M^2
BH CV ECHO MEAS - SI(TEICH): 28.4 ML/M^2
BH CV ECHO MEAS - SV(AO): 384.8 ML
BH CV ECHO MEAS - SV(CUBED): 70.4 ML
BH CV ECHO MEAS - SV(LVOT): 68.2 ML
BH CV ECHO MEAS - SV(MOD-SP2): 109 ML
BH CV ECHO MEAS - SV(MOD-SP4): 111 ML
BH CV ECHO MEAS - SV(TEICH): 62.3 ML
BH CV ECHO MEAS - TAPSE (>1.6): 2.1 CM
BH CV ECHO MEASUREMENTS AVERAGE E/E' RATIO: 12.41
BH CV XLRA - RV BASE: 4 CM
BH CV XLRA - RV LENGTH: 6.4 CM
BH CV XLRA - RV MID: 3.1 CM
BH CV XLRA - TDI S': 12.6 CM/SEC
BILIRUB SERPL-MCNC: 0.6 MG/DL (ref 0–1.2)
BUN SERPL-MCNC: 16 MG/DL (ref 8–23)
BUN/CREAT SERPL: 11.3 (ref 7–25)
CALCIUM SPEC-SCNC: 8.4 MG/DL (ref 8.6–10.5)
CHLORIDE SERPL-SCNC: 105 MMOL/L (ref 98–107)
CHOLEST SERPL-MCNC: 105 MG/DL (ref 0–200)
CO2 SERPL-SCNC: 25 MMOL/L (ref 22–29)
CREAT SERPL-MCNC: 1.42 MG/DL (ref 0.76–1.27)
CRP SERPL-MCNC: <0.3 MG/DL (ref 0–0.5)
D-LACTATE SERPL-SCNC: 0.9 MMOL/L (ref 0.5–2)
DEPRECATED RDW RBC AUTO: 42.5 FL (ref 37–54)
ERYTHROCYTE [DISTWIDTH] IN BLOOD BY AUTOMATED COUNT: 13 % (ref 12.3–15.4)
ERYTHROCYTE [SEDIMENTATION RATE] IN BLOOD: <1 MM/HR (ref 0–20)
GFR SERPL CREATININE-BSD FRML MDRD: 50 ML/MIN/1.73
GLOBULIN UR ELPH-MCNC: 2 GM/DL
GLUCOSE BLDC GLUCOMTR-MCNC: 104 MG/DL (ref 70–130)
GLUCOSE BLDC GLUCOMTR-MCNC: 148 MG/DL (ref 70–130)
GLUCOSE BLDC GLUCOMTR-MCNC: 149 MG/DL (ref 70–130)
GLUCOSE BLDC GLUCOMTR-MCNC: 198 MG/DL (ref 70–130)
GLUCOSE SERPL-MCNC: 139 MG/DL (ref 65–99)
HCT VFR BLD AUTO: 44.3 % (ref 37.5–51)
HDLC SERPL-MCNC: 51 MG/DL (ref 40–60)
HGB BLD-MCNC: 15.4 G/DL (ref 13–17.7)
LDLC SERPL CALC-MCNC: 36 MG/DL (ref 0–100)
LDLC/HDLC SERPL: 0.68 {RATIO}
LEFT ATRIUM VOLUME INDEX: 35.6 ML/M^2
LEFT ATRIUM VOLUME: 78 ML
LIPASE SERPL-CCNC: 34 U/L (ref 13–60)
LIPASE SERPL-CCNC: 65 U/L (ref 13–60)
LV EF 2D ECHO EST: 60 %
MAXIMAL PREDICTED HEART RATE: 156 BPM
MCH RBC QN AUTO: 31.3 PG (ref 26.6–33)
MCHC RBC AUTO-ENTMCNC: 34.8 G/DL (ref 31.5–35.7)
MCV RBC AUTO: 90 FL (ref 79–97)
PLATELET # BLD AUTO: 161 10*3/MM3 (ref 140–450)
PMV BLD AUTO: 10 FL (ref 6–12)
POTASSIUM SERPL-SCNC: 4.2 MMOL/L (ref 3.5–5.2)
PROCALCITONIN SERPL-MCNC: 0.04 NG/ML (ref 0–0.25)
PROT SERPL-MCNC: 5.7 G/DL (ref 6–8.5)
QT INTERVAL: 430 MS
QTC INTERVAL: 443 MS
RBC # BLD AUTO: 4.92 10*6/MM3 (ref 4.14–5.8)
SARS-COV-2 RDRP RESP QL NAA+PROBE: NORMAL
SODIUM SERPL-SCNC: 137 MMOL/L (ref 136–145)
STRESS TARGET HR: 133 BPM
TRIGL SERPL-MCNC: 96 MG/DL (ref 0–150)
TROPONIN T SERPL-MCNC: <0.01 NG/ML (ref 0–0.03)
VLDLC SERPL-MCNC: 18 MG/DL (ref 5–40)
WBC # BLD AUTO: 8.61 10*3/MM3 (ref 3.4–10.8)

## 2021-03-27 PROCEDURE — 25010000002 MORPHINE PER 10 MG: Performed by: INTERNAL MEDICINE

## 2021-03-27 PROCEDURE — 80053 COMPREHEN METABOLIC PANEL: CPT | Performed by: INTERNAL MEDICINE

## 2021-03-27 PROCEDURE — 74181 MRI ABDOMEN W/O CONTRAST: CPT

## 2021-03-27 PROCEDURE — 25010000002 PROCHLORPERAZINE 10 MG/2ML SOLUTION: Performed by: PHYSICIAN ASSISTANT

## 2021-03-27 PROCEDURE — 80061 LIPID PANEL: CPT | Performed by: PHYSICIAN ASSISTANT

## 2021-03-27 PROCEDURE — 63710000001 INSULIN LISPRO (HUMAN) PER 5 UNITS: Performed by: SURGERY

## 2021-03-27 PROCEDURE — 0 IOPAMIDOL PER 1 ML: Performed by: INTERNAL MEDICINE

## 2021-03-27 PROCEDURE — 93010 ELECTROCARDIOGRAM REPORT: CPT | Performed by: INTERNAL MEDICINE

## 2021-03-27 PROCEDURE — 25010000002 PROCHLORPERAZINE 10 MG/2ML SOLUTION: Performed by: SURGERY

## 2021-03-27 PROCEDURE — 86140 C-REACTIVE PROTEIN: CPT | Performed by: INTERNAL MEDICINE

## 2021-03-27 PROCEDURE — 93306 TTE W/DOPPLER COMPLETE: CPT | Performed by: INTERNAL MEDICINE

## 2021-03-27 PROCEDURE — 85652 RBC SED RATE AUTOMATED: CPT | Performed by: INTERNAL MEDICINE

## 2021-03-27 PROCEDURE — 82962 GLUCOSE BLOOD TEST: CPT

## 2021-03-27 PROCEDURE — 84145 PROCALCITONIN (PCT): CPT | Performed by: PHYSICIAN ASSISTANT

## 2021-03-27 PROCEDURE — 87635 SARS-COV-2 COVID-19 AMP PRB: CPT | Performed by: INTERNAL MEDICINE

## 2021-03-27 PROCEDURE — G0378 HOSPITAL OBSERVATION PER HR: HCPCS

## 2021-03-27 PROCEDURE — 85027 COMPLETE CBC AUTOMATED: CPT | Performed by: INTERNAL MEDICINE

## 2021-03-27 PROCEDURE — 83690 ASSAY OF LIPASE: CPT | Performed by: SURGERY

## 2021-03-27 PROCEDURE — 83605 ASSAY OF LACTIC ACID: CPT | Performed by: PHYSICIAN ASSISTANT

## 2021-03-27 PROCEDURE — 99232 SBSQ HOSP IP/OBS MODERATE 35: CPT | Performed by: INTERNAL MEDICINE

## 2021-03-27 PROCEDURE — 99221 1ST HOSP IP/OBS SF/LOW 40: CPT | Performed by: PHYSICIAN ASSISTANT

## 2021-03-27 PROCEDURE — 63710000001 INSULIN LISPRO (HUMAN) PER 5 UNITS: Performed by: PHYSICIAN ASSISTANT

## 2021-03-27 PROCEDURE — 83690 ASSAY OF LIPASE: CPT | Performed by: PHYSICIAN ASSISTANT

## 2021-03-27 PROCEDURE — 93005 ELECTROCARDIOGRAM TRACING: CPT | Performed by: PHYSICIAN ASSISTANT

## 2021-03-27 PROCEDURE — 93306 TTE W/DOPPLER COMPLETE: CPT

## 2021-03-27 PROCEDURE — 74176 CT ABD & PELVIS W/O CONTRAST: CPT

## 2021-03-27 PROCEDURE — 25010000002 LORAZEPAM PER 2 MG: Performed by: INTERNAL MEDICINE

## 2021-03-27 PROCEDURE — 84484 ASSAY OF TROPONIN QUANT: CPT | Performed by: INTERNAL MEDICINE

## 2021-03-27 RX ORDER — NICOTINE POLACRILEX 4 MG
15 LOZENGE BUCCAL
Status: DISCONTINUED | OUTPATIENT
Start: 2021-03-27 | End: 2021-03-31 | Stop reason: HOSPADM

## 2021-03-27 RX ORDER — PROCHLORPERAZINE EDISYLATE 5 MG/ML
5 INJECTION INTRAMUSCULAR; INTRAVENOUS EVERY 6 HOURS PRN
Status: DISCONTINUED | OUTPATIENT
Start: 2021-03-27 | End: 2021-03-31 | Stop reason: HOSPADM

## 2021-03-27 RX ORDER — DEXTROSE MONOHYDRATE 25 G/50ML
25 INJECTION, SOLUTION INTRAVENOUS
Status: DISCONTINUED | OUTPATIENT
Start: 2021-03-27 | End: 2021-03-31 | Stop reason: HOSPADM

## 2021-03-27 RX ORDER — SODIUM CHLORIDE 9 MG/ML
75 INJECTION, SOLUTION INTRAVENOUS CONTINUOUS
Status: ACTIVE | OUTPATIENT
Start: 2021-03-27 | End: 2021-03-27

## 2021-03-27 RX ORDER — LORAZEPAM 2 MG/ML
0.25 INJECTION INTRAMUSCULAR ONCE
Status: COMPLETED | OUTPATIENT
Start: 2021-03-27 | End: 2021-03-27

## 2021-03-27 RX ADMIN — LIDOCAINE HYDROCHLORIDE: 20 SOLUTION ORAL; TOPICAL at 03:15

## 2021-03-27 RX ADMIN — IOPAMIDOL 80 ML: 755 INJECTION, SOLUTION INTRAVENOUS at 00:02

## 2021-03-27 RX ADMIN — SODIUM CHLORIDE 75 ML/HR: 9 INJECTION, SOLUTION INTRAVENOUS at 04:27

## 2021-03-27 RX ADMIN — ASPIRIN 81 MG: 81 TABLET, COATED ORAL at 09:21

## 2021-03-27 RX ADMIN — METAXALONE 400 MG: 800 TABLET ORAL at 21:48

## 2021-03-27 RX ADMIN — LOSARTAN POTASSIUM 150 MG: 50 TABLET, FILM COATED ORAL at 09:21

## 2021-03-27 RX ADMIN — GABAPENTIN 300 MG: 300 CAPSULE ORAL at 21:48

## 2021-03-27 RX ADMIN — INSULIN LISPRO 2 UNITS: 100 INJECTION, SOLUTION INTRAVENOUS; SUBCUTANEOUS at 11:34

## 2021-03-27 RX ADMIN — CARVEDILOL 37.5 MG: 12.5 TABLET, FILM COATED ORAL at 09:21

## 2021-03-27 RX ADMIN — METAXALONE 400 MG: 800 TABLET ORAL at 09:22

## 2021-03-27 RX ADMIN — GABAPENTIN 300 MG: 300 CAPSULE ORAL at 00:15

## 2021-03-27 RX ADMIN — TAMSULOSIN HYDROCHLORIDE 0.4 MG: 0.4 CAPSULE ORAL at 09:22

## 2021-03-27 RX ADMIN — AMLODIPINE BESYLATE 5 MG: 5 TABLET ORAL at 09:22

## 2021-03-27 RX ADMIN — CARVEDILOL 37.5 MG: 12.5 TABLET, FILM COATED ORAL at 17:39

## 2021-03-27 RX ADMIN — LORAZEPAM 0.25 MG: 2 INJECTION INTRAMUSCULAR; INTRAVENOUS at 04:49

## 2021-03-27 RX ADMIN — MORPHINE SULFATE 1 MG: 2 INJECTION, SOLUTION INTRAMUSCULAR; INTRAVENOUS at 01:26

## 2021-03-27 RX ADMIN — ALPRAZOLAM 0.25 MG: 0.25 TABLET ORAL at 00:15

## 2021-03-27 RX ADMIN — PROCHLORPERAZINE EDISYLATE 5 MG: 5 INJECTION INTRAMUSCULAR; INTRAVENOUS at 02:23

## 2021-03-27 RX ADMIN — POTASSIUM CHLORIDE 10 MEQ: 750 CAPSULE, EXTENDED RELEASE ORAL at 09:21

## 2021-03-27 RX ADMIN — ROSUVASTATIN CALCIUM 40 MG: 20 TABLET, COATED ORAL at 09:21

## 2021-03-27 RX ADMIN — SERTRALINE HYDROCHLORIDE 50 MG: 50 TABLET ORAL at 09:22

## 2021-03-27 RX ADMIN — GABAPENTIN 300 MG: 300 CAPSULE ORAL at 09:22

## 2021-03-27 RX ADMIN — METAXALONE 400 MG: 800 TABLET ORAL at 00:14

## 2021-03-28 ENCOUNTER — ANESTHESIA (OUTPATIENT)
Dept: PERIOP | Facility: HOSPITAL | Age: 66
End: 2021-03-28

## 2021-03-28 ENCOUNTER — ANESTHESIA EVENT (OUTPATIENT)
Dept: PERIOP | Facility: HOSPITAL | Age: 66
End: 2021-03-28

## 2021-03-28 LAB
ALBUMIN SERPL-MCNC: 3.6 G/DL (ref 3.5–5.2)
ALBUMIN/GLOB SERPL: 1.7 G/DL
ALP SERPL-CCNC: 73 U/L (ref 39–117)
ALT SERPL W P-5'-P-CCNC: 48 U/L (ref 1–41)
ANION GAP SERPL CALCULATED.3IONS-SCNC: 8 MMOL/L (ref 5–15)
AST SERPL-CCNC: 27 U/L (ref 1–40)
BILIRUB SERPL-MCNC: 0.5 MG/DL (ref 0–1.2)
BUN SERPL-MCNC: 15 MG/DL (ref 8–23)
BUN/CREAT SERPL: 11.3 (ref 7–25)
CALCIUM SPEC-SCNC: 7.9 MG/DL (ref 8.6–10.5)
CHLORIDE SERPL-SCNC: 107 MMOL/L (ref 98–107)
CO2 SERPL-SCNC: 25 MMOL/L (ref 22–29)
CREAT SERPL-MCNC: 1.33 MG/DL (ref 0.76–1.27)
DEPRECATED RDW RBC AUTO: 44.9 FL (ref 37–54)
ERYTHROCYTE [DISTWIDTH] IN BLOOD BY AUTOMATED COUNT: 13.3 % (ref 12.3–15.4)
GFR SERPL CREATININE-BSD FRML MDRD: 54 ML/MIN/1.73
GLOBULIN UR ELPH-MCNC: 2.1 GM/DL
GLUCOSE BLDC GLUCOMTR-MCNC: 126 MG/DL (ref 70–130)
GLUCOSE BLDC GLUCOMTR-MCNC: 193 MG/DL (ref 70–130)
GLUCOSE BLDC GLUCOMTR-MCNC: 357 MG/DL (ref 70–130)
GLUCOSE SERPL-MCNC: 110 MG/DL (ref 65–99)
HCT VFR BLD AUTO: 42.7 % (ref 37.5–51)
HGB BLD-MCNC: 14.3 G/DL (ref 13–17.7)
MCH RBC QN AUTO: 31 PG (ref 26.6–33)
MCHC RBC AUTO-ENTMCNC: 33.5 G/DL (ref 31.5–35.7)
MCV RBC AUTO: 92.6 FL (ref 79–97)
PLATELET # BLD AUTO: 150 10*3/MM3 (ref 140–450)
PMV BLD AUTO: 10.2 FL (ref 6–12)
POTASSIUM SERPL-SCNC: 3.7 MMOL/L (ref 3.5–5.2)
PROT SERPL-MCNC: 5.7 G/DL (ref 6–8.5)
RBC # BLD AUTO: 4.61 10*6/MM3 (ref 4.14–5.8)
SODIUM SERPL-SCNC: 140 MMOL/L (ref 136–145)
WBC # BLD AUTO: 8.07 10*3/MM3 (ref 3.4–10.8)

## 2021-03-28 PROCEDURE — 80053 COMPREHEN METABOLIC PANEL: CPT | Performed by: SURGERY

## 2021-03-28 PROCEDURE — 85027 COMPLETE CBC AUTOMATED: CPT | Performed by: SURGERY

## 2021-03-28 PROCEDURE — 25010000002 HEPARIN (PORCINE) PER 1000 UNITS: Performed by: SURGERY

## 2021-03-28 PROCEDURE — 88304 TISSUE EXAM BY PATHOLOGIST: CPT | Performed by: SURGERY

## 2021-03-28 PROCEDURE — 0FT44ZZ RESECTION OF GALLBLADDER, PERCUTANEOUS ENDOSCOPIC APPROACH: ICD-10-PCS | Performed by: SURGERY

## 2021-03-28 PROCEDURE — 82962 GLUCOSE BLOOD TEST: CPT

## 2021-03-28 PROCEDURE — 25010000002 DEXAMETHASONE PER 1 MG: Performed by: NURSE ANESTHETIST, CERTIFIED REGISTERED

## 2021-03-28 PROCEDURE — G0378 HOSPITAL OBSERVATION PER HR: HCPCS

## 2021-03-28 PROCEDURE — P9041 ALBUMIN (HUMAN),5%, 50ML: HCPCS | Performed by: NURSE ANESTHETIST, CERTIFIED REGISTERED

## 2021-03-28 PROCEDURE — 25010000003 CEFAZOLIN PER 500 MG: Performed by: NURSE ANESTHETIST, CERTIFIED REGISTERED

## 2021-03-28 PROCEDURE — 99232 SBSQ HOSP IP/OBS MODERATE 35: CPT | Performed by: NURSE PRACTITIONER

## 2021-03-28 PROCEDURE — 25010000002 FENTANYL CITRATE (PF) 100 MCG/2ML SOLUTION: Performed by: NURSE ANESTHETIST, CERTIFIED REGISTERED

## 2021-03-28 PROCEDURE — 25010000002 ALBUMIN HUMAN 5% PER 50 ML: Performed by: NURSE ANESTHETIST, CERTIFIED REGISTERED

## 2021-03-28 PROCEDURE — 25010000002 PROPOFOL 10 MG/ML EMULSION: Performed by: NURSE ANESTHETIST, CERTIFIED REGISTERED

## 2021-03-28 PROCEDURE — 25010000002 NEOSTIGMINE 10 MG/10ML SOLUTION: Performed by: NURSE ANESTHETIST, CERTIFIED REGISTERED

## 2021-03-28 PROCEDURE — 25010000002 ONDANSETRON PER 1 MG: Performed by: NURSE ANESTHETIST, CERTIFIED REGISTERED

## 2021-03-28 DEVICE — LIGACLIP 10-M/L, 10MM ENDOSCOPIC ROTATING MULTIPLE CLIP APPLIERS
Type: IMPLANTABLE DEVICE | Site: ABDOMEN | Status: FUNCTIONAL
Brand: LIGACLIP

## 2021-03-28 RX ORDER — DEXAMETHASONE SODIUM PHOSPHATE 4 MG/ML
INJECTION, SOLUTION INTRA-ARTICULAR; INTRALESIONAL; INTRAMUSCULAR; INTRAVENOUS; SOFT TISSUE AS NEEDED
Status: DISCONTINUED | OUTPATIENT
Start: 2021-03-28 | End: 2021-03-28 | Stop reason: SURG

## 2021-03-28 RX ORDER — NEOSTIGMINE METHYLSULFATE 1 MG/ML
INJECTION, SOLUTION INTRAVENOUS AS NEEDED
Status: DISCONTINUED | OUTPATIENT
Start: 2021-03-28 | End: 2021-03-28 | Stop reason: SURG

## 2021-03-28 RX ORDER — SODIUM CHLORIDE 9 MG/ML
INJECTION, SOLUTION INTRAVENOUS AS NEEDED
Status: DISCONTINUED | OUTPATIENT
Start: 2021-03-28 | End: 2021-03-28 | Stop reason: HOSPADM

## 2021-03-28 RX ORDER — SODIUM CHLORIDE 0.9 % (FLUSH) 0.9 %
10 SYRINGE (ML) INJECTION AS NEEDED
Status: DISCONTINUED | OUTPATIENT
Start: 2021-03-28 | End: 2021-03-28 | Stop reason: HOSPADM

## 2021-03-28 RX ORDER — SODIUM CHLORIDE 0.9 % (FLUSH) 0.9 %
3 SYRINGE (ML) INJECTION EVERY 12 HOURS SCHEDULED
Status: DISCONTINUED | OUTPATIENT
Start: 2021-03-28 | End: 2021-03-28 | Stop reason: HOSPADM

## 2021-03-28 RX ORDER — SACCHAROMYCES BOULARDII 250 MG
500 CAPSULE ORAL 2 TIMES DAILY
Status: DISCONTINUED | OUTPATIENT
Start: 2021-03-28 | End: 2021-03-31 | Stop reason: HOSPADM

## 2021-03-28 RX ORDER — PROMETHAZINE HYDROCHLORIDE 25 MG/1
25 TABLET ORAL ONCE AS NEEDED
Status: DISCONTINUED | OUTPATIENT
Start: 2021-03-28 | End: 2021-03-28 | Stop reason: HOSPADM

## 2021-03-28 RX ORDER — HEPARIN SODIUM 5000 [USP'U]/ML
5000 INJECTION, SOLUTION INTRAVENOUS; SUBCUTANEOUS EVERY 8 HOURS SCHEDULED
Status: DISCONTINUED | OUTPATIENT
Start: 2021-03-28 | End: 2021-03-31 | Stop reason: HOSPADM

## 2021-03-28 RX ORDER — LIDOCAINE HYDROCHLORIDE 10 MG/ML
0.5 INJECTION, SOLUTION EPIDURAL; INFILTRATION; INTRACAUDAL; PERINEURAL ONCE AS NEEDED
Status: DISCONTINUED | OUTPATIENT
Start: 2021-03-28 | End: 2021-03-28 | Stop reason: HOSPADM

## 2021-03-28 RX ORDER — FAMOTIDINE 20 MG/1
20 TABLET, FILM COATED ORAL ONCE
Status: DISCONTINUED | OUTPATIENT
Start: 2021-03-28 | End: 2021-03-28 | Stop reason: HOSPADM

## 2021-03-28 RX ORDER — PROPOFOL 10 MG/ML
VIAL (ML) INTRAVENOUS CONTINUOUS PRN
Status: DISCONTINUED | OUTPATIENT
Start: 2021-03-28 | End: 2021-03-28 | Stop reason: SURG

## 2021-03-28 RX ORDER — IPRATROPIUM BROMIDE AND ALBUTEROL SULFATE 2.5; .5 MG/3ML; MG/3ML
3 SOLUTION RESPIRATORY (INHALATION) ONCE AS NEEDED
Status: DISCONTINUED | OUTPATIENT
Start: 2021-03-28 | End: 2021-03-28 | Stop reason: HOSPADM

## 2021-03-28 RX ORDER — GLYCOPYRROLATE 0.2 MG/ML
INJECTION INTRAMUSCULAR; INTRAVENOUS AS NEEDED
Status: DISCONTINUED | OUTPATIENT
Start: 2021-03-28 | End: 2021-03-28 | Stop reason: SURG

## 2021-03-28 RX ORDER — LIDOCAINE HYDROCHLORIDE 10 MG/ML
INJECTION, SOLUTION EPIDURAL; INFILTRATION; INTRACAUDAL; PERINEURAL AS NEEDED
Status: DISCONTINUED | OUTPATIENT
Start: 2021-03-28 | End: 2021-03-28 | Stop reason: SURG

## 2021-03-28 RX ORDER — BUPIVACAINE HYDROCHLORIDE 5 MG/ML
INJECTION, SOLUTION PERINEURAL AS NEEDED
Status: DISCONTINUED | OUTPATIENT
Start: 2021-03-28 | End: 2021-03-28 | Stop reason: HOSPADM

## 2021-03-28 RX ORDER — FENTANYL CITRATE 50 UG/ML
INJECTION, SOLUTION INTRAMUSCULAR; INTRAVENOUS AS NEEDED
Status: DISCONTINUED | OUTPATIENT
Start: 2021-03-28 | End: 2021-03-28 | Stop reason: SURG

## 2021-03-28 RX ORDER — DOCUSATE SODIUM 100 MG/1
100 CAPSULE, LIQUID FILLED ORAL 2 TIMES DAILY
Status: DISCONTINUED | OUTPATIENT
Start: 2021-03-28 | End: 2021-03-31 | Stop reason: HOSPADM

## 2021-03-28 RX ORDER — CEFAZOLIN SODIUM 1 G/3ML
INJECTION, POWDER, FOR SOLUTION INTRAMUSCULAR; INTRAVENOUS AS NEEDED
Status: DISCONTINUED | OUTPATIENT
Start: 2021-03-28 | End: 2021-03-28 | Stop reason: SURG

## 2021-03-28 RX ORDER — FAMOTIDINE 10 MG/ML
20 INJECTION, SOLUTION INTRAVENOUS ONCE
Status: COMPLETED | OUTPATIENT
Start: 2021-03-28 | End: 2021-03-28

## 2021-03-28 RX ORDER — HYDROMORPHONE HYDROCHLORIDE 1 MG/ML
0.5 INJECTION, SOLUTION INTRAMUSCULAR; INTRAVENOUS; SUBCUTANEOUS
Status: DISCONTINUED | OUTPATIENT
Start: 2021-03-28 | End: 2021-03-28 | Stop reason: HOSPADM

## 2021-03-28 RX ORDER — IBUPROFEN 600 MG/1
600 TABLET ORAL EVERY 6 HOURS PRN
Status: DISCONTINUED | OUTPATIENT
Start: 2021-03-28 | End: 2021-03-31 | Stop reason: HOSPADM

## 2021-03-28 RX ORDER — PROMETHAZINE HYDROCHLORIDE 25 MG/1
25 SUPPOSITORY RECTAL ONCE AS NEEDED
Status: DISCONTINUED | OUTPATIENT
Start: 2021-03-28 | End: 2021-03-28 | Stop reason: HOSPADM

## 2021-03-28 RX ORDER — ROCURONIUM BROMIDE 10 MG/ML
INJECTION, SOLUTION INTRAVENOUS AS NEEDED
Status: DISCONTINUED | OUTPATIENT
Start: 2021-03-28 | End: 2021-03-28 | Stop reason: SURG

## 2021-03-28 RX ORDER — ONDANSETRON 2 MG/ML
INJECTION INTRAMUSCULAR; INTRAVENOUS AS NEEDED
Status: DISCONTINUED | OUTPATIENT
Start: 2021-03-28 | End: 2021-03-28 | Stop reason: SURG

## 2021-03-28 RX ORDER — SODIUM CHLORIDE 0.9 % (FLUSH) 0.9 %
10 SYRINGE (ML) INJECTION EVERY 12 HOURS SCHEDULED
Status: DISCONTINUED | OUTPATIENT
Start: 2021-03-28 | End: 2021-03-28 | Stop reason: HOSPADM

## 2021-03-28 RX ORDER — MIDAZOLAM HYDROCHLORIDE 1 MG/ML
1 INJECTION INTRAMUSCULAR; INTRAVENOUS
Status: DISCONTINUED | OUTPATIENT
Start: 2021-03-28 | End: 2021-03-28 | Stop reason: HOSPADM

## 2021-03-28 RX ORDER — MAGNESIUM HYDROXIDE 1200 MG/15ML
LIQUID ORAL AS NEEDED
Status: DISCONTINUED | OUTPATIENT
Start: 2021-03-28 | End: 2021-03-28 | Stop reason: HOSPADM

## 2021-03-28 RX ORDER — ALBUMIN, HUMAN INJ 5% 5 %
SOLUTION INTRAVENOUS CONTINUOUS PRN
Status: DISCONTINUED | OUTPATIENT
Start: 2021-03-28 | End: 2021-03-28 | Stop reason: SURG

## 2021-03-28 RX ORDER — FAMOTIDINE 20 MG/1
20 TABLET, FILM COATED ORAL
Status: DISCONTINUED | OUTPATIENT
Start: 2021-03-28 | End: 2021-03-31 | Stop reason: HOSPADM

## 2021-03-28 RX ORDER — MIDAZOLAM HYDROCHLORIDE 1 MG/ML
2 INJECTION INTRAMUSCULAR; INTRAVENOUS
Status: DISCONTINUED | OUTPATIENT
Start: 2021-03-28 | End: 2021-03-28 | Stop reason: HOSPADM

## 2021-03-28 RX ORDER — EPHEDRINE SULFATE 50 MG/ML
INJECTION, SOLUTION INTRAVENOUS AS NEEDED
Status: DISCONTINUED | OUTPATIENT
Start: 2021-03-28 | End: 2021-03-28 | Stop reason: SURG

## 2021-03-28 RX ORDER — HYDRALAZINE HYDROCHLORIDE 20 MG/ML
5 INJECTION INTRAMUSCULAR; INTRAVENOUS
Status: DISCONTINUED | OUTPATIENT
Start: 2021-03-28 | End: 2021-03-28 | Stop reason: HOSPADM

## 2021-03-28 RX ORDER — LABETALOL HYDROCHLORIDE 5 MG/ML
5 INJECTION, SOLUTION INTRAVENOUS
Status: DISCONTINUED | OUTPATIENT
Start: 2021-03-28 | End: 2021-03-28 | Stop reason: HOSPADM

## 2021-03-28 RX ORDER — SODIUM CHLORIDE, SODIUM LACTATE, POTASSIUM CHLORIDE, CALCIUM CHLORIDE 600; 310; 30; 20 MG/100ML; MG/100ML; MG/100ML; MG/100ML
9 INJECTION, SOLUTION INTRAVENOUS CONTINUOUS
Status: DISCONTINUED | OUTPATIENT
Start: 2021-03-28 | End: 2021-03-31 | Stop reason: HOSPADM

## 2021-03-28 RX ORDER — ONDANSETRON 2 MG/ML
4 INJECTION INTRAMUSCULAR; INTRAVENOUS ONCE AS NEEDED
Status: DISCONTINUED | OUTPATIENT
Start: 2021-03-28 | End: 2021-03-28 | Stop reason: HOSPADM

## 2021-03-28 RX ORDER — SODIUM CHLORIDE 0.9 % (FLUSH) 0.9 %
3-10 SYRINGE (ML) INJECTION AS NEEDED
Status: DISCONTINUED | OUTPATIENT
Start: 2021-03-28 | End: 2021-03-28 | Stop reason: HOSPADM

## 2021-03-28 RX ADMIN — AMLODIPINE BESYLATE 5 MG: 5 TABLET ORAL at 08:24

## 2021-03-28 RX ADMIN — LOSARTAN POTASSIUM 150 MG: 50 TABLET, FILM COATED ORAL at 08:24

## 2021-03-28 RX ADMIN — TAZOBACTAM SODIUM AND PIPERACILLIN SODIUM 3.38 G: 375; 3 INJECTION, SOLUTION INTRAVENOUS at 23:28

## 2021-03-28 RX ADMIN — EPHEDRINE SULFATE 15 MG: 50 INJECTION, SOLUTION INTRAVENOUS at 16:48

## 2021-03-28 RX ADMIN — ALBUMIN HUMAN: 0.05 INJECTION, SOLUTION INTRAVENOUS at 16:07

## 2021-03-28 RX ADMIN — NEOSTIGMINE 2.5 MG: 1 INJECTION INTRAVENOUS at 16:33

## 2021-03-28 RX ADMIN — POTASSIUM CHLORIDE 10 MEQ: 750 CAPSULE, EXTENDED RELEASE ORAL at 08:24

## 2021-03-28 RX ADMIN — FAMOTIDINE 20 MG: 10 INJECTION INTRAVENOUS at 15:12

## 2021-03-28 RX ADMIN — GABAPENTIN 300 MG: 300 CAPSULE ORAL at 22:21

## 2021-03-28 RX ADMIN — SODIUM CHLORIDE, POTASSIUM CHLORIDE, SODIUM LACTATE AND CALCIUM CHLORIDE: 600; 310; 30; 20 INJECTION, SOLUTION INTRAVENOUS at 15:42

## 2021-03-28 RX ADMIN — CARVEDILOL 37.5 MG: 12.5 TABLET, FILM COATED ORAL at 08:24

## 2021-03-28 RX ADMIN — LIDOCAINE HYDROCHLORIDE 50 MG: 10 INJECTION, SOLUTION EPIDURAL; INFILTRATION; INTRACAUDAL; PERINEURAL at 15:46

## 2021-03-28 RX ADMIN — FAMOTIDINE 20 MG: 20 TABLET, FILM COATED ORAL at 18:40

## 2021-03-28 RX ADMIN — ROSUVASTATIN CALCIUM 40 MG: 20 TABLET, COATED ORAL at 08:24

## 2021-03-28 RX ADMIN — PROPOFOL 25 MCG/KG/MIN: 10 INJECTION, EMULSION INTRAVENOUS at 16:08

## 2021-03-28 RX ADMIN — SERTRALINE HYDROCHLORIDE 50 MG: 50 TABLET ORAL at 08:24

## 2021-03-28 RX ADMIN — TAMSULOSIN HYDROCHLORIDE 0.4 MG: 0.4 CAPSULE ORAL at 08:24

## 2021-03-28 RX ADMIN — FENTANYL CITRATE 50 MCG: 50 INJECTION, SOLUTION INTRAMUSCULAR; INTRAVENOUS at 16:40

## 2021-03-28 RX ADMIN — FENTANYL CITRATE 50 MCG: 50 INJECTION, SOLUTION INTRAMUSCULAR; INTRAVENOUS at 15:46

## 2021-03-28 RX ADMIN — GLYCOPYRROLATE 0.4 MG: 0.4 INJECTION INTRAMUSCULAR; INTRAVENOUS at 16:02

## 2021-03-28 RX ADMIN — METAXALONE 400 MG: 800 TABLET ORAL at 05:46

## 2021-03-28 RX ADMIN — HEPARIN SODIUM 5000 UNITS: 5000 INJECTION INTRAVENOUS; SUBCUTANEOUS at 22:22

## 2021-03-28 RX ADMIN — HYDROCODONE BITARTRATE AND ACETAMINOPHEN 1 TABLET: 7.5; 325 TABLET ORAL at 23:20

## 2021-03-28 RX ADMIN — SUGAMMADEX 200 MG: 100 INJECTION, SOLUTION INTRAVENOUS at 16:44

## 2021-03-28 RX ADMIN — ONDANSETRON 4 MG: 2 INJECTION INTRAMUSCULAR; INTRAVENOUS at 15:55

## 2021-03-28 RX ADMIN — CARVEDILOL 37.5 MG: 12.5 TABLET, FILM COATED ORAL at 18:40

## 2021-03-28 RX ADMIN — ROCURONIUM BROMIDE 50 MG: 10 INJECTION INTRAVENOUS at 15:46

## 2021-03-28 RX ADMIN — SODIUM CHLORIDE 250 ML: 9 INJECTION, SOLUTION INTRAVENOUS at 15:13

## 2021-03-28 RX ADMIN — GLYCOPYRROLATE 0.4 MG: 0.4 INJECTION INTRAMUSCULAR; INTRAVENOUS at 16:33

## 2021-03-28 RX ADMIN — Medication 500 MG: at 22:22

## 2021-03-28 RX ADMIN — METAXALONE 400 MG: 800 TABLET ORAL at 22:21

## 2021-03-28 RX ADMIN — DOCUSATE SODIUM 100 MG: 100 CAPSULE, LIQUID FILLED ORAL at 22:21

## 2021-03-28 RX ADMIN — GABAPENTIN 300 MG: 300 CAPSULE ORAL at 08:24

## 2021-03-28 RX ADMIN — CEFAZOLIN SODIUM 2 G: 1 INJECTION, POWDER, FOR SOLUTION INTRAMUSCULAR; INTRAVENOUS at 15:57

## 2021-03-28 RX ADMIN — PROPOFOL 200 MG: 10 INJECTION, EMULSION INTRAVENOUS at 15:46

## 2021-03-28 RX ADMIN — DEXAMETHASONE SODIUM PHOSPHATE 4 MG: 4 INJECTION, SOLUTION INTRA-ARTICULAR; INTRALESIONAL; INTRAMUSCULAR; INTRAVENOUS; SOFT TISSUE at 15:55

## 2021-03-28 NOTE — ANESTHESIA PREPROCEDURE EVALUATION
Anesthesia Evaluation     Patient summary reviewed and Nursing notes reviewed   NPO Solid Status: > 8 hours  NPO Liquid Status: > 8 hours           Airway   Mallampati: II  TM distance: >3 FB  Neck ROM: full  No difficulty expected  Dental - normal exam     Pulmonary    (+) decreased breath sounds,   Cardiovascular   Exercise tolerance: poor (<4 METS)    Rhythm: regular  Rate: normal    (+) CABG >6 Months,       Neuro/Psych  GI/Hepatic/Renal/Endo      Musculoskeletal     Abdominal    Substance History      OB/GYN          Other                        Anesthesia Plan    ASA 3     general     intravenous induction     Anesthetic plan, all risks, benefits, and alternatives have been provided, discussed and informed consent has been obtained with: patient.

## 2021-03-28 NOTE — ANESTHESIA POSTPROCEDURE EVALUATION
Patient: Jesse Brink    Procedure Summary     Date: 03/28/21 Room / Location:  [facility] OR 20 /  [facility]    Anesthesia Start: 1542 Anesthesia Stop: 1700    Procedure: CHOLECYSTECTOMY LAPAROSCOPIC (N/A Abdomen) Diagnosis:     Surgeons: Gigi Ambrose MD Provider: Uriel Hayden MD    Anesthesia Type: general ASA Status: 3          Anesthesia Type: general    Vitals  Vitals Value Taken Time   /58 03/28/21 1657   Temp 97.9 °F (36.6 °C) 03/28/21 1656   Pulse 63 03/28/21 1659   Resp 16 03/28/21 1656   SpO2 94 % 03/28/21 1659   Vitals shown include unvalidated device data.        Post Anesthesia Care and Evaluation    Patient location during evaluation: PACU  Patient participation: complete - patient participated  Level of consciousness: awake and alert  Pain management: adequate  Airway patency: patent  Anesthetic complications: No anesthetic complications  PONV Status: none  Cardiovascular status: hemodynamically stable and acceptable  Respiratory status: nonlabored ventilation, acceptable and nasal cannula  Hydration status: acceptable

## 2021-03-28 NOTE — ANESTHESIA PROCEDURE NOTES
Airway  Urgency: elective    Date/Time: 3/28/2021 3:47 PM  Airway not difficult    General Information and Staff    Patient location during procedure: OR  CRNA: Juan Cox III, CRNA    Indications and Patient Condition  Indications for airway management: airway protection    Preoxygenated: yes  MILS not maintained throughout  Mask difficulty assessment: 0 - not attempted    Final Airway Details  Final airway type: endotracheal airway      Successful airway: ETT  Cuffed: yes   Successful intubation technique: direct laryngoscopy  Blade: Dee  Blade size: 3  ETT size (mm): 7.0  Cormack-Lehane Classification: grade I - full view of glottis  Placement verified by: chest auscultation and capnometry   Measured from: lips  ETT/EBT  to lips (cm): 21  Number of attempts at approach: 1  Assessment: lips, teeth, and gum same as pre-op and atraumatic intubation    Additional Comments  Negative epigastric sounds, Breath sound equal bilaterally with symmetric chest rise and fall.

## 2021-03-29 LAB
ACT BLD: 136 SECONDS (ref 82–152)
ALBUMIN SERPL-MCNC: 3.8 G/DL (ref 3.5–5.2)
ALBUMIN/GLOB SERPL: 1.5 G/DL
ALP SERPL-CCNC: 65 U/L (ref 39–117)
ALT SERPL W P-5'-P-CCNC: 70 U/L (ref 1–41)
ANION GAP SERPL CALCULATED.3IONS-SCNC: 13 MMOL/L (ref 5–15)
AST SERPL-CCNC: 66 U/L (ref 1–40)
BILIRUB SERPL-MCNC: 0.8 MG/DL (ref 0–1.2)
BUN SERPL-MCNC: 16 MG/DL (ref 8–23)
BUN/CREAT SERPL: 13 (ref 7–25)
CALCIUM SPEC-SCNC: 8.8 MG/DL (ref 8.6–10.5)
CHLORIDE SERPL-SCNC: 103 MMOL/L (ref 98–107)
CO2 SERPL-SCNC: 20 MMOL/L (ref 22–29)
CREAT SERPL-MCNC: 1.23 MG/DL (ref 0.76–1.27)
DEPRECATED RDW RBC AUTO: 45.1 FL (ref 37–54)
ERYTHROCYTE [DISTWIDTH] IN BLOOD BY AUTOMATED COUNT: 12.8 % (ref 12.3–15.4)
GFR SERPL CREATININE-BSD FRML MDRD: 59 ML/MIN/1.73
GLOBULIN UR ELPH-MCNC: 2.5 GM/DL
GLUCOSE BLDC GLUCOMTR-MCNC: 123 MG/DL (ref 70–130)
GLUCOSE BLDC GLUCOMTR-MCNC: 178 MG/DL (ref 70–130)
GLUCOSE BLDC GLUCOMTR-MCNC: 186 MG/DL (ref 70–130)
GLUCOSE BLDC GLUCOMTR-MCNC: 195 MG/DL (ref 70–130)
GLUCOSE SERPL-MCNC: 128 MG/DL (ref 65–99)
HCT VFR BLD AUTO: 45.9 % (ref 37.5–51)
HGB BLD-MCNC: 14.8 G/DL (ref 13–17.7)
MCH RBC QN AUTO: 31 PG (ref 26.6–33)
MCHC RBC AUTO-ENTMCNC: 32.2 G/DL (ref 31.5–35.7)
MCV RBC AUTO: 96 FL (ref 79–97)
PLATELET # BLD AUTO: 150 10*3/MM3 (ref 140–450)
PMV BLD AUTO: 10 FL (ref 6–12)
POTASSIUM SERPL-SCNC: 4.1 MMOL/L (ref 3.5–5.2)
PROT SERPL-MCNC: 6.3 G/DL (ref 6–8.5)
RBC # BLD AUTO: 4.78 10*6/MM3 (ref 4.14–5.8)
SODIUM SERPL-SCNC: 136 MMOL/L (ref 136–145)
WBC # BLD AUTO: 9.82 10*3/MM3 (ref 3.4–10.8)

## 2021-03-29 PROCEDURE — 85027 COMPLETE CBC AUTOMATED: CPT | Performed by: SURGERY

## 2021-03-29 PROCEDURE — 82962 GLUCOSE BLOOD TEST: CPT

## 2021-03-29 PROCEDURE — 25010000002 HEPARIN (PORCINE) PER 1000 UNITS: Performed by: SURGERY

## 2021-03-29 PROCEDURE — 63710000001 INSULIN LISPRO (HUMAN) PER 5 UNITS: Performed by: SURGERY

## 2021-03-29 PROCEDURE — 25010000002 PIPERACILLIN SOD-TAZOBACTAM PER 1 G: Performed by: SURGERY

## 2021-03-29 PROCEDURE — 80053 COMPREHEN METABOLIC PANEL: CPT | Performed by: SURGERY

## 2021-03-29 PROCEDURE — G0378 HOSPITAL OBSERVATION PER HR: HCPCS

## 2021-03-29 PROCEDURE — 99232 SBSQ HOSP IP/OBS MODERATE 35: CPT | Performed by: INTERNAL MEDICINE

## 2021-03-29 RX ORDER — FINASTERIDE 5 MG/1
5 TABLET, FILM COATED ORAL DAILY
Status: DISCONTINUED | OUTPATIENT
Start: 2021-03-29 | End: 2021-03-31 | Stop reason: HOSPADM

## 2021-03-29 RX ADMIN — SERTRALINE HYDROCHLORIDE 50 MG: 50 TABLET ORAL at 08:45

## 2021-03-29 RX ADMIN — FAMOTIDINE 20 MG: 20 TABLET, FILM COATED ORAL at 08:44

## 2021-03-29 RX ADMIN — Medication 500 MG: at 20:04

## 2021-03-29 RX ADMIN — INSULIN LISPRO 2 UNITS: 100 INJECTION, SOLUTION INTRAVENOUS; SUBCUTANEOUS at 11:43

## 2021-03-29 RX ADMIN — LOSARTAN POTASSIUM 150 MG: 50 TABLET, FILM COATED ORAL at 08:43

## 2021-03-29 RX ADMIN — GABAPENTIN 300 MG: 300 CAPSULE ORAL at 08:44

## 2021-03-29 RX ADMIN — GABAPENTIN 300 MG: 300 CAPSULE ORAL at 20:04

## 2021-03-29 RX ADMIN — AMLODIPINE BESYLATE 5 MG: 5 TABLET ORAL at 08:45

## 2021-03-29 RX ADMIN — HEPARIN SODIUM 5000 UNITS: 5000 INJECTION INTRAVENOUS; SUBCUTANEOUS at 07:25

## 2021-03-29 RX ADMIN — TAZOBACTAM SODIUM AND PIPERACILLIN SODIUM 3.38 G: 375; 3 INJECTION, SOLUTION INTRAVENOUS at 16:25

## 2021-03-29 RX ADMIN — HEPARIN SODIUM 5000 UNITS: 5000 INJECTION INTRAVENOUS; SUBCUTANEOUS at 15:08

## 2021-03-29 RX ADMIN — METAXALONE 400 MG: 800 TABLET ORAL at 20:04

## 2021-03-29 RX ADMIN — POTASSIUM CHLORIDE 10 MEQ: 750 CAPSULE, EXTENDED RELEASE ORAL at 08:45

## 2021-03-29 RX ADMIN — DOCUSATE SODIUM 100 MG: 100 CAPSULE, LIQUID FILLED ORAL at 20:04

## 2021-03-29 RX ADMIN — CARVEDILOL 37.5 MG: 12.5 TABLET, FILM COATED ORAL at 18:23

## 2021-03-29 RX ADMIN — HYDROCODONE BITARTRATE AND ACETAMINOPHEN 1 TABLET: 7.5; 325 TABLET ORAL at 22:25

## 2021-03-29 RX ADMIN — Medication 500 MG: at 08:44

## 2021-03-29 RX ADMIN — METAXALONE 400 MG: 800 TABLET ORAL at 07:25

## 2021-03-29 RX ADMIN — DOCUSATE SODIUM 100 MG: 100 CAPSULE, LIQUID FILLED ORAL at 08:45

## 2021-03-29 RX ADMIN — ROSUVASTATIN CALCIUM 40 MG: 20 TABLET, COATED ORAL at 08:43

## 2021-03-29 RX ADMIN — FINASTERIDE 5 MG: 5 TABLET, FILM COATED ORAL at 11:50

## 2021-03-29 RX ADMIN — ASPIRIN 81 MG: 81 TABLET, COATED ORAL at 08:43

## 2021-03-29 RX ADMIN — CARVEDILOL 37.5 MG: 12.5 TABLET, FILM COATED ORAL at 08:43

## 2021-03-29 RX ADMIN — HEPARIN SODIUM 5000 UNITS: 5000 INJECTION INTRAVENOUS; SUBCUTANEOUS at 20:05

## 2021-03-29 RX ADMIN — INSULIN LISPRO 2 UNITS: 100 INJECTION, SOLUTION INTRAVENOUS; SUBCUTANEOUS at 18:52

## 2021-03-29 RX ADMIN — METAXALONE 400 MG: 800 TABLET ORAL at 15:09

## 2021-03-29 RX ADMIN — TAZOBACTAM SODIUM AND PIPERACILLIN SODIUM 3.38 G: 375; 3 INJECTION, SOLUTION INTRAVENOUS at 08:45

## 2021-03-29 RX ADMIN — FAMOTIDINE 20 MG: 20 TABLET, FILM COATED ORAL at 18:23

## 2021-03-29 RX ADMIN — TAMSULOSIN HYDROCHLORIDE 0.4 MG: 0.4 CAPSULE ORAL at 08:44

## 2021-03-30 PROBLEM — K80.20 CHOLELITHIASIS: Status: ACTIVE | Noted: 2021-03-30

## 2021-03-30 LAB
GLUCOSE BLDC GLUCOMTR-MCNC: 171 MG/DL (ref 70–130)
GLUCOSE BLDC GLUCOMTR-MCNC: 194 MG/DL (ref 70–130)
GLUCOSE BLDC GLUCOMTR-MCNC: 203 MG/DL (ref 70–130)
GLUCOSE BLDC GLUCOMTR-MCNC: 272 MG/DL (ref 70–130)

## 2021-03-30 PROCEDURE — 25010000002 PIPERACILLIN SOD-TAZOBACTAM PER 1 G: Performed by: SURGERY

## 2021-03-30 PROCEDURE — 63710000001 INSULIN LISPRO (HUMAN) PER 5 UNITS: Performed by: SURGERY

## 2021-03-30 PROCEDURE — 25010000002 HEPARIN (PORCINE) PER 1000 UNITS: Performed by: SURGERY

## 2021-03-30 PROCEDURE — 82962 GLUCOSE BLOOD TEST: CPT

## 2021-03-30 PROCEDURE — 99232 SBSQ HOSP IP/OBS MODERATE 35: CPT | Performed by: NURSE PRACTITIONER

## 2021-03-30 RX ADMIN — TAZOBACTAM SODIUM AND PIPERACILLIN SODIUM 3.38 G: 375; 3 INJECTION, SOLUTION INTRAVENOUS at 01:39

## 2021-03-30 RX ADMIN — SERTRALINE HYDROCHLORIDE 50 MG: 50 TABLET ORAL at 09:16

## 2021-03-30 RX ADMIN — CARVEDILOL 37.5 MG: 12.5 TABLET, FILM COATED ORAL at 09:14

## 2021-03-30 RX ADMIN — METAXALONE 400 MG: 800 TABLET ORAL at 20:11

## 2021-03-30 RX ADMIN — FAMOTIDINE 20 MG: 20 TABLET, FILM COATED ORAL at 16:56

## 2021-03-30 RX ADMIN — ROSUVASTATIN CALCIUM 40 MG: 20 TABLET, COATED ORAL at 09:15

## 2021-03-30 RX ADMIN — Medication 500 MG: at 09:14

## 2021-03-30 RX ADMIN — ACETAMINOPHEN 650 MG: 325 TABLET ORAL at 16:44

## 2021-03-30 RX ADMIN — FAMOTIDINE 20 MG: 20 TABLET, FILM COATED ORAL at 09:16

## 2021-03-30 RX ADMIN — LOSARTAN POTASSIUM 150 MG: 50 TABLET, FILM COATED ORAL at 09:15

## 2021-03-30 RX ADMIN — METAXALONE 400 MG: 800 TABLET ORAL at 06:32

## 2021-03-30 RX ADMIN — TAZOBACTAM SODIUM AND PIPERACILLIN SODIUM 3.38 G: 375; 3 INJECTION, SOLUTION INTRAVENOUS at 09:24

## 2021-03-30 RX ADMIN — Medication 500 MG: at 20:11

## 2021-03-30 RX ADMIN — INSULIN LISPRO 3 UNITS: 100 INJECTION, SOLUTION INTRAVENOUS; SUBCUTANEOUS at 09:16

## 2021-03-30 RX ADMIN — HEPARIN SODIUM 5000 UNITS: 5000 INJECTION INTRAVENOUS; SUBCUTANEOUS at 13:26

## 2021-03-30 RX ADMIN — DOCUSATE SODIUM 100 MG: 100 CAPSULE, LIQUID FILLED ORAL at 20:13

## 2021-03-30 RX ADMIN — ASPIRIN 81 MG: 81 TABLET, COATED ORAL at 09:16

## 2021-03-30 RX ADMIN — INSULIN LISPRO 2 UNITS: 100 INJECTION, SOLUTION INTRAVENOUS; SUBCUTANEOUS at 16:44

## 2021-03-30 RX ADMIN — AMLODIPINE BESYLATE 5 MG: 5 TABLET ORAL at 09:15

## 2021-03-30 RX ADMIN — METAXALONE 400 MG: 800 TABLET ORAL at 09:13

## 2021-03-30 RX ADMIN — HEPARIN SODIUM 5000 UNITS: 5000 INJECTION INTRAVENOUS; SUBCUTANEOUS at 06:31

## 2021-03-30 RX ADMIN — TAZOBACTAM SODIUM AND PIPERACILLIN SODIUM 3.38 G: 375; 3 INJECTION, SOLUTION INTRAVENOUS at 16:44

## 2021-03-30 RX ADMIN — GABAPENTIN 300 MG: 300 CAPSULE ORAL at 20:11

## 2021-03-30 RX ADMIN — DOCUSATE SODIUM 100 MG: 100 CAPSULE, LIQUID FILLED ORAL at 09:16

## 2021-03-30 RX ADMIN — CARVEDILOL 37.5 MG: 12.5 TABLET, FILM COATED ORAL at 18:02

## 2021-03-30 RX ADMIN — HEPARIN SODIUM 5000 UNITS: 5000 INJECTION INTRAVENOUS; SUBCUTANEOUS at 20:14

## 2021-03-30 RX ADMIN — TAMSULOSIN HYDROCHLORIDE 0.4 MG: 0.4 CAPSULE ORAL at 09:15

## 2021-03-30 RX ADMIN — POTASSIUM CHLORIDE 10 MEQ: 750 CAPSULE, EXTENDED RELEASE ORAL at 09:16

## 2021-03-30 RX ADMIN — GABAPENTIN 300 MG: 300 CAPSULE ORAL at 09:14

## 2021-03-30 RX ADMIN — FINASTERIDE 5 MG: 5 TABLET, FILM COATED ORAL at 09:12

## 2021-03-30 RX ADMIN — INSULIN LISPRO 4 UNITS: 100 INJECTION, SOLUTION INTRAVENOUS; SUBCUTANEOUS at 12:23

## 2021-03-31 VITALS
DIASTOLIC BLOOD PRESSURE: 81 MMHG | OXYGEN SATURATION: 98 % | HEART RATE: 61 BPM | BODY MASS INDEX: 35.9 KG/M2 | RESPIRATION RATE: 18 BRPM | SYSTOLIC BLOOD PRESSURE: 136 MMHG | TEMPERATURE: 97.6 F | HEIGHT: 68 IN | WEIGHT: 236.9 LBS

## 2021-03-31 LAB
GLUCOSE BLDC GLUCOMTR-MCNC: 159 MG/DL (ref 70–130)
GLUCOSE BLDC GLUCOMTR-MCNC: 161 MG/DL (ref 70–130)

## 2021-03-31 PROCEDURE — 99239 HOSP IP/OBS DSCHRG MGMT >30: CPT | Performed by: HOSPITALIST

## 2021-03-31 PROCEDURE — 25010000002 PIPERACILLIN SOD-TAZOBACTAM PER 1 G: Performed by: SURGERY

## 2021-03-31 PROCEDURE — 63710000001 INSULIN LISPRO (HUMAN) PER 5 UNITS: Performed by: SURGERY

## 2021-03-31 PROCEDURE — 25010000002 HEPARIN (PORCINE) PER 1000 UNITS: Performed by: SURGERY

## 2021-03-31 PROCEDURE — 82962 GLUCOSE BLOOD TEST: CPT

## 2021-03-31 RX ORDER — SACCHAROMYCES BOULARDII 250 MG
500 CAPSULE ORAL 2 TIMES DAILY
Qty: 28 CAPSULE | Refills: 0 | Status: SHIPPED | OUTPATIENT
Start: 2021-03-31 | End: 2021-04-07

## 2021-03-31 RX ORDER — HYDROCODONE BITARTRATE AND ACETAMINOPHEN 7.5; 325 MG/1; MG/1
1 TABLET ORAL EVERY 8 HOURS PRN
Qty: 9 TABLET | Refills: 0 | Status: SHIPPED | OUTPATIENT
Start: 2021-03-31 | End: 2021-04-05

## 2021-03-31 RX ORDER — AMOXICILLIN AND CLAVULANATE POTASSIUM 875; 125 MG/1; MG/1
1 TABLET, FILM COATED ORAL 2 TIMES DAILY
Qty: 10 TABLET | Refills: 0 | Status: SHIPPED | OUTPATIENT
Start: 2021-03-31 | End: 2021-04-05

## 2021-03-31 RX ADMIN — ASPIRIN 81 MG: 81 TABLET, COATED ORAL at 08:41

## 2021-03-31 RX ADMIN — INSULIN LISPRO 2 UNITS: 100 INJECTION, SOLUTION INTRAVENOUS; SUBCUTANEOUS at 12:08

## 2021-03-31 RX ADMIN — DOCUSATE SODIUM 100 MG: 100 CAPSULE, LIQUID FILLED ORAL at 08:40

## 2021-03-31 RX ADMIN — FAMOTIDINE 20 MG: 20 TABLET, FILM COATED ORAL at 08:39

## 2021-03-31 RX ADMIN — HEPARIN SODIUM 5000 UNITS: 5000 INJECTION INTRAVENOUS; SUBCUTANEOUS at 06:20

## 2021-03-31 RX ADMIN — Medication 500 MG: at 08:40

## 2021-03-31 RX ADMIN — SERTRALINE HYDROCHLORIDE 50 MG: 50 TABLET ORAL at 08:41

## 2021-03-31 RX ADMIN — FINASTERIDE 5 MG: 5 TABLET, FILM COATED ORAL at 08:39

## 2021-03-31 RX ADMIN — INSULIN LISPRO 2 UNITS: 100 INJECTION, SOLUTION INTRAVENOUS; SUBCUTANEOUS at 08:43

## 2021-03-31 RX ADMIN — METAXALONE 400 MG: 800 TABLET ORAL at 06:20

## 2021-03-31 RX ADMIN — TAMSULOSIN HYDROCHLORIDE 0.4 MG: 0.4 CAPSULE ORAL at 08:41

## 2021-03-31 RX ADMIN — TAZOBACTAM SODIUM AND PIPERACILLIN SODIUM 3.38 G: 375; 3 INJECTION, SOLUTION INTRAVENOUS at 02:25

## 2021-03-31 RX ADMIN — CARVEDILOL 37.5 MG: 12.5 TABLET, FILM COATED ORAL at 08:40

## 2021-03-31 RX ADMIN — LOSARTAN POTASSIUM 150 MG: 50 TABLET, FILM COATED ORAL at 08:40

## 2021-03-31 RX ADMIN — GABAPENTIN 300 MG: 300 CAPSULE ORAL at 08:41

## 2021-03-31 RX ADMIN — AMLODIPINE BESYLATE 5 MG: 5 TABLET ORAL at 08:41

## 2021-03-31 RX ADMIN — ROSUVASTATIN CALCIUM 40 MG: 20 TABLET, COATED ORAL at 08:41

## 2021-03-31 RX ADMIN — POTASSIUM CHLORIDE 10 MEQ: 750 CAPSULE, EXTENDED RELEASE ORAL at 08:41

## 2021-03-31 RX ADMIN — TAZOBACTAM SODIUM AND PIPERACILLIN SODIUM 3.38 G: 375; 3 INJECTION, SOLUTION INTRAVENOUS at 08:40

## 2021-04-01 ENCOUNTER — CARE COORDINATION (OUTPATIENT)
Dept: CARE COORDINATION | Age: 66
End: 2021-04-01

## 2021-04-01 ENCOUNTER — READMISSION MANAGEMENT (OUTPATIENT)
Dept: CALL CENTER | Facility: HOSPITAL | Age: 66
End: 2021-04-01

## 2021-04-01 LAB
CYTO UR: NORMAL
LAB AP CASE REPORT: NORMAL
LAB AP CLINICAL INFORMATION: NORMAL
PATH REPORT.FINAL DX SPEC: NORMAL
PATH REPORT.GROSS SPEC: NORMAL

## 2021-04-01 NOTE — CARE COORDINATION
Jake [facility] Transitions Initial Follow Up Call    Call within 2 business days of discharge: Yes     Patient: [place] Patient : 1955 MRN: <[id number]>    Last Discharge [facility]       Complaint Diagnosis Description Type Department Provider    3/26/21 Chest Pain; Shortness of Breath; Back Pain ST elevation myocardial infarction (STEMI), unspecified artery [facility]) ED (TRANSFER) [address] ED Luciana Roman MD     [address] [place] [address] Drive. 3/31/21  Love       RARS: No data recorded     Spoke with: Rich Galeano states that he is doing ok this morning. He remains sore and feels tired. He has had no issues eating or drinking. We discussed DC meds and HFU appt.      Discharge department/facility: [facility]    Non-face-to-face services provided:  Scheduled appointment with Maximus  Obtained and reviewed discharge summary and/or continuity of care documents    Follow Up  Future Appointments   Date Time Provider Xiomy eMlara   2021 10:15 [facility]   2021 11:15 AM Cat Goddard, [address] [place] [address] [facility] Miller RN

## 2021-04-01 NOTE — OUTREACH NOTE
Prep Survey      Responses   [facility] patient discharged from?  [place]   Is LACE score < 7 ?  No   Emergency Room discharge w/ pulse ox?  No   Eligibility  Readm Mgmt   Discharge diagnosis  cholecystectomy    Does the patient have one of the following disease processes/diagnoses(primary or secondary)?  General Surgery   Does the patient have Home health ordered?  No   Is there a DME ordered?  No   Prep survey completed?  Yes          Bailey Yoder RN

## 2021-04-05 ENCOUNTER — READMISSION MANAGEMENT (OUTPATIENT)
Dept: CALL CENTER | Facility: HOSPITAL | Age: 66
End: 2021-04-05

## 2021-04-05 NOTE — OUTREACH NOTE
General Surgery Week 1 Survey      Responses   [facility] patient discharged from?  [place]   Does the patient have one of the following disease processes/diagnoses(primary or secondary)?  General Surgery   Week 1 attempt successful?  Yes   Call start time  1547   Rescheduled  Rescheduled-pt requested [Wife - answered cooking dinner, not a good time. ]   Call end time  1547   Discharge diagnosis  cholecystectomy    Is patient permission given to speak with other caregiver?  Yes          Sara Thrasher RN

## 2021-04-07 ENCOUNTER — READMISSION MANAGEMENT (OUTPATIENT)
Dept: CALL CENTER | Facility: HOSPITAL | Age: 66
End: 2021-04-07

## 2021-04-07 NOTE — OUTREACH NOTE
General Surgery Week 1 Survey      Responses   [facility] patient discharged from?  [place]   Does the patient have one of the following disease processes/diagnoses(primary or secondary)?  General Surgery   Week 1 attempt successful?  No   Rescheduled  Revoked          Bashir Sunshine RN

## 2021-04-09 ENCOUNTER — OFFICE VISIT (OUTPATIENT)
Dept: PRIMARY CARE CLINIC | Age: 66
End: 2021-04-09
Payer: MEDICARE

## 2021-04-09 VITALS
TEMPERATURE: 97.6 F | OXYGEN SATURATION: 98 % | DIASTOLIC BLOOD PRESSURE: 60 MMHG | HEART RATE: 66 BPM | SYSTOLIC BLOOD PRESSURE: 100 MMHG | WEIGHT: 227 LBS | RESPIRATION RATE: 16 BRPM | HEIGHT: 68 IN | BODY MASS INDEX: 34.4 KG/M2

## 2021-04-09 DIAGNOSIS — I25.10 CORONARY ARTERY DISEASE WITH RECENT ACUTE CORONARY SYNDROME AND HISTORY OF CORONARY REVASCULARIZATION (HCC): ICD-10-CM

## 2021-04-09 DIAGNOSIS — Z90.49 HISTORY OF CHOLECYSTECTOMY: Primary | ICD-10-CM

## 2021-04-09 DIAGNOSIS — Z98.61 CORONARY ARTERY DISEASE WITH RECENT ACUTE CORONARY SYNDROME AND HISTORY OF CORONARY REVASCULARIZATION (HCC): ICD-10-CM

## 2021-04-09 DIAGNOSIS — K57.92 DIVERTICULITIS: ICD-10-CM

## 2021-04-09 DIAGNOSIS — I24.9 CORONARY ARTERY DISEASE WITH RECENT ACUTE CORONARY SYNDROME AND HISTORY OF CORONARY REVASCULARIZATION (HCC): ICD-10-CM

## 2021-04-09 PROCEDURE — 99214 OFFICE O/P EST MOD 30 MIN: CPT | Performed by: NURSE PRACTITIONER

## 2021-04-09 PROCEDURE — 1111F DSCHRG MED/CURRENT MED MERGE: CPT | Performed by: NURSE PRACTITIONER

## 2021-04-09 ASSESSMENT — ENCOUNTER SYMPTOMS
NAUSEA: 0
SORE THROAT: 0
DIARRHEA: 0
VOMITING: 0
EYE REDNESS: 0
EYE ITCHING: 0
CONSTIPATION: 0
EYE DISCHARGE: 0
ABDOMINAL PAIN: 0
SHORTNESS OF BREATH: 0
RHINORRHEA: 0
COUGH: 0

## 2021-04-09 NOTE — PROGRESS NOTES
Post-Discharge Transitional Care Management Services or Hospital Follow Up      Alisia Mazariegos   YOB: 1955    Date of Office Visit:  4/9/2021  Date of Hospital Admission: 03/26/21  Date of Hospital Discharge: 03/31/21    Care management risk score Rising risk (score 2-5) and Complex Care (Scores >=6): 5     Non face to face  following discharge, date last encounter closed (first attempt may have been earlier): 4/1/2021 11:20 AM     Call initiated 2 business days of discharge: Yes    Patient Active Problem List   Diagnosis    Hypertension    [facility]    Type 2 diabetes mellitus with complication, without long-term current use of insulin ([facility]    Type 2 diabetes mellitus with diabetic polyneuropathy, without long-term current use of insulin ([facility]    Acute non-recurrent maxillary sinusitis       Allergies   Allergen Reactions    Humira [Adalimumab]      HA, intractable ocular migraine       Medications listed as ordered at the time of discharge from hospital  Reviewed     Medications marked \"taking\" at this time  Outpatient Medications Marked as Taking for the 4/9/21 encounter (Office Visit) with WILDER Chandra   Medication Sig Dispense Refill    Tofacitinib Citrate (XELJANZ PO) Take by mouth      metaxalone (SKELAXIN) 800 MG tablet Take 800 mg by mouth 3 times daily as needed for Pain      tamsulosin (FLOMAX) 0.4 MG capsule TAKE 1 CAPSULE BY MOUTH NIGHTLY FOR PROSTATE 90 capsule 3    methocarbamol (ROBAXIN) 500 MG tablet Take 500 mg by mouth 3 times daily      rosuvastatin (CRESTOR) 40 MG tablet 20mg QOD, alternating with 40mg QOD 68 tablet 3    sertraline (ZOLOFT) 50 MG tablet Take 1 tablet by mouth daily 90 tablet 3    gabapentin (NEURONTIN) 300 MG capsule TAKE 1 CAPSULE BY MOUTH 3 TIMES DAILY 270 capsule 1    Alcohol Swabs (B-D SINGLE USE SWABS REGULAR) PADS TEST TWICE DAILY AS NEEDED 100 each 5    JARDIANCE 25 MG tablet TAKE 1 TABLET BY MOUTH DAILY 90 tablet 3    potassium chloride (KLOR-CON M) 20 MEQ extended release tablet TAKE 1 TABLET EVERY DAY 90 tablet 3    TRUE METRIX BLOOD GLUCOSE TEST strip TEST EVERY DAY AS NEEDED 100 strip 0    traMADol (ULTRAM) 50 MG tablet Take 50 mg by mouth every 6 hours as needed for Pain.  Cyanocobalamin 2500 MCG SUBL Place 2,500 mcg under the tongue daily 90 tablet 3    Blood Glucose Monitoring Suppl (TRUE METRIX AIR GLUCOSE METER) w/Device KIT 1 each by Does not apply route daily E11. 9-DX 1 kit 0    aspirin 81 MG tablet Take 81 mg by mouth daily      carvedilol (COREG) 25 MG tablet 1.5 tabs twice daily      ONETOUCH DELICA LANCETS 94L MISC USE TO TEST BLOOD SUGAR 4 TIMES DAILY 150 each 3    amLODIPine (NORVASC) 5 MG tablet       losartan (COZAAR) 25 MG tablet Take 25 mg by mouth daily      Cholecalciferol (VITAMIN D3) 2000 UNIT TABS Take  by mouth. Medications patient taking as of now reconciled against medications ordered at time of hospital discharge: Yes    Chief Complaint   Patient presents with    Follow-Up from Hospital       HPI    Inpatient course: Discharge summary reviewed- see chart. Interval history/Current status: He was transferred for an MI. He had a heart cath done. They said 2 of his bypasses were blocked. They removed his gall bladder while he was there. Vitals:    04/09/21 1028 04/09/21 1113   BP: (!) 96/56 100/60   Site: Left Upper Arm    Position: Sitting    Pulse: 66    Resp: 16    Temp: 97.6 °F (36.4 °C)    TempSrc: Temporal    SpO2: 98%    Weight: 227 lb (103 kg)    Height: 5' 8\" (1.727 m)      Body mass index is 34.52 kg/m². Wt Readings from Last 3 Encounters:   04/09/21 227 lb (103 kg)   03/26/21 230 lb (104.3 kg)   03/11/21 231 lb (104.8 kg)     BP Readings from Last 3 Encounters:   04/09/21 100/60   03/26/21 137/72   03/11/21 110/70       Review of Systems   Constitutional: Negative for chills, fatigue and fever.    HENT: Negative for congestion, ear pain, rhinorrhea and sore throat. Eyes: Negative for discharge, redness and itching. Respiratory: Negative for cough and shortness of breath. Cardiovascular: Negative for chest pain, palpitations and leg swelling. Gastrointestinal: Negative for abdominal pain, constipation, diarrhea, nausea and vomiting. Endocrine: Negative for cold intolerance and heat intolerance. Genitourinary: Negative for dysuria. Musculoskeletal: Negative for arthralgias and joint swelling. Skin: Negative for rash and wound. Neurological: Negative for weakness and headaches. Hematological: Negative for adenopathy. Psychiatric/Behavioral: Negative for dysphoric mood and sleep disturbance. The patient is not nervous/anxious. Reviewed. See MA note. Physical Exam  Constitutional:       Appearance: He is well-developed. HENT:      Head: Normocephalic and atraumatic. Right Ear: External ear normal.      Left Ear: External ear normal.   Eyes:      Conjunctiva/sclera: Conjunctivae normal.      Pupils: Pupils are equal, round, and reactive to light. Neck:      Musculoskeletal: Neck supple. Thyroid: No thyromegaly. Vascular: No JVD. Cardiovascular:      Rate and Rhythm: Normal rate and regular rhythm. Heart sounds: Normal heart sounds. No murmur. No friction rub. No gallop. Pulmonary:      Effort: Pulmonary effort is normal. No respiratory distress. Breath sounds: Normal breath sounds. Abdominal:      General: Bowel sounds are normal. There is no distension. Palpations: Abdomen is soft. Tenderness: There is no abdominal tenderness. Musculoskeletal: Normal range of motion. Lymphadenopathy:      Cervical: No cervical adenopathy. Skin:     General: Skin is warm and dry. Neurological:      Mental Status: He is alert and oriented to person, place, and time. Cranial Nerves: No cranial nerve deficit. Assessment/Plan:   Diagnosis Orders   1.  History of take your pills out of their original containers. Mix crushed pills with an undesirable substance, such as cat litter or used coffee grounds. Put the mixture into a disposable container with a lid, such as an empty margarine tub, or into a sealable bag. Cover up or remove any of your personal information on the empty containers by covering it with black permanent marker or duct tape. Place the sealed container with the mixture, and the empty drug containers, in the trash. · If you use a medication that is in the form of a patch, dispose of used patches by folding them in half so that the sticky sides meet, and then flushing them down a toilet. They should not be placed in the household trash where children or pets can find them. · If you have any questions, ask your provider or pharmacist for more information. · Be sure to keep all appointments for provider visits or tests. We are committed to providing you with the best care possible. In order to help us achieve these goals please remember to bring all medications, herbal products, and over the counter supplements with you to each visit. If your provider has ordered testing for you, please be sure to follow up with our office if you have not received results within 7 days after the testing took place. *If you receive a survey after visiting one of our offices, please take time to share your experience concerning your physician office visit. These surveys are confidential and no health information about you is shared. We are eager to improve for you and we are counting on your feedback to help make that happen. Thank you for requesting your Continuity of Care Document (CCD) electronically. Please follow the instructions below to securely access your online medical record. [employer] allows you to send messages to your doctor, view your test results, renew your prescriptions, schedule appointments, and more. How Do I Access my CCD?   In your

## 2021-04-09 NOTE — PATIENT INSTRUCTIONS
· Keep a list of your medicines with you. List all of the prescription medicines, nonprescription medicines, supplements, natural remedies, and vitamins that you take. Tell your healthcare providers who treat you about all of the products you are taking. Your provider can provide you with a form to keep track of them. Just ask. · Follow the directions that come with your medicine, including information about food or alcohol. Make sure you know how and when to take your medicine. Do not take more or less than you are supposed to take. · Keep all medicines out of the reach of children. · Store medicines according to the directions on the label. · Monitor yourself. Learn to know how your body reacts to your new medicine and keep track of how it makes you feel before attempting (If your provider has allowed you to do so) to drive or go to work. · Seek emergency medical attention if you think you have used too much of this medicine. An overdose of any prescription medicine can be fatal. Overdose symptoms may include extreme drowsiness, muscle weakness, confusion, cold and clammy skin, pinpoint pupils, shallow breathing, slow heart rate, fainting, or coma. · Don't share prescription medicines with others, even when they seem to have the same symptoms. What may be good for you may be harmful to others. · If you are no longer taking a prescribed medication and you have pills left please take your pills out of their original containers. Mix crushed pills with an undesirable substance, such as cat litter or used coffee grounds. Put the mixture into a disposable container with a lid, such as an empty margarine tub, or into a sealable bag. Cover up or remove any of your personal information on the empty containers by covering it with black permanent marker or duct tape. Place the sealed container with the mixture, and the empty drug containers, in the trash.    · If you use a medication that is in the form of a patch, dispose of used patches by folding them in half so that the sticky sides meet, and then flushing them down a toilet. They should not be placed in the household trash where children or pets can find them. · If you have any questions, ask your provider or pharmacist for more information. · Be sure to keep all appointments for provider visits or tests. We are committed to providing you with the best care possible. In order to help us achieve these goals please remember to bring all medications, herbal products, and over the counter supplements with you to each visit. If your provider has ordered testing for you, please be sure to follow up with our office if you have not received results within 7 days after the testing took place. *If you receive a survey after visiting one of our offices, please take time to share your experience concerning your physician office visit. These surveys are confidential and no health information about you is shared. We are eager to improve for you and we are counting on your feedback to help make that happen. Thank you for requesting your Continuity of Care Document (CCD) electronically. Please follow the instructions below to securely access your online medical record. [employer] allows you to send messages to your doctor, view your test results, renew your prescriptions, schedule appointments, and more. How Do I Access my CCD? In your Internet browser, go to [email or web address] Enter your user name and password   Click on My medical Record  --> Download Summary --> Enter Password --> Download --> Save or Open Document    Additional Information  If you have questions, please contact your physician practice where you receive care. Remember, [employer] is NOT to be used for urgent needs. For medical emergencies, dial 911. Stop Amlodipine. Monitor BP.  If it continues to run low <110, send me a message and I will cut the Carvedilol back. If it goes back up, start 1/2 of the Amlodipine.

## 2021-04-20 ENCOUNTER — OFFICE VISIT (OUTPATIENT)
Dept: CARDIOLOGY | Facility: CLINIC | Age: 66
End: 2021-04-20

## 2021-04-20 VITALS
SYSTOLIC BLOOD PRESSURE: 140 MMHG | WEIGHT: 232 LBS | HEART RATE: 81 BPM | OXYGEN SATURATION: 97 % | HEIGHT: 68 IN | BODY MASS INDEX: 35.16 KG/M2 | DIASTOLIC BLOOD PRESSURE: 86 MMHG

## 2021-04-20 DIAGNOSIS — E78.2 MIXED HYPERLIPIDEMIA: ICD-10-CM

## 2021-04-20 DIAGNOSIS — I25.10 CAD IN NATIVE ARTERY: Primary | ICD-10-CM

## 2021-04-20 DIAGNOSIS — I10 BENIGN ESSENTIAL HTN: ICD-10-CM

## 2021-04-20 PROCEDURE — 99214 OFFICE O/P EST MOD 30 MIN: CPT | Performed by: INTERNAL MEDICINE

## 2021-04-20 RX ORDER — SACCHAROMYCES BOULARDII 250 MG
250 CAPSULE ORAL DAILY
COMMUNITY

## 2021-04-20 NOTE — PROGRESS NOTES
[facility] at [facility]  Office visit  Jesse Brink  1955  [phone]    VISIT DATE:  4/20/2021      PCP: Terri Barreto APRN  [address] [facility] [place] [address]    CC:  Chief Complaint   Patient presents with   • Coronary Artery Disease       PROBLEM LIST:  1. Coronary artery disease  2. Transient ischemic attack with normal echocardiogram, carotid duplex, and CT scan of the brain by verbal report.  3. Benign hypertension.  4. History of tobacco, quitting 28 years ago.  5. Hyperglycemia, diet controlled.  6. Rheumatoid arthritis.  7. Obesity.  8. Family history of heart disease.  9. Surgical history:  a. Pilonidal cyst, 1977.  b. Benign fibroid removed, 1980.  c. Anal fistula repair, 1999 and 2008.    Previous cardiac studies and procedures  January 2019  Cardiac catheterization  · Multivessel obstructive coronary disease: Proximal RCA .  Mid LAD .  95% proximal first diagonal, 95% first obtuse marginal branch, 70-80% proximal second obtuse marginal branch.  · Mildly elevated LVEDP  · No aortic stenosis.  Echo   · Left ventricular wall thickness is consistent with mild concentric hypertrophy.  · Estimated EF appears to be in the range of 36 - 40%  · Left ventricular diastolic dysfunction (grade I a) consistent with impaired relaxation.  · The following left ventricular wall segments are hypokinetic: mid anterior, apical anterior, apical lateral, apical inferior, apical septal and apex hypokinetic.  · Regional contracility augments post PVC.    Carotid duplex  · Right internal carotid artery stenosis of 0-49%.  · Left internal carotid artery stenosis of 0-49%.    1. Coronary artery bypass graft x 5  -LIMA to LAD.    -Greater saphenous vein to RCA  -Greater saphenous vein to OM1  -Greater saphenous vein to OM2  -Greater saphenous vein to D1  2. Left atrial appendage ligation (35 mm Atricure clip placement)    May 2019 echo  · Left ventricular systolic function is normal.  · Calculated  EF = 55%  · Left atrial cavity size is mildly dilated.    March 2021  Cardiac catheterization  · 3/5 patent grafts with evidence of occlusion of the SVG to one of the obtuse marginal branches as well as the diagonal branch.  These both appear to be old findings.  · Normal LV systolic function wall motion  · No EKG changes are seen  · Elevated LVEDP at 22 mmHg  Transthoracic echocardiogram  · Estimated left ventricular EF = 60% Left ventricular systolic function is normal.  · Left ventricular diastolic function is consistent with (grade II w/high LAP) pseudonormalization.  · Trace mitral valve regurgitation is present.  · Trace tricuspid valve regurgitation is present.  · There is no evidence of pericardial effusion    ASSESSMENT:   Diagnosis Plan   1. CAD in native artery     2. Benign essential HTN     3. Mixed hyperlipidemia         PLAN:  Coronary artery disease: Status post surgical revascularization.  Normal EF, continue current medical therapy.    Hypertension: Goal less than 130/90.  Currently well controlled, continue current medical therapy.    Palpitations: Consistent with symptomatic premature ventricular contractions.   Continue beta-blockade.  Currently well controlled.    Hyperlipidemia: Continue high intensity statin therapy, goal LDL less than 70.  Continue rosuvastatin 20 mg p.o. daily.  Currently well controlled.    History of stroke: Continue aggressive risk factor modification.  Continue current medical therapy.    Subjective  Presented in March 2021 with acute epigastric/precordial chest discomfort with some inferior dynamic EKG changes at [facility].  [facility] underwent urgent cardiac catheterization which was unremarkable.  Eventually diagnosed with cholecystitis and underwent successful laparoscopic cholecystectomy.  No further episodes of chest discomfort and steadily improving.  Blood pressures running less than 130/80 mmHg,He is compliant with medical therapy.  "Reviewed most recent labwork.  Reports some chest wall sensitivity which is been chronic and stable.    PHYSICAL EXAMINATION:  Vitals:    04/20/21 1123   BP: 140/86   BP Location: Left arm   Patient Position: Sitting   Pulse: 81   SpO2: 97%   Weight: 105 kg (232 lb)   Height: 172.7 cm (68\")     General Appearance:    Alert, cooperative, no distress, appears stated age   Head:    Normocephalic, without obvious abnormality, atraumatic   Eyes:    conjunctiva/corneas clear   Nose:   Nares normal, septum midline, mucosa normal, no drainage   Throat:   Lips, teeth and gums normal   Neck:   Supple, symmetrical, trachea midline, no carotid    bruit or JVD   Lungs:     Clear to auscultation bilaterally, respirations unlabored   Chest Wall:    No tenderness or deformity    Heart:    Regular rate and rhythm, S1 and S2 normal, no murmur, rub   or gallop, normal carotid impulse bilaterally without bruit.   Abdomen:     Soft, non-tender   Extremities:   Extremities normal, atraumatic, no cyanosis or edema   Pulses:   2+ and symmetric all extremities   Skin:   Skin color, texture, turgor normal, no rashes or lesions       Diagnostic Data:  Procedures  Lab Results   Component Value Date    TRIG 96 03/27/2021    HDL 51 03/27/2021     Lab Results   Component Value Date    GLUCOSE 128 (H) 03/29/2021    BUN 16 03/29/2021    CREATININE 1.23 03/29/2021     03/29/2021    K 4.1 03/29/2021     03/29/2021    CO2 20.0 (L) 03/29/2021     Lab Results   Component Value Date    HGBA1C 6.60 (H) 01/30/2019     Lab Results   Component Value Date    WBC 9.82 03/29/2021    HGB 14.8 03/29/2021    HCT 45.9 03/29/2021     03/29/2021       Allergies  Allergies   Allergen Reactions   • Humira [Adalimumab] Other (See Comments)     HA, intractable ocular migraine       Current Medications    Current Outpatient Medications:   •  amLODIPine (NORVASC) 5 MG tablet, TAKE 1 TABLET EVERY NIGHT AT BEDTIME, Disp: 90 tablet, Rfl: 1  •  aspirin 81 MG " EC tablet, Take 81 mg by mouth Daily., Disp: , Rfl:   •  baclofen (LIORESAL) 10 MG tablet, Take 10 mg by mouth As Needed for Muscle Spasms., Disp: , Rfl:   •  carvedilol (COREG) 25 MG tablet, Take 1.5 tablets by mouth 2 (Two) Times a Day With Meals., Disp: 270 tablet, Rfl: 1  •  Cholecalciferol (VITAMIN D3) 2000 UNITS capsule, Take 2,000 Units by mouth Daily., Disp: , Rfl:   •  Empagliflozin (JARDIANCE) 25 MG tablet, Take 25 mg by mouth Daily., Disp: , Rfl:   •  gabapentin (NEURONTIN) 300 MG capsule, Take 300 mg by mouth 2 (Two) Times a Day., Disp: , Rfl:   •  losartan (COZAAR) 100 MG tablet, Take 1-1/2 tablets p.o. daily., Disp: 135 tablet, Rfl: 1  •  metaxalone (SKELAXIN) 800 MG tablet, 800 mg As Needed., Disp: , Rfl:   •  potassium chloride (K-DUR) 10 MEQ CR tablet, Take 10 mEq by mouth Daily., Disp: , Rfl:   •  rosuvastatin (CRESTOR) 40 MG tablet, TAKE 1 TABLET EVERY DAY, Disp: 90 tablet, Rfl: 1  •  saccharomyces boulardii (FLORASTOR) 250 MG capsule, Take 250 mg by mouth Daily., Disp: , Rfl:   •  sertraline (ZOLOFT) 50 MG tablet, Take 50 mg by mouth Daily., Disp: , Rfl:   •  tamsulosin (FLOMAX) 0.4 MG capsule 24 hr capsule, Take 1 capsule by mouth Daily., Disp: , Rfl:   •  traMADol (ULTRAM) 50 MG tablet, Take 50 mg by mouth As Needed., Disp: , Rfl:   •  Xeljanz XR 11 MG tablet sustained-release 24 hour, 11 mg Daily., Disp: , Rfl:   No current facility-administered medications for this visit.    Facility-Administered Medications Ordered in Other Visits:   •  Chlorhexidine Gluconate Cloth 2 % pads 1 application, 1 application, Topical, Q12H PRN, Marie Barnes PA-C ROS  Review of Systems   Cardiovascular: Positive for leg swelling. Negative for chest pain, claudication, dyspnea on exertion, irregular heartbeat and palpitations.   Respiratory: Negative for cough and shortness of breath.          SOCIAL HX  Social History     Socioeconomic History   • Marital status:      Spouse name: Not on  file   • Number of children: 2   • Years of education: Not on file   • Highest education level: Not on file   Tobacco Use   • Smoking status: Former Smoker     Packs/day: 1.00     Types: Cigarettes     Quit date: 3/5/1984     Years since quittin.1   • Smokeless tobacco: Never Used   Vaping Use   • Vaping Use: Never used   Substance and Sexual Activity   • Alcohol use: Yes     Alcohol/week: 1.0 standard drinks     Types: 1 Cans of beer per week     Comment: occas   • Drug use: No   • Sexual activity: Defer       FAMILY HX  Family History   Problem Relation Age of Onset   • Heart disease Other    • Diabetes Other    • Cancer Other    • Hypertension Other    • Heart disease Mother    • Diabetes Father    • Hypertension Father    • Cancer Father              Dillon Neville III, MD, FACC

## 2021-04-28 RX ORDER — EMPAGLIFLOZIN 25 MG/1
TABLET, FILM COATED ORAL
Qty: 90 TABLET | Refills: 3 | Status: SHIPPED | OUTPATIENT
Start: 2021-04-28 | End: 2021-05-12 | Stop reason: SDUPTHER

## 2021-05-06 NOTE — PROGRESS NOTES
Rhythm: Normal rate and regular rhythm. Heart sounds: Normal heart sounds. Pulmonary:      Effort: Pulmonary effort is normal.      Breath sounds: Normal breath sounds. Abdominal:      General: Bowel sounds are normal. There is no distension. Palpations: Abdomen is soft. Tenderness: There is no abdominal tenderness. Musculoskeletal:      Cervical back: Neck supple. Lymphadenopathy:      Cervical: No cervical adenopathy. Skin:     General: Skin is warm and dry. Neurological:      Mental Status: He is alert and oriented to person, place, and time. No results found for requested labs within last 30 days. Hemoglobin A1C (%)   Date Value   03/11/2021 6.4     Microscopic Examination (no units)   Date Value   12/15/2016 YES     LDL Calculated (mg/dL)   Date Value   12/10/2020 49       Lab Results   Component Value Date    WBC 7.9 03/26/2021    NEUTROABS 3.6 03/26/2021    HGB 16.3 03/26/2021    HCT 47.1 03/26/2021    MCV 90.8 03/26/2021     03/26/2021     Lab Results   Component Value Date    TSH 0.972 07/19/2014       Prior to Visit Medications    Medication Sig Taking? Authorizing Provider   amLODIPine (NORVASC) 2.5 MG tablet Take 1 tablet by mouth daily Yes WILDER Drake   traMADol (ULTRAM) 50 MG tablet Take 1 tablet by mouth every 8 hours as needed for Pain for up to 30 days. Intended supply: 3 days.  Take lowest dose possible to manage pain Yes WILDER Drake   Tofacitinib Citrate (XELJANZ PO) Take by mouth Yes Historical Provider, MD   metaxalone (SKELAXIN) 800 MG tablet Take 800 mg by mouth 3 times daily as needed for Pain Yes Historical Provider, MD   tamsulosin (FLOMAX) 0.4 MG capsule TAKE 1 CAPSULE BY MOUTH NIGHTLY FOR PROSTATE Yes WILDER Drake   methocarbamol (ROBAXIN) 500 MG tablet Take 500 mg by mouth 3 times daily Yes Historical Provider, MD   rosuvastatin (CRESTOR) 40 MG tablet 20mg QOD, alternating with 40mg QOD Yes WILDER Drake sertraline (ZOLOFT) 50 MG tablet Take 1 tablet by mouth daily Yes WILDER Leroy   gabapentin (NEURONTIN) 300 MG capsule TAKE 1 CAPSULE BY MOUTH 3 TIMES DAILY Yes WILDER Leroy   Alcohol Swabs (B-D SINGLE USE SWABS REGULAR) PADS TEST TWICE DAILY AS NEEDED Yes WILDER Leroy   potassium chloride (KLOR-CON M) 20 MEQ extended release tablet TAKE 1 TABLET EVERY DAY Yes WILDER Leroy   TRUE METRIX BLOOD GLUCOSE TEST strip TEST EVERY DAY AS NEEDED Yes WILDER Leroy   traMADol (ULTRAM) 50 MG tablet Take 50 mg by mouth every 6 hours as needed for Pain. Yes Historical Provider, MD   Cyanocobalamin 2500 MCG SUBL Place 2,500 mcg under the tongue daily Yes WILDER Leroy   Blood Glucose Monitoring Suppl (TRUE METRIX AIR GLUCOSE METER) w/Device KIT 1 each by Does not apply route daily E11. 9-DX Yes WILDER Leroy   aspirin 81 MG tablet Take 81 mg by mouth daily Yes Historical Provider, MD   carvedilol (COREG) 25 MG tablet 1.5 tabs twice daily Yes Historical Provider, MD   [facility] LANCETS 38G MISC USE TO TEST BLOOD SUGAR 4 TIMES DAILY Yes WILDER Leroy   losartan (COZAAR) 25 MG tablet Take 25 mg by mouth daily Yes Historical Provider, MD   Cholecalciferol (VITAMIN D3) 2000 UNIT TABS Take  by mouth. Yes Historical Provider, MD   empagliflozin (JARDIANCE) 25 MG tablet TAKE 1 TABLET BY MOUTH DAILY  WILDER Leroy       ASSESSMENT:  1. Essential hypertension    2. Rheumatoid arthritis ([facility] [address]    3. Type 2 diabetes mellitus with diabetic polyneuropathy, without long-term current use of insulin ([facility])        PLAN:  Orders Placed This Encounter   Medications    amLODIPine (NORVASC) 2.5 MG tablet     Sig: Take 1 tablet by mouth daily     Dispense:  90 tablet     Refill:  3    traMADol (ULTRAM) 50 MG tablet     Sig: Take 1 tablet by mouth every 8 hours as needed for Pain for up to 30 days. Intended supply: 3 days.  Take lowest dose possible to manage pain     Dispense:  90 tablet     Refill:  5     Reduce doses taken as pain becomes manageable       Patient Instructions   The medication list included in this document is our record of what you are currently taking, including any changes that were made at today's visit.  If you find any differences when compared to your medications at home, or have any questions that were not answered at your visit, please contact the office. · Keep a list of your medicines with you. List all of the prescription medicines, nonprescription medicines, supplements, natural remedies, and vitamins that you take. Tell your healthcare providers who treat you about all of the products you are taking. Your provider can provide you with a form to keep track of them. Just ask. · Follow the directions that come with your medicine, including information about food or alcohol. Make sure you know how and when to take your medicine. Do not take more or less than you are supposed to take. · Keep all medicines out of the reach of children. · Store medicines according to the directions on the label. · Monitor yourself. Learn to know how your body reacts to your new medicine and keep track of how it makes you feel before attempting (If your provider has allowed you to do so) to drive or go to work. · Seek emergency medical attention if you think you have used too much of this medicine. An overdose of any prescription medicine can be fatal. Overdose symptoms may include extreme drowsiness, muscle weakness, confusion, cold and clammy skin, pinpoint pupils, shallow breathing, slow heart rate, fainting, or coma. · Don't share prescription medicines with others, even when they seem to have the same symptoms. What may be good for you may be harmful to others. · If you are no longer taking a prescribed medication and you have pills left please take your pills out of their original containers.  Mix crushed pills with an undesirable substance, such as cat litter or used coffee grounds. Put the mixture into a disposable container with a lid, such as an empty margarine tub, or into a sealable bag. Cover up or remove any of your personal information on the empty containers by covering it with black permanent marker or duct tape. Place the sealed container with the mixture, and the empty drug containers, in the trash. · If you use a medication that is in the form of a patch, dispose of used patches by folding them in half so that the sticky sides meet, and then flushing them down a toilet. They should not be placed in the household trash where children or pets can find them. · If you have any questions, ask your provider or pharmacist for more information. · Be sure to keep all appointments for provider visits or tests. We are committed to providing you with the best care possible. In order to help us achieve these goals please remember to bring all medications, herbal products, and over the counter supplements with you to each visit. If your provider has ordered testing for you, please be sure to follow up with our office if you have not received results within 7 days after the testing took place. *If you receive a survey after visiting one of our offices, please take time to share your experience concerning your physician office visit. These surveys are confidential and no health information about you is shared. We are eager to improve for you and we are counting on your feedback to help make that happen. Return in about 2 months (around 7/7/2021). Health Maintenance Due This Visit   Colonoscopy Yes- Pt will schedule   Mammogram No   Annual Wellness Visit No   Microalbumin Yes   HgbA1C No   Diabetic Eye Exam No    House Bill One Due This Visit   JENNIFER No   UDS No   Contract No        Chief Complaint   Patient presents with    Hypertension    Diabetes     Pt here today for 1 mo f/u on htn and DM.  Sugar running 130    Have you seen any other physician or provider since your last visit no    Have you had any other diagnostic tests since your last visit? no    Have you changed or stopped any medications since your last visit? no

## 2021-05-07 ENCOUNTER — OFFICE VISIT (OUTPATIENT)
Dept: PRIMARY CARE CLINIC | Age: 66
End: 2021-05-07
Payer: MEDICARE

## 2021-05-07 VITALS
BODY MASS INDEX: 35.1 KG/M2 | DIASTOLIC BLOOD PRESSURE: 80 MMHG | WEIGHT: 231.6 LBS | HEIGHT: 68 IN | TEMPERATURE: 97.8 F | SYSTOLIC BLOOD PRESSURE: 132 MMHG | HEART RATE: 65 BPM | OXYGEN SATURATION: 98 %

## 2021-05-07 DIAGNOSIS — M06.9 RHEUMATOID ARTHRITIS, INVOLVING UNSPECIFIED SITE, UNSPECIFIED WHETHER RHEUMATOID FACTOR PRESENT (HCC): ICD-10-CM

## 2021-05-07 DIAGNOSIS — E11.42 TYPE 2 DIABETES MELLITUS WITH DIABETIC POLYNEUROPATHY, WITHOUT LONG-TERM CURRENT USE OF INSULIN (HCC): ICD-10-CM

## 2021-05-07 DIAGNOSIS — I10 ESSENTIAL HYPERTENSION: Primary | ICD-10-CM

## 2021-05-07 PROCEDURE — 2022F DILAT RTA XM EVC RTNOPTHY: CPT | Performed by: NURSE PRACTITIONER

## 2021-05-07 PROCEDURE — 3044F HG A1C LEVEL LT 7.0%: CPT | Performed by: NURSE PRACTITIONER

## 2021-05-07 PROCEDURE — 1036F TOBACCO NON-USER: CPT | Performed by: NURSE PRACTITIONER

## 2021-05-07 PROCEDURE — 99213 OFFICE O/P EST LOW 20 MIN: CPT | Performed by: NURSE PRACTITIONER

## 2021-05-07 PROCEDURE — G8427 DOCREV CUR MEDS BY ELIG CLIN: HCPCS | Performed by: NURSE PRACTITIONER

## 2021-05-07 PROCEDURE — G8417 CALC BMI ABV UP PARAM F/U: HCPCS | Performed by: NURSE PRACTITIONER

## 2021-05-07 PROCEDURE — 3017F COLORECTAL CA SCREEN DOC REV: CPT | Performed by: NURSE PRACTITIONER

## 2021-05-07 RX ORDER — TRAMADOL HYDROCHLORIDE 50 MG/1
50 TABLET ORAL EVERY 8 HOURS PRN
Qty: 90 TABLET | Refills: 5 | Status: SHIPPED | OUTPATIENT
Start: 2021-05-07 | End: 2021-06-06

## 2021-05-07 RX ORDER — AMLODIPINE BESYLATE 2.5 MG/1
2.5 TABLET ORAL DAILY
Qty: 90 TABLET | Refills: 3 | Status: SHIPPED | OUTPATIENT
Start: 2021-05-07

## 2021-05-11 ENCOUNTER — TELEPHONE (OUTPATIENT)
Dept: PRIMARY CARE CLINIC | Age: 66
End: 2021-05-11

## 2021-05-11 NOTE — TELEPHONE ENCOUNTER
Patient came to the office today for more samples of Jardiance 25 mg. This was prescribed by tarik Muniz. Patient was given 4 box/boxes. Qty. 28, Lot # [id number], [id number], Exp. Date 6/23,6/23.

## 2021-05-11 NOTE — TELEPHONE ENCOUNTER
Called pt regarding his Jardiance. Left general message for him to call me back.     Nita Lozano, AgustinD

## 2021-05-12 RX ORDER — EMPAGLIFLOZIN 25 MG/1
TABLET, FILM COATED ORAL
Qty: 28 TABLET | Refills: 0 | COMMUNITY
Start: 2021-05-12

## 2021-05-13 NOTE — TELEPHONE ENCOUNTER
Submitted pt's application for Jardiance today. I doubt he will get approved as he has ~$30,000 left in his 401K and has a second home worth ~$35,000, but will still submit application and see if  approves him or not. Pt stated that his Osiel Balderrama is around $80 at pharmacy and he cannot afford this amount.     Khoa Avendaño, PharmD

## 2021-05-18 NOTE — TELEPHONE ENCOUNTER
Pt was approved for  program for Jardiance. Notified pt and instructed him to call me at least two weeks before he is out so we can request refills. Pt verbalized understanding.     Bhupendra Baeza, AgustinD

## 2021-05-31 ASSESSMENT — ENCOUNTER SYMPTOMS
SORE THROAT: 0
ABDOMINAL PAIN: 0
SHORTNESS OF BREATH: 0
COUGH: 0
NAUSEA: 0
EYE PAIN: 0
VOMITING: 0

## 2021-06-14 ENCOUNTER — HOSPITAL ENCOUNTER (OUTPATIENT)
Facility: HOSPITAL | Age: 66
Discharge: HOME | End: 2021-06-14
Payer: MEDICARE

## 2021-06-14 DIAGNOSIS — E11.42 TYPE 2 DIABETES MELLITUS WITH DIABETIC POLYNEUROPATHY, WITHOUT LONG-TERM CURRENT USE OF INSULIN (HCC): ICD-10-CM

## 2021-06-14 PROCEDURE — 36415 COLL VENOUS BLD VENIPUNCTURE: CPT

## 2021-06-15 ENCOUNTER — TELEPHONE (OUTPATIENT)
Dept: PRIMARY CARE CLINIC | Age: 66
End: 2021-06-15

## 2021-06-15 NOTE — TELEPHONE ENCOUNTER
----- Message from WILDER Law sent at 6/15/2021  9:52 AM EDT -----  Please inform labs are great and be sure they were faxed to Dr. Anel Fitzgerald- rheumatology.

## 2021-07-29 NOTE — PROGRESS NOTES
Health Maintenance Due This Visit   Colonoscopy Yes   Mammogram No   Annual Wellness Visit No   Microalbumin No   HgbA1C No   Diabetic Eye Exam No    House Bill One Due This Visit   JENNIFER No   UDS No   Contract No    No chief complaint on file. Have you seen any other physician or provider since your last visit {YES/NO DEFAULT:21114}    Have you had any other diagnostic tests since your last visit? {YES/NO DEFAULT:21114}    Have you changed or stopped any medications since your last visit?  {YES/NO DEFAULT:21114}

## 2021-08-05 ENCOUNTER — OFFICE VISIT (OUTPATIENT)
Dept: PRIMARY CARE CLINIC | Age: 66
End: 2021-08-05
Payer: MEDICARE

## 2021-08-05 VITALS
TEMPERATURE: 97.3 F | DIASTOLIC BLOOD PRESSURE: 80 MMHG | HEIGHT: 68 IN | HEART RATE: 61 BPM | OXYGEN SATURATION: 96 % | WEIGHT: 234 LBS | SYSTOLIC BLOOD PRESSURE: 120 MMHG | BODY MASS INDEX: 35.46 KG/M2

## 2021-08-05 DIAGNOSIS — G62.9 PERIPHERAL POLYNEUROPATHY: ICD-10-CM

## 2021-08-05 DIAGNOSIS — E11.42 TYPE 2 DIABETES MELLITUS WITH DIABETIC POLYNEUROPATHY, WITHOUT LONG-TERM CURRENT USE OF INSULIN (HCC): Primary | ICD-10-CM

## 2021-08-05 DIAGNOSIS — M06.9 RHEUMATOID ARTHRITIS, INVOLVING UNSPECIFIED SITE, UNSPECIFIED WHETHER RHEUMATOID FACTOR PRESENT (HCC): ICD-10-CM

## 2021-08-05 DIAGNOSIS — I10 ESSENTIAL HYPERTENSION: ICD-10-CM

## 2021-08-05 LAB — HBA1C MFR BLD: 6.6 %

## 2021-08-05 PROCEDURE — 1036F TOBACCO NON-USER: CPT | Performed by: NURSE PRACTITIONER

## 2021-08-05 PROCEDURE — G8427 DOCREV CUR MEDS BY ELIG CLIN: HCPCS | Performed by: NURSE PRACTITIONER

## 2021-08-05 PROCEDURE — 3044F HG A1C LEVEL LT 7.0%: CPT | Performed by: NURSE PRACTITIONER

## 2021-08-05 PROCEDURE — 99213 OFFICE O/P EST LOW 20 MIN: CPT | Performed by: NURSE PRACTITIONER

## 2021-08-05 PROCEDURE — G8417 CALC BMI ABV UP PARAM F/U: HCPCS | Performed by: NURSE PRACTITIONER

## 2021-08-05 PROCEDURE — 2022F DILAT RTA XM EVC RTNOPTHY: CPT | Performed by: NURSE PRACTITIONER

## 2021-08-05 PROCEDURE — 3017F COLORECTAL CA SCREEN DOC REV: CPT | Performed by: NURSE PRACTITIONER

## 2021-08-05 PROCEDURE — 83036 HEMOGLOBIN GLYCOSYLATED A1C: CPT | Performed by: NURSE PRACTITIONER

## 2021-08-05 RX ORDER — LOSARTAN POTASSIUM 100 MG/1
TABLET ORAL
COMMUNITY
Start: 2021-06-06 | End: 2021-08-05

## 2021-08-05 RX ORDER — CARVEDILOL 25 MG/1
TABLET ORAL
Qty: 270 TABLET | Refills: 1 | Status: SHIPPED | OUTPATIENT
Start: 2021-08-05 | End: 2022-01-06

## 2021-08-05 RX ORDER — TOFACITINIB 11 MG/1
TABLET, FILM COATED, EXTENDED RELEASE ORAL
COMMUNITY
Start: 2021-07-31 | End: 2021-08-05

## 2021-08-05 NOTE — PROGRESS NOTES
Chief Complaint   Patient presents with    Diabetes     2 month follow up.  Hypertension       Have you seen any other physician or provider since your last visit yes - patient saw a specialist and had an injection in his thumb. Have you had any other diagnostic tests since your last visit? yes    Have you changed or stopped any medications since your last visit? no     Patient is here today for a follow up. He has had an injection in his thumb and it is doing better. He is also doing some physical therapy and it is doing well.

## 2021-08-05 NOTE — PROGRESS NOTES
SUBJECTIVE:    Patient ID: Zoë Mercado is a 73 y.o. male. Medical History Review  Past Medical, Family, and Social History reviewed. Health Maintenance Due   Topic Date Due    Diabetic foot exam  Never done    HIV screen  Never done    DTaP/Tdap/Td vaccine (1 - Tdap) Never done    Shingles Vaccine (1 of 2) Never done    Colon cancer screen colonoscopy  12/14/2020    Flu vaccine (1) 09/01/2021    Diabetic microalbuminuria test  09/04/2021       HPI:   Chief Complaint   Patient presents with    Diabetes     2 month follow up.  Hypertension   His thumb is much better after his injection. HIs blood sugar stays around 120-140. He has a few readings in the 200's. He had one reading over 300 when he had forgotten to take his medication. Patient's medications, allergies, past medical, surgical, social and family histories were reviewed and updated as appropriate. Review of Systems   Constitutional: Negative for chills and fever. HENT: Negative for ear pain and sore throat. Eyes: Negative for pain and visual disturbance. Respiratory: Negative for cough and shortness of breath. Cardiovascular: Negative for chest pain, palpitations and leg swelling. Gastrointestinal: Negative for abdominal pain, nausea and vomiting. Genitourinary: Negative for dysuria and hematuria. Musculoskeletal: Positive for arthralgias. Negative for joint swelling. Skin: Negative for rash. Neurological: Negative for dizziness and weakness. Psychiatric/Behavioral: Negative for sleep disturbance. Reviewed and acurate. See MA note. OBJECTIVE:  /80   Pulse 61   Temp 97.3 °F (36.3 °C)   Ht 5' 8\" (1.727 m)   Wt 234 lb (106.1 kg)   SpO2 96%   BMI 35.58 kg/m²    Physical Exam  Vitals reviewed. Constitutional:       General: He is not in acute distress. Appearance: He is well-developed. HENT:      Head: Normocephalic.       Mouth/Throat:      Pharynx: No oropharyngeal exudate. Eyes:      General: Lids are normal.   Cardiovascular:      Rate and Rhythm: Normal rate and regular rhythm. Heart sounds: Normal heart sounds. Pulmonary:      Effort: Pulmonary effort is normal.      Breath sounds: Normal breath sounds. Abdominal:      General: Bowel sounds are normal. There is no distension. Palpations: Abdomen is soft. Tenderness: There is no abdominal tenderness. Musculoskeletal:      Cervical back: Neck supple. Lymphadenopathy:      Cervical: No cervical adenopathy. Skin:     General: Skin is warm and dry. Neurological:      Mental Status: He is alert and oriented to person, place, and time. No results found for requested labs within last 30 days. Hemoglobin A1C (%)   Date Value   08/05/2021 6.6     Microscopic Examination (no units)   Date Value   12/15/2016 YES     LDL Calculated (mg/dL)   Date Value   06/14/2021 52       Lab Results   Component Value Date    WBC 5.5 06/14/2021    NEUTROABS 2.8 06/14/2021    HGB 17.5 06/14/2021    HCT 49.1 06/14/2021    MCV 91 06/14/2021     06/14/2021     Lab Results   Component Value Date    TSH 0.972 07/19/2014       Prior to Visit Medications    Medication Sig Taking?  Authorizing Provider   gabapentin (NEURONTIN) 300 MG capsule TAKE 1 CAPSULE BY MOUTH 3 TIMES DAILY Yes WILDER Oneill   empagliflozin (JARDIANCE) 25 MG tablet TAKE 1 TABLET BY MOUTH DAILY Yes WILDER Oneill   amLODIPine (NORVASC) 2.5 MG tablet Take 1 tablet by mouth daily Yes WILDER Oneill   Tofacitinib Citrate (XELJANZ PO) Take by mouth Yes Historical Provider, MD   tamsulosin (FLOMAX) 0.4 MG capsule TAKE 1 CAPSULE BY MOUTH NIGHTLY FOR PROSTATE Yes WILDER Oneill   rosuvastatin (CRESTOR) 40 MG tablet 20mg QOD, alternating with 40mg QOD Yes WILDER Oneill   sertraline (ZOLOFT) 50 MG tablet Take 1 tablet by mouth daily Yes WILDER Oneill   Alcohol Swabs (B-D SINGLE USE SWABS REGULAR) PADS TEST TWICE DAILY AS NEEDED Yes WILDER Setvens   potassium chloride (KLOR-CON M) 20 MEQ extended release tablet TAKE 1 TABLET EVERY DAY Yes WILDER Stevens   TRUE METRIX BLOOD GLUCOSE TEST strip TEST EVERY DAY AS NEEDED Yes WILDER Stevens   traMADol (ULTRAM) 50 MG tablet Take 50 mg by mouth every 6 hours as needed for Pain. Yes Historical Provider, MD   Cyanocobalamin 2500 MCG SUBL Place 2,500 mcg under the tongue daily Yes WILDER Stevens   Blood Glucose Monitoring Suppl (TRUE METRIX AIR GLUCOSE METER) w/Device KIT 1 each by Does not apply route daily E11. 9-DX Yes WILDER Stevens   aspirin 81 MG tablet Take 81 mg by mouth daily Yes Historical Provider, MD   carvedilol (COREG) 25 MG tablet 1.5 tabs twice daily Yes Historical Provider, MD   [facility] MISC USE TO TEST BLOOD SUGAR 4 TIMES DAILY Yes WILDER Stevens   losartan (COZAAR) 25 MG tablet Take 25 mg by mouth daily Yes Historical Provider, MD   Cholecalciferol (VITAMIN D3) 2000 UNIT TABS Take  by mouth. Yes Historical Provider, MD   metaxalone (SKELAXIN) 800 MG tablet Take 800 mg by mouth 3 times daily as needed for Pain  Historical Provider, MD   methocarbamol (ROBAXIN) 500 MG tablet Take 500 mg by mouth 3 times daily  Historical Provider, MD       ASSESSMENT:  1. Type 2 diabetes mellitus with diabetic polyneuropathy, without long-term current use of insulin ([facility]    2. Peripheral polyneuropathy    3. Essential hypertension    4. Rheumatoid arthritis, involving unspecified site, unspecified whether rheumatoid factor present ([facility]        PLAN:  Orders Placed This Encounter   Medications    gabapentin (NEURONTIN) 300 MG capsule     Sig: TAKE 1 CAPSULE BY MOUTH 3 TIMES DAILY     Dispense:  270 capsule     Refill:  1     Orders Placed This Encounter   Procedures    POCT glycosylated hemoglobin (Hb A1C)       Return in about 3 months (around 11/5/2021).

## 2021-08-06 RX ORDER — GABAPENTIN 300 MG/1
CAPSULE ORAL
Qty: 270 CAPSULE | Refills: 1 | Status: SHIPPED | OUTPATIENT
Start: 2021-08-06 | End: 2022-04-02

## 2021-08-18 RX ORDER — LOSARTAN POTASSIUM 100 MG/1
TABLET ORAL
Qty: 135 TABLET | Refills: 1 | Status: SHIPPED | OUTPATIENT
Start: 2021-08-18 | End: 2022-01-12

## 2021-09-13 ASSESSMENT — ENCOUNTER SYMPTOMS
VOMITING: 0
SHORTNESS OF BREATH: 0
COUGH: 0
SORE THROAT: 0
EYE PAIN: 0
NAUSEA: 0
ABDOMINAL PAIN: 0

## 2021-11-16 ENCOUNTER — OFFICE VISIT (OUTPATIENT)
Dept: CARDIOLOGY | Facility: CLINIC | Age: 66
End: 2021-11-16

## 2021-11-16 VITALS
SYSTOLIC BLOOD PRESSURE: 144 MMHG | WEIGHT: 235 LBS | BODY MASS INDEX: 35.61 KG/M2 | HEART RATE: 72 BPM | OXYGEN SATURATION: 98 % | DIASTOLIC BLOOD PRESSURE: 66 MMHG | HEIGHT: 68 IN

## 2021-11-16 DIAGNOSIS — I10 BENIGN ESSENTIAL HTN: ICD-10-CM

## 2021-11-16 DIAGNOSIS — I25.5 ISCHEMIC CARDIOMYOPATHY: ICD-10-CM

## 2021-11-16 DIAGNOSIS — E78.2 MIXED HYPERLIPIDEMIA: ICD-10-CM

## 2021-11-16 DIAGNOSIS — I25.10 CAD IN NATIVE ARTERY: Primary | ICD-10-CM

## 2021-11-16 PROCEDURE — 99214 OFFICE O/P EST MOD 30 MIN: CPT | Performed by: INTERNAL MEDICINE

## 2021-11-16 RX ORDER — AMLODIPINE BESYLATE 2.5 MG/1
2.5 TABLET ORAL DAILY
COMMUNITY
Start: 2021-10-18

## 2021-11-16 NOTE — PROGRESS NOTES
[facility]  Office visit  Jesse Brink  1955  [phone]    VISIT DATE:  11/16/2021      PCP: Terri Barreto APRN  [address] [facility] [address] [id number]    CC:  Chief Complaint   Patient presents with   • Coronary Artery Disease       PROBLEM LIST:  1. Coronary artery disease  2. Transient ischemic attack with normal echocardiogram, carotid duplex, and CT scan of the brain by verbal report.  3. Benign hypertension.  4. History of tobacco, quitting 28 years ago.  5. Hyperglycemia, diet controlled.  6. Rheumatoid arthritis.  7. Obesity.  8. Family history of heart disease.  9. Surgical history:  a. Pilonidal cyst, 1977.  b. Benign fibroid removed, 1980.  c. Anal fistula repair, 1999 and 2008.    Previous cardiac studies and procedures  January 2019  Cardiac catheterization  · Multivessel obstructive coronary disease: Proximal RCA .  Mid LAD .  95% proximal first diagonal, 95% first obtuse marginal branch, 70-80% proximal second obtuse marginal branch.  · Mildly elevated LVEDP  · No aortic stenosis.  Echo   · Left ventricular wall thickness is consistent with mild concentric hypertrophy.  · Estimated EF appears to be in the range of 36 - 40%  · Left ventricular diastolic dysfunction (grade I a) consistent with impaired relaxation.  · The following left ventricular wall segments are hypokinetic: mid anterior, apical anterior, apical lateral, apical inferior, apical septal and apex hypokinetic.  · Regional contracility augments post PVC.    Carotid duplex  · Right internal carotid artery stenosis of 0-49%.  · Left internal carotid artery stenosis of 0-49%.    1. Coronary artery bypass graft x 5  -LIMA to LAD.    -Greater saphenous vein to RCA  -Greater saphenous vein to OM1  -Greater saphenous vein to OM2  -Greater saphenous vein to D1  2. Left atrial appendage ligation (35 mm Atricure clip placement)    May 2019 echo  · Left ventricular systolic function is  "normal.  · Calculated EF = 55%  · Left atrial cavity size is mildly dilated.    March 2021  Cardiac catheterization  · 3/5 patent grafts with evidence of occlusion of the SVG to one of the obtuse marginal branches as well as the diagonal branch.  These both appear to be old findings.  · Normal LV systolic function wall motion  · No EKG changes are seen  · Elevated LVEDP at 22 mmHg  Transthoracic echocardiogram  · Estimated left ventricular EF = 60% Left ventricular systolic function is normal.  · Left ventricular diastolic function is consistent with (grade II w/high LAP) pseudonormalization.  · Trace mitral valve regurgitation is present.  · Trace tricuspid valve regurgitation is present.  · There is no evidence of pericardial effusion    ASSESSMENT:   Diagnosis Plan   1. CAD in native artery     2. Benign essential HTN     3. Mixed hyperlipidemia         PLAN:  Coronary artery disease: Status post surgical revascularization.  Normal EF, continue current medical therapy.    Hypertension: Goal less than 130/90.  Currently well controlled, continue current medical therapy.    Palpitations: Consistent with symptomatic premature ventricular contractions.   Continue beta-blockade.  Currently well controlled.    Hyperlipidemia: Continue high intensity statin therapy, goal LDL less than 70.  Continue rosuvastatin 20 mg p.o. daily.  Currently well controlled.    History of stroke: Continue aggressive risk factor modification.  Continue current medical therapy.    Subjective  Is mild shortness of breath in a stable class II pattern. Denies chest discomfort. Blood pressures at home running less than 130/80 mmHg. Compliant with medical therapy. Recently taken off Xeljanz.    PHYSICAL EXAMINATION:  Vitals:    11/16/21 0927   BP: 144/66   BP Location: Right arm   Patient Position: Sitting   Pulse: 72   SpO2: 98%   Weight: 107 kg (235 lb)   Height: 172.7 cm (68\")     General Appearance:    Alert, cooperative, no distress, " appears stated age   Head:    Normocephalic, without obvious abnormality, atraumatic   Eyes:    conjunctiva/corneas clear   Nose:   Nares normal, septum midline, mucosa normal, no drainage   Throat:   Lips, teeth and gums normal   Neck:   Supple, symmetrical, trachea midline, no carotid    bruit or JVD   Lungs:     Clear to auscultation bilaterally, respirations unlabored   Chest Wall:    No tenderness or deformity    Heart:    Regular rate and rhythm, S1 and S2 normal, no murmur, rub   or gallop, normal carotid impulse bilaterally without bruit.   Abdomen:     Soft, non-tender   Extremities:   Extremities normal, atraumatic, no cyanosis or edema   Pulses:   2+ and symmetric all extremities   Skin:   Skin color, texture, turgor normal, no rashes or lesions       Diagnostic Data:  Procedures  Lab Results   Component Value Date    TRIG 96 03/27/2021    HDL 51 03/27/2021     Lab Results   Component Value Date    GLUCOSE 128 (H) 03/29/2021    BUN 16 03/29/2021    CREATININE 1.23 03/29/2021     03/29/2021    K 4.1 03/29/2021     03/29/2021    CO2 20.0 (L) 03/29/2021     Lab Results   Component Value Date    HGBA1C 6.60 (H) 01/30/2019     Lab Results   Component Value Date    WBC 9.82 03/29/2021    HGB 14.8 03/29/2021    HCT 45.9 03/29/2021     03/29/2021       Allergies  Allergies   Allergen Reactions   • Humira [Adalimumab] Other (See Comments)     HA, intractable ocular migraine       Current Medications    Current Outpatient Medications:   •  amLODIPine (NORVASC) 2.5 MG tablet, Take 2.5 mg by mouth Daily., Disp: , Rfl:   •  aspirin 81 MG EC tablet, Take 81 mg by mouth Daily., Disp: , Rfl:   •  baclofen (LIORESAL) 10 MG tablet, Take 10 mg by mouth As Needed for Muscle Spasms., Disp: , Rfl:   •  carvedilol (COREG) 25 MG tablet, TAKE 1 AND 1/2 TABLETS TWICE DAILY WITH MEALS, Disp: 270 tablet, Rfl: 1  •  Cholecalciferol (VITAMIN D3) 2000 UNITS capsule, Take 2,000 Units by mouth Daily., Disp: , Rfl:   •   Empagliflozin (JARDIANCE) 25 MG tablet, Take 25 mg by mouth Daily., Disp: , Rfl:   •  gabapentin (NEURONTIN) 300 MG capsule, Take 300 mg by mouth 2 (Two) Times a Day., Disp: , Rfl:   •  losartan (COZAAR) 100 MG tablet, TAKE 1 AND 1/2 TABLETS EVERY DAY, Disp: 135 tablet, Rfl: 1  •  metaxalone (SKELAXIN) 800 MG tablet, 800 mg As Needed., Disp: , Rfl:   •  potassium chloride (K-DUR) 10 MEQ CR tablet, Take 10 mEq by mouth Daily., Disp: , Rfl:   •  rosuvastatin (CRESTOR) 40 MG tablet, TAKE 1 TABLET EVERY DAY, Disp: 90 tablet, Rfl: 1  •  saccharomyces boulardii (FLORASTOR) 250 MG capsule, Take 250 mg by mouth Daily., Disp: , Rfl:   •  sertraline (ZOLOFT) 50 MG tablet, Take 50 mg by mouth Daily., Disp: , Rfl:   •  tamsulosin (FLOMAX) 0.4 MG capsule 24 hr capsule, Take 1 capsule by mouth Daily., Disp: , Rfl:   •  traMADol (ULTRAM) 50 MG tablet, Take 50 mg by mouth As Needed., Disp: , Rfl:   No current facility-administered medications for this visit.    Facility-Administered Medications Ordered in Other Visits:   •  Chlorhexidine Gluconate Cloth 2 % pads 1 application, 1 application, Topical, Q12H PRN, Marie Barnes PA-C ROS  Review of Systems   Cardiovascular: Positive for leg swelling. Negative for chest pain, claudication, dyspnea on exertion, irregular heartbeat and palpitations.   Respiratory: Negative for cough and shortness of breath.          SOCIAL HX  Social History     Socioeconomic History   • Marital status:    • Number of children: 2   Tobacco Use   • Smoking status: Former Smoker     Packs/day: 1.00     Types: Cigarettes     Quit date: 3/5/1984     Years since quittin.7   • Smokeless tobacco: Never Used   Vaping Use   • Vaping Use: Never used   Substance and Sexual Activity   • Alcohol use: Yes     Alcohol/week: 1.0 standard drink     Types: 1 Cans of beer per week     Comment: occas   • Drug use: No   • Sexual activity: Defer       FAMILY HX  Family History   Problem Relation Age  of Onset   • Heart disease Other    • Diabetes Other    • Cancer Other    • Hypertension Other    • Heart disease Mother    • Diabetes Father    • Hypertension Father    • Cancer Father              Dillon Neville III, MD, FACC

## 2021-12-30 NOTE — ANESTHESIA PROCEDURE NOTES
Central Line      Patient location during procedure: OR  Staff  Anesthesiologist: Barber Galo MD  Preanesthetic Checklist  Completed: patient identified, site marked, surgical consent, pre-op evaluation, timeout performed, IV checked, risks and benefits discussed and monitors and equipment checked  Central Line Prep  Sterile Tech:gloves, cap, gown, mask and sterile barriers  Patient monitoring: blood pressure monitoring, continuous pulse oximetry and EKG  Central Line Procedure  Laterality:right  Location:internal jugular  Catheter Type:Cordis  Catheter Size:9 Fr  Guidance:ultrasound guided, landmark technique and palpation technique  Assessment  Post procedure:biopatch applied, line sutured and occlusive dressing applied  Assessement:blood return through all ports, free fluid flow, chest x-ray ordered and Jamie Test  Complications:no  Patient Tolerance:patient tolerated the procedure well with no apparent complications             No

## 2022-01-06 RX ORDER — CARVEDILOL 25 MG/1
TABLET ORAL
Qty: 270 TABLET | Refills: 0 | Status: SHIPPED | OUTPATIENT
Start: 2022-01-06 | End: 2022-03-21

## 2022-01-10 ENCOUNTER — OFFICE VISIT (OUTPATIENT)
Dept: NEUROSURGERY | Facility: CLINIC | Age: 67
End: 2022-01-10

## 2022-01-10 VITALS
DIASTOLIC BLOOD PRESSURE: 76 MMHG | HEIGHT: 68 IN | TEMPERATURE: 98 F | WEIGHT: 236 LBS | BODY MASS INDEX: 35.77 KG/M2 | SYSTOLIC BLOOD PRESSURE: 148 MMHG

## 2022-01-10 DIAGNOSIS — M50.30 DDD (DEGENERATIVE DISC DISEASE), CERVICAL: Primary | ICD-10-CM

## 2022-01-10 PROBLEM — M51.36 DEGENERATION OF LUMBAR INTERVERTEBRAL DISC: Status: ACTIVE | Noted: 2017-07-25

## 2022-01-10 PROBLEM — N28.9 KIDNEY DISEASE: Status: ACTIVE | Noted: 2017-07-25

## 2022-01-10 PROBLEM — J01.00 ACUTE NON-RECURRENT MAXILLARY SINUSITIS: Status: ACTIVE | Noted: 2018-02-12

## 2022-01-10 PROBLEM — M51.369 DEGENERATION OF LUMBAR INTERVERTEBRAL DISC: Status: ACTIVE | Noted: 2017-07-25

## 2022-01-10 PROBLEM — M79.18 MYOFASCIAL PAIN: Status: ACTIVE | Noted: 2017-07-25

## 2022-01-10 PROCEDURE — 99204 OFFICE O/P NEW MOD 45 MIN: CPT | Performed by: NEUROLOGICAL SURGERY

## 2022-01-10 NOTE — PATIENT INSTRUCTIONS
Follow up with your primary care doctor. On your MRI of your cervical spine it was an incidental finding of the throat/tongue mass.

## 2022-01-10 NOTE — PROGRESS NOTES
NAME: BALDO RAMIREZ   DOS: 1/10/2022  : 1955  PCP: Terri Barreto APRN    Chief Complaint:    Chief Complaint   Patient presents with   • Neck & right arm pain       History of Present Illness:  66 y.o. male   I saw a 66-year-old male in neurosurgical consultation.  He has a history of cervical arthritis and diabetes and presents for evaluation.  He has pain in his neck decreased range of motion and occasional right-sided arm pain eye denies any clear-cut persistent radiculopathy.  He has been through recent physical therapy and he is here for evaluation he is accompanied by his wife    PMHX  Allergies:  Allergies   Allergen Reactions   • Humira [Adalimumab] Other (See Comments)     HA, intractable ocular migraine     Medications    Current Outpatient Medications:   •  amLODIPine (NORVASC) 2.5 MG tablet, Take 2.5 mg by mouth Daily., Disp: , Rfl:   •  aspirin 81 MG EC tablet, Take 81 mg by mouth Daily., Disp: , Rfl:   •  baclofen (LIORESAL) 10 MG tablet, Take 10 mg by mouth As Needed for Muscle Spasms., Disp: , Rfl:   •  carvedilol (COREG) 25 MG tablet, TAKE 1 AND 1/2 TABLETS TWICE DAILY WITH MEALS, Disp: 270 tablet, Rfl: 0  •  Cholecalciferol (VITAMIN D3) 2000 UNITS capsule, Take 2,000 Units by mouth Daily., Disp: , Rfl:   •  Empagliflozin (JARDIANCE) 25 MG tablet, Take 25 mg by mouth Daily., Disp: , Rfl:   •  gabapentin (NEURONTIN) 300 MG capsule, Take 300 mg by mouth 2 (Two) Times a Day., Disp: , Rfl:   •  losartan (COZAAR) 100 MG tablet, TAKE 1 AND 1/2 TABLETS EVERY DAY, Disp: 135 tablet, Rfl: 1  •  metaxalone (SKELAXIN) 800 MG tablet, 800 mg As Needed., Disp: , Rfl:   •  potassium chloride (K-DUR) 10 MEQ CR tablet, Take 10 mEq by mouth Daily., Disp: , Rfl:   •  rosuvastatin (CRESTOR) 40 MG tablet, TAKE 1 TABLET EVERY DAY, Disp: 90 tablet, Rfl: 1  •  sertraline (ZOLOFT) 50 MG tablet, Take 50 mg by mouth Daily., Disp: , Rfl:   •  tamsulosin (FLOMAX) 0.4 MG capsule 24 hr capsule, Take 1 capsule  by mouth Daily., Disp: , Rfl:   •  traMADol (ULTRAM) 50 MG tablet, Take 50 mg by mouth As Needed., Disp: , Rfl:   •  saccharomyces boulardii (FLORASTOR) 250 MG capsule, Take 250 mg by mouth Daily., Disp: , Rfl:   No current facility-administered medications for this visit.    Facility-Administered Medications Ordered in Other Visits:   •  Chlorhexidine Gluconate Cloth 2 % pads 1 application, 1 application, Topical, Q12H PRN, Marie Barnes PA-C  Past Medical History:  Past Medical History:   Diagnosis Date   • Anxiety    • Cancer (HCC)     skin    • Chest pain syndrome    • Coronary artery disease    • Diabetes mellitus (HCC)     TYPE 2 DIAGNOSED SEVERAL YEARS AGO. TRIES TO TEST ONCE DAILY   • Diverticulitis    • Fibromyalgia    • Hearing aid worn    • History of cellulitis    • History of kidney stones    • History of malignant neoplasm    • History of MRI of spine     demonstrating buldging discs at L3/L4, L4/L5, L5/S1 levels   • History of panic attacks    • History of tobacco abuse     History of tobacco, quitting 28 years ago.   • Hypertension    • Irregular heart beat    • Low back pain    • Migraine     occular   • Obesity    • Rheumatoid arthritis (HCC)    • Transient ischemic attack     Recent transient ischemic attack with normal echocardiogram, carotid duplex, and CT scan of the brain by verbal report.     Past Surgical History:  Past Surgical History:   Procedure Laterality Date   • ANAL FISTULA REPAIR  1999    Anal fistula repair, 1999 and 2008.   • ATRIAL APPENDAGE EXCLUSION LEFT WITH TRANSESOPHAGEAL ECHOCARDIOGRAM N/A 1/31/2019    Procedure: ATRIAL APPENDAGE OCCLUSION LEFT;  Surgeon: Dillon Gu MD;  Location:  [facility];  Service: Cardiothoracic   • CARDIAC CATHETERIZATION N/A 1/18/2019    Procedure: Left Heart Cath;  Surgeon: Dillon Neville III, MD;  Location:  [email or web address] CATH INVASIVE LOCATION;  Service: Cardiovascular   • CARDIAC CATHETERIZATION N/A 3/26/2021    Procedure: Left Heart Cath;   Surgeon: Mercy Martines MD;  Location:  [facility]ATH [facility]VAS[facility] LOCATION;  Service: Cardiovascular;  Laterality: N/A;   • CHOLECYSTECTOMY N/A 3/28/2021    Procedure: CHOLECYSTECTOMY LAPAROSCOPIC;  Surgeon: Gigi Ambrose MD;  Location:  [facility];  Service: General;  Laterality: N/A;   • COLONOSCOPY  2016   • CORONARY ARTERY BYPASS GRAFT N/A 2019    Procedure: MEDIAN STERNOTOMY, CORONARY ARTERY BYPASS GRAFT X5, UTILIZING THE LEFT INTERNAL MAMMARY ARTERY, EVH OF THE RIGHT GREATER SAPHENOUS VEIN;  Surgeon: Dillon Gu MD;  Location:  [facility];  Service: Cardiothoracic   • MYOMECTOMY      Benign fibroid removed, .   • PILONIDAL CYSTECTOMY     • SKIN CANCER EXCISION      left shoulder   • TRANSESOPHAGEAL ECHOCARDIOGRAM (TIMOTHY) N/A 2019    Procedure: TRANSESOPHAGEAL ECHOCARDIOGRAM WITH ANESTHESIA;  Surgeon: Dillon Gu MD;  Location:  [facility];  Service: Cardiothoracic     Social Hx:  Social History     Tobacco Use   • Smoking status: Former Smoker     Packs/day: 1.00     Types: Cigarettes     Quit date: 3/5/1984     Years since quittin.8   • Smokeless tobacco: Never Used   Vaping Use   • Vaping Use: Never used   Substance Use Topics   • Alcohol use: Yes     Alcohol/week: 1.0 standard drink     Types: 1 Cans of beer per week     Comment: occas   • Drug use: No     Family Hx:  Family History   Problem Relation Age of Onset   • Heart disease Other    • Diabetes Other    • Cancer Other    • Hypertension Other    • Heart disease Mother    • Diabetes Father    • Hypertension Father    • Cancer Father      Review of Systems:        Review of Systems   Constitutional: Negative for activity change, appetite change, chills, diaphoresis, fatigue, fever and unexpected weight change.   HENT: Positive for sinus pressure. Negative for congestion, dental problem, drooling, ear discharge, ear pain, facial swelling, hearing loss, mouth sores, nosebleeds, postnasal drip, rhinorrhea, sinus  pain, sneezing, sore throat, tinnitus, trouble swallowing and voice change.    Eyes: Negative for photophobia, pain, discharge, redness, itching and visual disturbance.   Respiratory: Negative for apnea, cough, choking, chest tightness, shortness of breath, wheezing and stridor.    Cardiovascular: Negative for chest pain, palpitations and leg swelling.   Gastrointestinal: Negative for abdominal distention, abdominal pain, anal bleeding, blood in stool, constipation, diarrhea, nausea, rectal pain and vomiting.   Endocrine: Negative for cold intolerance, heat intolerance, polydipsia, polyphagia and polyuria.   Genitourinary: Negative for decreased urine volume, difficulty urinating, dysuria, enuresis, flank pain, frequency, genital sores, hematuria, penile discharge, penile pain, penile swelling, scrotal swelling, testicular pain and urgency.   Musculoskeletal: Positive for neck pain and neck stiffness. Negative for arthralgias, back pain, gait problem, joint swelling and myalgias.   Skin: Negative for color change, pallor, rash and wound.   Allergic/Immunologic: Negative for environmental allergies, food allergies and immunocompromised state.   Neurological: Negative for dizziness, tremors, seizures, syncope, facial asymmetry, speech difficulty, weakness, light-headedness, numbness and headaches.   Hematological: Negative for adenopathy. Does not bruise/bleed easily.   Psychiatric/Behavioral: Negative for agitation, behavioral problems, confusion, decreased concentration, dysphoric mood, hallucinations, self-injury, sleep disturbance and suicidal ideas. The patient is not nervous/anxious and is not hyperactive.    I have reviewed this note template and all pertinent parts of the review of systems social, family history, surgical history and medication list        Physical Examination:  Vitals:    01/10/22 1453   BP: 148/76   Temp: 98 °F (36.7 °C)      General Appearance:   Well developed, well nourished, well  groomed, alert, and cooperative.  Neurological examination:  Neurologic Exam  Vital signs were reviewed and documented in the chart  Patient appeared in good neurologic function with normal comprehension fluent speech  Mood and affect are normal  Sense of smell deferred    COVID mask left in place  Muscle bulk and tone normal  5 out of 5 strength no motor drift  Gait normal intact  Negative Romberg  No clonus long tract signs or myelopathy  Decreased range of motion at the neck and shoulders  Reflexes symmetric arthritic stigmata at the extremities  1+ edema noted and extremities skin appears normal  Certainly no myelopathy        Review of Imaging/DATA:  I reviewed his MRI personally which administrate some multilevel degenerative changes there is intraforaminal disease C5-6 with straightening of the cervical spine I reviewed and interpreted the films personally    On the report they mention a potential base of tongue mass  Diagnoses/Plan:    Mr. Brink is a 66 y.o. male   1.  Cervical spondylosis-  2.  Rheumatoid arthritis  3.  Chronic axial neck pain secondary #1 and 2    I recommended conservative management referral to an interventional pain specialist if his symptoms persist more than 3 to 6 months any is really miserable we could contemplate further work-up with potential evaluation for cervical fusion for treatment of intraforaminal disease that being said I would not recommended at present and I explained the risk benefits expected outcome from surgery and why for most of the complaints that were explained to me this may not have a satisfactory outcome    I explained the signs and symptoms to look for to necessitate a follow-up he does have some moderate central canal stenosis but certainly no myelopathy I counseled him on fall risk precautions and I will see him back as needed    MRI was report was ordered by the primary doctor but he is going to follow-up with his PCP regarding the MRI of the tongue  this was explained to him explicitly and I attempted to call the PCP they were not in, I be happy to make ENT referral if necessary

## 2022-01-11 ENCOUNTER — TELEPHONE (OUTPATIENT)
Dept: NEUROSURGERY | Facility: CLINIC | Age: 67
End: 2022-01-11

## 2022-01-11 NOTE — TELEPHONE ENCOUNTER
Pt given MRI report directly by me yesterday in office, he is aware to contact his PCP for further imaging/guidance in regard to tongue mass.     -I will call PCP to ensure proper f/u.

## 2022-01-11 NOTE — TELEPHONE ENCOUNTER
----- Message from Cali Millan MD sent at 1/10/2022  5:23 PM EST -----  Regarding: needs fyu  Please call PCP office of this patient and ensure that he has ENT follow-up for base of tongue suspected tumor

## 2022-01-11 NOTE — TELEPHONE ENCOUNTER
Left a message with SELMA Patel office.   #[phone]    Mass at the floor of the mouth was noted on MR CSP. -I have called pt's primary care provider and left a message regarding this information.

## 2022-01-12 RX ORDER — LOSARTAN POTASSIUM 100 MG/1
TABLET ORAL
Qty: 135 TABLET | Refills: 1 | Status: SHIPPED | OUTPATIENT
Start: 2022-01-12 | End: 2022-06-09

## 2022-02-04 NOTE — PROGRESS NOTES
"Chief Complaint: \"Neck pain.\"        History of Present Illness:   Patient: Mr. Jesse Brink, 66 y.o. male   Referring Physician: Dr. Cali Millan  Reason for Referral: Consultation for chronic intractable posterior neck pain.   Pain History: Patient reports a longstanding history of progressive posterior cervicalgia associated with a significantly increase in the range of motion of the cervical spine, which began without incident.  Patient has failed to obtain pain relief with conservative measures including oral analgesics, physical therapy, to name a few.  MRI of the cervical spine revealed multilevel cervical spondylosis with significant degenerative disc disease, facet hypertrophy contributing to various degrees of canal and neuroforaminal stenosis.  Pain appears to be mostly axial and mechanical. Jesse Brink underwent neurosurgical consultation with Dr. Cali Millan on 01/10/2022, and was found not to be a surgical candidate at that time.  Dr. Millan discussed with the patient the possibility of additional diagnostic work-up for evaluation of her cervical fusion. Pain has progressed in intensity over the past years.   Pain Description: Constant posterior neck pain with intermittent exacerbation, described as aching, sharp, stabbing and throbbing sensation.   Radiation of Pain: The pain radiates into the shoulder blades RT> LT  Pain intensity today: 5/10  Average pain intensity last week: 6/10  Pain intensity ranges from: 4/10 to 9/10  Aggravating factors: Pain increases with extension, rotation of the cervical spine   Alleviating factors: Pain decreases with sitting  Associated Symptoms:   Patient denies pain, numbness, or weakness in the upper or lower extremities.   Patient denies any new bladder or bowel problems.   Patient reports difficulties with his balance but denies recent falls.   Stiffness    Review of previous therapies and additional medical records:  Jesse Gong " [place] has already failed the following measures, including:   Conservative Measures: Oral analgesics, opioids, topical analgesics, ice, heat, physical therapy   Interventional Measures: None  Surgical Measures: No history of previous cervical spine or shoulder surgery. No history of previous lumbar spine or hip surgery   Jesse Brink underwent neurosurgical consultation with Dr. Cali Millan on 01/10/2022, and was found not to be a surgical candidate.  Jesse Brink presents with significant comorbidities including anxiety, CAD, diabetes, fibromyalgia, history of panic attacks, hypertension, migraines, rheumatoid arthritis, history of TIAs, history of CABG 1/31/2019, on aspirin  In terms of current analgesics, Jesse Brink takes: Metaxalone, gabapentin, baclofen, tramadol.  Patient also takes sertraline  I have reviewed [facility] Report #[id number] consistent with medication reconciliation.  SOAPP: Low Risk     Global Pain Scale 02-10  2022          Pain 15          Feelings 5          Clinical outcomes 10          Activities 6          GPS Total: 36            Review of Diagnostic Studies:  I have reviewed the images with the patient as well as the reports, as follows;  MRI of the cervical spine without contrast October 18, 2021.  Vertebral body heights and alignment are maintained.  Multilevel degenerative changes.  Axial imaging:  C2-C3: Disc osteophyte complex.  Facet arthropathy.  Mild canal and mild right foraminal stenosis.  Moderate left foraminal stenosis.  C3-C4: Disc osteophyte complex, facet arthropathy.  Mild canal stenosis.  Mild right and severe left foraminal stenosis   C4-C5: Disc osteophyte complex, facet hypertrophy.  Moderate spinal canal stenosis and mild to moderate bilateral foraminal stenosis   C5-C6: Diffuse disc for complex, facet hypertrophy.  Mild to moderate canal stenosis.  Moderate to severe foraminal stenosis  C6-C7: Severe disc space narrowing,  diffuse hard disc osteophyte complex, facet hypertrophy.  Mild canal stenosis.  Moderate to severe bilateral foraminal stenosis  C7-T1: Disc osteophyte complex.  Mild canal stenosis.  Moderate to severe bilateral foraminal stenosis    Review of Systems   Constitutional: Positive for fatigue.   Musculoskeletal: Positive for back pain, myalgias, neck pain and neck stiffness.   All other systems reviewed and are negative.        Patient Active Problem List   Diagnosis   • Benign essential HTN   • Lumbosacral radiculopathy at S1   • Numbness of lower extremity   • Transient ischemic attack   • Rheumatoid arthritis (HCC)   • Obesity   • Idiopathic peripheral neuropathy   • Palpitations   • CAD in native artery   • Type 2 diabetes mellitus (HCC)   • Ischemic cardiomyopathy   • S/P CABG x 5 1/31/19   • Mixed hyperlipidemia   • Acute coronary syndrome (HCC)   • Renal colic   • Cholelithiasis   • Myofascial pain   • Degeneration of lumbar intervertebral disc   • Acute non-recurrent maxillary sinusitis   • Kidney disease   • Cervical spondylosis without myelopathy   • DDD (degenerative disc disease), cervical   • Cervical spinal stenosis   • Diabetic polyneuropathy associated with type 2 diabetes mellitus (HCC)   • Anxiety and depression   • Physical deconditioning   • Gait disturbance       Past Medical History:   Diagnosis Date   • Anxiety    • Cancer (HCC)     skin    • Chest pain syndrome    • Coronary artery disease    • Diabetes mellitus (HCC)     TYPE 2 DIAGNOSED SEVERAL YEARS AGO. TRIES TO TEST ONCE DAILY   • Diverticulitis    • Fibromyalgia    • Hearing aid worn    • History of cellulitis    • History of kidney stones    • History of malignant neoplasm    • History of MRI of spine     demonstrating buldging discs at L3/L4, L4/L5, L5/S1 levels   • History of panic attacks    • History of tobacco abuse     History of tobacco, quitting 28 years ago.   • Hypertension    • Irregular heart beat    • Low back pain    •  Migraine     occular   • Obesity    • Rheumatoid arthritis (HCC)    • Transient ischemic attack     Recent transient ischemic attack with normal echocardiogram, carotid duplex, and CT scan of the brain by verbal report.         Past Surgical History:   Procedure Laterality Date   • ANAL FISTULA REPAIR  1999    Anal fistula repair, 1999 and 2008.   • ATRIAL APPENDAGE EXCLUSION LEFT WITH TRANSESOPHAGEAL ECHOCARDIOGRAM N/A 1/31/2019    Procedure: ATRIAL APPENDAGE OCCLUSION LEFT;  Surgeon: Dillon Gu MD;  Location:  [facility];  Service: Cardiothoracic   • CARDIAC CATHETERIZATION N/A 1/18/2019    Procedure: Left Heart Cath;  Surgeon: Dillon Neville III, MD;  Location:  [facility];  Service: Cardiovascular   • CARDIAC CATHETERIZATION N/A 3/26/2021    Procedure: Left Heart Cath;  Surgeon: Mercy Martines MD;  Location:  [facility];  Service: Cardiovascular;  Laterality: N/A;   • CHOLECYSTECTOMY N/A 3/28/2021    Procedure: CHOLECYSTECTOMY LAPAROSCOPIC;  Surgeon: Gigi Ambrose MD;  Location:  [facility];  Service: General;  Laterality: N/A;   • COLONOSCOPY  2016   • CORONARY ARTERY BYPASS GRAFT N/A 1/31/2019    Procedure: MEDIAN STERNOTOMY, CORONARY ARTERY BYPASS GRAFT X5, UTILIZING THE LEFT INTERNAL MAMMARY ARTERY, EVH OF THE RIGHT GREATER SAPHENOUS VEIN;  Surgeon: Dillon Gu MD;  Location:  [facility];  Service: Cardiothoracic   • MYOMECTOMY  1980    Benign fibroid removed, 1980.   • PILONIDAL CYSTECTOMY  1977   • SKIN CANCER EXCISION      left shoulder   • TRANSESOPHAGEAL ECHOCARDIOGRAM (TIMOTHY) N/A 1/31/2019    Procedure: TRANSESOPHAGEAL ECHOCARDIOGRAM WITH ANESTHESIA;  Surgeon: Dillon Gu MD;  Location:  [facility];  Service: Cardiothoracic         Family History   Problem Relation Age of Onset   • Heart disease Other    • Diabetes Other    • Cancer Other    • Hypertension Other    • Heart disease Mother    • Diabetes Father    • Hypertension Father    • Cancer  Father          Social History     Socioeconomic History   • Marital status:    • Number of children: 2   Tobacco Use   • Smoking status: Former Smoker     Packs/day: 1.00     Types: Cigarettes     Quit date: 3/5/1984     Years since quittin.9   • Smokeless tobacco: Never Used   Vaping Use   • Vaping Use: Never used   Substance and Sexual Activity   • Alcohol use: Yes     Alcohol/week: 1.0 standard drink     Types: 1 Cans of beer per week     Comment: occas   • Drug use: No   • Sexual activity: Defer           Current Outpatient Medications:   •  amLODIPine (NORVASC) 2.5 MG tablet, Take 2.5 mg by mouth Daily., Disp: , Rfl:   •  aspirin 81 MG EC tablet, Take 81 mg by mouth Daily., Disp: , Rfl:   •  baclofen (LIORESAL) 10 MG tablet, Take 10 mg by mouth As Needed for Muscle Spasms., Disp: , Rfl:   •  carvedilol (COREG) 25 MG tablet, TAKE 1 AND 1/2 TABLETS TWICE DAILY WITH MEALS, Disp: 270 tablet, Rfl: 0  •  Cholecalciferol (VITAMIN D3) 2000 UNITS capsule, Take 2,000 Units by mouth Daily., Disp: , Rfl:   •  Empagliflozin (JARDIANCE) 25 MG tablet, Take 25 mg by mouth Daily., Disp: , Rfl:   •  gabapentin (NEURONTIN) 300 MG capsule, Take 300 mg by mouth 2 (Two) Times a Day., Disp: , Rfl:   •  losartan (COZAAR) 100 MG tablet, TAKE 1 AND 1/2 TABLETS EVERY DAY, Disp: 135 tablet, Rfl: 1  •  metaxalone (SKELAXIN) 800 MG tablet, 800 mg As Needed., Disp: , Rfl:   •  potassium chloride (K-DUR) 10 MEQ CR tablet, Take 10 mEq by mouth Daily., Disp: , Rfl:   •  rosuvastatin (CRESTOR) 40 MG tablet, TAKE 1 TABLET EVERY DAY, Disp: 90 tablet, Rfl: 1  •  saccharomyces boulardii (FLORASTOR) 250 MG capsule, Take 250 mg by mouth Daily., Disp: , Rfl:   •  sertraline (ZOLOFT) 50 MG tablet, Take 50 mg by mouth Daily., Disp: , Rfl:   •  tamsulosin (FLOMAX) 0.4 MG capsule 24 hr capsule, Take 1 capsule by mouth Daily., Disp: , Rfl:   •  traMADol (ULTRAM) 50 MG tablet, Take 50 mg by mouth As Needed., Disp: , Rfl:   No current  "facility-administered medications for this visit.    Facility-Administered Medications Ordered in Other Visits:   •  Chlorhexidine Gluconate Cloth 2 % pads 1 application, 1 application, Topical, Q12H ASTRIDN, Marie Barnes PA-C      Allergies   Allergen Reactions   • Humira [Adalimumab] Other (See Comments)     HA, intractable ocular migraine         /77 (BP Location: Right arm, Patient Position: Sitting, Cuff Size: Adult)   Pulse 62   Temp 96.2 °F (35.7 °C) (Infrared)   Ht 172.7 cm (68\")   Wt 107 kg (235 lb)   SpO2 96%   BMI 35.73 kg/m²       Physical Exam:  Constitutional: Patient appears well-developed, well-nourished, well-hydrated  HEENT: Head: Normocephalic and atraumatic  Eyes: Conjunctivae and lids are normal  Pupils: Equal, round, reactive to light  Neck: Trachea normal. Neck supple. No JVD present.   Lymphatic: No cervical adenopathy  Musculoskeletal   Gait and station: Gait evaluation demonstrated a normal gait. Able to walk on heels, toes with some difficulties due to his diabetic polyneuropathy  Cervical spine: Passive and active range of motion are limited secondary to pain. Extension, lateral flexion, rotation of the cervical spine increased and reproduced pain. Cervical facet joint loading maneuvers are positive.  Muscles: Presence of active trigger points at the levator scapulae   Shoulders: The range of motion of the glenohumeral joints is almost full and without pain. Rotator cuff strength is 5/5.   Neurological:   Patient is alert and oriented to person, place, and time.   Speech: Normal.   Cortical function: Normal mental status.   Reflex Scores:  Right brachioradialis: 1+  Left brachioradialis: 1+  Right biceps: 1+  Left biceps: 1+  Right triceps: 1+  Left triceps: 1+  Right patellar: 0+  Left patellar: 0+  Right Achilles: 0+  Left Achilles: 0+  Motor strength: 5/5  Motor Tone: Normal  Involuntary movements: None.   Superficial/Primitive Reflexes: Primitive reflexes were absent. "   Right Mukherjee: Absent  Left Mukherjee: Absent  Right ankle clonus: Absent  Left ankle clonus: Absent   Babinsky: Absent  Spurling sign: Negative. Neck tornado test: Negative. Lhermitte sign: Negative. Long tract signs: Negative.   Sensory exam: Intact to light touch, intact pain and temperature sensation, intact vibration sensation and normal proprioception.   Coordination: Normal finger to nose and heel to shin. Normal balance and negative Romberg's sign   Skin and subcutaneous tissue: Skin is warm and intact. No rash noted. No cyanosis.   Psychiatric: Judgment and insight: Normal. Recent and remote memory: Intact. Mood and affect: Normal.     ASSESSMENT:   1. Cervical spondylosis without myelopathy    2. DDD (degenerative disc disease), cervical    3. Cervical spinal stenosis    4. Myofascial pain    5. Idiopathic peripheral neuropathy    6. Rheumatoid arthritis, involving unspecified site, unspecified whether rheumatoid factor present ([facility])    7. Diabetic polyneuropathy associated with type 2 diabetes mellitus ([facility])    8. Type 2 diabetes mellitus with other specified complication, unspecified whether long term insulin use ([facility])    9. S/P CABG x 5 1/31/19    10. Anxiety and depression    11. Physical deconditioning    12. Gait disturbance          PLAN/MEDICAL DECISION MAKING:  Patient reports a longstanding history of progressive posterior cervicalgia associated with a significantly increase in the range of motion of the cervical spine, which began without incident.  Patient has failed to obtain pain relief with conservative measures including oral analgesics, physical therapy, to name a few.  MRI of the cervical spine revealed multilevel cervical spondylosis with significant degenerative disc disease, facet hypertrophy contributing to various degrees of canal and neuroforaminal stenosis.  Pain appears to be mostly axial and mechanical. Jesse [place] underwent neurosurgical consultation with   Cali Millan on 01/10/2022, and was found not to be a surgical candidate at that time.  Dr. Millan discussed with the patient the possibility of additional diagnostic work-up for evaluation for a cervical fusion. Pain has progressed in intensity over the past years.  Patient has failed to obtain pain relief with conservative measures, as referenced above. I have reviewed all available patient's medical records as well as previous therapies as referenced above. I had a lengthy conversation with . Jesse Brink regarding his chronic pain condition and potential therapeutic options including risks, benefits, alternative therapies, to name a few. Therefore, I have proposed the following plan:  1. Diagnostic studies: None indicated at this time.  Patient may need cervical myelogram and CT postmyelogram if he continues to struggle with pain  2. Pharmacological measures: Reviewed and discussed;   A. Patient takes gabapentin, baclofen, metaxalone, tramadol.  Patient also takes sertraline   B. Trial with Rheumate one tablet twice daily  C. Trial with prilocaine 2%, lidocaine 10%, imipramine 3%, capsaicin 0.001% and mannitol 20% cream, apply 1 to 2 grams of cream to the affected areas every 4 to 6 hours as needed  3. Interventional pain management measures:Patient will be scheduled for diagnostic bilateral cervical medial branch blocks at C4, C5, C6; for bilateral cervical facet joints at C4-C5, C5-C6, to clarify the origin of chronic refractory mechanical/axial cervicalgia. If patient experiences more than 80% pain relief along with significant improvement in the range of motion of the cervical spine, then, patient will be scheduled for a second set of diagnostic bilateral cervical medial branch blocks, to then, proceed with bilateral cervical medial branch rhizotomies.  Otherwise, we may proceed with a cervical epidural steroid injection by interlaminar approach. We may repeat epidural depending on patient's  outcome. Patient will follow-up with Dr. Millan thereafter for possible ACDF  4. Long-term rehabilitation efforts:  A. The patient does not have a history of falls. I did complete a risk assessment for falls  B. Patient will start a comprehensive physical therapy program at [facility] for upper body strengthening/posture correction, core strengthening, ultrasound, ASTYM, myofascial release, cupping and dry needling once pain is under control  C. Start an exercise program such as daily walks for fitness  D. Contrast therapy: Apply ice-packs for 15-20 minutes, followed by heating pads for 15-20 minutes to affected area   E. Referral to [facility]. Patient's Body mass index is 35.73 kg/m². Patient counseled on the importance of weight loss to help with overall health and pain control. Patient instructed to attempt weight loss.    5. The patient and his family have been instructed to contact my office with any questions or difficulties. The patient understands the plan and agrees to proceed accordingly.    The patient has a documented plan of care to address chronic pain.   Jesse Brink reports a pain score of  6/10.  Given his pain assessment as noted, treatment options were discussed and the following options were decided upon as a follow-up plan to address the patient's pain: continuation of current treatment plan for pain, educational materials on pain management, home exercises and therapy, prescription for non-opiod analgesics, referral to Physical Therapy, referral to Primary Care for assistance in pain treatment guidance, referral to specialist for assistance in pain treatment guidance, steroid injections, use of non-medical modalities (ice, heat, stretching and/or behavior modifications) and interventional pain management measures.               Pain Management Panel     Pain Management Panel Latest Ref Rng & Units 1/30/2019    AMPHETAMINES SCREEN,  URINE Negative Negative    BARBITURATES SCREEN Negative Negative    BENZODIAZEPINE SCREEN, URINE Negative Negative    BUPRENORPHINEUR Negative Negative    COCAINE SCREEN, URINE Negative Negative    METHADONE SCREEN, URINE Negative Negative    METHAMPHETAMINEUR Negative Negative             JESUS query complete. JESUS reviewed by Leobardo Draper MD.     Pain Medications             aspirin 81 MG EC tablet Take 81 mg by mouth Daily.    baclofen (LIORESAL) 10 MG tablet Take 10 mg by mouth As Needed for Muscle Spasms.    gabapentin (NEURONTIN) 300 MG capsule Take 300 mg by mouth 2 (Two) Times a Day.    metaxalone (SKELAXIN) 800 MG tablet 800 mg As Needed.    sertraline (ZOLOFT) 50 MG tablet Take 50 mg by mouth Daily.    traMADol (ULTRAM) 50 MG tablet Take 50 mg by mouth As Needed.             Patient Care Team:  Terri Barreto APRN as PCP - Dillon Sweeney MD as Surgeon (Cardiothoracic Surgery)     No orders of the defined types were placed in this encounter.        Future Appointments   Date Time Provider Department [place]   5/17/2022  9:30 AM Dillon Neville III, MD [facility] IRVN [facility]         Leobardo Draper MD     Please note that portions of this note were completed with a voice recognition program.

## 2022-02-09 PROBLEM — M48.02 CERVICAL SPINAL STENOSIS: Status: ACTIVE | Noted: 2022-02-09

## 2022-02-09 PROBLEM — M50.30 DDD (DEGENERATIVE DISC DISEASE), CERVICAL: Status: ACTIVE | Noted: 2022-02-09

## 2022-02-09 PROBLEM — M47.812 CERVICAL SPONDYLOSIS WITHOUT MYELOPATHY: Status: ACTIVE | Noted: 2022-02-09

## 2022-02-10 ENCOUNTER — OFFICE VISIT (OUTPATIENT)
Dept: PAIN MEDICINE | Facility: CLINIC | Age: 67
End: 2022-02-10

## 2022-02-10 VITALS
OXYGEN SATURATION: 96 % | TEMPERATURE: 96.2 F | DIASTOLIC BLOOD PRESSURE: 77 MMHG | HEIGHT: 68 IN | BODY MASS INDEX: 35.61 KG/M2 | SYSTOLIC BLOOD PRESSURE: 126 MMHG | HEART RATE: 62 BPM | WEIGHT: 235 LBS

## 2022-02-10 DIAGNOSIS — M47.812 CERVICAL SPONDYLOSIS WITHOUT MYELOPATHY: ICD-10-CM

## 2022-02-10 DIAGNOSIS — M47.812 CERVICAL SPONDYLOSIS WITHOUT MYELOPATHY: Primary | ICD-10-CM

## 2022-02-10 DIAGNOSIS — M50.30 DDD (DEGENERATIVE DISC DISEASE), CERVICAL: ICD-10-CM

## 2022-02-10 DIAGNOSIS — E11.42 DIABETIC POLYNEUROPATHY ASSOCIATED WITH TYPE 2 DIABETES MELLITUS: ICD-10-CM

## 2022-02-10 DIAGNOSIS — E11.69 TYPE 2 DIABETES MELLITUS WITH OTHER SPECIFIED COMPLICATION, UNSPECIFIED WHETHER LONG TERM INSULIN USE: ICD-10-CM

## 2022-02-10 DIAGNOSIS — M48.02 CERVICAL SPINAL STENOSIS: ICD-10-CM

## 2022-02-10 DIAGNOSIS — Z95.1 S/P CABG X 5: ICD-10-CM

## 2022-02-10 DIAGNOSIS — R53.81 PHYSICAL DECONDITIONING: ICD-10-CM

## 2022-02-10 DIAGNOSIS — G60.9 IDIOPATHIC PERIPHERAL NEUROPATHY: ICD-10-CM

## 2022-02-10 DIAGNOSIS — M79.18 MYOFASCIAL PAIN: ICD-10-CM

## 2022-02-10 DIAGNOSIS — R26.9 GAIT DISTURBANCE: ICD-10-CM

## 2022-02-10 DIAGNOSIS — F32.A ANXIETY AND DEPRESSION: ICD-10-CM

## 2022-02-10 DIAGNOSIS — M06.9 RHEUMATOID ARTHRITIS, INVOLVING UNSPECIFIED SITE, UNSPECIFIED WHETHER RHEUMATOID FACTOR PRESENT: ICD-10-CM

## 2022-02-10 DIAGNOSIS — F41.9 ANXIETY AND DEPRESSION: ICD-10-CM

## 2022-02-10 PROCEDURE — 99204 OFFICE O/P NEW MOD 45 MIN: CPT | Performed by: ANESTHESIOLOGY

## 2022-02-10 RX ORDER — ME-TETRAHYDROFOLATE/B12/HRB236 1-1-500 MG
1 CAPSULE ORAL DAILY
Qty: 90 CAPSULE | Refills: 1 | Status: SHIPPED | OUTPATIENT
Start: 2022-02-10 | End: 2022-12-08

## 2022-02-11 ENCOUNTER — TRANSCRIBE ORDERS (OUTPATIENT)
Dept: ADMINISTRATIVE | Facility: HOSPITAL | Age: 67
End: 2022-02-11

## 2022-02-11 DIAGNOSIS — R22.1 LOCALIZED SWELLING, MASS AND LUMP, NECK: Primary | ICD-10-CM

## 2022-02-23 ENCOUNTER — OUTSIDE FACILITY SERVICE (OUTPATIENT)
Dept: PAIN MEDICINE | Facility: CLINIC | Age: 67
End: 2022-02-23

## 2022-02-23 DIAGNOSIS — M47.812 CERVICAL SPONDYLOSIS WITHOUT MYELOPATHY: Primary | ICD-10-CM

## 2022-02-23 PROCEDURE — 64491 INJ PARAVERT F JNT C/T 2 LEV: CPT | Performed by: ANESTHESIOLOGY

## 2022-02-23 PROCEDURE — 99152 MOD SED SAME PHYS/QHP 5/>YRS: CPT | Performed by: ANESTHESIOLOGY

## 2022-02-23 PROCEDURE — 64490 INJ PARAVERT F JNT C/T 1 LEV: CPT | Performed by: ANESTHESIOLOGY

## 2022-02-24 ENCOUNTER — TELEPHONE (OUTPATIENT)
Dept: PAIN MEDICINE | Facility: CLINIC | Age: 67
End: 2022-02-24

## 2022-02-24 ENCOUNTER — HOSPITAL ENCOUNTER (OUTPATIENT)
Dept: ULTRASOUND IMAGING | Facility: HOSPITAL | Age: 67
Discharge: HOME | End: 2022-02-24
Admitting: OTOLARYNGOLOGY

## 2022-02-24 DIAGNOSIS — R22.1 LOCALIZED SWELLING, MASS AND LUMP, NECK: ICD-10-CM

## 2022-02-24 PROCEDURE — 88173 CYTOPATH EVAL FNA REPORT: CPT | Performed by: OTOLARYNGOLOGY

## 2022-02-24 RX ORDER — LIDOCAINE HYDROCHLORIDE 10 MG/ML
10 INJECTION, SOLUTION EPIDURAL; INFILTRATION; INTRACAUDAL; PERINEURAL ONCE
Status: COMPLETED | OUTPATIENT
Start: 2022-02-24 | End: 2022-02-24

## 2022-02-24 RX ADMIN — LIDOCAINE HYDROCHLORIDE 10 ML: 10 INJECTION, SOLUTION EPIDURAL; INFILTRATION; INTRACAUDAL; PERINEURAL at 10:30

## 2022-02-24 NOTE — TELEPHONE ENCOUNTER
Spoke with pt regarding yesterdays procedure. Pt stated doing well and no complaints. I advised of next procedure date. Advised also nothing to eat or drink the night prior after midnight, need a  and its over in the surgery center in the [address]. Advised to call office as needed

## 2022-02-24 NOTE — PRE-PROCEDURE NOTE
[facility]   Vascular Interventional Radiology  History & Physicial    Patient Name:Jesse Brink    : 1955    MRN: [id number]    Primary Care Physician: Terri Barreto APRN    Referring Physician: Noelle Rubio, *     Date of admission: 2022    Subjective     Reason for Consult: FNA tongue base nodule    History of Present Illness     Jesse Brink is a 66 y.o. male referred to IR as noted above.      Active Hospital Problems:  There are no active hospital problems to display for this patient.      Personal History     Past Medical History:   Diagnosis Date   • Anxiety    • Cancer (HCC)     skin    • Chest pain syndrome    • Coronary artery disease    • Diabetes mellitus (HCC)     TYPE 2 DIAGNOSED SEVERAL YEARS AGO. TRIES TO TEST ONCE DAILY   • Diverticulitis    • Fibromyalgia    • Hearing aid worn    • History of cellulitis    • History of kidney stones    • History of malignant neoplasm    • History of MRI of spine     demonstrating buldging discs at L3/L4, L4/L5, L5/S1 levels   • History of panic attacks    • History of tobacco abuse     History of tobacco, quitting 28 years ago.   • Hypertension    • Irregular heart beat    • Low back pain    • Migraine     occular   • Obesity    • Rheumatoid arthritis (HCC)    • Transient ischemic attack     Recent transient ischemic attack with normal echocardiogram, carotid duplex, and CT scan of the brain by verbal report.       Past Surgical History:   Procedure Laterality Date   • ANAL FISTULA REPAIR      Anal fistula repair,  and .   • ATRIAL APPENDAGE EXCLUSION LEFT WITH TRANSESOPHAGEAL ECHOCARDIOGRAM N/A 2019    Procedure: ATRIAL APPENDAGE OCCLUSION LEFT;  Surgeon: Dillon Gu MD;  Location:  [facility];  Service: Cardiothoracic   • CARDIAC CATHETERIZATION N/A 2019    Procedure: Left Heart Cath;  Surgeon: Dillon Neville III, MD;  Location:  [facility];  Service: Cardiovascular   •  CARDIAC CATHETERIZATION N/A 3/26/2021    Procedure: Left Heart Cath;  Surgeon: Mercy Martines MD;  Location:  [facility];  Service: Cardiovascular;  Laterality: N/A;   • CHOLECYSTECTOMY N/A 3/28/2021    Procedure: CHOLECYSTECTOMY LAPAROSCOPIC;  Surgeon: Gigi Ambrose MD;  Location:  [facility];  Service: General;  Laterality: N/A;   • COLONOSCOPY  2016   • CORONARY ARTERY BYPASS GRAFT N/A 1/31/2019    Procedure: MEDIAN STERNOTOMY, CORONARY ARTERY BYPASS GRAFT X5, UTILIZING THE LEFT INTERNAL MAMMARY ARTERY, EVH OF THE RIGHT GREATER SAPHENOUS VEIN;  Surgeon: Dillon Gu MD;  Location:  [facility];  Service: Cardiothoracic   • MYOMECTOMY  1980    Benign fibroid removed, 1980.   • PILONIDAL CYSTECTOMY  1977   • SKIN CANCER EXCISION      left shoulder   • TRANSESOPHAGEAL ECHOCARDIOGRAM (TIMOTHY) N/A 1/31/2019    Procedure: TRANSESOPHAGEAL ECHOCARDIOGRAM WITH ANESTHESIA;  Surgeon: Dillon Gu MD;  Location:  [facility];  Service: Cardiothoracic       Family History: His family history includes Cancer in his father and another family member; Diabetes in his father and another family member; Heart disease in his mother and another family member; Hypertension in his father and another family member.     Social History: He  reports that he quit smoking about 38 years ago. His smoking use included cigarettes. He smoked 1.00 pack per day. He has never used smokeless tobacco. He reports current alcohol use of about 1.0 standard drink of alcohol per week. He reports that he does not use drugs.    Home Medications:  Gel Base, Rheumate, Vitamin D3, amLODIPine, aspirin, baclofen, carvedilol, empagliflozin, gabapentin, losartan, metaxalone, potassium chloride, rosuvastatin, saccharomyces boulardii, sertraline, tamsulosin, and traMADol    Current Medications:  No current facility-administered medications for this encounter.  •  Chlorhexidine Gluconate Cloth     Allergies:  He is allergic to humira  [adalimumab].    Review of Systems    Objective     There were no vitals taken for this visit.     Physical Exam    A&Ox3. Able to communicate  No Apparent Distress  Average physique      Result Review      I have personally reviewed the results from the time of this admission to 2/24/2022 11:02 EST and agree with these findings.  [x]  Laboratory  []  Microbiology  [x]  Radiology  []  EKG/Telemetry   []  Cardiology/Vascular   []  Pathology  []  Old records  []  Other:    Most notable findings include: As noted:          CrCl cannot be calculated (Patient's most recent lab result is older than the maximum 30 days allowed.). No results found for: CREATININE    COVID19   Date Value Ref Range Status   03/27/2021 Presumptive Negative Presumptive Negative - Ref. Range Final          Assessment / Plan     Jesse Brink is a 66 y.o. male referred to the IR service with above problem.    Plan:   As above.    Wolf Wilkerson MD   Vascular Interventional Radiology  02/24/22   11:02 AM EST

## 2022-02-28 ENCOUNTER — TELEPHONE (OUTPATIENT)
Dept: PRIMARY CARE CLINIC | Age: 67
End: 2022-02-28

## 2022-02-28 LAB
LAB AP CASE REPORT: NORMAL
PATH REPORT.FINAL DX SPEC: NORMAL

## 2022-02-28 NOTE — TELEPHONE ENCOUNTER
Pt called regarding his [employer] that he gets through  assistance. Informed pt that [facility] will be taking over the pt's manfuacturer assistance. Pt provided verbal permission for me to send his application and financials to [employer] at [facility]. Informed pt to call PCP office if he is running low on Jardiance for samples. Pt verbalized understanding.     Maxi Montanez, AgustinD

## 2022-03-16 ENCOUNTER — OUTSIDE FACILITY SERVICE (OUTPATIENT)
Dept: PAIN MEDICINE | Facility: CLINIC | Age: 67
End: 2022-03-16

## 2022-03-21 DIAGNOSIS — M47.812 CERVICAL SPONDYLOSIS WITHOUT MYELOPATHY: Primary | ICD-10-CM

## 2022-03-21 RX ORDER — CARVEDILOL 25 MG/1
TABLET ORAL
Qty: 270 TABLET | Refills: 0 | Status: SHIPPED | OUTPATIENT
Start: 2022-03-21 | End: 2022-06-06

## 2022-03-23 ENCOUNTER — OUTSIDE FACILITY SERVICE (OUTPATIENT)
Dept: PAIN MEDICINE | Facility: CLINIC | Age: 67
End: 2022-03-23

## 2022-03-23 DIAGNOSIS — M47.812 CERVICAL SPONDYLOSIS WITHOUT MYELOPATHY: Primary | ICD-10-CM

## 2022-03-23 PROCEDURE — 64490 INJ PARAVERT F JNT C/T 1 LEV: CPT | Performed by: ANESTHESIOLOGY

## 2022-03-23 PROCEDURE — 99152 MOD SED SAME PHYS/QHP 5/>YRS: CPT | Performed by: ANESTHESIOLOGY

## 2022-03-23 PROCEDURE — 64491 INJ PARAVERT F JNT C/T 2 LEV: CPT | Performed by: ANESTHESIOLOGY

## 2022-03-24 ENCOUNTER — TELEPHONE (OUTPATIENT)
Dept: PAIN MEDICINE | Facility: CLINIC | Age: 67
End: 2022-03-24

## 2022-03-24 NOTE — TELEPHONE ENCOUNTER
Spoke with pt regarding how they’re feeling after yesterday’s procedure with Dr. Draper. Pt advised doing well with no complaints. I advised of next procedure date and time. Also advised nothing to eat or drink the night prior, must have a , and the procedure is in the [address]. Pt verbalized understanding, no further needs expressed.

## 2022-04-06 ENCOUNTER — OUTSIDE FACILITY SERVICE (OUTPATIENT)
Dept: PAIN MEDICINE | Facility: CLINIC | Age: 67
End: 2022-04-06

## 2022-04-06 PROCEDURE — 64634 DESTROY C/TH FACET JNT ADDL: CPT | Performed by: ANESTHESIOLOGY

## 2022-04-06 PROCEDURE — 64633 DESTROY CERV/THOR FACET JNT: CPT | Performed by: ANESTHESIOLOGY

## 2022-04-06 PROCEDURE — 99152 MOD SED SAME PHYS/QHP 5/>YRS: CPT | Performed by: ANESTHESIOLOGY

## 2022-04-07 ENCOUNTER — APPOINTMENT (OUTPATIENT)
Dept: DIABETES SERVICES | Facility: HOSPITAL | Age: 67
End: 2022-04-07

## 2022-04-07 ENCOUNTER — TELEPHONE (OUTPATIENT)
Dept: PAIN MEDICINE | Facility: CLINIC | Age: 67
End: 2022-04-07

## 2022-04-07 NOTE — TELEPHONE ENCOUNTER
Spoke with pt regarding yesterday’s procedure with Dr. Draper. Pt stated they are doing well, with no complaints. Advised of f/u appointment. Pt understood, and no further needs expressed

## 2022-05-17 ENCOUNTER — OFFICE VISIT (OUTPATIENT)
Dept: CARDIOLOGY | Facility: CLINIC | Age: 67
End: 2022-05-17

## 2022-05-17 VITALS
BODY MASS INDEX: 35.01 KG/M2 | WEIGHT: 231 LBS | DIASTOLIC BLOOD PRESSURE: 78 MMHG | SYSTOLIC BLOOD PRESSURE: 130 MMHG | HEART RATE: 70 BPM | OXYGEN SATURATION: 99 % | HEIGHT: 68 IN

## 2022-05-17 DIAGNOSIS — E78.2 MIXED HYPERLIPIDEMIA: ICD-10-CM

## 2022-05-17 DIAGNOSIS — I10 BENIGN ESSENTIAL HTN: ICD-10-CM

## 2022-05-17 DIAGNOSIS — I25.10 CAD IN NATIVE ARTERY: Primary | ICD-10-CM

## 2022-05-17 DIAGNOSIS — I25.5 ISCHEMIC CARDIOMYOPATHY: ICD-10-CM

## 2022-05-17 PROCEDURE — 99214 OFFICE O/P EST MOD 30 MIN: CPT | Performed by: INTERNAL MEDICINE

## 2022-05-17 RX ORDER — ROSUVASTATIN CALCIUM 20 MG/1
20 TABLET, COATED ORAL DAILY
COMMUNITY

## 2022-05-17 RX ORDER — ABATACEPT 125 MG/ML
1 INJECTION, SOLUTION SUBCUTANEOUS WEEKLY
COMMUNITY
Start: 2022-02-09

## 2022-05-17 NOTE — PROGRESS NOTES
[facility]  Office visit  Jesse Brink  1955  [phone]    VISIT DATE:  5/17/2022      PCP: Terri Barreto APRN  [address] [place] [id number]    CC:  Chief Complaint   Patient presents with   • Coronary Artery Disease   • Dizziness       PROBLEM LIST:  1. Coronary artery disease  2. Transient ischemic attack with normal echocardiogram, carotid duplex, and CT scan of the brain by verbal report.  3. Benign hypertension.  4. History of tobacco, quitting 28 years ago.  5. Hyperglycemia, diet controlled.  6. Rheumatoid arthritis.  7. Obesity.  8. Family history of heart disease.  9. Surgical history:  a. Pilonidal cyst, 1977.  b. Benign fibroid removed, 1980.  c. Anal fistula repair, 1999 and 2008.    Previous cardiac studies and procedures  January 2019  Cardiac catheterization  · Multivessel obstructive coronary disease: Proximal RCA .  Mid LAD .  95% proximal first diagonal, 95% first obtuse marginal branch, 70-80% proximal second obtuse marginal branch.  · Mildly elevated LVEDP  · No aortic stenosis.  Echo   · Left ventricular wall thickness is consistent with mild concentric hypertrophy.  · Estimated EF appears to be in the range of 36 - 40%  · Left ventricular diastolic dysfunction (grade I a) consistent with impaired relaxation.  · The following left ventricular wall segments are hypokinetic: mid anterior, apical anterior, apical lateral, apical inferior, apical septal and apex hypokinetic.  · Regional contracility augments post PVC.    Carotid duplex  · Right internal carotid artery stenosis of 0-49%.  · Left internal carotid artery stenosis of 0-49%.    1. Coronary artery bypass graft x 5  -LIMA to LAD.    -Greater saphenous vein to RCA  -Greater saphenous vein to OM1  -Greater saphenous vein to OM2  -Greater saphenous vein to D1  2. Left atrial appendage ligation (35 mm Atricure clip placement)    May 2019 echo  · Left ventricular systolic function is  "normal.  · Calculated EF = 55%  · Left atrial cavity size is mildly dilated.    March 2021  Cardiac catheterization  · 3/5 patent grafts with evidence of occlusion of the SVG to one of the obtuse marginal branches as well as the diagonal branch.  These both appear to be old findings.  · Normal LV systolic function wall motion  · No EKG changes are seen  · Elevated LVEDP at 22 mmHg  Transthoracic echocardiogram  · Estimated left ventricular EF = 60% Left ventricular systolic function is normal.  · Left ventricular diastolic function is consistent with (grade II w/high LAP) pseudonormalization.  · Trace mitral valve regurgitation is present.  · Trace tricuspid valve regurgitation is present.  · There is no evidence of pericardial effusion    ASSESSMENT:   Diagnosis Plan   1. CAD in native artery     2. Benign essential HTN     3. Ischemic cardiomyopathy     4. Mixed hyperlipidemia         PLAN:  Coronary artery disease: Status post surgical revascularization.  Normal EF, continue current medical therapy.    Hypertension: Goal less than 130/90.  Currently well controlled, continue current medical therapy.    Palpitations: Consistent with symptomatic premature ventricular contractions.   Continue beta-blockade.  Currently well controlled.    Hyperlipidemia: Continue high intensity statin therapy, goal LDL less than 70.  Continue rosuvastatin 20 mg p.o. daily.  Currently well controlled.    History of stroke: Continue aggressive risk factor modification.  Continue current medical therapy.    Subjective  Denies chest discomfort. Blood pressures at home running less than 130/80 mmHg. Compliant with medical therapy.     PHYSICAL EXAMINATION:  Vitals:    05/17/22 0931   BP: 130/78   BP Location: Right arm   Patient Position: Sitting   Pulse: 70   SpO2: 99%   Weight: 105 kg (231 lb)   Height: 172.7 cm (68\")     General Appearance:    Alert, cooperative, no distress, appears stated age   Head:    Normocephalic, without obvious " abnormality, atraumatic   Eyes:    conjunctiva/corneas clear   Nose:   Nares normal, septum midline, mucosa normal, no drainage   Throat:   Lips, teeth and gums normal   Neck:   Supple, symmetrical, trachea midline, no carotid    bruit or JVD   Lungs:     Clear to auscultation bilaterally, respirations unlabored   Chest Wall:    No tenderness or deformity    Heart:    Regular rate and rhythm, S1 and S2 normal, no murmur, rub   or gallop, normal carotid impulse bilaterally without bruit.   Abdomen:     Soft, non-tender   Extremities:   Extremities normal, atraumatic, no cyanosis or edema   Pulses:   2+ and symmetric all extremities   Skin:   Skin color, texture, turgor normal, no rashes or lesions       Diagnostic Data:  Procedures  Lab Results   Component Value Date    TRIG 96 03/27/2021    HDL 51 03/27/2021     Lab Results   Component Value Date    GLUCOSE 128 (H) 03/29/2021    BUN 16 03/29/2021    CREATININE 1.23 03/29/2021     03/29/2021    K 4.1 03/29/2021     03/29/2021    CO2 20.0 (L) 03/29/2021     Lab Results   Component Value Date    HGBA1C 6.60 (H) 01/30/2019     Lab Results   Component Value Date    WBC 9.82 03/29/2021    HGB 14.8 03/29/2021    HCT 45.9 03/29/2021     03/29/2021       Allergies  Allergies   Allergen Reactions   • Humira [Adalimumab] Other (See Comments)     HA, intractable ocular migraine       Current Medications    Current Outpatient Medications:   •  Abatacept (Orencia ClickJect) 125 MG/ML solution auto-injector, Inject 1 mL under the skin into the appropriate area as directed 1 (One) Time Per Week., Disp: , Rfl:   •  amLODIPine (NORVASC) 2.5 MG tablet, Take 2.5 mg by mouth Daily., Disp: , Rfl:   •  aspirin 81 MG EC tablet, Take 81 mg by mouth Daily., Disp: , Rfl:   •  baclofen (LIORESAL) 10 MG tablet, Take 10 mg by mouth As Needed for Muscle Spasms., Disp: , Rfl:   •  carvedilol (COREG) 25 MG tablet, TAKE 1 AND 1/2 TABLETS TWICE DAILY WITH MEALS, Disp: 270 tablet,  Rfl: 0  •  Cholecalciferol (VITAMIN D3) 2000 UNITS capsule, Take 2,000 Units by mouth Daily., Disp: , Rfl:   •  Dietary Management Product (Rheumate) capsule, Take 1 capsule by mouth Daily., Disp: 90 capsule, Rfl: 1  •  empagliflozin (JARDIANCE) 25 MG tablet tablet, Take 25 mg by mouth Daily., Disp: , Rfl:   •  gabapentin (NEURONTIN) 300 MG capsule, Take 300 mg by mouth 2 (Two) Times a Day., Disp: , Rfl:   •  losartan (COZAAR) 100 MG tablet, TAKE 1 AND 1/2 TABLETS EVERY DAY, Disp: 135 tablet, Rfl: 1  •  metaxalone (SKELAXIN) 800 MG tablet, Take 800 mg by mouth As Needed., Disp: , Rfl:   •  potassium chloride (K-DUR) 10 MEQ CR tablet, Take 10 mEq by mouth Daily., Disp: , Rfl:   •  rosuvastatin (CRESTOR) 20 MG tablet, Take 20 mg by mouth Daily., Disp: , Rfl:   •  saccharomyces boulardii (FLORASTOR) 250 MG capsule, Take 250 mg by mouth Daily., Disp: , Rfl:   •  sertraline (ZOLOFT) 50 MG tablet, Take 50 mg by mouth Daily., Disp: , Rfl:   •  tamsulosin (FLOMAX) 0.4 MG capsule 24 hr capsule, Take 1 capsule by mouth Daily., Disp: , Rfl:   •  traMADol (ULTRAM) 50 MG tablet, Take 50 mg by mouth As Needed., Disp: , Rfl:   No current facility-administered medications for this visit.    Facility-Administered Medications Ordered in Other Visits:   •  Chlorhexidine Gluconate Cloth 2 % pads 1 application, 1 application, Topical, Q12H PRN, Marie Barnes PA-C ROS  Review of Systems   Cardiovascular: Positive for leg swelling. Negative for chest pain, claudication, dyspnea on exertion, irregular heartbeat and palpitations.   Respiratory: Negative for cough and shortness of breath.          SOCIAL HX  Social History     Socioeconomic History   • Marital status:    • Number of children: 2   Tobacco Use   • Smoking status: Former Smoker     Packs/day: 1.00     Types: Cigarettes     Quit date: 3/5/1984     Years since quittin.2   • Smokeless tobacco: Never Used   Vaping Use   • Vaping Use: Never used    Substance and Sexual Activity   • Alcohol use: Yes     Alcohol/week: 1.0 standard drink     Types: 1 Cans of beer per week     Comment: occas   • Drug use: No   • Sexual activity: Defer       FAMILY HX  Family History   Problem Relation Age of Onset   • Heart disease Other    • Diabetes Other    • Cancer Other    • Hypertension Other    • Heart disease Mother    • Diabetes Father    • Hypertension Father    • Cancer Father              Dillon Neville III, MD, FACC

## 2022-06-06 RX ORDER — CARVEDILOL 25 MG/1
TABLET ORAL
Qty: 270 TABLET | Refills: 1 | Status: SHIPPED | OUTPATIENT
Start: 2022-06-06 | End: 2023-01-11

## 2022-06-09 RX ORDER — LOSARTAN POTASSIUM 100 MG/1
TABLET ORAL
Qty: 135 TABLET | Refills: 1 | Status: SHIPPED | OUTPATIENT
Start: 2022-06-09 | End: 2023-01-11

## 2022-06-17 ENCOUNTER — TRANSCRIBE ORDERS (OUTPATIENT)
Dept: LAB | Facility: HOSPITAL | Age: 67
End: 2022-06-17

## 2022-06-17 ENCOUNTER — HOSPITAL ENCOUNTER (OUTPATIENT)
Dept: CARDIOLOGY | Facility: HOSPITAL | Age: 67
Discharge: HOME | End: 2022-06-17

## 2022-06-17 ENCOUNTER — LAB (OUTPATIENT)
Dept: LAB | Facility: HOSPITAL | Age: 67
End: 2022-06-17

## 2022-06-17 DIAGNOSIS — I10 ESSENTIAL HYPERTENSION, MALIGNANT: ICD-10-CM

## 2022-06-17 DIAGNOSIS — Z01.818 PRE-OPERATIVE CLEARANCE: ICD-10-CM

## 2022-06-17 DIAGNOSIS — Z01.818 PRE-OPERATIVE CLEARANCE: Primary | ICD-10-CM

## 2022-06-17 LAB
ALBUMIN SERPL-MCNC: 4.2 G/DL (ref 3.5–5.2)
ALBUMIN/GLOB SERPL: 1.6 G/DL
ALP SERPL-CCNC: 72 U/L (ref 39–117)
ALT SERPL W P-5'-P-CCNC: 19 U/L (ref 1–41)
ANION GAP SERPL CALCULATED.3IONS-SCNC: 10.9 MMOL/L (ref 5–15)
AST SERPL-CCNC: 19 U/L (ref 1–40)
BILIRUB SERPL-MCNC: 0.5 MG/DL (ref 0–1.2)
BUN SERPL-MCNC: 13 MG/DL (ref 8–23)
BUN/CREAT SERPL: 10.9 (ref 7–25)
CALCIUM SPEC-SCNC: 9.2 MG/DL (ref 8.6–10.5)
CHLORIDE SERPL-SCNC: 102 MMOL/L (ref 98–107)
CO2 SERPL-SCNC: 24.1 MMOL/L (ref 22–29)
CREAT SERPL-MCNC: 1.19 MG/DL (ref 0.76–1.27)
DEPRECATED RDW RBC AUTO: 39.2 FL (ref 37–54)
EGFRCR SERPLBLD CKD-EPI 2021: 67.8 ML/MIN/1.73
ERYTHROCYTE [DISTWIDTH] IN BLOOD BY AUTOMATED COUNT: 12.2 % (ref 12.3–15.4)
GLOBULIN UR ELPH-MCNC: 2.6 GM/DL
GLUCOSE SERPL-MCNC: 183 MG/DL (ref 65–99)
HCT VFR BLD AUTO: 48 % (ref 37.5–51)
HGB BLD-MCNC: 17.1 G/DL (ref 13–17.7)
MCH RBC QN AUTO: 31.7 PG (ref 26.6–33)
MCHC RBC AUTO-ENTMCNC: 35.6 G/DL (ref 31.5–35.7)
MCV RBC AUTO: 88.9 FL (ref 79–97)
PLATELET # BLD AUTO: 195 10*3/MM3 (ref 140–450)
PMV BLD AUTO: 10.1 FL (ref 6–12)
POTASSIUM SERPL-SCNC: 4.5 MMOL/L (ref 3.5–5.2)
PROT SERPL-MCNC: 6.8 G/DL (ref 6–8.5)
RBC # BLD AUTO: 5.4 10*6/MM3 (ref 4.14–5.8)
SODIUM SERPL-SCNC: 137 MMOL/L (ref 136–145)
WBC NRBC COR # BLD: 7.65 10*3/MM3 (ref 3.4–10.8)

## 2022-06-17 PROCEDURE — 36415 COLL VENOUS BLD VENIPUNCTURE: CPT

## 2022-06-17 PROCEDURE — 80053 COMPREHEN METABOLIC PANEL: CPT

## 2022-06-17 PROCEDURE — 85027 COMPLETE CBC AUTOMATED: CPT

## 2022-06-17 PROCEDURE — 93005 ELECTROCARDIOGRAM TRACING: CPT | Performed by: OTOLARYNGOLOGY

## 2022-06-19 LAB
QT INTERVAL: 440 MS
QTC INTERVAL: 420 MS

## 2022-06-20 ENCOUNTER — TELEPHONE (OUTPATIENT)
Dept: CARDIOLOGY | Facility: CLINIC | Age: 67
End: 2022-06-20

## 2022-06-20 NOTE — TELEPHONE ENCOUNTER
Tati at  is requesting a cardiac risk assessment and to hold his aspirin prior to surgery. He is scheduled to have a neck mass excision on 7/11/22. He had an EKG on 6/17/22 in [place] that is scanned in media. Please advise.

## 2022-06-20 NOTE — TELEPHONE ENCOUNTER
Acceptable cardiac risk for upcoming neck mass excision.  May hold aspirin 5 to 7 days prior to procedure.

## 2022-08-10 ENCOUNTER — HOSPITAL ENCOUNTER (OUTPATIENT)
Dept: ULTRASOUND IMAGING | Facility: HOSPITAL | Age: 67
Discharge: HOME | End: 2022-08-10
Payer: MEDICARE

## 2022-08-10 DIAGNOSIS — Z13.6 ENCOUNTER FOR SCREENING FOR VASCULAR DISEASE: ICD-10-CM

## 2022-08-10 PROCEDURE — 76706 US ABDL AORTA SCREEN AAA: CPT

## 2022-08-24 ENCOUNTER — OFFICE VISIT (OUTPATIENT)
Dept: PAIN MEDICINE | Facility: CLINIC | Age: 67
End: 2022-08-24

## 2022-08-24 VITALS
HEIGHT: 68 IN | RESPIRATION RATE: 16 BRPM | DIASTOLIC BLOOD PRESSURE: 84 MMHG | HEART RATE: 62 BPM | WEIGHT: 232.6 LBS | OXYGEN SATURATION: 97 % | SYSTOLIC BLOOD PRESSURE: 140 MMHG | TEMPERATURE: 97.9 F | BODY MASS INDEX: 35.25 KG/M2

## 2022-08-24 DIAGNOSIS — M79.18 MYOFASCIAL PAIN: ICD-10-CM

## 2022-08-24 DIAGNOSIS — M06.9 RHEUMATOID ARTHRITIS, INVOLVING UNSPECIFIED SITE, UNSPECIFIED WHETHER RHEUMATOID FACTOR PRESENT: ICD-10-CM

## 2022-08-24 DIAGNOSIS — F32.A ANXIETY AND DEPRESSION: ICD-10-CM

## 2022-08-24 DIAGNOSIS — M48.02 CERVICAL SPINAL STENOSIS: ICD-10-CM

## 2022-08-24 DIAGNOSIS — F41.9 ANXIETY AND DEPRESSION: ICD-10-CM

## 2022-08-24 DIAGNOSIS — E11.42 DIABETIC POLYNEUROPATHY ASSOCIATED WITH TYPE 2 DIABETES MELLITUS: ICD-10-CM

## 2022-08-24 DIAGNOSIS — M47.812 CERVICAL SPONDYLOSIS WITHOUT MYELOPATHY: ICD-10-CM

## 2022-08-24 DIAGNOSIS — M50.30 DDD (DEGENERATIVE DISC DISEASE), CERVICAL: ICD-10-CM

## 2022-08-24 DIAGNOSIS — R53.81 PHYSICAL DECONDITIONING: ICD-10-CM

## 2022-08-24 PROCEDURE — 99213 OFFICE O/P EST LOW 20 MIN: CPT | Performed by: NURSE PRACTITIONER

## 2022-08-24 RX ORDER — ACETAMINOPHEN 500 MG
2 TABLET ORAL
COMMUNITY

## 2022-08-24 RX ORDER — SYRING-NEEDL,DISP,INSUL,0.3 ML 30 GX5/16"
1 SYRINGE, EMPTY DISPOSABLE MISCELLANEOUS 4 TIMES DAILY
COMMUNITY

## 2022-08-24 NOTE — PROGRESS NOTES
"Chief Complaint: \"Neck pain.\"        History of Present Illness:   Patient: Mr. Jesse Brink, 66 y.o. male originally referred by Dr. Cali Millan in consultation for chronic intractable neck pain.  Patient reports a longstanding history of neck pain, which began without incident.  MRI of the cervical spine revealed multilevel cervical spondylosis with significant degenerative disc disease and facet hypertrophy with multiple levels of spinal canal and neural foraminal stenosis.  He was seen by neurosurgery, Dr. Cali Millan on 1/10/2022 and was found not to be a surgical candidate at that time, it was discussed with the patient the possibility of additional work-up for evaluation of cervical fusion in the future.  We last saw him on April 6, 2022, when he underwent bilateral cervical medial branch rhizotomies at C4, C5, and C6, from which he reports experiencing 80% pain relief and functional improvement lasting 3 months, with an additional 60-70% ongoing relief.  He did begin physical therapy thereafter.  He returns today for postprocedure follow-up and evaluation.  Pain Description: Constant low grade neck pain with intermittent exacerbation, described as aching, sharp, stabbing and throbbing sensation.   Radiation of Pain: The pain radiates into the shoulder blades RT> LT  Pain intensity today: 3/10  Average pain intensity last week: 3/10  Pain intensity ranges from: 1/10 to 6/10  Aggravating factors: Pain increases with extension, rotation of the cervical spine   Alleviating factors: Pain decreases with sitting  Associated Symptoms:   Patient denies pain, numbness, or weakness in the upper.   Patient denies any new bladder or bowel problems.   Patient reports difficulties with his balance but denies recent falls.       Review of previous therapies and additional medical records:  Jesse Brink has already failed the following measures, including:   Conservative Measures: Oral " analgesics, opioids, topical analgesics, ice, heat, physical therapy   Interventional Measures: 4/6/2022: Bilateral cervical RFA  Surgical Measures: No history of previous cervical spine or shoulder surgery. No history of previous lumbar spine or hip surgery   Jesse Brink underwent neurosurgical consultation with Dr. Cali Millan on 01/10/2022, and was found not to be a surgical candidate.  Jesse Brink presents with significant comorbidities including anxiety, CAD, diabetes, fibromyalgia, history of panic attacks, hypertension, migraines, rheumatoid arthritis, history of TIAs, history of CABG 1/31/2019, on aspirin  In terms of current analgesics, Jesse Brink takes: Metaxalone, gabapentin, baclofen, Norco.  Patient also takes sertraline  I have reviewed James Report is consistent with medication reconciliation.  SOAPP: Low Risk     Global Pain Scale 02-10  2022 08-24 2022         Pain 15 7         Feelings 5 2         Clinical outcomes 10 4         Activities 6 0         GPS Total: 36 13           Review of Diagnostic Studies:    MRI of the cervical spine without contrast 10/18/2021: Imaging was reviewed.  Vertebral body heights are well-maintained without evidence of malalignment.    C2-C3: Disc osteophyte complex.  Facet arthropathy.  Mild canal and mild right foraminal stenosis.  Moderate left foraminal stenosis.  C3-C4: Disc osteophyte complex, facet arthropathy.  Mild canal stenosis.  Mild right and severe left foraminal stenosis   C4-C5: Disc osteophyte complex, facet hypertrophy.  Moderate spinal canal stenosis and mild to moderate bilateral foraminal stenosis   C5-C6: Diffuse disc for complex, facet hypertrophy.  Mild to moderate canal stenosis.  Moderate to severe foraminal stenosis  C6-C7: Severe disc space narrowing, diffuse hard disc osteophyte complex, facet hypertrophy.  Mild canal stenosis.  Moderate to severe bilateral foraminal stenosis  C7-T1: Disc osteophyte  complex.  Mild canal stenosis.  Moderate to severe bilateral foraminal stenosis    Review of Systems   HENT: Positive for dental problem.    Musculoskeletal: Positive for arthralgias, back pain, myalgias, neck pain and neck stiffness.   All other systems reviewed and are negative.        Patient Active Problem List   Diagnosis   • Benign essential HTN   • Lumbosacral radiculopathy at S1   • Numbness of lower extremity   • Transient ischemic attack   • Rheumatoid arthritis (HCC)   • Obesity   • Idiopathic peripheral neuropathy   • Palpitations   • CAD in native artery   • Type 2 diabetes mellitus (HCC)   • Ischemic cardiomyopathy   • S/P CABG x 5 1/31/19   • Mixed hyperlipidemia   • Acute coronary syndrome (HCC)   • Renal colic   • Cholelithiasis   • Myofascial pain   • Degeneration of lumbar intervertebral disc   • Acute non-recurrent maxillary sinusitis   • Kidney disease   • Cervical spondylosis without myelopathy   • DDD (degenerative disc disease), cervical   • Cervical spinal stenosis   • Diabetic polyneuropathy associated with type 2 diabetes mellitus (HCC)   • Anxiety and depression   • Physical deconditioning   • Gait disturbance       Past Medical History:   Diagnosis Date   • Anxiety    • Cancer (HCC)     skin    • Chest pain syndrome    • Coronary artery disease    • Diabetes mellitus (HCC)     TYPE 2 DIAGNOSED SEVERAL YEARS AGO. TRIES TO TEST ONCE DAILY   • Diverticulitis    • Fibromyalgia    • Hearing aid worn    • History of cellulitis    • History of kidney stones    • History of malignant neoplasm    • History of MRI of spine     demonstrating buldging discs at L3/L4, L4/L5, L5/S1 levels   • History of panic attacks    • History of tobacco abuse     History of tobacco, quitting 28 years ago.   • Hypertension    • Irregular heart beat    • Low back pain    • Migraine     occular   • Obesity    • Rheumatoid arthritis (HCC)    • Transient ischemic attack     Recent transient ischemic attack with normal  echocardiogram, carotid duplex, and CT scan of the brain by verbal report.         Past Surgical History:   Procedure Laterality Date   • ANAL FISTULA REPAIR  1999    Anal fistula repair, 1999 and 2008.   • ATRIAL APPENDAGE EXCLUSION LEFT WITH TRANSESOPHAGEAL ECHOCARDIOGRAM N/A 01/31/2019    Procedure: ATRIAL APPENDAGE OCCLUSION LEFT;  Surgeon: Dillon Gu MD;  Location:  [facility];  Service: Cardiothoracic   • CARDIAC CATHETERIZATION N/A 01/18/2019    Procedure: Left Heart Cath;  Surgeon: Dillon Neville III, MD;  Location:  [facility];  Service: Cardiovascular   • CARDIAC CATHETERIZATION N/A 03/26/2021    Procedure: Left Heart Cath;  Surgeon: Mercy Martines MD;  Location:  [facility];  Service: Cardiovascular;  Laterality: N/A;   • CHOLECYSTECTOMY N/A 03/28/2021    Procedure: CHOLECYSTECTOMY LAPAROSCOPIC;  Surgeon: Gigi Ambrose MD;  Location:  [facility];  Service: General;  Laterality: N/A;   • COLONOSCOPY  2016   • CORONARY ARTERY BYPASS GRAFT N/A 01/31/2019    Procedure: MEDIAN STERNOTOMY, CORONARY ARTERY BYPASS GRAFT X5, UTILIZING THE LEFT INTERNAL MAMMARY ARTERY, EVH OF THE RIGHT GREATER SAPHENOUS VEIN;  Surgeon: Dillon Gu MD;  Location:  [facility];  Service: Cardiothoracic   • MOUTH FLOOR MASS EXCISION N/A    • MYOMECTOMY  1980    Benign fibroid removed, 1980.   • PILONIDAL CYSTECTOMY  1977   • SKIN CANCER EXCISION      left shoulder   • TRANSESOPHAGEAL ECHOCARDIOGRAM (TIMOTHY) N/A 01/31/2019    Procedure: TRANSESOPHAGEAL ECHOCARDIOGRAM WITH ANESTHESIA;  Surgeon: Dillon Gu MD;  Location:  [facility];  Service: Cardiothoracic         Family History   Problem Relation Age of Onset   • Heart disease Other    • Diabetes Other    • Cancer Other    • Hypertension Other    • Heart disease Mother    • Diabetes Father    • Hypertension Father    • Cancer Father          Social History     Socioeconomic History   • Marital status:    • Number of  children: 2   Tobacco Use   • Smoking status: Former Smoker     Packs/day: 1.00     Types: Cigarettes     Quit date: 3/5/1984     Years since quittin.4   • Smokeless tobacco: Never Used   Vaping Use   • Vaping Use: Never used   Substance and Sexual Activity   • Alcohol use: Yes     Alcohol/week: 1.0 standard drink     Types: 1 Cans of beer per week     Comment: occas   • Drug use: No   • Sexual activity: Defer           Current Outpatient Medications:   •  Abatacept (Orencia ClickJect) 125 MG/ML solution auto-injector, Inject 1 mL under the skin into the appropriate area as directed 1 (One) Time Per Week., Disp: , Rfl:   •  amLODIPine (NORVASC) 2.5 MG tablet, Take 2.5 mg by mouth Daily., Disp: , Rfl:   •  aspirin 81 MG EC tablet, Take 81 mg by mouth Daily., Disp: , Rfl:   •  baclofen (LIORESAL) 10 MG tablet, Take 10 mg by mouth As Needed for Muscle Spasms., Disp: , Rfl:   •  carvedilol (COREG) 25 MG tablet, TAKE 1 AND 1/2 TABLETS TWICE DAILY WITH MEALS, Disp: 270 tablet, Rfl: 1  •  Cholecalciferol (VITAMIN D3) 2000 UNITS capsule, Take 2,000 Units by mouth Daily., Disp: , Rfl:   •  empagliflozin (JARDIANCE) 25 MG tablet tablet, Take 25 mg by mouth Daily., Disp: , Rfl:   •  gabapentin (NEURONTIN) 300 MG capsule, Take 300 mg by mouth 2 (Two) Times a Day., Disp: , Rfl:   •  losartan (COZAAR) 100 MG tablet, TAKE 1 AND 1/2 TABLETS EVERY DAY, Disp: 135 tablet, Rfl: 1  •  metaxalone (SKELAXIN) 800 MG tablet, Take 800 mg by mouth As Needed., Disp: , Rfl:   •  potassium chloride (K-DUR) 10 MEQ CR tablet, Take 10 mEq by mouth Daily., Disp: , Rfl:   •  rosuvastatin (CRESTOR) 20 MG tablet, Take 20 mg by mouth Daily., Disp: , Rfl:   •  sertraline (ZOLOFT) 50 MG tablet, Take 50 mg by mouth Daily., Disp: , Rfl:   •  tamsulosin (FLOMAX) 0.4 MG capsule 24 hr capsule, Take 1 capsule by mouth Daily., Disp: , Rfl:   •  traMADol (ULTRAM) 50 MG tablet, Take 50 mg by mouth As Needed., Disp: , Rfl:   •  acetaminophen (TYLENOL) 500 MG  "tablet, 2 tablets., Disp: , Rfl:   •  Dietary Management Product (Rheumate) capsule, Take 1 capsule by mouth Daily., Disp: 90 capsule, Rfl: 1  •  Glucose Blood (COOL BLOOD GLUCOSE TEST STRIPS VI), , Disp: , Rfl:   •  Lancet Device misc, 1 each 4 (Four) Times a Day., Disp: , Rfl:   •  saccharomyces boulardii (FLORASTOR) 250 MG capsule, Take 250 mg by mouth Daily., Disp: , Rfl:   No current facility-administered medications for this visit.    Facility-Administered Medications Ordered in Other Visits:   •  Chlorhexidine Gluconate Cloth 2 % pads 1 application, 1 application, Topical, Q12H PRN, Marie Barnes PA-C      Allergies   Allergen Reactions   • Humira [Adalimumab] Other (See Comments)     HA, intractable ocular migraine         /84   Pulse 62   Temp 97.9 °F (36.6 °C) (Temporal)   Resp 16   Ht 172.7 cm (67.99\")   Wt 106 kg (232 lb 9.6 oz)   SpO2 97%   BMI 35.38 kg/m²       Physical Exam:  Constitutional: Patient appears well-developed, well-nourished, well-hydrated  HEENT: Head: Normocephalic and atraumatic  Eyes: Conjunctivae and lids are normal  Pupils: Equal, round, reactive to light  Neck: Trachea normal. Neck supple. No JVD present.   Lymphatic: No cervical adenopathy  Musculoskeletal   Gait and station: Gait evaluation demonstrated a normal gait.   Cervical spine: Passive and active range of motion are improved with minimal pain. Rotation of the cervical spine to the left increased and reproduced pain. Cervical facet joint loading maneuvers are equivocal.  Muscles: Presence of active trigger points at the levator scapulae   Shoulders: The range of motion of the glenohumeral joints is almost full and without pain. Rotator cuff strength is 5/5.   Neurological:   Patient is alert and oriented to person, place, and time.   Speech: Normal.   Cortical function: Normal mental status.   Reflex Scores:  Right brachioradialis: 1+  Left brachioradialis: 1+  Right biceps: 1+  Left biceps: 1+  Right " triceps: 1+  Left triceps: 1+  Motor strength: 5/5  Motor Tone: Normal  Involuntary movements: None.   Superficial/Primitive Reflexes: Primitive reflexes were absent.   Right Mukherjee: Absent  Left Mukherjee: Absent  Right ankle clonus: Absent  Left ankle clonus: Absent   Babinsky: Absent  Spurling sign: Negative. Neck tornado test: Negative. Lhermitte sign: Negative. Long tract signs: Negative.   Sensory exam: Intact to light touch, intact pain and temperature sensation, intact vibration sensation and normal proprioception.   Coordination: Normal finger to nose and heel to shin. Normal balance and negative Romberg's sign   Skin and subcutaneous tissue: Skin is warm and intact. No rash noted. No cyanosis.   Psychiatric: Judgment and insight: Normal. Recent and remote memory: Intact. Mood and affect: Normal.     ASSESSMENT:   1. Cervical spondylosis without myelopathy    2. DDD (degenerative disc disease), cervical    3. Cervical spinal stenosis    4. Rheumatoid arthritis, involving unspecified site, unspecified whether rheumatoid factor present (HCC)    5. Myofascial pain    6. Diabetic polyneuropathy associated with type 2 diabetes mellitus (HCC)    7. Anxiety and depression    8. Physical deconditioning          PLAN/MEDICAL DECISION MAKING:  Patient reports a longstanding history of progressive neck pain.  Patient has failed to obtain pain relief with conservative measures including oral analgesics, physical therapy, to name a few.  MRI of the cervical spine revealed multilevel cervical spondylosis with significant degenerative disc disease, facet hypertrophy contributing to multiple levels of canal and neuroforaminal stenosis.  He underwent neurosurgical consultation with Dr. Cali Millan on 01/10/2022, and was found not to be a surgical candidate at that time.  It was discussed also work-up in the future for cervical fusion.  Currently he is doing significantly better, and experiencing improvement in his  overall neck pain.  He will follow-up as needed.  Patient has failed to obtain pain relief with conservative measures, as referenced above. I have reviewed all available patient's medical records as well as previous therapies as referenced above. I had a lengthy conversation with . Jesse Brink regarding his chronic pain condition and potential therapeutic options including risks, benefits, alternative therapies, to name a few. Therefore, I have proposed the following plan:  1. Pharmacological measures: Reviewed and discussed;   A. Patient takes gabapentin, baclofen, metaxalone, Norco.   Patient also takes sertraline   2. Interventional pain management measures: None indicated at this time. Follow up on an as needed basis.  Depending on continued response we may consider repeating bilateral cervical medial branch rhizotomies at C4, C5, C6; for bilateral cervical facet joints at C4-C5, C5-C6. Otherwise, we may proceed with a cervical epidural steroid injection by interlaminar approach. We may repeat epidural depending on patient's outcome. Patient will follow-up with Dr. Millan thereafter for possible ACDF  3. Long-term rehabilitation efforts:  A. The patient does not have a history of falls. I did complete a risk assessment for falls  B. Patient will start a comprehensive physical therapy program for upper body strengthening/posture correction, core strengthening, ultrasound, ASTYM, myofascial release, cupping and dry needling if his pain recurs  C. Start an exercise program such as daily walks for fitness  D. Contrast therapy: Apply ice-packs for 15-20 minutes, followed by heating pads for 15-20 minutes to affected area   E.  Patient counseled on the importance of weight loss to help with overall health and pain control. Patient instructed to attempt weight loss.    4. The patient has been instructed to contact my office with any questions or difficulties. The patient understands the plan and agrees to  proceed accordingly.      Pain Medications             Abatacept (Orencia ClickJect) 125 MG/ML solution auto-injector Inject 1 mL under the skin into the appropriate area as directed 1 (One) Time Per Week.    aspirin 81 MG EC tablet Take 81 mg by mouth Daily.    baclofen (LIORESAL) 10 MG tablet Take 10 mg by mouth As Needed for Muscle Spasms.    gabapentin (NEURONTIN) 300 MG capsule Take 300 mg by mouth 2 (Two) Times a Day.    metaxalone (SKELAXIN) 800 MG tablet Take 800 mg by mouth As Needed.    sertraline (ZOLOFT) 50 MG tablet Take 50 mg by mouth Daily.    traMADol (ULTRAM) 50 MG tablet Take 50 mg by mouth As Needed.    acetaminophen (TYLENOL) 500 MG tablet 2 tablets.             Patient Care Team:  Terri Barreto APRN as PCP - Dillon Sweeney MD as Surgeon (Cardiothoracic Surgery)  Sara Nelson APRN as Nurse Practitioner (Pain Medicine)     No orders of the defined types were placed in this encounter.        Future Appointments   Date Time Provider Department Force   12/8/2022  9:45 AM Dillon Neville III, MD [facility] [place]     Please note that portions of this note were completed with a voice recognition program.

## 2022-10-18 ENCOUNTER — HOSPITAL ENCOUNTER (OUTPATIENT)
Facility: HOSPITAL | Age: 67
Discharge: HOME | End: 2022-10-18
Payer: MEDICARE

## 2022-10-18 PROCEDURE — 86480 TB TEST CELL IMMUN MEASURE: CPT

## 2022-10-25 LAB
QUANTI TB GOLD PLUS: NEGATIVE
QUANTI TB1 MINUS NIL: 0.05 IU/ML (ref 0–0.34)
QUANTI TB2 MINUS NIL: 0.02 IU/ML (ref 0–0.34)
QUANTIFERON MITOGEN: 9.15 IU/ML
QUANTIFERON NIL: 0.05 IU/ML

## 2022-12-08 ENCOUNTER — OFFICE VISIT (OUTPATIENT)
Dept: CARDIOLOGY | Facility: CLINIC | Age: 67
End: 2022-12-08

## 2022-12-08 VITALS
OXYGEN SATURATION: 96 % | DIASTOLIC BLOOD PRESSURE: 70 MMHG | WEIGHT: 236 LBS | HEART RATE: 64 BPM | BODY MASS INDEX: 35.77 KG/M2 | HEIGHT: 68 IN | SYSTOLIC BLOOD PRESSURE: 142 MMHG

## 2022-12-08 DIAGNOSIS — I25.5 ISCHEMIC CARDIOMYOPATHY: ICD-10-CM

## 2022-12-08 DIAGNOSIS — Z95.1 S/P CABG X 5: ICD-10-CM

## 2022-12-08 DIAGNOSIS — I25.10 CAD IN NATIVE ARTERY: Primary | ICD-10-CM

## 2022-12-08 DIAGNOSIS — I10 BENIGN ESSENTIAL HTN: ICD-10-CM

## 2022-12-08 DIAGNOSIS — E78.2 MIXED HYPERLIPIDEMIA: ICD-10-CM

## 2022-12-08 PROCEDURE — 99214 OFFICE O/P EST MOD 30 MIN: CPT | Performed by: INTERNAL MEDICINE

## 2022-12-08 NOTE — PROGRESS NOTES
[facility]  Office visit  Jesse Brink  1955  [phone]    VISIT DATE:  12/8/2022      PCP: Terri Barreto APRN  [address] [place] [id number]    CC:  Chief Complaint   Patient presents with   • CAD in native artery       PROBLEM LIST:  1. Coronary artery disease  2. Transient ischemic attack with normal echocardiogram, carotid duplex, and CT scan of the brain by verbal report.  3. Benign hypertension.  4. History of tobacco, quitting 28 years ago.  5. Hyperglycemia, diet controlled.  6. Rheumatoid arthritis.  7. Obesity.  8. Family history of heart disease.  9. Surgical history:  a. Pilonidal cyst, 1977.  b. Benign fibroid removed, 1980.  c. Anal fistula repair, 1999 and 2008.    Previous cardiac studies and procedures  January 2019  Cardiac catheterization  · Multivessel obstructive coronary disease: Proximal RCA .  Mid LAD .  95% proximal first diagonal, 95% first obtuse marginal branch, 70-80% proximal second obtuse marginal branch.  · Mildly elevated LVEDP  · No aortic stenosis.  Echo   · Left ventricular wall thickness is consistent with mild concentric hypertrophy.  · Estimated EF appears to be in the range of 36 - 40%  · Left ventricular diastolic dysfunction (grade I a) consistent with impaired relaxation.  · The following left ventricular wall segments are hypokinetic: mid anterior, apical anterior, apical lateral, apical inferior, apical septal and apex hypokinetic.  · Regional contracility augments post PVC.    Carotid duplex  · Right internal carotid artery stenosis of 0-49%.  · Left internal carotid artery stenosis of 0-49%.    1. Coronary artery bypass graft x 5  -LIMA to LAD.    -Greater saphenous vein to RCA  -Greater saphenous vein to OM1  -Greater saphenous vein to OM2  -Greater saphenous vein to D1  2. Left atrial appendage ligation (35 mm Atricure clip placement)    May 2019 echo  · Left ventricular systolic function is  "normal.  · Calculated EF = 55%  · Left atrial cavity size is mildly dilated.    March 2021  Cardiac catheterization  · 3/5 patent grafts with evidence of occlusion of the SVG to one of the obtuse marginal branches as well as the diagonal branch.  These both appear to be old findings.  · Normal LV systolic function wall motion  · No EKG changes are seen  · Elevated LVEDP at 22 mmHg  Transthoracic echocardiogram  · Estimated left ventricular EF = 60% Left ventricular systolic function is normal.  · Left ventricular diastolic function is consistent with (grade II w/high LAP) pseudonormalization.  · Trace mitral valve regurgitation is present.  · Trace tricuspid valve regurgitation is present.  · There is no evidence of pericardial effusion    ASSESSMENT:   Diagnosis Plan   1. CAD in native artery        2. Ischemic cardiomyopathy        3. Mixed hyperlipidemia        4. S/P CABG x 5 1/31/19        5. Benign essential HTN            PLAN:  Coronary artery disease: Status post surgical revascularization.  Normal EF, continue current medical therapy.    Hypertension: Goal less than 130/90.  Currently well controlled, continue current medical therapy.    Palpitations: Consistent with symptomatic premature ventricular contractions.   Continue beta-blockade.  Currently well controlled.    Hyperlipidemia: Continue high intensity statin therapy, goal LDL less than 70.  Continue rosuvastatin 20 mg p.o. daily.  Currently well controlled.    History of stroke: Continue aggressive risk factor modification.  Continue current medical therapy.    Subjective  Denies chest discomfort. Blood pressures at home running less than 130/80 mmHg. Compliant with medical therapy.     PHYSICAL EXAMINATION:  Vitals:    12/08/22 0945   BP: 142/70   BP Location: Right arm   Patient Position: Sitting   Pulse: 64   SpO2: 96%   Weight: 107 kg (236 lb)   Height: 172.7 cm (68\")     General Appearance:    Alert, cooperative, no distress, appears stated " age   Head:    Normocephalic, without obvious abnormality, atraumatic   Eyes:    conjunctiva/corneas clear   Nose:   Nares normal, septum midline, mucosa normal, no drainage   Throat:   Lips, teeth and gums normal   Neck:   Supple, symmetrical, trachea midline, no carotid    bruit or JVD   Lungs:     Clear to auscultation bilaterally, respirations unlabored   Chest Wall:    No tenderness or deformity    Heart:    Regular rate and rhythm, S1 and S2 normal, no murmur, rub   or gallop, normal carotid impulse bilaterally without bruit.   Abdomen:     Soft, non-tender   Extremities:   Extremities normal, atraumatic, no cyanosis or edema   Pulses:   2+ and symmetric all extremities   Skin:   Skin color, texture, turgor normal, no rashes or lesions       Diagnostic Data:  Procedures  Lab Results   Component Value Date    TRIG 96 03/27/2021    HDL 51 03/27/2021     Lab Results   Component Value Date    GLUCOSE 183 (H) 06/17/2022    BUN 13 06/17/2022    CREATININE 1.19 06/17/2022     06/17/2022    K 4.5 06/17/2022     06/17/2022    CO2 24.1 06/17/2022     Lab Results   Component Value Date    HGBA1C 6.60 (H) 01/30/2019     Lab Results   Component Value Date    WBC 7.65 06/17/2022    HGB 17.1 06/17/2022    HCT 48.0 06/17/2022     06/17/2022       Allergies  Allergies   Allergen Reactions   • Humira [Adalimumab] Other (See Comments)     HA, intractable ocular migraine       Current Medications    Current Outpatient Medications:   •  Abatacept (Orencia ClickJect) 125 MG/ML solution auto-injector, Inject 1 mL under the skin into the appropriate area as directed 1 (One) Time Per Week., Disp: , Rfl:   •  acetaminophen (TYLENOL) 500 MG tablet, 2 tablets., Disp: , Rfl:   •  amLODIPine (NORVASC) 2.5 MG tablet, Take 2.5 mg by mouth Daily., Disp: , Rfl:   •  aspirin 81 MG EC tablet, Take 81 mg by mouth Daily., Disp: , Rfl:   •  baclofen (LIORESAL) 10 MG tablet, Take 10 mg by mouth As Needed for Muscle Spasms., Disp:  , Rfl:   •  carvedilol (COREG) 25 MG tablet, TAKE 1 AND 1/2 TABLETS TWICE DAILY WITH MEALS, Disp: 270 tablet, Rfl: 1  •  Cholecalciferol (VITAMIN D3) 2000 UNITS capsule, Take 2,000 Units by mouth Daily., Disp: , Rfl:   •  empagliflozin (JARDIANCE) 25 MG tablet tablet, Take 25 mg by mouth Daily., Disp: , Rfl:   •  gabapentin (NEURONTIN) 300 MG capsule, Take 300 mg by mouth 2 (Two) Times a Day., Disp: , Rfl:   •  Glucose Blood (COOL BLOOD GLUCOSE TEST STRIPS VI), , Disp: , Rfl:   •  Lancet Device misc, 1 each 4 (Four) Times a Day., Disp: , Rfl:   •  losartan (COZAAR) 100 MG tablet, TAKE 1 AND 1/2 TABLETS EVERY DAY, Disp: 135 tablet, Rfl: 1  •  metaxalone (SKELAXIN) 800 MG tablet, Take 800 mg by mouth As Needed., Disp: , Rfl:   •  potassium chloride (K-DUR) 10 MEQ CR tablet, Take 10 mEq by mouth Daily., Disp: , Rfl:   •  rosuvastatin (CRESTOR) 20 MG tablet, Take 20 mg by mouth Daily., Disp: , Rfl:   •  saccharomyces boulardii (FLORASTOR) 250 MG capsule, Take 250 mg by mouth Daily., Disp: , Rfl:   •  sertraline (ZOLOFT) 50 MG tablet, Take 50 mg by mouth Daily., Disp: , Rfl:   •  tamsulosin (FLOMAX) 0.4 MG capsule 24 hr capsule, Take 1 capsule by mouth Daily., Disp: , Rfl:   •  traMADol (ULTRAM) 50 MG tablet, Take 50 mg by mouth As Needed., Disp: , Rfl:   No current facility-administered medications for this visit.    Facility-Administered Medications Ordered in Other Visits:   •  Chlorhexidine Gluconate Cloth 2 % pads 1 application, 1 application, Topical, Q12H PRN, Marie Barnes PA-C ROS  Review of Systems   Cardiovascular: Positive for leg swelling. Negative for chest pain, claudication, dyspnea on exertion, irregular heartbeat and palpitations.   Respiratory: Negative for cough and shortness of breath.          SOCIAL HX  Social History     Socioeconomic History   • Marital status:    • Number of children: 2   Tobacco Use   • Smoking status: Former     Packs/day: 1.00     Types: Cigarettes      Quit date: 3/5/1984     Years since quittin.7   • Smokeless tobacco: Never   Vaping Use   • Vaping Use: Never used   Substance and Sexual Activity   • Alcohol use: Yes     Alcohol/week: 1.0 standard drink     Types: 1 Cans of beer per week     Comment: occas   • Drug use: No   • Sexual activity: Defer       FAMILY HX  Family History   Problem Relation Age of Onset   • Heart disease Other    • Diabetes Other    • Cancer Other    • Hypertension Other    • Heart disease Mother    • Diabetes Father    • Hypertension Father    • Cancer Father              Dillon Neville III, MD, FACC

## 2023-01-11 RX ORDER — LOSARTAN POTASSIUM 100 MG/1
TABLET ORAL
Qty: 135 TABLET | Refills: 1 | Status: SHIPPED | OUTPATIENT
Start: 2023-01-11

## 2023-01-11 RX ORDER — CARVEDILOL 25 MG/1
TABLET ORAL
Qty: 270 TABLET | Refills: 1 | Status: SHIPPED | OUTPATIENT
Start: 2023-01-11

## 2023-01-26 NOTE — PROGRESS NOTES
[facility]  Progress Note       LOS: 5 days   Patient Care Team:  Terri Barreto APRN as PCP - General    Chief Complaint:  F/u HTN, HLP    Subjective     Interval History: Patient sitting up in chair.  Pain overall controlled.  Reports appetite improving.  NSR per tele.        Review of Systems:   Pertinent positives in HPI, all others reviewed and negative.      Objective       Current Facility-Administered Medications:   •  aspirin EC tablet 325 mg, 325 mg, Oral, Daily, Marie Barnes PA-C, 325 mg at 02/05/19 0855  •  baclofen (LIORESAL) tablet 10 mg, 10 mg, Oral, TID PRN, Dillon Gu MD  •  bisacodyl (DULCOLAX) EC tablet 10 mg, 10 mg, Oral, Daily PRN, Marie Barnes PA-C  •  bisacodyl (DULCOLAX) suppository 10 mg, 10 mg, Rectal, Daily PRN, Marie Barnes PA-C  •  docusate sodium (COLACE) capsule 100 mg, 100 mg, Oral, BID PRN, Marie Barnes PA-C  •  gabapentin (NEURONTIN) capsule 300 mg, 300 mg, Oral, Q12H, Armando Smith MD, 300 mg at 02/05/19 0855  •  heparin (porcine) 5000 UNIT/ML injection 5,000 Units, 5,000 Units, Subcutaneous, Q8H, Dany Aponte MD, 5,000 Units at 02/05/19 0620  •  HYDROcodone-acetaminophen (NORCO) 7.5-325 MG per tablet 1 tablet, 1 tablet, Oral, Q4H PRN, Dillon Gu MD, 1 tablet at 02/05/19 0858  •  insulin detemir (LEVEMIR) injection 15 Units, 15 Units, Subcutaneous, Nightly, Armando Smith MD, 15 Units at 02/04/19 2128  •  insulin lispro (humaLOG) injection 0-9 Units, 0-9 Units, Subcutaneous, 4x Daily With Meals & Nightly, Armando Smith MD, 2 Units at 02/05/19 0858  •  insulin lispro (humaLOG) injection 3 Units, 3 Units, Subcutaneous, TID With Meals, Armando Smith MD, 3 Units at 02/05/19 0858  •  losartan (COZAAR) tablet 25 mg, 25 mg, Oral, Q24H, Erendira Middleton APRN  •  magnesium hydroxide (MILK OF MAGNESIA) suspension 2400 mg/10mL 10 mL, 10 mL, Oral,  "Daily PRN, Marie Barnes PA-C, 10 mL at 02/04/19 1114  •  meclizine (ANTIVERT) tablet 12.5 mg, 12.5 mg, Oral, Q4H PRN, Armando Smith MD, 12.5 mg at 02/01/19 2009  •  metoprolol tartrate (LOPRESSOR) tablet 25 mg, 25 mg, Oral, Q12H, Erendira Middleton APRN  •  Morphine sulfate (PF) injection 2 mg, 2 mg, Intravenous, Q2H PRN, Armando Smith MD, 2 mg at 02/02/19 0304  •  ondansetron (ZOFRAN) injection 4 mg, 4 mg, Intravenous, Q6H PRN, Marie Barnes PA-C, 4 mg at 02/02/19 0604  •  oxyCODONE-acetaminophen (PERCOCET) 5-325 MG per tablet 1 tablet, 1 tablet, Oral, Q4H PRN, Dillon Balbuena MD, 1 tablet at 02/05/19 0626  •  Pharmacy Consult - [facility], , Does not apply, Daily, [place], [facility], Stopped at 02/03/19 0814  •  potassium chloride (MICRO-K) CR capsule 40 mEq, 40 mEq, Oral, PRN, 40 mEq at 02/04/19 2127 **OR** potassium chloride (KLOR-CON) packet 40 mEq, 40 mEq, Oral, PRN, Marie Barnes PA-C  •  rosuvastatin (CRESTOR) tablet 40 mg, 40 mg, Oral, Nightly, Armando Smith MD, 40 mg at 02/04/19 2127  •  terazosin (HYTRIN) capsule 5 mg, 5 mg, Oral, Nightly, Armando Smith MD, 5 mg at 02/04/19 2127    Facility-Administered Medications Ordered in Other Encounters:   •  Chlorhexidine Gluconate Cloth 2 % pads 1 application, 1 application, Topical, Q12H PRN, Marie Barnes PA-C    Vital Sign Min/Max for last 24 hours  Temp  Min: 97.7 °F (36.5 °C)  Max: 98.2 °F (36.8 °C)   BP  Min: 102/61  Max: 141/77   Pulse  Min: 91  Max: 116   Resp  Min: 16  Max: 20   SpO2  Min: 93 %  Max: 98 %   Flow (L/min)  Min: 1  Max: 2   No Data Recorded     Flowsheet Rows      First Filed Value   Admission Height  175.3 cm (69\") Documented at 01/31/2019 0611   Admission Weight  108 kg (239 lb) Documented at 01/31/2019 0611            Intake/Output Summary (Last 24 hours) at 2/5/2019 1205  Last data filed at 2/5/2019 0600  Gross per 24 hour   Intake 370 ml   Output 1750 ml   Net " -1380 ml       Physical Exam:     General Appearance:    Alert, cooperative, in no acute distress   Lungs:     Clear to auscultation, respirations regular, even and                  unlabored    Heart:    Regular rhythm and normal rate, normal S1 and S2, no            murmur, no gallop, no rub, no click   Chest Wall:    Sternal incision WNL   Abdomen:     Normal bowel sounds, no masses, soft, non-tender, non-distended   Extremities:   Moves all extremities well, 1+ LE edema, no cyanosis, no         redness   Pulses:   Pulses palpable and equal bilaterally   Skin:   No bleeding, bruising or rash        Results Review:   Results from last 7 days   Lab Units 02/05/19  0300 02/03/19  0321 02/02/19  0416   WBC 10*3/mm3 9.72 10.58 10.93*   HEMOGLOBIN g/dL 13.0* 11.7* 11.9*   HEMATOCRIT % 39.3 34.8* 35.9*   PLATELETS 10*3/mm3 238 146* 125*     Results from last 7 days   Lab Units 02/05/19  0300 02/04/19  1516 02/04/19  0007 02/03/19  0321   SODIUM mmol/L 137  --  136 137   POTASSIUM mmol/L 4.3 3.6 3.4* 3.7   CHLORIDE mmol/L 101  --  101 102   CO2 mmol/L 31.0  --  32.0* 28.0   BUN mg/dL 17  --  19 22   CREATININE mg/dL 0.97  --  1.08 1.15   GLUCOSE mg/dL 115*  --  128* 134*      Results from last 7 days   Lab Units 01/30/19  1250   HEMOGLOBIN A1C % 6.60*                 Results from last 7 days   Lab Units 02/01/19  0409 01/31/19  1517 01/30/19  1250   PROTIME Seconds 16.1* 17.4* 13.5   INR  1.36* 1.50* 1.09   APTT seconds  --  27.9 29.1         Results from last 7 days   Lab Units 02/05/19  0300   MAGNESIUM mg/dL 2.2       Intake/Output Summary (Last 24 hours) at 2/5/2019 1205  Last data filed at 2/5/2019 0600  Gross per 24 hour   Intake 370 ml   Output 1750 ml   Net -1380 ml       I personally viewed and interpreted the patient's EKG/Telemetry data    EKG: None for review today    Telemetry: NSR/ST, HR  bpm    Ejection Fraction  No results found for: EF    Echo EF Estimated  No results found for:  ECHOEFEST      Present on Admission:  • (Resolved) Angina pectoris (CMS/HCC)  • CAD in native artery  • Benign essential HTN  • Type 2 diabetes mellitus (CMS/HCC)  • Ischemic cardiomyopathy  • Mixed hyperlipidemia    Assessment/Plan   1. CAD, S/P CABG x 5 and ligation of MARIA LUISA 1/31/19  - on ASA, statin, BB  2. Ischemic Cardiomyopathy:  - Echocardiogram 1/18/19: EF 36-40%   3. HTN  - Stable  4. Hyperlipidemia  - On statin  5. History of TIA  6. CKD:  - Cr stable      Plan: Ok for discharge home from a cardiology standpoint.  Restart losartan 25 mg daily due to systolic dysfunction.  Increase metoprolol to 25 mg bid.  Continue other current cardiac meds.  He will follow up with Dr. Neville in 1 month in [facility] (follow up order placed).      Electronically signed by SELMA Cerrato, 02/05/19, 12:05 PM.           4

## 2023-08-24 RX ORDER — LOSARTAN POTASSIUM 100 MG/1
TABLET ORAL
Qty: 135 TABLET | Refills: 0 | Status: SHIPPED | OUTPATIENT
Start: 2023-08-24

## 2023-08-24 RX ORDER — CARVEDILOL 25 MG/1
TABLET ORAL
Qty: 270 TABLET | Refills: 0 | Status: SHIPPED | OUTPATIENT
Start: 2023-08-24

## 2023-11-10 ENCOUNTER — HOSPITAL ENCOUNTER (OUTPATIENT)
Dept: ULTRASOUND IMAGING | Facility: HOSPITAL | Age: 68
Discharge: HOME | End: 2023-11-10
Payer: MEDICARE

## 2023-11-10 DIAGNOSIS — N18.31 CHRONIC KIDNEY DISEASE (CKD) STAGE G3A/A1, MODERATELY DECREASED GLOMERULAR FILTRATION RATE (GFR) BETWEEN 45-59 ML/MIN/1.73 SQUARE METER AND ALBUMINURIA CREATININE RATIO LESS THAN 30 MG/G (HCC): ICD-10-CM

## 2023-11-10 DIAGNOSIS — E55.9 VITAMIN D DEFICIENCY: ICD-10-CM

## 2023-11-10 DIAGNOSIS — I12.9 RENAL HYPERTENSION: ICD-10-CM

## 2023-11-10 DIAGNOSIS — E11.22 TYPE 2 DIABETES MELLITUS WITH ESRD (END-STAGE RENAL DISEASE) (HCC): ICD-10-CM

## 2023-11-10 DIAGNOSIS — N18.6 TYPE 2 DIABETES MELLITUS WITH ESRD (END-STAGE RENAL DISEASE) (HCC): ICD-10-CM

## 2023-11-10 PROCEDURE — 76770 US EXAM ABDO BACK WALL COMP: CPT

## 2023-12-14 ENCOUNTER — OFFICE VISIT (OUTPATIENT)
Dept: CARDIOLOGY | Facility: CLINIC | Age: 68
End: 2023-12-14
Payer: MEDICARE

## 2023-12-14 VITALS
SYSTOLIC BLOOD PRESSURE: 128 MMHG | HEIGHT: 68 IN | BODY MASS INDEX: 35.92 KG/M2 | DIASTOLIC BLOOD PRESSURE: 54 MMHG | OXYGEN SATURATION: 93 % | WEIGHT: 237 LBS | HEART RATE: 67 BPM

## 2023-12-14 DIAGNOSIS — I25.5 ISCHEMIC CARDIOMYOPATHY: ICD-10-CM

## 2023-12-14 DIAGNOSIS — E78.2 MIXED HYPERLIPIDEMIA: ICD-10-CM

## 2023-12-14 DIAGNOSIS — R00.2 PALPITATIONS: ICD-10-CM

## 2023-12-14 DIAGNOSIS — I25.10 CAD IN NATIVE ARTERY: ICD-10-CM

## 2023-12-14 DIAGNOSIS — I10 BENIGN ESSENTIAL HTN: Primary | ICD-10-CM

## 2023-12-14 PROCEDURE — 99214 OFFICE O/P EST MOD 30 MIN: CPT | Performed by: INTERNAL MEDICINE

## 2023-12-14 PROCEDURE — 3074F SYST BP LT 130 MM HG: CPT | Performed by: INTERNAL MEDICINE

## 2023-12-14 PROCEDURE — 3078F DIAST BP <80 MM HG: CPT | Performed by: INTERNAL MEDICINE

## 2023-12-14 NOTE — PROGRESS NOTES
[facility]  Office visit  Jesse Brink  1955  [phone]    VISIT DATE:  12/14/2023      PCP: Terri Barreto APRN  [address] [place] [id number]    CC:  Chief Complaint   Patient presents with    CAD in native artery       PROBLEM LIST:  Coronary artery disease  Transient ischemic attack with normal echocardiogram, carotid duplex, and CT scan of the brain by verbal report.  Benign hypertension.  History of tobacco, quitting 28 years ago.  Hyperglycemia, diet controlled.  Rheumatoid arthritis.  Obesity.  Family history of heart disease.  Surgical history:  Pilonidal cyst, 1977.  Benign fibroid removed, 1980.  Anal fistula repair, 1999 and 2008.    Previous cardiac studies and procedures  January 2019  Cardiac catheterization  Multivessel obstructive coronary disease: Proximal RCA .  Mid LAD .  95% proximal first diagonal, 95% first obtuse marginal branch, 70-80% proximal second obtuse marginal branch.  Mildly elevated LVEDP  No aortic stenosis.  Echo   Left ventricular wall thickness is consistent with mild concentric hypertrophy.  Estimated EF appears to be in the range of 36 - 40%  Left ventricular diastolic dysfunction (grade I a) consistent with impaired relaxation.  The following left ventricular wall segments are hypokinetic: mid anterior, apical anterior, apical lateral, apical inferior, apical septal and apex hypokinetic.  Regional contracility augments post PVC.    Carotid duplex  Right internal carotid artery stenosis of 0-49%.  Left internal carotid artery stenosis of 0-49%.    1. Coronary artery bypass graft x 5  -LIMA to LAD.    -Greater saphenous vein to RCA  -Greater saphenous vein to OM1  -Greater saphenous vein to OM2  -Greater saphenous vein to D1  2. Left atrial appendage ligation (35 mm Atricure clip placement)    May 2019 echo  Left ventricular systolic function is normal.  Calculated EF = 55%  Left atrial cavity size is mildly  "dilated.    March 2021  Cardiac catheterization  3/5 patent grafts with evidence of occlusion of the SVG to one of the obtuse marginal branches as well as the diagonal branch.  These both appear to be old findings.  Normal LV systolic function wall motion  No EKG changes are seen  Elevated LVEDP at 22 mmHg  Transthoracic echocardiogram  Estimated left ventricular EF = 60% Left ventricular systolic function is normal.  Left ventricular diastolic function is consistent with (grade II w/high LAP) pseudonormalization.  Trace mitral valve regurgitation is present.  Trace tricuspid valve regurgitation is present.  There is no evidence of pericardial effusion    ASSESSMENT:   Diagnosis Plan   1. Benign essential HTN        2. CAD in native artery        3. Ischemic cardiomyopathy        4. Mixed hyperlipidemia        5. Palpitations              PLAN:  Coronary artery disease: Status post surgical revascularization.  Currently asymptomatic.  Normal EF, continue current medical therapy.    Hypertension: Goal less than 130/90.  Currently well controlled, continue current medical therapy.    Palpitations: Consistent with symptomatic premature ventricular contractions.   Continue beta-blockade.  Currently well controlled.    Hyperlipidemia: Continue high intensity statin therapy, goal LDL less than 70.  Continue rosuvastatin 20 mg p.o. daily.  Currently well controlled.    History of stroke: Continue aggressive risk factor modification.  Continue current medical therapy.    Subjective  Denies chest discomfort. Blood pressures at home running less than 130/80 mmHg. Compliant with medical therapy.  No regular exercise, maintaining an active lifestyle.    PHYSICAL EXAMINATION:  Vitals:    12/14/23 1015   BP: 128/54   BP Location: Left arm   Patient Position: Sitting   Pulse: 67   SpO2: 93%   Weight: 108 kg (237 lb)   Height: 172.7 cm (68\")     General Appearance:    Alert, cooperative, no distress, appears stated age   Head:    " Normocephalic, without obvious abnormality, atraumatic   Eyes:    conjunctiva/corneas clear   Nose:   Nares normal, septum midline, mucosa normal, no drainage   Throat:   Lips, teeth and gums normal   Neck:   Supple, symmetrical, trachea midline, no carotid    bruit or JVD   Lungs:     Clear to auscultation bilaterally, respirations unlabored   Chest Wall:    No tenderness or deformity    Heart:    Regular rate and rhythm, S1 and S2 normal, no murmur, rub   or gallop, normal carotid impulse bilaterally without bruit.   Abdomen:     Soft, non-tender   Extremities:   Extremities normal, atraumatic, no cyanosis or edema   Pulses:   2+ and symmetric all extremities   Skin:   Skin color, texture, turgor normal, no rashes or lesions       Diagnostic Data:  Procedures  Lab Results   Component Value Date    TRIG 96 03/27/2021    HDL 51 03/27/2021     Lab Results   Component Value Date    GLUCOSE 183 (H) 06/17/2022    BUN 13 06/17/2022    CREATININE 1.19 06/17/2022     06/17/2022    K 4.5 06/17/2022     06/17/2022    CO2 24.1 06/17/2022     Lab Results   Component Value Date    HGBA1C 6.60 (H) 01/30/2019     Lab Results   Component Value Date    WBC 7.65 06/17/2022    HGB 17.1 06/17/2022    HCT 48.0 06/17/2022     06/17/2022       Allergies  Allergies   Allergen Reactions    Humira [Adalimumab] Other (See Comments)     HA, intractable ocular migraine       Current Medications    Current Outpatient Medications:     Abatacept (Orencia ClickJect) 125 MG/ML solution auto-injector, Inject 1 mL under the skin into the appropriate area as directed 1 (One) Time Per Week., Disp: , Rfl:     acetaminophen (TYLENOL) 500 MG tablet, Take 2 tablets by mouth Every 6 (Six) Hours As Needed., Disp: , Rfl:     amLODIPine (NORVASC) 2.5 MG tablet, Take 1 tablet by mouth Daily., Disp: , Rfl:     aspirin 81 MG EC tablet, Take 1 tablet by mouth Daily., Disp: , Rfl:     baclofen (LIORESAL) 10 MG tablet, Take 1 tablet by mouth As  Needed for Muscle Spasms., Disp: , Rfl:     carvedilol (COREG) 25 MG tablet, TAKE 1 AND 1/2 TABLETS TWICE DAILY WITH MEALS, Disp: 270 tablet, Rfl: 0    Cholecalciferol (VITAMIN D3) 2000 UNITS capsule, Take 1 capsule by mouth Daily., Disp: , Rfl:     empagliflozin (JARDIANCE) 25 MG tablet tablet, Take 1 tablet by mouth Daily., Disp: , Rfl:     gabapentin (NEURONTIN) 300 MG capsule, Take 1 capsule by mouth 2 (Two) Times a Day., Disp: , Rfl:     Glucose Blood (COOL BLOOD GLUCOSE TEST STRIPS VI), , Disp: , Rfl:     Lancet Device misc, 1 each 4 (Four) Times a Day., Disp: , Rfl:     losartan (COZAAR) 100 MG tablet, TAKE 1 AND 1/2 TABLETS EVERY DAY, Disp: 135 tablet, Rfl: 0    metaxalone (SKELAXIN) 800 MG tablet, Take 1 tablet by mouth As Needed., Disp: , Rfl:     potassium chloride (K-DUR) 10 MEQ CR tablet, Take 1 tablet by mouth Daily., Disp: , Rfl:     rosuvastatin (CRESTOR) 20 MG tablet, Take 1 tablet by mouth Daily., Disp: , Rfl:     saccharomyces boulardii (FLORASTOR) 250 MG capsule, Take 1 capsule by mouth Daily., Disp: , Rfl:     sertraline (ZOLOFT) 50 MG tablet, Take 1 tablet by mouth Daily., Disp: , Rfl:     tamsulosin (FLOMAX) 0.4 MG capsule 24 hr capsule, Take 1 capsule by mouth Daily., Disp: , Rfl:     traMADol (ULTRAM) 50 MG tablet, Take 1 tablet by mouth As Needed., Disp: , Rfl:   No current facility-administered medications for this visit.    Facility-Administered Medications Ordered in Other Visits:     Chlorhexidine Gluconate Cloth 2 % pads 1 application, 1 application , Topical, Q12H PRN, Marie Barnes PA-C ROS  Review of Systems   Cardiovascular:  Positive for leg swelling. Negative for chest pain, claudication, dyspnea on exertion, irregular heartbeat and palpitations.   Respiratory:  Negative for cough and shortness of breath.          SOCIAL HX  Social History     Socioeconomic History    Marital status:     Number of children: 2   Tobacco Use    Smoking status: Former      Packs/day: 1     Types: Cigarettes     Quit date: 3/5/1984     Years since quittin.8    Smokeless tobacco: Never   Vaping Use    Vaping Use: Never used   Substance and Sexual Activity    Alcohol use: Yes     Alcohol/week: 1.0 standard drink of alcohol     Types: 1 Cans of beer per week     Comment: occas    Drug use: No    Sexual activity: Defer       FAMILY HX  Family History   Problem Relation Age of Onset    Heart disease Other     Diabetes Other     Cancer Other     Hypertension Other     Heart disease Mother     Diabetes Father     Hypertension Father     Cancer Father              Dillon Neville III, MD, FACC

## 2024-01-17 RX ORDER — LOSARTAN POTASSIUM 100 MG/1
TABLET ORAL
Qty: 135 TABLET | Refills: 3 | Status: SHIPPED | OUTPATIENT
Start: 2024-01-17

## 2024-01-17 RX ORDER — CARVEDILOL 25 MG/1
37.5 TABLET ORAL 2 TIMES DAILY WITH MEALS
Qty: 270 TABLET | Refills: 3 | Status: SHIPPED | OUTPATIENT
Start: 2024-01-17

## 2024-06-08 NOTE — PROGRESS NOTES
Pt was seen today in [facility] for a Phase 2 visit.  Vital signs and session notes recorded in [employer] and will be scanned into Epic by HIM.   comfortable appearance

## 2024-08-05 ENCOUNTER — TRANSCRIBE ORDERS (OUTPATIENT)
Dept: GENERAL RADIOLOGY | Facility: HOSPITAL | Age: 69
End: 2024-08-05

## 2024-08-05 DIAGNOSIS — K57.80 DIVERTICULITIS OF INTESTINE WITH PERFORATION WITHOUT ABSCESS OR BLEEDING, UNSPECIFIED PART OF INTESTINAL TRACT: Primary | ICD-10-CM

## 2024-08-07 ENCOUNTER — HOSPITAL ENCOUNTER (OUTPATIENT)
Dept: CT IMAGING | Facility: HOSPITAL | Age: 69
Discharge: HOME | End: 2024-08-07
Payer: MEDICARE

## 2024-08-07 DIAGNOSIS — K57.80 DIVERTICULITIS OF INTESTINE WITH PERFORATION WITHOUT ABSCESS OR BLEEDING, UNSPECIFIED PART OF INTESTINAL TRACT: ICD-10-CM

## 2024-08-07 PROBLEM — M79.10 MYALGIA: Status: ACTIVE | Noted: 2024-08-07

## 2024-08-07 PROCEDURE — 74176 CT ABD & PELVIS W/O CONTRAST: CPT

## 2024-08-12 PROBLEM — Z79.899 HIGH RISK MEDICATION USE: Status: ACTIVE | Noted: 2024-08-12

## 2024-08-12 PROBLEM — R53.83 FATIGUE: Status: ACTIVE | Noted: 2024-08-12

## 2024-08-12 NOTE — ASSESSMENT & PLAN NOTE
early rheumatoid arthritis  sero +;    Prior meds: Plaquenil (further dermatitis)  ssz w/o results. mtx @ 1st then sx increased. Arava (BUE numbness), Humira ( HAs), Enbrel ( lost efficacy. Xeljanz ( new data)   CURRENT TREATMENT : Orencia  ???CTS sx--wear splints--was mild 2004 emg/ncv--resolved w/nsaid per pt.   in past had dramatic results w/nsaid but mild RI  Esr and Crp looked good in 8/19  Hand films show moderate degenerative changes of the first cmc joints, no erosions.   Foot films show moderate heel spurs, no erosions.  Denies chronic infection , CHF, MS, TB, COPD, Hepatitis. ; 1 flare of diverticulitis many years ago.  Started Orencia 4/22.   Currently stable with overall improvement.    Continue Orencia Sub Q.  He has chronic bilateral shoulder pain.  He continues to have CMC pain.  Continue Gabapentin regularly.  Prescribed by outside provider.  Tramadol - uses occasionally.  Prescribed by outside provider.  PCP has given him a RX of hydrocodone and he rarely takes and only half a dose.  Works better than the Tramadol.  Tylenol Arthritis 2 twice daily as needed.  NO NSAIDS due to CKD.  Wrist splint recommended while working.

## 2024-08-12 NOTE — ASSESSMENT & PLAN NOTE
Cervical severe  ddd and facet arthritis C5-7 ; Narrowing at C7-T1.  Neck pain radiates into shoulders.  If he uses arms a certain way he develops numbness.  No relief with PT.  Saw [facility] following MRI. They did not recommend surgery at this point   S/p rhizotomy      As per PM.  Doing well today.  He has had a rhizotomy in the past that was very helpful.  Continue gabapentin.  He is receiving NORCO from PCP, rarely takes.

## 2024-08-12 NOTE — ASSESSMENT & PLAN NOTE
1.31/59--2/16   no nsaid--has used otc prn. July 2016 , Cr 1.22/gfr 64  12/16- 1.26 gfr 62  1.32/58--7/17  1.30/56 -- 11/17  1.10/68 in 5/18  Cr 1.36/55 in 8/19  1.29/59 in 1/2020 7/2020 Cr 1.22( normal)/ 63  12/21 Cr 1.14/67  Cr 1.26/59 in 2/22  Cr 1.22(1.27)/66 in 8/22  Cr 1.34/58 in 6/2023    Stage 3A  No Nsaid

## 2024-08-13 ENCOUNTER — OFFICE VISIT (OUTPATIENT)
Age: 69
End: 2024-08-13
Payer: MEDICARE

## 2024-08-13 VITALS
DIASTOLIC BLOOD PRESSURE: 72 MMHG | HEART RATE: 82 BPM | BODY MASS INDEX: 33.8 KG/M2 | HEIGHT: 68 IN | WEIGHT: 223 LBS | SYSTOLIC BLOOD PRESSURE: 172 MMHG | TEMPERATURE: 98.2 F

## 2024-08-13 DIAGNOSIS — N28.9 KIDNEY DISEASE: ICD-10-CM

## 2024-08-13 DIAGNOSIS — M79.10 MYALGIA: ICD-10-CM

## 2024-08-13 DIAGNOSIS — M06.9 RHEUMATOID ARTHRITIS, INVOLVING UNSPECIFIED SITE, UNSPECIFIED WHETHER RHEUMATOID FACTOR PRESENT: Primary | ICD-10-CM

## 2024-08-13 DIAGNOSIS — Z79.899 HIGH RISK MEDICATION USE: ICD-10-CM

## 2024-08-13 DIAGNOSIS — R53.83 FATIGUE, UNSPECIFIED TYPE: ICD-10-CM

## 2024-08-13 PROCEDURE — 3077F SYST BP >= 140 MM HG: CPT | Performed by: NURSE PRACTITIONER

## 2024-08-13 PROCEDURE — 99214 OFFICE O/P EST MOD 30 MIN: CPT | Performed by: NURSE PRACTITIONER

## 2024-08-13 PROCEDURE — 3078F DIAST BP <80 MM HG: CPT | Performed by: NURSE PRACTITIONER

## 2024-08-13 PROCEDURE — G2211 COMPLEX E/M VISIT ADD ON: HCPCS | Performed by: NURSE PRACTITIONER

## 2024-08-13 RX ORDER — OMEPRAZOLE 40 MG/1
40 CAPSULE, DELAYED RELEASE ORAL DAILY
COMMUNITY

## 2024-08-13 RX ORDER — SEMAGLUTIDE 0.68 MG/ML
INJECTION, SOLUTION SUBCUTANEOUS
COMMUNITY
Start: 2024-06-24

## 2024-08-13 NOTE — PROGRESS NOTES
Office Visit       Date: 08/13/2024   Patient Name: Jesse Brink  MRN: [id number]  YOB: 1955    Referring Physician: No ref. provider found     Chief Complaint:   Chief Complaint   Patient presents with    Joint Swelling    Rheumatoid Arthritis       History of Present Illness: Jesse Brink is a 68 y.o. male who is here today for follow-up of rheumatoid arthritis.  Today he reports feeling the same.  He rates his pain 3.5 out of 10 and has 1 hour and 30 minutes of morning stiffness.  He has had a CAT scan.  He has been told he has diverticulitis.  This was his first flare.      Subjective   Review of Systems:  Review of Systems   All other systems reviewed and are negative.       Past Medical History:   Past Medical History:   Diagnosis Date    Anxiety     Cancer     skin     Chest pain syndrome     Coronary artery disease     Diabetes mellitus     TYPE 2 DIAGNOSED SEVERAL YEARS AGO. TRIES TO TEST ONCE DAILY    Diverticulitis     Diverticulitis     Fibromyalgia     Hearing aid worn     History of cellulitis     History of kidney stones     History of malignant neoplasm     History of MRI of spine     demonstrating buldging discs at L3/L4, L4/L5, L5/S1 levels    History of panic attacks     History of tobacco abuse     History of tobacco, quitting 28 years ago.    Hypertension     Hypokalemia     Irregular heart beat     Low back pain     Migraine     occular    Obesity     Rheumatoid arthritis     Transient ischemic attack     Recent transient ischemic attack with normal echocardiogram, carotid duplex, and CT scan of the brain by verbal report.       Past Surgical History:   Past Surgical History:   Procedure Laterality Date    ANAL FISTULA REPAIR  1999    Anal fistula repair, 1999 and 2008.    ATRIAL APPENDAGE EXCLUSION LEFT WITH TRANSESOPHAGEAL ECHOCARDIOGRAM N/A 01/31/2019    Procedure: ATRIAL APPENDAGE OCCLUSION LEFT;  Surgeon: Dillon Gu MD;  Location:   [facility];  Service: Cardiothoracic    CARDIAC CATHETERIZATION N/A 2019    Procedure: Left Heart Cath;  Surgeon: Dillon Neville III, MD;  Location:  [facility];  Service: Cardiovascular    CARDIAC CATHETERIZATION N/A 2021    Procedure: Left Heart Cath;  Surgeon: Mercy Martines MD;  Location:  [facility];  Service: Cardiovascular;  Laterality: N/A;    CHOLECYSTECTOMY N/A 2021    Procedure: CHOLECYSTECTOMY LAPAROSCOPIC;  Surgeon: Gigi Ambrose MD;  Location:  [facility];  Service: General;  Laterality: N/A;    COLONOSCOPY  2016    CORONARY ARTERY BYPASS GRAFT N/A 2019    Procedure: MEDIAN STERNOTOMY, CORONARY ARTERY BYPASS GRAFT X5, UTILIZING THE LEFT INTERNAL MAMMARY ARTERY, EVH OF THE RIGHT GREATER SAPHENOUS VEIN;  Surgeon: Dillon Gu MD;  Location:  [facility];  Service: Cardiothoracic    MOUTH FLOOR MASS EXCISION N/A     MOUTH FLOOR MASS EXCISION  2022    MYOMECTOMY      Benign fibroid removed, .    PILONIDAL CYSTECTOMY      SKIN CANCER EXCISION      left shoulder    TRANSESOPHAGEAL ECHOCARDIOGRAM (TIMOTHY) N/A 2019    Procedure: TRANSESOPHAGEAL ECHOCARDIOGRAM WITH ANESTHESIA;  Surgeon: Dillon Gu MD;  Location:  [facility];  Service: Cardiothoracic       Family History:   Family History   Problem Relation Age of Onset    Heart disease Mother     Diabetes Father     Hypertension Father     Cancer Father     Pancreatic cancer Father     Arthritis Father     Heart disease Brother     Heart disease Other     Diabetes Other     Cancer Other     Hypertension Other     Arthritis Other        Social History:   Social History     Socioeconomic History    Marital status:     Number of children: 2   Tobacco Use    Smoking status: Former     Current packs/day: 0.00     Types: Cigarettes     Quit date: 3/5/1984     Years since quittin.4    Smokeless tobacco: Never   Vaping Use    Vaping status: Never Used   Substance  and Sexual Activity    Alcohol use: Yes     Alcohol/week: 1.0 standard drink of alcohol     Types: 1 Cans of beer per week     Comment: occas    Drug use: No    Sexual activity: Defer       Medications:   Current Outpatient Medications:     Abatacept (Orencia ClickJect) 125 MG/ML solution auto-injector, Inject 1 mL under the skin into the appropriate area as directed 1 (One) Time Per Week., Disp: , Rfl:     acetaminophen (TYLENOL) 500 MG tablet, Take 2 tablets by mouth Every 6 (Six) Hours As Needed., Disp: , Rfl:     amLODIPine (NORVASC) 2.5 MG tablet, Take 1 tablet by mouth Daily., Disp: , Rfl:     aspirin 81 MG EC tablet, Take 1 tablet by mouth Daily., Disp: , Rfl:     baclofen (LIORESAL) 10 MG tablet, Take 1 tablet by mouth As Needed for Muscle Spasms., Disp: , Rfl:     carvedilol (COREG) 25 MG tablet, Take 1.5 tablets by mouth 2 (Two) Times a Day With Meals. Needs lab work for further refills., Disp: 270 tablet, Rfl: 3    Cholecalciferol (VITAMIN D3) 2000 UNITS capsule, Take 1 capsule by mouth Daily., Disp: , Rfl:     empagliflozin (JARDIANCE) 25 MG tablet tablet, Take 1 tablet by mouth Daily., Disp: , Rfl:     gabapentin (NEURONTIN) 300 MG capsule, Take 1 capsule by mouth 2 (Two) Times a Day., Disp: , Rfl:     Glucose Blood (COOL BLOOD GLUCOSE TEST STRIPS VI), , Disp: , Rfl:     Lancet Device misc, 1 each 4 (Four) Times a Day., Disp: , Rfl:     losartan (COZAAR) 100 MG tablet, TAKE 1 AND 1/2 TABLETS EVERY DAY. Needs lab work for further refills., Disp: 135 tablet, Rfl: 3    metaxalone (SKELAXIN) 800 MG tablet, Take 1 tablet by mouth As Needed., Disp: , Rfl:     Ozempic, 0.25 or 0.5 MG/DOSE, 2 MG/3ML solution pen-injector, INJECT TO 0.5MG UNDER THE SKIN ONCE WEEKLY E11.9, Disp: , Rfl:     potassium chloride (K-DUR) 10 MEQ CR tablet, Take 1 tablet by mouth Daily., Disp: , Rfl:     rosuvastatin (CRESTOR) 20 MG tablet, Take 1 tablet by mouth Daily., Disp: , Rfl:     saccharomyces boulardii (FLORASTOR) 250 MG  "capsule, Take 1 capsule by mouth Daily., Disp: , Rfl:     sertraline (ZOLOFT) 50 MG tablet, Take 1 tablet by mouth Daily., Disp: , Rfl:     tamsulosin (FLOMAX) 0.4 MG capsule 24 hr capsule, Take 1 capsule by mouth Daily., Disp: , Rfl:     traMADol (ULTRAM) 50 MG tablet, Take 1 tablet by mouth As Needed., Disp: , Rfl:     melatonin 5 MG tablet tablet, Take 1 tablet by mouth Daily., Disp: , Rfl:     omeprazole (priLOSEC) 40 MG capsule, Take 1 capsule by mouth Daily., Disp: , Rfl:     psyllium (METAMUCIL) 58.6 % powder, Take 1 packet by mouth., Disp: , Rfl:     Semaglutide (OZEMPIC, 0.25 OR 0.5 MG/DOSE, SC), , Disp: , Rfl:   No current facility-administered medications for this visit.    Facility-Administered Medications Ordered in Other Visits:     Chlorhexidine Gluconate Cloth 2 % pads 1 application, 1 application , Topical, Q12H PRN, Marie Barnes PA-C    Allergies:   Allergies   Allergen Reactions    Humira [Adalimumab] Other (See Comments)     HA, intractable ocular migraine       I have reviewed and updated the patient's chief complaint, history of present illness, review of systems, past medical history, surgical history, family history, social history, medications and allergy list as appropriate.     Objective    Vital Signs:   Vitals:    08/13/24 1340   BP: 172/72   BP Location: Left arm   Pulse: 82   Temp: 98.2 °F (36.8 °C)   Weight: 101 kg (223 lb)   Height: 172.7 cm (68\")   PainSc:   4   PainLoc: Head  Comment: shoulders     Body mass index is 33.91 kg/m².   BMI is >= 30 and <35. (Class 1 Obesity). The following options were offered after discussion;: defer to PCP       Physical Exam:  Physical Exam  Vitals reviewed.   Constitutional:       Appearance: Normal appearance. He is obese.   HENT:      Head: Normocephalic and atraumatic.      Mouth/Throat:      Mouth: Mucous membranes are moist.   Eyes:      Conjunctiva/sclera: Conjunctivae normal.   Cardiovascular:      Rate and Rhythm: Normal rate and " regular rhythm.      Pulses: Normal pulses.      Heart sounds: Normal heart sounds.   Pulmonary:      Effort: Pulmonary effort is normal.      Breath sounds: Normal breath sounds.   Musculoskeletal:         General: Normal range of motion.      Cervical back: Normal range of motion and neck supple.      Comments: HE has no synovitis  Heberden's bilaterally  Strength 5/5 BUE and BLE   Skin:     General: Skin is warm and dry.   Neurological:      General: No focal deficit present.      Mental Status: He is alert and oriented to person, place, and time. Mental status is at baseline.   Psychiatric:         Mood and Affect: Mood normal.         Behavior: Behavior normal.         Thought Content: Thought content normal.         Judgment: Judgment normal.          Results Review:   Imaging Results (Last 24 Hours)       ** No results found for the last 24 hours. **            Procedures    Assessment / Plan    Assessment/Plan:   Diagnoses and all orders for this visit:    1. Rheumatoid arthritis, involving unspecified site, unspecified whether rheumatoid factor present (Primary)  Assessment & Plan:  early rheumatoid arthritis  sero +;    Prior meds: Plaquenil (further dermatitis)  ssz w/o results. mtx @ 1st then sx increased. Arava (BUE numbness), Humira ( HAs), Enbrel ( lost efficacy. Xeljanz ( new data)   CURRENT TREATMENT : Orencia  ???CTS sx--wear splints--was mild 2004 emg/ncv--resolved w/nsaid per pt.   in past had dramatic results w/nsaid but mild RI  Esr and Crp looked good in 8/19  Hand films show moderate degenerative changes of the first cmc joints, no erosions.   Foot films show moderate heel spurs, no erosions.  Denies chronic infection , CHF, MS, TB, COPD, Hepatitis. ; 1 flare of diverticulitis many years ago.  Started Orencia 4/22.   Currently stable with overall improvement.    Continue Orencia Sub Q.  He has chronic bilateral shoulder pain.  He continues to have CMC pain.  Continue Gabapentin regularly.   Prescribed by outside provider.  Tramadol - uses occasionally.  Prescribed by outside provider.  PCP has given him a RX of hydrocodone and he rarely takes and only half a dose.  Works better than the Tramadol.  Tylenol Arthritis 2 twice daily as needed.  NO NSAIDS due to CKD.  Wrist splint recommended while working.            Orders:  -     QuantiFERON-TB Gold Plus; Future  -     Hepatitis Panel, Acute; Future  -     CBC With Manual Differential; Future  -     Comprehensive Metabolic Panel; Future    2. High risk medication use  Assessment & Plan:  Started Orencia 4/22 - well tolerated and effective. Occ hand sx.  S/p Covid vaccine +2 boosters. He has had Bivalent booster in 9/22.  Hep and Qfn negative 10/22    Cbc, Cmp Q 4 months    Would hold Orencia for any infection or illness, Seek treatment and when well and illness is resolved you may restart medication.    Orders:  -     QuantiFERON-TB Gold Plus; Future  -     Hepatitis Panel, Acute; Future  -     CBC With Manual Differential; Future  -     Comprehensive Metabolic Panel; Future    3. Kidney disease  Assessment & Plan:  1.31/59--2/16   no nsaid--has used otc prn. July 2016 , Cr 1.22/gfr 64  12/16- 1.26 gfr 62  1.32/58--7/17  1.30/56 -- 11/17  1.10/68 in 5/18  Cr 1.36/55 in 8/19  1.29/59 in 1/2020 7/2020 Cr 1.22( normal)/ 63  12/21 Cr 1.14/67  Cr 1.26/59 in 2/22  Cr 1.22(1.27)/66 in 8/22  Cr 1.34/58 in 6/2023    Stage 3A  No Nsaid          4. Myalgia  Assessment & Plan:  Strength is stable.  He has taken a minimal amount of tizanidine.      5. Fatigue, unspecified type  Assessment & Plan:  Update Q TB and hepatitis panel    Orders:  -     QuantiFERON-TB Gold Plus; Future  -     Hepatitis Panel, Acute; Future        Follow Up:   Return in about 4 months (around 12/13/2024) for Dr. Mahmood.        SELMA Zavala  [facility] [place]

## 2024-10-16 ENCOUNTER — HOSPITAL ENCOUNTER (OUTPATIENT)
Dept: GENERAL RADIOLOGY | Facility: HOSPITAL | Age: 69
Discharge: HOME | End: 2024-10-16
Payer: MEDICARE

## 2024-10-16 ENCOUNTER — HOSPITAL ENCOUNTER (OUTPATIENT)
Facility: HOSPITAL | Age: 69
Discharge: HOME | End: 2024-10-16
Payer: MEDICARE

## 2024-10-16 DIAGNOSIS — M25.551 RIGHT HIP PAIN: ICD-10-CM

## 2024-10-16 DIAGNOSIS — M54.50 LOW BACK PAIN ASSOCIATED WITH A SPINAL DISORDER OTHER THAN RADICULOPATHY OR SPINAL STENOSIS: ICD-10-CM

## 2024-10-16 PROCEDURE — 73502 X-RAY EXAM HIP UNI 2-3 VIEWS: CPT

## 2024-10-16 PROCEDURE — 72100 X-RAY EXAM L-S SPINE 2/3 VWS: CPT

## 2024-10-17 ENCOUNTER — TRANSCRIBE ORDERS (OUTPATIENT)
Dept: ADMINISTRATIVE | Age: 69
End: 2024-10-17

## 2024-10-17 ENCOUNTER — TRANSCRIBE ORDERS (OUTPATIENT)
Dept: GENERAL RADIOLOGY | Facility: HOSPITAL | Age: 69
End: 2024-10-17

## 2024-10-17 DIAGNOSIS — M81.0 AGE-RELATED OSTEOPOROSIS WITHOUT CURRENT PATHOLOGICAL FRACTURE: Primary | ICD-10-CM

## 2024-10-24 ENCOUNTER — HOSPITAL ENCOUNTER (OUTPATIENT)
Dept: GENERAL RADIOLOGY | Facility: HOSPITAL | Age: 69
Discharge: HOME | End: 2024-10-24
Payer: MEDICARE

## 2024-10-24 DIAGNOSIS — M81.0 AGE-RELATED OSTEOPOROSIS WITHOUT CURRENT PATHOLOGICAL FRACTURE: ICD-10-CM

## 2024-10-24 PROCEDURE — 77080 DXA BONE DENSITY AXIAL: CPT

## 2024-11-06 RX ORDER — CARVEDILOL 25 MG/1
TABLET ORAL
Qty: 270 TABLET | Refills: 0 | Status: SHIPPED | OUTPATIENT
Start: 2024-11-06

## 2024-11-06 RX ORDER — LOSARTAN POTASSIUM 100 MG/1
TABLET ORAL
Qty: 135 TABLET | Refills: 0 | Status: SHIPPED | OUTPATIENT
Start: 2024-11-06

## 2024-11-11 ENCOUNTER — OFFICE VISIT (OUTPATIENT)
Dept: UROLOGY | Facility: CLINIC | Age: 69
End: 2024-11-11
Payer: MEDICARE

## 2024-11-11 ENCOUNTER — PATIENT ROUNDING (BHMG ONLY) (OUTPATIENT)
Dept: UROLOGY | Facility: CLINIC | Age: 69
End: 2024-11-11
Payer: MEDICARE

## 2024-11-11 VITALS
HEART RATE: 82 BPM | DIASTOLIC BLOOD PRESSURE: 82 MMHG | WEIGHT: 220 LBS | SYSTOLIC BLOOD PRESSURE: 118 MMHG | BODY MASS INDEX: 33.34 KG/M2 | TEMPERATURE: 97.9 F | OXYGEN SATURATION: 96 % | HEIGHT: 68 IN

## 2024-11-11 DIAGNOSIS — R35.1 NOCTURIA: ICD-10-CM

## 2024-11-11 DIAGNOSIS — R39.9 LOWER URINARY TRACT SYMPTOMS (LUTS): Primary | ICD-10-CM

## 2024-11-11 DIAGNOSIS — N28.1 RENAL CYST: ICD-10-CM

## 2024-11-11 NOTE — PROGRESS NOTES
Chief Complaint  Chief Complaint   Patient presents with    bladder wall thickening    Benign Prostatic Hypertrophy        Referring Provider:  Terri Barreto APRN HPI  Mr. Brink is a 69 y.o. male with below past medical history who presents with LUTS and renal cysts.    IPSS Questionnaire (AUA-7):  Incomplete emptying  Over the past month, how often have you had a sensation of not emptying your bladder completely after you finished urinating?: Less than 1 time in 5 (11/11/24 1459)  Frequency  Over the past month, how often have you had to urinate again less than two hours after you finishied urinating ?: Less than half the time (11/11/24 1459)  Intermittency  Over the past month, how often have you found you stopped and started again several time when you urinated ?: Less than half the time (11/11/24 1459)  Urgency  Over the last month, how often have you found it difficult  have you found it difficult to postpone urination ?: More than half the time (11/11/24 1459)  Weak Stream  Over the past month, how often have you had a weak urinary stream ?: More than half the time (11/11/24 1459)  Straining  Over the past month, how often have you had to push or strain to begin urination ?: Not at all (11/11/24 1459)  Nocturia  Over the past month, how many times did you most typically get up to urinate from the time you went to bed until the time you got up in the morning ?: 2 times (11/11/24 1459)  Quality of life due to urinary symptoms  If you were to spend the rest of your life with your urinary condition the way it is now, how would feel about that?: Mostly dissatisfied (11/11/24 1459)    Scores  Total IPSS Score: (!) 15 (11/11/24 1459)  Total Score = Symptomatic Level: Moderately symptomatic: 8-19 (11/11/24 1459)       Past Medical History  Past Medical History:   Diagnosis Date    Anxiety     Cancer     skin     Chest pain syndrome     Coronary artery disease     Diabetes mellitus     TYPE 2 DIAGNOSED SEVERAL  YEARS AGO. TRIES TO TEST ONCE DAILY    Diverticulitis     Diverticulitis     Fibromyalgia     Hearing aid worn     History of cellulitis     History of kidney stones     History of malignant neoplasm     History of MRI of spine     demonstrating buldging discs at L3/L4, L4/L5, L5/S1 levels    History of panic attacks     History of tobacco abuse     History of tobacco, quitting 28 years ago.    Hypertension     Hypokalemia     Irregular heart beat     Low back pain     Migraine     occular    Obesity     Rheumatoid arthritis     Transient ischemic attack     Recent transient ischemic attack with normal echocardiogram, carotid duplex, and CT scan of the brain by verbal report.       Past Surgical History  Past Surgical History:   Procedure Laterality Date    ANAL FISTULA REPAIR  1999    Anal fistula repair, 1999 and 2008.    ATRIAL APPENDAGE EXCLUSION LEFT WITH TRANSESOPHAGEAL ECHOCARDIOGRAM N/A 01/31/2019    Procedure: ATRIAL APPENDAGE OCCLUSION LEFT;  Surgeon: Dillon Gu MD;  Location:  [facility];  Service: Cardiothoracic    CARDIAC CATHETERIZATION N/A 01/18/2019    Procedure: Left Heart Cath;  Surgeon: Dillon Neville III, MD;  Location:  [facility];  Service: Cardiovascular    CARDIAC CATHETERIZATION N/A 03/26/2021    Procedure: Left Heart Cath;  Surgeon: Mercy Martines MD;  Location:  [facility];  Service: Cardiovascular;  Laterality: N/A;    CHOLECYSTECTOMY N/A 03/28/2021    Procedure: CHOLECYSTECTOMY LAPAROSCOPIC;  Surgeon: Gigi Ambrose MD;  Location:  [facility];  Service: General;  Laterality: N/A;    COLONOSCOPY  2016    CORONARY ARTERY BYPASS GRAFT N/A 01/31/2019    Procedure: MEDIAN STERNOTOMY, CORONARY ARTERY BYPASS GRAFT X5, UTILIZING THE LEFT INTERNAL MAMMARY ARTERY, EVH OF THE RIGHT GREATER SAPHENOUS VEIN;  Surgeon: Dillon Gu MD;  Location:  [facility];  Service: Cardiothoracic    MOUTH FLOOR MASS EXCISION N/A     MOUTH FLOOR MASS EXCISION   07/01/2022    MYOMECTOMY  1980    Benign fibroid removed, 1980.    PILONIDAL CYSTECTOMY  1977    SKIN CANCER EXCISION      left shoulder    TRANSESOPHAGEAL ECHOCARDIOGRAM (TIMOTHY) N/A 01/31/2019    Procedure: TRANSESOPHAGEAL ECHOCARDIOGRAM WITH ANESTHESIA;  Surgeon: Dillon Gu MD;  [facility]: [facility];  Service: Cardiothoracic       Medications    Current Outpatient Medications:     Abatacept (Orencia ClickJect) 125 MG/ML solution auto-injector, Inject 1 mL under the skin into the appropriate area as directed 1 (One) Time Per Week., Disp: , Rfl:     acetaminophen (TYLENOL) 500 MG tablet, Take 2 tablets by mouth Every 6 (Six) Hours As Needed., Disp: , Rfl:     amLODIPine (NORVASC) 2.5 MG tablet, Take 1 tablet by mouth Daily., Disp: , Rfl:     aspirin 81 MG EC tablet, Take 1 tablet by mouth Daily., Disp: , Rfl:     baclofen (LIORESAL) 10 MG tablet, Take 1 tablet by mouth As Needed for Muscle Spasms., Disp: , Rfl:     Cholecalciferol (VITAMIN D3) 2000 UNITS capsule, Take 1 capsule by mouth Daily., Disp: , Rfl:     empagliflozin (JARDIANCE) 25 MG tablet tablet, Take 1 tablet by mouth Daily., Disp: , Rfl:     gabapentin (NEURONTIN) 300 MG capsule, Take 1 capsule by mouth 2 (Two) Times a Day., Disp: , Rfl:     Glucose Blood (COOL BLOOD GLUCOSE TEST STRIPS VI), , Disp: , Rfl:     Lancet Device misc, 1 each 4 (Four) Times a Day., Disp: , Rfl:     losartan (COZAAR) 100 MG tablet, TAKE 1 AND 1/2 TABLETS EVERY DAY (NEED LAB WORK FOR FURTHER REFILLS), Disp: 135 tablet, Rfl: 0    metaxalone (SKELAXIN) 800 MG tablet, Take 1 tablet by mouth As Needed., Disp: , Rfl:     omeprazole (priLOSEC) 40 MG capsule, Take 1 capsule by mouth Daily., Disp: , Rfl:     Ozempic, 0.25 or 0.5 MG/DOSE, 2 MG/3ML solution pen-injector, INJECT TO 0.5MG UNDER THE SKIN ONCE WEEKLY E11.9, Disp: , Rfl:     potassium chloride (K-DUR) 10 MEQ CR tablet, Take 1 tablet by mouth Daily., Disp: , Rfl:     psyllium (METAMUCIL) 58.6 % powder, Take 1 packet by  mouth., Disp: , Rfl:     rosuvastatin (CRESTOR) 20 MG tablet, Take 1 tablet by mouth Daily., Disp: , Rfl:     saccharomyces boulardii (FLORASTOR) 250 MG capsule, Take 1 capsule by mouth Daily., Disp: , Rfl:     sertraline (ZOLOFT) 50 MG tablet, Take 1 tablet by mouth Daily., Disp: , Rfl:     tamsulosin (FLOMAX) 0.4 MG capsule 24 hr capsule, Take 1 capsule by mouth Daily., Disp: , Rfl:     traMADol (ULTRAM) 50 MG tablet, Take 1 tablet by mouth As Needed., Disp: , Rfl:     carvedilol (COREG) 25 MG tablet, TAKE 1 AND 1/2 TABLETS TWICE DAILY WITH MEALS (NEED LAB WORK FOR FURTHER REFILLS), Disp: 270 tablet, Rfl: 0    melatonin 5 MG tablet tablet, Take 1 tablet by mouth Daily. (Patient not taking: Reported on 2024), Disp: , Rfl:     Semaglutide (OZEMPIC, 0.25 OR 0.5 MG/DOSE, SC), , Disp: , Rfl:   No current facility-administered medications for this visit.    Facility-Administered Medications Ordered in Other Visits:     Chlorhexidine Gluconate Cloth 2 % pads 1 application, 1 application , Topical, Q12H PRN, Marie Barnes PA-C    Allergies  Allergies   Allergen Reactions    Humira [Adalimumab] Other (See Comments)     HA, intractable ocular migraine       Social History  Social History     Socioeconomic History    Marital status:     Number of children: 2   Tobacco Use    Smoking status: Former     Current packs/day: 0.00     Types: Cigarettes     Quit date: 3/5/1984     Years since quittin.7    Smokeless tobacco: Never   Vaping Use    Vaping status: Never Used   Substance and Sexual Activity    Alcohol use: Yes     Alcohol/week: 1.0 standard drink of alcohol     Types: 1 Cans of beer per week     Comment: occas    Drug use: No    Sexual activity: Defer       Family History  Family History   Problem Relation Age of Onset    Heart disease Mother     Diabetes Father     Hypertension Father     Cancer Father     Pancreatic cancer Father     Arthritis Father     Heart disease Brother     Heart disease  "Other     Diabetes Other     Cancer Other     Hypertension Other     Arthritis Other        Physical Exam  Visit Vitals  Ht 172.7 cm (68\")   Wt 99.8 kg (220 lb)   BMI 33.45 kg/m²     Physical exam was performed and was notable for obese    Genitourinary  deferred    Labs  No results found for: \"PSA\"    Lab Results   Component Value Date    GLUCOSE 183 (H) 06/17/2022    CALCIUM 9.2 06/17/2022     06/17/2022    K 4.5 06/17/2022    CO2 24.1 06/17/2022     06/17/2022    BUN 13 06/17/2022    CREATININE 1.19 06/17/2022    EGFRIFNONA 59 (L) 03/29/2021    BCR 10.9 06/17/2022    ANIONGAP 10.9 06/17/2022       Lab Results   Component Value Date    WBC 7.65 06/17/2022    HGB 17.1 06/17/2022    HCT 48.0 06/17/2022    MCV 88.9 06/17/2022     06/17/2022       Brief Urine Lab Results       None             Radiologic Studies  CT Abdomen Pelvis Without Contrast  Result Date: 8/7/2024  Uncomplicated sigmoid diverticulosis without evidence of diverticulitis. Significant degenerative changes in the lower lumbar spine with evidence of canal stenosis and foraminal narrowing at L4-L5 and L5-S1. Prostatomegaly with bladder wall thickening likely outlet obstruction although inflammation or infection not excluded. Multiple renal cysts Electronically signed by Mackenzie Aguillon          Assessment  Mr. Brink is a 69 y.o. male who presents with chronic lower urinary tract symptoms, on tamsulosin.  IPSS is .  Bilateral benign-appearing renal cysts. Most     Plan  1.  PSA  2. CT Urogram  2. Fu for cysto, uroflow      Samm Dotson MD    "

## 2024-11-15 NOTE — PROGRESS NOTES
November 15, 2024    Hello, may I speak with Jesse Brink? LVM.    My name is Josefina.      I am  with [facility]  [address]  [address]  [facility] [id number] [phone].    Before we get started may I verify your date of birth? 1955    I am calling to officially welcome you to our practice and ask about your recent visit. Is this a good time to talk?     Tell me about your visit with us. What things went well?         We're always looking for ways to make our patients' experiences even better. Do you have recommendations on ways we may improve?      Overall were you satisfied with your first visit to our practice?        I appreciate you taking the time to speak with me today. Is there anything else I can do for you?       Thank you, and have a great day.

## 2024-11-22 ENCOUNTER — SPECIALTY PHARMACY (OUTPATIENT)
Age: 69
End: 2024-11-22
Payer: MEDICARE

## 2024-11-27 ENCOUNTER — TELEPHONE (OUTPATIENT)
Age: 69
End: 2024-11-27
Payer: MEDICARE

## 2024-11-27 NOTE — TELEPHONE ENCOUNTER
PT HAD TO BE CANCELLED DUE TO A PROVIDER EMERGENCY. I HAVE REACHED OUT VIA ARTERA, G-CONHART AND PHONE. IF PT CALLS BACK TO RESCHEDULE, PLEASE SCHEDULE WITH LUCY TAYLOR OR MACK. IF THE PT WANTS TO SEE DR. DIAZ, SHE IS BOOKED UNTIL APRIL. IF IT IS A NEW PT, CONFRIM IF THEY ARE INTRESTED IN SEEING ANOTHER PROVIDER OR IF THEY WANT TO WAIT FOR DR. DIAZ. PT HAS BEEN ADDED TO THE CANCELLATION LIST AS HIGH PRIORITY.         -HUB READY TO SCHEDULE.

## 2024-12-06 ENCOUNTER — HOSPITAL ENCOUNTER (OUTPATIENT)
Dept: CT IMAGING | Facility: HOSPITAL | Age: 69
Discharge: HOME | End: 2024-12-06
Payer: MEDICARE

## 2024-12-06 DIAGNOSIS — N28.1 RENAL CYST: ICD-10-CM

## 2024-12-06 DIAGNOSIS — R39.9 LOWER URINARY TRACT SYMPTOMS (LUTS): ICD-10-CM

## 2024-12-06 DIAGNOSIS — R35.1 NOCTURIA: ICD-10-CM

## 2024-12-06 PROCEDURE — 25510000001 IOPAMIDOL 61 % SOLUTION: Performed by: UROLOGY

## 2024-12-06 PROCEDURE — 74178 CT ABD&PLV WO CNTR FLWD CNTR: CPT

## 2024-12-06 RX ORDER — IOPAMIDOL 612 MG/ML
100 INJECTION, SOLUTION INTRAVASCULAR
Status: COMPLETED | OUTPATIENT
Start: 2024-12-06 | End: 2024-12-06

## 2024-12-06 RX ADMIN — IOPAMIDOL 100 ML: 612 INJECTION, SOLUTION INTRAVENOUS at 09:12

## 2024-12-16 ENCOUNTER — PROCEDURE VISIT (OUTPATIENT)
Dept: UROLOGY | Facility: CLINIC | Age: 69
End: 2024-12-16
Payer: MEDICARE

## 2024-12-16 VITALS — HEIGHT: 68 IN | WEIGHT: 220 LBS | BODY MASS INDEX: 33.34 KG/M2

## 2024-12-16 DIAGNOSIS — N39.41 URGE INCONTINENCE OF URINE: ICD-10-CM

## 2024-12-16 DIAGNOSIS — N13.8 BPH WITH URINARY OBSTRUCTION: ICD-10-CM

## 2024-12-16 DIAGNOSIS — R35.1 NOCTURIA: ICD-10-CM

## 2024-12-16 DIAGNOSIS — N40.1 BPH WITH URINARY OBSTRUCTION: ICD-10-CM

## 2024-12-16 DIAGNOSIS — R39.9 LOWER URINARY TRACT SYMPTOMS (LUTS): Primary | ICD-10-CM

## 2024-12-16 PROBLEM — N40.0 BPH (BENIGN PROSTATIC HYPERPLASIA): Status: ACTIVE | Noted: 2024-12-16

## 2024-12-16 PROCEDURE — 51741 ELECTRO-UROFLOWMETRY FIRST: CPT | Performed by: UROLOGY

## 2024-12-16 PROCEDURE — 52000 CYSTOURETHROSCOPY: CPT | Performed by: UROLOGY

## 2024-12-16 PROCEDURE — 99214 OFFICE O/P EST MOD 30 MIN: CPT | Performed by: UROLOGY

## 2024-12-16 NOTE — PROGRESS NOTES
CC  LUTS / BPH Workup    HPI  Ms. Brink is a 69 y.o. male with history below in assessment, who presents for follow up.     At this visit patient is here for BPH workup and discussion.     Past Medical History:   Diagnosis Date    Anxiety     Cancer     skin     Chest pain syndrome     Coronary artery disease     Diabetes mellitus     TYPE 2 DIAGNOSED SEVERAL YEARS AGO. TRIES TO TEST ONCE DAILY    Diverticulitis     Diverticulitis     Fibromyalgia     Hearing aid worn     History of cellulitis     History of kidney stones     History of malignant neoplasm     History of MRI of spine     demonstrating buldging discs at L3/L4, L4/L5, L5/S1 levels    History of panic attacks     History of tobacco abuse     History of tobacco, quitting 28 years ago.    Hypertension     Hypokalemia     Irregular heart beat     Low back pain     Migraine     occular    Obesity     Rheumatoid arthritis     Transient ischemic attack     Recent transient ischemic attack with normal echocardiogram, carotid duplex, and CT scan of the brain by verbal report.       Past Surgical History:   Procedure Laterality Date    ANAL FISTULA REPAIR  1999    Anal fistula repair, 1999 and 2008.    ATRIAL APPENDAGE EXCLUSION LEFT WITH TRANSESOPHAGEAL ECHOCARDIOGRAM N/A 01/31/2019    Procedure: ATRIAL APPENDAGE OCCLUSION LEFT;  Surgeon: Dillon Gu MD;  Location:  [facility];  Service: Cardiothoracic    CARDIAC CATHETERIZATION N/A 01/18/2019    Procedure: Left Heart Cath;  Surgeon: Dillon Neville III, MD;  Location:  [facility];  Service: Cardiovascular    CARDIAC CATHETERIZATION N/A 03/26/2021    Procedure: Left Heart Cath;  Surgeon: Mercy Martines MD;  Location:  [facility];  Service: Cardiovascular;  Laterality: N/A;    CHOLECYSTECTOMY N/A 03/28/2021    Procedure: CHOLECYSTECTOMY LAPAROSCOPIC;  Surgeon: Gigi Ambrose MD;  Location:  [facility];  Service: General;  Laterality: N/A;    COLONOSCOPY   2016    CORONARY ARTERY BYPASS GRAFT N/A 01/31/2019    Procedure: MEDIAN STERNOTOMY, CORONARY ARTERY BYPASS GRAFT X5, UTILIZING THE LEFT INTERNAL MAMMARY ARTERY, EVH OF THE RIGHT GREATER SAPHENOUS VEIN;  Surgeon: Dillon Gu MD;  [facility];  Service: Cardiothoracic    MOUTH FLOOR MASS EXCISION N/A     MOUTH FLOOR MASS EXCISION  07/01/2022    MYOMECTOMY  1980    Benign fibroid removed, 1980.    PILONIDAL CYSTECTOMY  1977    SKIN CANCER EXCISION      left shoulder    TRANSESOPHAGEAL ECHOCARDIOGRAM (TIMOTHY) N/A 01/31/2019    Procedure: TRANSESOPHAGEAL ECHOCARDIOGRAM WITH ANESTHESIA;  Surgeon: Dillon Gu MD;  Location:  [facility];  Service: Cardiothoracic         Current Outpatient Medications:     Abatacept (Orencia ClickJect) 125 MG/ML solution auto-injector, Inject 1 mL under the skin into the appropriate area as directed 1 (One) Time Per Week., Disp: , Rfl:     acetaminophen (TYLENOL) 500 MG tablet, Take 2 tablets by mouth Every 6 (Six) Hours As Needed., Disp: , Rfl:     amLODIPine (NORVASC) 2.5 MG tablet, Take 1 tablet by mouth Daily., Disp: , Rfl:     aspirin 81 MG EC tablet, Take 1 tablet by mouth Daily., Disp: , Rfl:     baclofen (LIORESAL) 10 MG tablet, Take 1 tablet by mouth As Needed for Muscle Spasms., Disp: , Rfl:     carvedilol (COREG) 25 MG tablet, TAKE 1 AND 1/2 TABLETS TWICE DAILY WITH MEALS (NEED LAB WORK FOR FURTHER REFILLS), Disp: 270 tablet, Rfl: 0    Cholecalciferol (VITAMIN D3) 2000 UNITS capsule, Take 1 capsule by mouth Daily., Disp: , Rfl:     empagliflozin (JARDIANCE) 25 MG tablet tablet, Take 1 tablet by mouth Daily., Disp: , Rfl:     gabapentin (NEURONTIN) 300 MG capsule, Take 1 capsule by mouth 2 (Two) Times a Day., Disp: , Rfl:     Glucose Blood (COOL BLOOD GLUCOSE TEST STRIPS VI), , Disp: , Rfl:     Lancet Device misc, 1 each 4 (Four) Times a Day., Disp: , Rfl:     losartan (COZAAR) 100 MG tablet, TAKE 1 AND 1/2 TABLETS EVERY DAY (NEED LAB WORK FOR FURTHER REFILLS), Disp:  135 tablet, Rfl: 0    melatonin 5 MG tablet tablet, Take 1 tablet by mouth Daily. (Patient not taking: Reported on 11/11/2024), Disp: , Rfl:     metaxalone (SKELAXIN) 800 MG tablet, Take 1 tablet by mouth As Needed., Disp: , Rfl:     omeprazole (priLOSEC) 40 MG capsule, Take 1 capsule by mouth Daily., Disp: , Rfl:     Ozempic, 0.25 or 0.5 MG/DOSE, 2 MG/3ML solution pen-injector, INJECT TO 0.5MG UNDER THE SKIN ONCE WEEKLY E11.9, Disp: , Rfl:     potassium chloride (K-DUR) 10 MEQ CR tablet, Take 1 tablet by mouth Daily., Disp: , Rfl:     psyllium (METAMUCIL) 58.6 % powder, Take 1 packet by mouth., Disp: , Rfl:     rosuvastatin (CRESTOR) 20 MG tablet, Take 1 tablet by mouth Daily., Disp: , Rfl:     saccharomyces boulardii (FLORASTOR) 250 MG capsule, Take 1 capsule by mouth Daily., Disp: , Rfl:     Semaglutide (OZEMPIC, 0.25 OR 0.5 MG/DOSE, SC), , Disp: , Rfl:     sertraline (ZOLOFT) 50 MG tablet, Take 1 tablet by mouth Daily., Disp: , Rfl:     tamsulosin (FLOMAX) 0.4 MG capsule 24 hr capsule, Take 1 capsule by mouth Daily., Disp: , Rfl:     traMADol (ULTRAM) 50 MG tablet, Take 1 tablet by mouth As Needed., Disp: , Rfl:   No current facility-administered medications for this visit.    Facility-Administered Medications Ordered in Other Visits:     Chlorhexidine Gluconate Cloth 2 % pads 1 application, 1 application , Topical, Q12H PRN, Marie Barnes PA-C     Physical Exam  There were no vitals taken for this visit.    Labs  Brief Urine Lab Results       None            Lab Results   Component Value Date    GLUCOSE 183 (H) 06/17/2022    CALCIUM 9.2 06/17/2022     06/17/2022    K 4.5 06/17/2022    CO2 24.1 06/17/2022     06/17/2022    BUN 13 06/17/2022    CREATININE 1.19 06/17/2022    EGFRIFNONA 59 (L) 03/29/2021    BCR 10.9 06/17/2022    ANIONGAP 10.9 06/17/2022       Lab Results   Component Value Date    WBC 7.65 06/17/2022    HGB 17.1 06/17/2022    HCT 48.0 06/17/2022    MCV 88.9 06/17/2022      "06/17/2022            No results found for: \"PSA\"    No components found for: \"TESTFRE\", \"TESTOSTEROTT\"     Radiographic Studies  CT Abdomen Pelvis With & Without Contrast  Result Date: 12/6/2024  Benign bilateral renal cysts.  Fatty infiltration of the liver.  Colonic diverticulosis.    CTDI: 9.92 mGy DLP: 2109.89 mGy.cm   This study was performed with techniques to keep radiation doses as low as reasonably achievable (ALARA). Individualized dose reduction techniques using automated exposure control or adjustment of mA and/or kV according to the patient size were employed.     Images were reviewed, interpreted, and dictated by Dr. Isha Dozier MD Transcribed by Mayr Ordaz PA-C.  This report was signed and finalized on 12/6/2024 2:29 PM by Isha Dozier MD.        I have reviewed above labs and imaging.     CYSTOSCOPY  Preprocedure diagnosis  LUTS  Postprocedure diagnosis  Same  Procedure  Flexible Cystourethroscopy  Attending surgeon  Samm Dotson MD  Anesthesia  2% lidocaine jelly intraurethrally  Complications  None  Indications  69 y.o. male undergoing a flexible cystoscopy for the above mentioned indications.  Informed consent was obtained.    Findings  Cystoscopic findings included one right and left ureteral orifice in the normal anatomic position with normal bladder mucosa and no tumors, masses or stones. The urethral urothelium was within normal limits with no strictures.  There was not a prominent median lobe.  The lateral lobes were obstructive in appearance.  They did protrude intravesically little bit.  Procedure  The patient was placed in supine position and prepped and draped in sterile fashion with lidocaine jelly per urethra for anesthesia.  A timeout was performed.  The 14F flexible cystoscope was lubricated and gently placed through the penile urethra and into the bladder.  The bladder was completely visualized.  The cystoscope was retroflexed and the bladder neck and prostate " visualized.  The cystoscope was slowly withdrawn while visualizing the urethra and the procedure terminated.  The patient tolerated the procedure well.      PROSTATE SIZE  Prostate measured 50 cc on CT urogram      UROFLOW  Peak flow rate - 12 mL/sec  Average flow rate - 4 mL/sec  Flow curve -flat  Voided volume - 235 mL    I personally reviewed  and interpreted this study.       Assessment  69 y.o. male with worsening of chronic lower urinary tract symptoms.  Currently on tamsulosin and semaglutide.  Prostate 50 cc.  Very bothered by weak stream, nocturia, and now urge incontinence.    In a separate room and encounter after his workup, we discussed a number of different surgical options.  His prostate is too big for Urolift.  We therefore discussed HoLEP and aquablation.     Plan  1. Schedule for aquablation at [facility] in March.  Risks are history of CAD and diabetes mellitus on Ozempic.  He was instructed to stop the Ozempic 2 weeks prior.

## 2024-12-17 ENCOUNTER — HOSPITAL ENCOUNTER (OUTPATIENT)
Facility: HOSPITAL | Age: 69
Discharge: HOME | End: 2024-12-17
Payer: MEDICARE

## 2024-12-17 ENCOUNTER — OFFICE VISIT (OUTPATIENT)
Dept: CARDIOLOGY | Facility: CLINIC | Age: 69
End: 2024-12-17
Payer: MEDICARE

## 2024-12-17 ENCOUNTER — TRANSCRIBE ORDERS (OUTPATIENT)
Facility: HOSPITAL | Age: 69
End: 2024-12-17

## 2024-12-17 VITALS
OXYGEN SATURATION: 97 % | BODY MASS INDEX: 33.95 KG/M2 | HEIGHT: 68 IN | WEIGHT: 224 LBS | DIASTOLIC BLOOD PRESSURE: 64 MMHG | SYSTOLIC BLOOD PRESSURE: 138 MMHG | HEART RATE: 78 BPM

## 2024-12-17 DIAGNOSIS — I25.10 CAD IN NATIVE ARTERY: Primary | ICD-10-CM

## 2024-12-17 DIAGNOSIS — R07.2 PRECORDIAL PAIN: Primary | ICD-10-CM

## 2024-12-17 DIAGNOSIS — E78.2 MIXED HYPERLIPIDEMIA: ICD-10-CM

## 2024-12-17 DIAGNOSIS — I24.9 ACUTE CORONARY SYNDROME: ICD-10-CM

## 2024-12-17 DIAGNOSIS — R07.2 PRECORDIAL PAIN: ICD-10-CM

## 2024-12-17 DIAGNOSIS — I10 BENIGN ESSENTIAL HTN: ICD-10-CM

## 2024-12-17 PROCEDURE — 93005 ELECTROCARDIOGRAM TRACING: CPT

## 2024-12-17 RX ORDER — NITROGLYCERIN 0.4 MG/1
0.4 TABLET SUBLINGUAL
Qty: 25 TABLET | Refills: 1 | Status: SHIPPED | OUTPATIENT
Start: 2024-12-17

## 2024-12-17 NOTE — PROGRESS NOTES
[facility]  Office visit  Jesse Brink  1955  [phone]    VISIT DATE:  12/17/2024      PCP: Terri Barreto APRN  [address] [place] [id number]    CC:  Chief Complaint   Patient presents with    CAD in native artery       PROBLEM LIST:  Coronary artery disease  Transient ischemic attack with normal echocardiogram, carotid duplex, and CT scan of the brain by verbal report.  Benign hypertension.  History of tobacco, quitting 28 years ago.  Hyperglycemia, diet controlled.  Rheumatoid arthritis.  Obesity.  Family history of heart disease.  Surgical history:  Pilonidal cyst, 1977.  Benign fibroid removed, 1980.  Anal fistula repair, 1999 and 2008.    Previous cardiac studies and procedures  January 2019  Cardiac catheterization  Multivessel obstructive coronary disease: Proximal RCA .  Mid LAD .  95% proximal first diagonal, 95% first obtuse marginal branch, 70-80% proximal second obtuse marginal branch.  Mildly elevated LVEDP  No aortic stenosis.  Echo   Left ventricular wall thickness is consistent with mild concentric hypertrophy.  Estimated EF appears to be in the range of 36 - 40%  Left ventricular diastolic dysfunction (grade I a) consistent with impaired relaxation.  The following left ventricular wall segments are hypokinetic: mid anterior, apical anterior, apical lateral, apical inferior, apical septal and apex hypokinetic.  Regional contracility augments post PVC.    Carotid duplex  Right internal carotid artery stenosis of 0-49%.  Left internal carotid artery stenosis of 0-49%.    1. Coronary artery bypass graft x 5  -LIMA to LAD.    -Greater saphenous vein to RCA  -Greater saphenous vein to OM1  -Greater saphenous vein to OM2  -Greater saphenous vein to D1  2. Left atrial appendage ligation (35 mm Atricure clip placement)    May 2019 echo  Left ventricular systolic function is normal.  Calculated EF = 55%  Left atrial cavity size is mildly  dilated.    March 2021  Cardiac catheterization  3/5 patent grafts with evidence of occlusion of the SVG to one of the obtuse marginal branches as well as the diagonal branch.  These both appear to be old findings.  Normal LV systolic function wall motion  No EKG changes are seen  Elevated LVEDP at 22 mmHg  Transthoracic echocardiogram  Estimated left ventricular EF = 60% Left ventricular systolic function is normal.  Left ventricular diastolic function is consistent with (grade II w/high LAP) pseudonormalization.  Trace mitral valve regurgitation is present.  Trace tricuspid valve regurgitation is present.  There is no evidence of pericardial effusion    ASSESSMENT:   Diagnosis Plan   1. CAD in native artery        2. Benign essential HTN        3. Acute coronary syndrome        4. Mixed hyperlipidemia        5. Precordial pain  Stress Test With Myocardial Perfusion (1 Day)            PLAN:  Coronary artery disease: Status post surgical revascularization.  Only with intermittent mild atypical angina.  Danay scan myocardial perfusion imaging pending for ischemia evaluation.  Continue current antianginal therapy with addition of appearing sublingual nitroglycerin.  Will add isosorbide mononitrate if symptoms increase in frequency.  Normal EF, continue current medical therapy.    Hypertension: Goal less than 130/90.  Currently well controlled, continue current medical therapy.    Palpitations: Consistent with symptomatic premature ventricular contractions.   Continue beta-blockade.  Currently well controlled.    Hyperlipidemia: Continue high intensity statin therapy, goal LDL less than 70.  Continue rosuvastatin 20 mg p.o. daily.  Currently well controlled.    History of stroke: Continue aggressive risk factor modification.  Continue current medical therapy.    Subjective  Intermittent mild precordial chest discomfort.  Describes as a pressure sensation, uses a closed fist over top of his sternum as he describes a  "discomfort.  Rare in nature.  Can last up to 30 minutes.  No obvious triggers.  Blood pressures at home running less than 130/80 mmHg. Compliant with medical therapy.  No regular exercise, maintaining an active lifestyle.    PHYSICAL EXAMINATION:  Vitals:    12/17/24 0944   BP: 138/64   BP Location: Left arm   Patient Position: Sitting   Cuff Size: Adult   Pulse: 78   SpO2: 97%   Weight: 102 kg (224 lb)   Height: 172.7 cm (68\")     General Appearance:    Alert, cooperative, no distress, appears stated age   Head:    Normocephalic, without obvious abnormality, atraumatic   Eyes:    conjunctiva/corneas clear   Nose:   Nares normal, septum midline, mucosa normal, no drainage   Throat:   Lips, teeth and gums normal   Neck:   Supple, symmetrical, trachea midline, no carotid    bruit or JVD   Lungs:     Clear to auscultation bilaterally, respirations unlabored   Chest Wall:    No tenderness or deformity    Heart:    Regular rate and rhythm, S1 and S2 normal.  2/6 holosystolic murmur left lower sternal border to apex, no rub   or gallop, normal carotid impulse bilaterally without bruit.   Abdomen:     Soft, non-tender   Extremities:   Extremities normal, atraumatic, no cyanosis or edema   Pulses:   2+ and symmetric all extremities   Skin:   Skin color, texture, turgor normal, no rashes or lesions       Diagnostic Data:    ECG 12 Lead    Date/Time: 12/17/2024 9:49 AM  Performed by: Dillon Neville III, MD    Authorized by: Dillon Neville III, MD  Comparison: compared with previous ECG from 6/17/2022  Similar to previous ECG  Rhythm: sinus rhythm  Ectopy: unifocal PVCs  Q waves: III, II and aVF      Clinical impression: abnormal EKG        Lab Results   Component Value Date    TRIG 96 03/27/2021    HDL 51 03/27/2021     Lab Results   Component Value Date    GLUCOSE 183 (H) 06/17/2022    BUN 13 06/17/2022    CREATININE 1.19 06/17/2022     06/17/2022    K 4.5 06/17/2022     06/17/2022    CO2 24.1 06/17/2022     Lab " Results   Component Value Date    HGBA1C 6.60 (H) 01/30/2019     Lab Results   Component Value Date    WBC 7.65 06/17/2022    HGB 17.1 06/17/2022    HCT 48.0 06/17/2022     06/17/2022       Allergies  Allergies   Allergen Reactions    Humira [Adalimumab] Other (See Comments)     HA, intractable ocular migraine       Current Medications    Current Outpatient Medications:     Abatacept (Orencia ClickJect) 125 MG/ML solution auto-injector, Inject 1 mL under the skin into the appropriate area as directed 1 (One) Time Per Week., Disp: , Rfl:     acetaminophen (TYLENOL) 500 MG tablet, Take 2 tablets by mouth Every 6 (Six) Hours As Needed., Disp: , Rfl:     amLODIPine (NORVASC) 2.5 MG tablet, Take 1 tablet by mouth Daily., Disp: , Rfl:     aspirin 81 MG EC tablet, Take 1 tablet by mouth Daily., Disp: , Rfl:     baclofen (LIORESAL) 10 MG tablet, Take 1 tablet by mouth As Needed for Muscle Spasms., Disp: , Rfl:     carvedilol (COREG) 25 MG tablet, TAKE 1 AND 1/2 TABLETS TWICE DAILY WITH MEALS (NEED LAB WORK FOR FURTHER REFILLS), Disp: 270 tablet, Rfl: 0    Cholecalciferol (VITAMIN D3) 2000 UNITS capsule, Take 1 capsule by mouth Daily., Disp: , Rfl:     empagliflozin (JARDIANCE) 25 MG tablet tablet, Take 1 tablet by mouth Daily., Disp: , Rfl:     gabapentin (NEURONTIN) 300 MG capsule, Take 1 capsule by mouth 2 (Two) Times a Day., Disp: , Rfl:     Glucose Blood (COOL BLOOD GLUCOSE TEST STRIPS VI), , Disp: , Rfl:     Lancet Device misc, 1 each 4 (Four) Times a Day., Disp: , Rfl:     losartan (COZAAR) 100 MG tablet, TAKE 1 AND 1/2 TABLETS EVERY DAY (NEED LAB WORK FOR FURTHER REFILLS), Disp: 135 tablet, Rfl: 0    metaxalone (SKELAXIN) 800 MG tablet, Take 1 tablet by mouth As Needed., Disp: , Rfl:     omeprazole (priLOSEC) 40 MG capsule, Take 1 capsule by mouth Daily., Disp: , Rfl:     Ozempic, 0.25 or 0.5 MG/DOSE, 2 MG/3ML solution pen-injector, INJECT TO 0.5MG UNDER THE SKIN ONCE WEEKLY E11.9, Disp: , Rfl:     potassium  chloride (K-DUR) 10 MEQ CR tablet, Take 1 tablet by mouth Daily., Disp: , Rfl:     psyllium (METAMUCIL) 58.6 % powder, Take 1 packet by mouth., Disp: , Rfl:     rosuvastatin (CRESTOR) 20 MG tablet, Take 1 tablet by mouth Daily., Disp: , Rfl:     saccharomyces boulardii (FLORASTOR) 250 MG capsule, Take 1 capsule by mouth Daily., Disp: , Rfl:     sertraline (ZOLOFT) 50 MG tablet, Take 1 tablet by mouth Daily., Disp: , Rfl:     tamsulosin (FLOMAX) 0.4 MG capsule 24 hr capsule, Take 1 capsule by mouth Daily., Disp: , Rfl:     traMADol (ULTRAM) 50 MG tablet, Take 1 tablet by mouth As Needed., Disp: , Rfl:     melatonin 5 MG tablet tablet, Take 1 tablet by mouth Daily. (Patient not taking: Reported on 2024), Disp: , Rfl:     nitroglycerin (NITROSTAT) 0.4 MG SL tablet, Place 1 tablet under the tongue Every 5 (Five) Minutes As Needed for Chest Pain. Take no more than 3 doses in 15 minutes., Disp: 25 tablet, Rfl: 1    Semaglutide (OZEMPIC, 0.25 OR 0.5 MG/DOSE, SC), , Disp: , Rfl:   No current facility-administered medications for this visit.    Facility-Administered Medications Ordered in Other Visits:     Chlorhexidine Gluconate Cloth 2 % pads 1 application, 1 application , Topical, Q12H PRN, Marie Barnes PA-C ROS  Review of Systems   Cardiovascular:  Positive for leg swelling. Negative for chest pain, claudication, dyspnea on exertion, irregular heartbeat and palpitations.   Respiratory:  Negative for cough and shortness of breath.          SOCIAL HX  Social History     Socioeconomic History    Marital status:     Number of children: 2   Tobacco Use    Smoking status: Former     Current packs/day: 0.00     Average packs/day: 1.5 packs/day for 8.2 years (12.3 ttl pk-yrs)     Types: Cigarettes, Cigars     Start date: 1976     Quit date: 3/5/1984     Years since quittin.8     Passive exposure: Past    Smokeless tobacco: Never    Tobacco comments:     quit about 42 years ago   Vaping Use     Vaping status: Never Used   Substance and Sexual Activity    Alcohol use: Yes     Alcohol/week: 1.0 standard drink of alcohol     Comment: couple of beers  and maybe a mixed drink every other month    Drug use: No    Sexual activity: Not Currently     Partners: Female       FAMILY HX  Family History   Problem Relation Age of Onset    Heart disease Mother     Diabetes Father     Hypertension Father     Cancer Father     Pancreatic cancer Father     Arthritis Father     Heart disease Brother     Heart disease Other     Diabetes Other     Cancer Other     Hypertension Other     Arthritis Other              Dillon Neville III, MD, FACC

## 2025-01-08 ENCOUNTER — TELEPHONE (OUTPATIENT)
Age: 70
End: 2025-01-08

## 2025-01-08 NOTE — TELEPHONE ENCOUNTER
"      Hub staff attempted to follow warm transfer process and was unsuccessful     Caller: Jesse Brink Edw \"Сергей\"    Relationship to patient: Self    Best call back number: [phone]     Patient is needing: PT NEEDS ASSISTANCE FOR OZEMPIC ASAP        "

## 2025-01-09 ENCOUNTER — PATIENT MESSAGE (OUTPATIENT)
Age: 70
End: 2025-01-09
Payer: MEDICARE

## 2025-01-20 RX ORDER — CARVEDILOL 25 MG/1
TABLET ORAL
Qty: 270 TABLET | Refills: 0 | Status: SHIPPED | OUTPATIENT
Start: 2025-01-20

## 2025-01-20 RX ORDER — LOSARTAN POTASSIUM 100 MG/1
TABLET ORAL
Qty: 135 TABLET | Refills: 0 | Status: SHIPPED | OUTPATIENT
Start: 2025-01-20

## 2025-01-23 ENCOUNTER — HOSPITAL ENCOUNTER (OUTPATIENT)
Dept: NUCLEAR MEDICINE | Facility: HOSPITAL | Age: 70
Discharge: HOME | End: 2025-01-23
Payer: MEDICARE

## 2025-01-23 ENCOUNTER — OUTSIDE FACILITY SERVICE (OUTPATIENT)
Dept: CARDIOLOGY | Facility: CLINIC | Age: 70
End: 2025-01-23
Payer: MEDICARE

## 2025-01-23 ENCOUNTER — HOSPITAL ENCOUNTER (OUTPATIENT)
Facility: HOSPITAL | Age: 70
Discharge: HOME | End: 2025-01-25
Payer: MEDICARE

## 2025-01-23 DIAGNOSIS — R07.2 PRECORDIAL PAIN: ICD-10-CM

## 2025-01-23 LAB
STRESS BASELINE DIAS BP: 84 MMHG
STRESS BASELINE HR: 86 BPM
STRESS BASELINE SYS BP: 160 MMHG
STRESS O2 SAT REST: 93 %
STRESS ST DEPRESSION: 0 MM
STRESS STAGE 1 BP: NORMAL MMHG
STRESS STAGE 1 COMMENTS: 97
STRESS STAGE 1 DURATION: 1 MIN:SEC
STRESS STAGE 1 HR: 101 BPM
STRESS STAGE 2 BP: NORMAL MMHG
STRESS STAGE 2 COMMENTS: 96
STRESS STAGE 2 DURATION: 1 MIN:SEC
STRESS STAGE 2 HR: 95 BPM
STRESS STAGE 3 BP: NORMAL MMHG
STRESS STAGE 3 COMMENTS: 93
STRESS STAGE 3 DURATION: 1 MIN:SEC
STRESS STAGE 3 HR: 93 BPM
STRESS STAGE 4 BP: NORMAL MMHG
STRESS STAGE 4 COMMENTS: 93
STRESS STAGE 4 DURATION: 1 MIN:SEC
STRESS STAGE 4 HR: 93 BPM
STRESS STAGE RECOVERY 1 BP: NORMAL MMHG
STRESS STAGE RECOVERY 1 COMMENTS: 96
STRESS STAGE RECOVERY 1 DURATION: 1 MIN:SEC
STRESS STAGE RECOVERY 1 HR: 93 BPM
STRESS STAGE RECOVERY 2 BP: NORMAL MMHG
STRESS STAGE RECOVERY 2 COMMENTS: 92
STRESS STAGE RECOVERY 2 DURATION: 1 MIN:SEC
STRESS STAGE RECOVERY 2 HR: 93 BPM
STRESS STAGE RECOVERY 3 BP: NORMAL MMHG
STRESS STAGE RECOVERY 3 COMMENTS: 96
STRESS STAGE RECOVERY 3 DURATION: 1 MIN:SEC
STRESS STAGE RECOVERY 3 HR: 93 BPM
STRESS TARGET HR: 152 BPM

## 2025-01-23 PROCEDURE — 93017 CV STRESS TEST TRACING ONLY: CPT

## 2025-01-23 PROCEDURE — A9500 TC99M SESTAMIBI: HCPCS | Performed by: INTERNAL MEDICINE

## 2025-01-23 PROCEDURE — 78452 HT MUSCLE IMAGE SPECT MULT: CPT

## 2025-01-23 PROCEDURE — 6360000002 HC RX W HCPCS: Performed by: INTERNAL MEDICINE

## 2025-01-23 PROCEDURE — 3430000000 HC RX DIAGNOSTIC RADIOPHARMACEUTICAL: Performed by: INTERNAL MEDICINE

## 2025-01-23 RX ORDER — TETRAKIS(2-METHOXYISOBUTYLISOCYANIDE)COPPER(I) TETRAFLUOROBORATE 1 MG/ML
30 INJECTION, POWDER, LYOPHILIZED, FOR SOLUTION INTRAVENOUS
Status: COMPLETED | OUTPATIENT
Start: 2025-01-23 | End: 2025-01-23

## 2025-01-23 RX ORDER — TETRAKIS(2-METHOXYISOBUTYLISOCYANIDE)COPPER(I) TETRAFLUOROBORATE 1 MG/ML
10 INJECTION, POWDER, LYOPHILIZED, FOR SOLUTION INTRAVENOUS
Status: COMPLETED | OUTPATIENT
Start: 2025-01-23 | End: 2025-01-23

## 2025-01-23 RX ORDER — REGADENOSON 0.08 MG/ML
0.4 INJECTION, SOLUTION INTRAVENOUS
Status: COMPLETED | OUTPATIENT
Start: 2025-01-23 | End: 2025-01-23

## 2025-01-23 RX ADMIN — TETRAKIS(2-METHOXYISOBUTYLISOCYANIDE)COPPER(I) TETRAFLUOROBORATE 10 MILLICURIE: 1 INJECTION, POWDER, LYOPHILIZED, FOR SOLUTION INTRAVENOUS at 08:09

## 2025-01-23 RX ADMIN — TETRAKIS(2-METHOXYISOBUTYLISOCYANIDE)COPPER(I) TETRAFLUOROBORATE 30 MILLICURIE: 1 INJECTION, POWDER, LYOPHILIZED, FOR SOLUTION INTRAVENOUS at 10:49

## 2025-01-23 RX ADMIN — REGADENOSON 0.4 MG: 0.08 INJECTION, SOLUTION INTRAVENOUS at 09:39

## 2025-02-03 ENCOUNTER — TELEPHONE (OUTPATIENT)
Dept: CARDIOLOGY | Facility: CLINIC | Age: 70
End: 2025-02-03
Payer: MEDICARE

## 2025-02-03 NOTE — TELEPHONE ENCOUNTER
----- Message from Dillon Neville sent at 2/3/2025 11:17 AM EST -----  No new blockages noted on stress test.  Would only recommend heart catheterization if he had consistent chest pain concerning for angina.  ----- Message -----  From: Silvana Hayden  Sent: 1/31/2025   2:28 PM EST  To: Dillon Neville III, MD

## 2025-02-13 NOTE — TELEPHONE ENCOUNTER
"    Caller: Jesse Brink Estevaneliceo \"Сергей\"    Relationship: Self    Best call back number: [phone]     Requested Prescriptions:      Ozempic, 0.25 or 0.5 MG/DOSE, 2 MG/3ML solution pen-injector       Pharmacy where request should be sent:    NOT CERTAIN  [facility] NOT [facility]      Additional details provided by patient: ALSO NEEDS FINANCIAL ASSISTANCE PAPERWORK TO BE FILLED OUT TO BE ABLE TO GET THE OZEMPIC. PATIENT TOOK LAST SHOT ABOUT 2 WEEKS AGO.    Does the patient have less than a 3 day supply:  [x] Yes  [] No    Would you like a call back once the refill request has been completed: [x] Yes [] No    If the office needs to give you a call back, can they leave a voicemail: [x] Yes [] No    Bakari Mancini Rep   02/13/25 11:58 EST           "

## 2025-02-14 NOTE — TELEPHONE ENCOUNTER
LVM for pt to return our call to confirm RX. Pt asked for Ozempic, We prescribe Orencia. Not Ozempic

## 2025-02-17 RX ORDER — ABATACEPT 125 MG/ML
1 INJECTION, SOLUTION SUBCUTANEOUS WEEKLY
Qty: 4 ML | Refills: 5 | Status: SHIPPED | OUTPATIENT
Start: 2025-02-17

## 2025-02-17 NOTE — TELEPHONE ENCOUNTER
Specialty Pharmacy Patient Management Program  Per Protocol Prescription Order/Refill     Patient currently fills medications at [facility] and is not enrolled in an Rheumatology Patient Management Program.     Requested Prescriptions     Signed Prescriptions Disp Refills    Abatacept (Orencia ClickJect) 125 MG/ML solution auto-injector 4 mL 5     Sig: Inject 1 mL under the skin into the appropriate area as directed 1 (One) Time Per Week.     Authorizing Provider: DEEP TAYLOR     Ordering User: CHELLE UMANA     Prescription orders above were sent to the pharmacy per Collaborative Care Agreement Protocol.

## 2025-02-21 ENCOUNTER — TELEPHONE (OUTPATIENT)
Age: 70
End: 2025-02-21
Payer: MEDICARE

## 2025-02-21 NOTE — TELEPHONE ENCOUNTER
Prior authorization initiated by [facility].  Update will be provided when a determination has been received.     Medication: Orencia Clickject   PA Submission Method: CMM  Case Number/CMM Key: [id number]

## 2025-02-24 ENCOUNTER — TELEPHONE (OUTPATIENT)
Age: 70
End: 2025-02-24
Payer: MEDICARE

## 2025-02-24 NOTE — TELEPHONE ENCOUNTER
PA request has been approved and pharmacy notified (Filling pharmacy will be notified by phone, fax, or submitted prescription)    Authorized Medication: Orencia Clickject   Name of Insurance Approving PA: Edmund  Pharmacy PA Number: [id number]  PA Effective Dates: 1/1/25-12/30/25  Dispensing Pharmacy: [facility]

## 2025-02-28 NOTE — TELEPHONE ENCOUNTER
"    Hub staff attempted to follow warm transfer process and was unsuccessful     Caller: Jesse Brink \"Сергей\"    Relationship to patient: Self    Best call back number: [phone]     Patient is needing: PT ASSIST FOR ORENCIA NEEDED SOON, PT BEEN OUT FOR A MONTH.       "

## 2025-02-28 NOTE — TELEPHONE ENCOUNTER
I will send the patient the "[employer] patient assistance application via my chart to fill out and return to us

## 2025-03-06 ENCOUNTER — SPECIALTY PHARMACY (OUTPATIENT)
Age: 70
End: 2025-03-06
Payer: MEDICARE

## 2025-03-06 ENCOUNTER — TELEPHONE (OUTPATIENT)
Age: 70
End: 2025-03-06
Payer: MEDICARE

## 2025-04-04 RX ORDER — CARVEDILOL 25 MG/1
TABLET ORAL
Qty: 270 TABLET | Refills: 3 | Status: SHIPPED | OUTPATIENT
Start: 2025-04-04

## 2025-04-04 RX ORDER — LOSARTAN POTASSIUM 100 MG/1
TABLET ORAL
Qty: 135 TABLET | Refills: 3 | Status: SHIPPED | OUTPATIENT
Start: 2025-04-04

## 2025-05-19 ENCOUNTER — ANESTHESIA EVENT (OUTPATIENT)
Dept: PERIOP | Facility: HOSPITAL | Age: 70
End: 2025-05-19
Payer: MEDICARE

## 2025-05-19 ENCOUNTER — PRE-ADMISSION TESTING (OUTPATIENT)
Dept: PREADMISSION TESTING | Facility: HOSPITAL | Age: 70
End: 2025-05-19
Payer: MEDICARE

## 2025-05-19 VITALS — WEIGHT: 226.19 LBS | BODY MASS INDEX: 34.28 KG/M2 | HEIGHT: 68 IN

## 2025-05-19 LAB
QT INTERVAL: 410 MS
QTC INTERVAL: 433 MS

## 2025-05-19 PROCEDURE — 93005 ELECTROCARDIOGRAM TRACING: CPT

## 2025-05-19 PROCEDURE — 85027 COMPLETE CBC AUTOMATED: CPT | Performed by: UROLOGY

## 2025-05-19 PROCEDURE — 93010 ELECTROCARDIOGRAM REPORT: CPT | Performed by: STUDENT IN AN ORGANIZED HEALTH CARE EDUCATION/TRAINING PROGRAM

## 2025-05-19 PROCEDURE — 81003 URINALYSIS AUTO W/O SCOPE: CPT | Performed by: UROLOGY

## 2025-05-19 PROCEDURE — 80048 BASIC METABOLIC PNL TOTAL CA: CPT | Performed by: UROLOGY

## 2025-05-19 RX ORDER — ROSUVASTATIN CALCIUM 40 MG/1
20-40 TABLET, COATED ORAL DAILY
COMMUNITY

## 2025-05-19 NOTE — PAT
Patient did not review general PAT education video as instructed in their preoperative information received from their surgeon.  One-on-one Pre Admission Testing general education provided during PAT visit.  Copies of PAT general education handouts (Incentive Spirometry, Meds to Beds Program, Patient Belongings, Pre-op skin preparation instructions, Blood Glucose testing, Visitor policy, Surgery FAQ, Code H) distributed to patient. Encouraged patient/family to read PAT general education handouts thoroughly and notify PAT staff with any questions or concerns. Patient instructed to bring PAT pass and completed skin prep sheet (if applicable) on the day of procedure. Patient verbalized understanding of all information and priority content.     Per Anesthesia Request, patient instructed not to take their ACE/ARB medications on the AM of surgery.    EKG, last stress test reviewed with Dr. Almodovar.  Per Dr. Almodovar, pt needs a cardiac risk assessment prior to surgery.  Pt denies any current SOB or chest pain.

## 2025-05-20 ENCOUNTER — TELEPHONE (OUTPATIENT)
Dept: UROLOGY | Facility: CLINIC | Age: 70
End: 2025-05-20
Payer: MEDICARE

## 2025-05-20 ENCOUNTER — TELEPHONE (OUTPATIENT)
Dept: CARDIOLOGY | Facility: CLINIC | Age: 70
End: 2025-05-20
Payer: MEDICARE

## 2025-05-20 NOTE — TELEPHONE ENCOUNTER
Left voicemail message with Dr. Kelin Lindsey's nurse requesting a detailed cardiac risk assessment for Aquablation surgery 06/02/2025.

## 2025-05-20 NOTE — TELEPHONE ENCOUNTER
Ara at 's office is requesting a cardiac risk assessment be placed in [facility]. He is scheduled to have an Aquablation of Prostate on 6/2/25. Had PAT yesterday and had an abnormal EKG. Please advise.

## 2025-05-21 NOTE — PAT
Verified patient previously completed cardiology visit for cardiac risk assessment in preparation for upcoming procedure, completion of 12-lead ECG within six months, and risk assessment letter reviewed. No further interventions required.     Cleared as low risk by Dr Neville on 5/20/25.  Chart forwarded to pre op on 5/21/25

## 2025-05-31 RX ORDER — FAMOTIDINE 10 MG/ML
20 INJECTION, SOLUTION INTRAVENOUS ONCE
Status: CANCELLED | OUTPATIENT
Start: 2025-05-31 | End: 2025-05-31

## 2025-06-02 ENCOUNTER — HOSPITAL ENCOUNTER (OUTPATIENT)
Facility: HOSPITAL | Age: 70
Discharge: HOME | End: 2025-06-02
Attending: UROLOGY | Admitting: UROLOGY
Payer: MEDICARE

## 2025-06-02 ENCOUNTER — ANESTHESIA (OUTPATIENT)
Dept: PERIOP | Facility: HOSPITAL | Age: 70
End: 2025-06-02
Payer: MEDICARE

## 2025-06-02 VITALS
OXYGEN SATURATION: 91 % | HEIGHT: 68 IN | HEART RATE: 64 BPM | BODY MASS INDEX: 34.25 KG/M2 | WEIGHT: 226 LBS | SYSTOLIC BLOOD PRESSURE: 127 MMHG | DIASTOLIC BLOOD PRESSURE: 79 MMHG | RESPIRATION RATE: 16 BRPM | TEMPERATURE: 97.7 F

## 2025-06-02 DIAGNOSIS — N13.8 BPH WITH URINARY OBSTRUCTION: ICD-10-CM

## 2025-06-02 DIAGNOSIS — N40.1 BPH WITH URINARY OBSTRUCTION: ICD-10-CM

## 2025-06-02 DIAGNOSIS — N40.1 BENIGN PROSTATIC HYPERPLASIA WITH URINARY FREQUENCY: Primary | ICD-10-CM

## 2025-06-02 DIAGNOSIS — R35.0 BENIGN PROSTATIC HYPERPLASIA WITH URINARY FREQUENCY: Primary | ICD-10-CM

## 2025-06-02 LAB — GLUCOSE BLDC GLUCOMTR-MCNC: 111 MG/DL (ref 70–130)

## 2025-06-02 PROCEDURE — 25010000002 ONDANSETRON PER 1 MG

## 2025-06-02 PROCEDURE — 88305 TISSUE EXAM BY PATHOLOGIST: CPT | Performed by: UROLOGY

## 2025-06-02 PROCEDURE — 25010000002 LIDOCAINE PF 1% 1 % SOLUTION

## 2025-06-02 PROCEDURE — 94640 AIRWAY INHALATION TREATMENT: CPT

## 2025-06-02 PROCEDURE — 25010000002 FENTANYL CITRATE (PF) 100 MCG/2ML SOLUTION

## 2025-06-02 PROCEDURE — 82948 REAGENT STRIP/BLOOD GLUCOSE: CPT

## 2025-06-02 PROCEDURE — 25010000002 PROPOFOL 10 MG/ML EMULSION

## 2025-06-02 PROCEDURE — 25010000002 LIDOCAINE PF 1% 1 % SOLUTION: Performed by: ANESTHESIOLOGY

## 2025-06-02 PROCEDURE — C2596 PROBE, ROBOTIC, WATER-JET: HCPCS | Performed by: UROLOGY

## 2025-06-02 PROCEDURE — 25010000002 TOBRAMYCIN PER 80 MG: Performed by: UROLOGY

## 2025-06-02 PROCEDURE — 25010000002 SUGAMMADEX 200 MG/2ML SOLUTION

## 2025-06-02 PROCEDURE — 0421T PR TRANSURETHRAL WATERJET ABLATION PROSTATE COMPL: CPT | Performed by: UROLOGY

## 2025-06-02 PROCEDURE — 25010000002 CEFAZOLIN PER 500 MG: Performed by: UROLOGY

## 2025-06-02 PROCEDURE — 25010000002 HYDROMORPHONE 1 MG/ML SOLUTION

## 2025-06-02 PROCEDURE — 25010000002 DEXAMETHASONE PER 1 MG

## 2025-06-02 PROCEDURE — G0378 HOSPITAL OBSERVATION PER HR: HCPCS

## 2025-06-02 PROCEDURE — 25810000003 LACTATED RINGERS PER 1000 ML: Performed by: ANESTHESIOLOGY

## 2025-06-02 PROCEDURE — S0260 H&P FOR SURGERY: HCPCS

## 2025-06-02 RX ORDER — MIDAZOLAM HYDROCHLORIDE 1 MG/ML
0.5 INJECTION, SOLUTION INTRAMUSCULAR; INTRAVENOUS
Status: DISCONTINUED | OUTPATIENT
Start: 2025-06-02 | End: 2025-06-02 | Stop reason: HOSPADM

## 2025-06-02 RX ORDER — HYDROCODONE BITARTRATE AND ACETAMINOPHEN 5; 325 MG/1; MG/1
1 TABLET ORAL EVERY 6 HOURS PRN
COMMUNITY

## 2025-06-02 RX ORDER — FAMOTIDINE 20 MG/1
20 TABLET, FILM COATED ORAL ONCE
Status: COMPLETED | OUTPATIENT
Start: 2025-06-02 | End: 2025-06-02

## 2025-06-02 RX ORDER — LABETALOL HYDROCHLORIDE 5 MG/ML
5 INJECTION, SOLUTION INTRAVENOUS
Status: DISCONTINUED | OUTPATIENT
Start: 2025-06-02 | End: 2025-06-02 | Stop reason: HOSPADM

## 2025-06-02 RX ORDER — ONDANSETRON 2 MG/ML
4 INJECTION INTRAMUSCULAR; INTRAVENOUS ONCE AS NEEDED
Status: DISCONTINUED | OUTPATIENT
Start: 2025-06-02 | End: 2025-06-02 | Stop reason: HOSPADM

## 2025-06-02 RX ORDER — FENTANYL CITRATE 50 UG/ML
50 INJECTION, SOLUTION INTRAMUSCULAR; INTRAVENOUS
Status: DISCONTINUED | OUTPATIENT
Start: 2025-06-02 | End: 2025-06-02 | Stop reason: HOSPADM

## 2025-06-02 RX ORDER — PROPOFOL 10 MG/ML
VIAL (ML) INTRAVENOUS AS NEEDED
Status: DISCONTINUED | OUTPATIENT
Start: 2025-06-02 | End: 2025-06-02 | Stop reason: SURG

## 2025-06-02 RX ORDER — DROPERIDOL 2.5 MG/ML
0.62 INJECTION, SOLUTION INTRAMUSCULAR; INTRAVENOUS
Status: DISCONTINUED | OUTPATIENT
Start: 2025-06-02 | End: 2025-06-02 | Stop reason: HOSPADM

## 2025-06-02 RX ORDER — MAGNESIUM HYDROXIDE 1200 MG/15ML
LIQUID ORAL AS NEEDED
Status: DISCONTINUED | OUTPATIENT
Start: 2025-06-02 | End: 2025-06-02 | Stop reason: HOSPADM

## 2025-06-02 RX ORDER — SUCCINYLCHOLINE/SOD CL,ISO/PF 200MG/10ML
SYRINGE (ML) INTRAVENOUS AS NEEDED
Status: DISCONTINUED | OUTPATIENT
Start: 2025-06-02 | End: 2025-06-02 | Stop reason: SURG

## 2025-06-02 RX ORDER — ROCURONIUM BROMIDE 10 MG/ML
INJECTION, SOLUTION INTRAVENOUS AS NEEDED
Status: DISCONTINUED | OUTPATIENT
Start: 2025-06-02 | End: 2025-06-02 | Stop reason: SURG

## 2025-06-02 RX ORDER — PROMETHAZINE HYDROCHLORIDE 25 MG/1
25 TABLET ORAL ONCE AS NEEDED
Status: DISCONTINUED | OUTPATIENT
Start: 2025-06-02 | End: 2025-06-02 | Stop reason: HOSPADM

## 2025-06-02 RX ORDER — HYDRALAZINE HYDROCHLORIDE 20 MG/ML
5 INJECTION INTRAMUSCULAR; INTRAVENOUS
Status: DISCONTINUED | OUTPATIENT
Start: 2025-06-02 | End: 2025-06-02 | Stop reason: HOSPADM

## 2025-06-02 RX ORDER — NALOXONE HCL 0.4 MG/ML
0.4 VIAL (ML) INJECTION AS NEEDED
Status: DISCONTINUED | OUTPATIENT
Start: 2025-06-02 | End: 2025-06-02 | Stop reason: HOSPADM

## 2025-06-02 RX ORDER — OXYBUTYNIN CHLORIDE 10 MG/1
10 TABLET, EXTENDED RELEASE ORAL DAILY PRN
Qty: 10 TABLET | Refills: 0 | Status: SHIPPED | OUTPATIENT
Start: 2025-06-02

## 2025-06-02 RX ORDER — FENTANYL CITRATE 50 UG/ML
INJECTION, SOLUTION INTRAMUSCULAR; INTRAVENOUS AS NEEDED
Status: DISCONTINUED | OUTPATIENT
Start: 2025-06-02 | End: 2025-06-02 | Stop reason: SURG

## 2025-06-02 RX ORDER — PROMETHAZINE HYDROCHLORIDE 25 MG/1
25 SUPPOSITORY RECTAL ONCE AS NEEDED
Status: DISCONTINUED | OUTPATIENT
Start: 2025-06-02 | End: 2025-06-02 | Stop reason: HOSPADM

## 2025-06-02 RX ORDER — LIDOCAINE HYDROCHLORIDE 10 MG/ML
0.5 INJECTION, SOLUTION EPIDURAL; INFILTRATION; INTRACAUDAL; PERINEURAL ONCE AS NEEDED
Status: COMPLETED | OUTPATIENT
Start: 2025-06-02 | End: 2025-06-02

## 2025-06-02 RX ORDER — SODIUM CHLORIDE 9 MG/ML
9 INJECTION, SOLUTION INTRAVENOUS AS NEEDED
Status: DISCONTINUED | OUTPATIENT
Start: 2025-06-02 | End: 2025-06-02 | Stop reason: HOSPADM

## 2025-06-02 RX ORDER — ONDANSETRON 2 MG/ML
INJECTION INTRAMUSCULAR; INTRAVENOUS AS NEEDED
Status: DISCONTINUED | OUTPATIENT
Start: 2025-06-02 | End: 2025-06-02 | Stop reason: SURG

## 2025-06-02 RX ORDER — CEFDINIR 300 MG/1
300 CAPSULE ORAL 2 TIMES DAILY
Qty: 6 CAPSULE | Refills: 0 | Status: SHIPPED | OUTPATIENT
Start: 2025-06-02 | End: 2025-06-05

## 2025-06-02 RX ORDER — HYDROCODONE BITARTRATE AND ACETAMINOPHEN 7.5; 325 MG/1; MG/1
1 TABLET ORAL EVERY 4 HOURS PRN
Status: DISCONTINUED | OUTPATIENT
Start: 2025-06-02 | End: 2025-06-02 | Stop reason: HOSPADM

## 2025-06-02 RX ORDER — DROPERIDOL 2.5 MG/ML
0.62 INJECTION, SOLUTION INTRAMUSCULAR; INTRAVENOUS ONCE AS NEEDED
Status: DISCONTINUED | OUTPATIENT
Start: 2025-06-02 | End: 2025-06-02 | Stop reason: HOSPADM

## 2025-06-02 RX ORDER — SODIUM CHLORIDE 0.9 % (FLUSH) 0.9 %
3-10 SYRINGE (ML) INJECTION AS NEEDED
Status: DISCONTINUED | OUTPATIENT
Start: 2025-06-02 | End: 2025-06-02 | Stop reason: HOSPADM

## 2025-06-02 RX ORDER — PHENAZOPYRIDINE HYDROCHLORIDE 100 MG/1
100 TABLET, FILM COATED ORAL DAILY PRN
Qty: 5 TABLET | Refills: 0 | Status: SHIPPED | OUTPATIENT
Start: 2025-06-02

## 2025-06-02 RX ORDER — SODIUM CHLORIDE, SODIUM LACTATE, POTASSIUM CHLORIDE, CALCIUM CHLORIDE 600; 310; 30; 20 MG/100ML; MG/100ML; MG/100ML; MG/100ML
9 INJECTION, SOLUTION INTRAVENOUS CONTINUOUS
Status: DISCONTINUED | OUTPATIENT
Start: 2025-06-02 | End: 2025-06-02 | Stop reason: HOSPADM

## 2025-06-02 RX ORDER — DEXAMETHASONE SODIUM PHOSPHATE 4 MG/ML
INJECTION, SOLUTION INTRA-ARTICULAR; INTRALESIONAL; INTRAMUSCULAR; INTRAVENOUS; SOFT TISSUE AS NEEDED
Status: DISCONTINUED | OUTPATIENT
Start: 2025-06-02 | End: 2025-06-02 | Stop reason: SURG

## 2025-06-02 RX ORDER — IPRATROPIUM BROMIDE AND ALBUTEROL SULFATE 2.5; .5 MG/3ML; MG/3ML
SOLUTION RESPIRATORY (INHALATION)
Status: COMPLETED
Start: 2025-06-02 | End: 2025-06-02

## 2025-06-02 RX ORDER — SODIUM CHLORIDE 0.9 % (FLUSH) 0.9 %
10 SYRINGE (ML) INJECTION AS NEEDED
Status: DISCONTINUED | OUTPATIENT
Start: 2025-06-02 | End: 2025-06-02 | Stop reason: HOSPADM

## 2025-06-02 RX ORDER — TRANEXAMIC ACID 10 MG/ML
1000 INJECTION, SOLUTION INTRAVENOUS ONCE
Status: DISCONTINUED | OUTPATIENT
Start: 2025-06-02 | End: 2025-06-02 | Stop reason: HOSPADM

## 2025-06-02 RX ORDER — TRANEXAMIC ACID 100 MG/ML
INJECTION, SOLUTION INTRAVENOUS AS NEEDED
Status: DISCONTINUED | OUTPATIENT
Start: 2025-06-02 | End: 2025-06-02 | Stop reason: SURG

## 2025-06-02 RX ORDER — EPHEDRINE SULFATE 50 MG/ML
INJECTION INTRAVENOUS AS NEEDED
Status: DISCONTINUED | OUTPATIENT
Start: 2025-06-02 | End: 2025-06-02 | Stop reason: SURG

## 2025-06-02 RX ORDER — IPRATROPIUM BROMIDE AND ALBUTEROL SULFATE 2.5; .5 MG/3ML; MG/3ML
3 SOLUTION RESPIRATORY (INHALATION) ONCE AS NEEDED
Status: COMPLETED | OUTPATIENT
Start: 2025-06-02 | End: 2025-06-02

## 2025-06-02 RX ORDER — SODIUM CHLORIDE 0.9 % (FLUSH) 0.9 %
3 SYRINGE (ML) INJECTION EVERY 12 HOURS SCHEDULED
Status: DISCONTINUED | OUTPATIENT
Start: 2025-06-02 | End: 2025-06-02 | Stop reason: HOSPADM

## 2025-06-02 RX ORDER — LIDOCAINE HYDROCHLORIDE 10 MG/ML
INJECTION, SOLUTION EPIDURAL; INFILTRATION; INTRACAUDAL; PERINEURAL AS NEEDED
Status: DISCONTINUED | OUTPATIENT
Start: 2025-06-02 | End: 2025-06-02 | Stop reason: SURG

## 2025-06-02 RX ORDER — SODIUM CHLORIDE, SODIUM LACTATE, POTASSIUM CHLORIDE, CALCIUM CHLORIDE 600; 310; 30; 20 MG/100ML; MG/100ML; MG/100ML; MG/100ML
9 INJECTION, SOLUTION INTRAVENOUS CONTINUOUS
Status: DISCONTINUED | OUTPATIENT
Start: 2025-06-03 | End: 2025-06-02 | Stop reason: HOSPADM

## 2025-06-02 RX ORDER — FENTANYL CITRATE 50 UG/ML
50 INJECTION, SOLUTION INTRAMUSCULAR; INTRAVENOUS
Status: DISCONTINUED | OUTPATIENT
Start: 2025-06-02 | End: 2025-06-02 | Stop reason: SDUPTHER

## 2025-06-02 RX ORDER — HYDROCODONE BITARTRATE AND ACETAMINOPHEN 5; 325 MG/1; MG/1
1 TABLET ORAL ONCE AS NEEDED
Status: DISCONTINUED | OUTPATIENT
Start: 2025-06-02 | End: 2025-06-02 | Stop reason: HOSPADM

## 2025-06-02 RX ORDER — SODIUM CHLORIDE 0.9 % (FLUSH) 0.9 %
10 SYRINGE (ML) INJECTION EVERY 12 HOURS SCHEDULED
Status: DISCONTINUED | OUTPATIENT
Start: 2025-06-02 | End: 2025-06-02 | Stop reason: HOSPADM

## 2025-06-02 RX ORDER — ONDANSETRON 2 MG/ML
4 INJECTION INTRAMUSCULAR; INTRAVENOUS ONCE AS NEEDED
Status: DISCONTINUED | OUTPATIENT
Start: 2025-06-02 | End: 2025-06-02 | Stop reason: SDUPTHER

## 2025-06-02 RX ORDER — HYDROMORPHONE HYDROCHLORIDE 1 MG/ML
0.5 INJECTION, SOLUTION INTRAMUSCULAR; INTRAVENOUS; SUBCUTANEOUS
Status: DISCONTINUED | OUTPATIENT
Start: 2025-06-02 | End: 2025-06-02 | Stop reason: SDUPTHER

## 2025-06-02 RX ORDER — HYDROMORPHONE HYDROCHLORIDE 1 MG/ML
0.5 INJECTION, SOLUTION INTRAMUSCULAR; INTRAVENOUS; SUBCUTANEOUS
Status: DISCONTINUED | OUTPATIENT
Start: 2025-06-02 | End: 2025-06-02 | Stop reason: HOSPADM

## 2025-06-02 RX ADMIN — TRANEXAMIC ACID 1000 MG: 100 INJECTION, SOLUTION INTRAVENOUS at 07:46

## 2025-06-02 RX ADMIN — FENTANYL CITRATE 100 MCG: 50 INJECTION, SOLUTION INTRAMUSCULAR; INTRAVENOUS at 07:42

## 2025-06-02 RX ADMIN — EPHEDRINE SULFATE 10 MG: 50 INJECTION INTRAVENOUS at 07:51

## 2025-06-02 RX ADMIN — ROCURONIUM 20 MG: 50 INJECTION, SOLUTION INTRAVENOUS at 08:08

## 2025-06-02 RX ADMIN — TOBRAMYCIN 410 MG: 40 INJECTION INTRAMUSCULAR; INTRAVENOUS at 07:43

## 2025-06-02 RX ADMIN — SODIUM CHLORIDE, POTASSIUM CHLORIDE, SODIUM LACTATE AND CALCIUM CHLORIDE 9 ML/HR: 600; 310; 30; 20 INJECTION, SOLUTION INTRAVENOUS at 07:03

## 2025-06-02 RX ADMIN — FAMOTIDINE 20 MG: 20 TABLET, FILM COATED ORAL at 07:04

## 2025-06-02 RX ADMIN — PROPOFOL 150 MG: 10 INJECTION, EMULSION INTRAVENOUS at 07:42

## 2025-06-02 RX ADMIN — IPRATROPIUM BROMIDE AND ALBUTEROL SULFATE 3 ML: 2.5; .5 SOLUTION RESPIRATORY (INHALATION) at 09:35

## 2025-06-02 RX ADMIN — ONDANSETRON 4 MG: 2 INJECTION INTRAMUSCULAR; INTRAVENOUS at 08:50

## 2025-06-02 RX ADMIN — SODIUM CHLORIDE 2000 MG: 900 INJECTION INTRAVENOUS at 07:45

## 2025-06-02 RX ADMIN — HYDROMORPHONE HYDROCHLORIDE 0.5 MG: 1 INJECTION, SOLUTION INTRAMUSCULAR; INTRAVENOUS; SUBCUTANEOUS at 09:53

## 2025-06-02 RX ADMIN — LIDOCAINE HYDROCHLORIDE 50 MG: 10 INJECTION, SOLUTION EPIDURAL; INFILTRATION; INTRACAUDAL; PERINEURAL at 07:42

## 2025-06-02 RX ADMIN — DEXAMETHASONE SODIUM PHOSPHATE 4 MG: 4 INJECTION, SOLUTION INTRA-ARTICULAR; INTRALESIONAL; INTRAMUSCULAR; INTRAVENOUS; SOFT TISSUE at 08:19

## 2025-06-02 RX ADMIN — Medication 160 MG: at 07:42

## 2025-06-02 RX ADMIN — ROCURONIUM 50 MG: 50 INJECTION, SOLUTION INTRAVENOUS at 07:48

## 2025-06-02 RX ADMIN — EPHEDRINE SULFATE 10 MG: 50 INJECTION INTRAVENOUS at 08:43

## 2025-06-02 RX ADMIN — LIDOCAINE HYDROCHLORIDE 0.5 ML: 10 INJECTION, SOLUTION EPIDURAL; INFILTRATION; INTRACAUDAL; PERINEURAL at 07:03

## 2025-06-02 RX ADMIN — SUGAMMADEX 200 MG: 100 INJECTION, SOLUTION INTRAVENOUS at 08:56

## 2025-06-02 RX ADMIN — TRANEXAMIC ACID 1000 MG: 100 INJECTION, SOLUTION INTRAVENOUS at 08:50

## 2025-06-02 NOTE — ANESTHESIA PREPROCEDURE EVALUATION
Anesthesia Evaluation     Patient summary reviewed and Nursing notes reviewed   no history of anesthetic complications:   NPO Solid Status: > 8 hours  NPO Liquid Status: > 2 hours           Airway   Mallampati: II  TM distance: >3 FB  Neck ROM: full  No difficulty expected  Dental - normal exam     Pulmonary - negative pulmonary ROS and normal exam    breath sounds clear to auscultation  Cardiovascular - normal exam  Exercise tolerance: good (4-7 METS)    ECG reviewed  Rhythm: regular  Rate: normal    (+) hypertension, CAD, CABG (2019 - last seen by cardiology 12/24, denies functional limitations)      Neuro/Psych  (+) TIA  GI/Hepatic/Renal/Endo    (+) obesity, renal disease- CRI, diabetes mellitus (Taking ozempic - last dose 9 days ago) type 2    Musculoskeletal     Abdominal    Substance History      OB/GYN          Other   arthritis,                 Anesthesia Plan    ASA 3     general     intravenous induction     Anesthetic plan, risks, benefits, and alternatives have been provided, discussed and informed consent has been obtained with: patient.    Plan discussed with CRNA.    CODE STATUS:

## 2025-06-02 NOTE — OP NOTE
Pre-procedure diagnosis:   Benign prostatic hyperplasia with urinary obstruction    Post-procedure diagnosis:   SAME    Procedure performed:   Aquablation CPT- 0421T    Surgeon  Samm Dotson MD    Anesthesia  General    Specimens  Prostate tissue    EBL  100 cc    FINDINGS:  1. Prostate had large obstructing lateral lobes.  2.  Ureteral orifice ease were able to be visualized at all times and were unaffected.    3. Prostate was sized at 50 cc       PROCEDURE IN DETAIL:  The patient was brought to the operating room. Site and scope of surgery were confirmed with the patient. The patient’s identity was confirmed.  General anesthesia was achieved without complications. Patient was then placed in lithotomy position. Genitals were prepped and draped in standard sterile fashion. Appropriate time-out was performed. Intravenous antibiotic was given. The TRUS Stepper was mounted to the Articulating Arm and secured to OR bed. The ultrasound probe was attached to the stepper. The ultrasound probe was aligned, and confirmation made that the prostate is centered and aligned using both transverse and sagittal views. The bladder neck, verumontanum and the central/transition zones were identified. [optional] A clinical assessment of the prostate was performed measuring dimensions (height, width, length) to estimate prostate size and note prostate anatomy. The 24F AQUABEAM Handpiece was inserted into the prostatic urethra and a complete cystoscopic evaluation was performed by inspecting the prostate, bladder, and identifying the location of the verumontanum/external sphincter. The AQUABEAM Handpiece was secured to the Handpiece Articulating Arm. Confirmed alignment of AQUABEAM Handpiece and TRUS Probe to be parallel and colinear. Confirmation that AQUABEAM nozzle is centered and anterior of the bladder neck or the median lobe. The cystoscope was then retracted to visualize the verumontanum and external sphincter and the  cystoscope tip was positioned just proximal to the external sphincter. Reconfirmed alignment of the TRUS probe with the AQUABEAM Handpiece and compression applied with TRUS probe. Horizontal alignment of the Handpiece waterjet nozzle was performed. The Aquablation treatment zones were planned utilizing real-time TRUS to visualize the contour of the prostate and the depth and radial angles of resection were defined in the transverse view. In the sagittal view, the AQUABEAM nozzle is identified and position registered with software. The treatment contours were then adjusted to conform to the intended resection margins. The median lobe, bladder neck and verumontanum were marked and confirmed in the treatment contour. The Aquablation Treatment was then started following the resection contour confirmed under ultrasound guidance.     TOTAL AQUABLATION RESECTION TIME:  7 min    Once Aquablation resection was complete, cystoscopy and tissue/clot evacuation was performed using a 26 Fr cystoscope/resectoscope until light pink.  We then resected the fluffy tissue that had been destroyed and performed cautery with the bipolar loop and half-moon ball at settings of 3 and 3.  A 24 Italian Youngblood catheter was inserted under ultrasound guidance. Balloon was inflated to   30 cc. Balloon positioning at bladder neck was confirmed and CBI started.Ultrasound probe was then removed and patient was brought out of lithotomy. Patient was then brought to recovery in stable condition.    He will be discharged today same day.

## 2025-06-02 NOTE — DISCHARGE INSTRUCTIONS
Home Care After Prostate Treatment  The following instructions will help you care for yourself, or be cared for upon your return home today.  These are guidelines for your care right after surgery only.     Diet  Drink plenty of liquids and eat light meals today.    Start your regular diet tomorrow.    Activity  Start normal activities in twenty-four (24) hours.  Limit heavy lifting and strenuous activity for 5-7 days.    Wound Care and Hygiene  No restrictions, start normal routine.    Anesthesia Precautions & Expectations  After anesthesia, rest for 24 hours.  Do not drive, drink alcoholic beverages or make any important decisions during this time.  General anesthesia may cause a sore throat, jaw discomfort or muscle aches.  These symptoms can last for one or two days.     What to Expect after Surgery  Mild pain or burning with voiding.  Frequency or urgency with urination.  Bladder cramps.  Minimal bleeding with voiding.    Call your Doctor  Passing clots in urine preventing bladder emptying  Severe pain not controlled by oral medication  Temperature above 101.5 degrees  Inability to urinate within eight (8) hours after surgery    Catheter care  Empty the catheter bag as it fills.  Call if it is not draining.      After catheter removal  It is common to have blood tinged urine for 3-5 days.  It is common to have urgency with urination.    Other Instructions  Burning with urination is very common.  Drink plenty of water to limit this feeling.    Other Contacts  Urology Office:  [id number]   [place] [address] [phone] office  [phone] fax    Follow up Appointment  Please call Dr. Trammell office to make a follow-up appointment tomorrow if you do not already have 1 for Youngblood catheter removal

## 2025-06-02 NOTE — H&P
"Pre-Op H&P  Jesse Brink  [id number]  1955    Chief complaint: \" I am here for aquablation of my prostate.\"    HPI:    Patient is a 69 y.o.male who presents with BPH with urinary obstruction.  He presents today for scheduled surgery and anticipates aquablation of the prostate.  Patient experiences chronic lower urinary tract symptoms.  Conservative treatment methods such as tamsulosin and semaglutide have not provided adequate results and therefore he seeks surgical intervention.  Prostate measured 50 cc on CT urogram. Patient denies any symptomatic changes or recent illnesses since last office visit.  He reports he takes an 81 mg aspirin daily and states his last dose was this morning 6/2/2025.     Review of Systems:  General ROS: negative for chills, fever or skin lesions;  No changes since last office visit.  Neg for recent sick exposure  Cardiovascular ROS: no chest pain or dyspnea on exertion  Respiratory ROS: no cough, shortness of breath, or wheezing    Allergies: Denies allergy to latex or contrast dye.  Allergies   Allergen Reactions    Humira [Adalimumab] Headache     HA, intractable ocular migraine       Home Meds:    Current Facility-Administered Medications on File Prior to Encounter   Medication Dose Route Frequency Provider Last Rate Last Admin    Chlorhexidine Gluconate Cloth 2 % pads 1 application  1 application  Topical Q12H PRN Marie Barnes PA-C         Current Outpatient Medications on File Prior to Encounter   Medication Sig Dispense Refill    acetaminophen (TYLENOL) 500 MG tablet Take 2 tablets by mouth Every 6 (Six) Hours As Needed.      amLODIPine (NORVASC) 2.5 MG tablet Take 1 tablet by mouth Daily.      aspirin 81 MG EC tablet Take 1 tablet by mouth Daily.      Cholecalciferol (VITAMIN D3) 2000 UNITS capsule Take 1 capsule by mouth Daily.      empagliflozin (JARDIANCE) 25 MG tablet tablet Take 1 tablet by mouth Daily.      gabapentin (NEURONTIN) 300 MG capsule Take " 1 capsule by mouth 2 (Two) Times a Day.      HYDROcodone-acetaminophen (NORCO) 5-325 MG per tablet Take 1 tablet by mouth Every 6 (Six) Hours As Needed.      omeprazole (priLOSEC) 40 MG capsule Take 1 capsule by mouth Daily.      Ozempic, 0.25 or 0.5 MG/DOSE, 2 MG/3ML solution pen-injector Saturdays      potassium chloride (K-DUR) 10 MEQ CR tablet Take 1 tablet by mouth Daily.      psyllium (METAMUCIL) 58.6 % powder Take 1 packet by mouth.      sertraline (ZOLOFT) 50 MG tablet Take 1 tablet by mouth Daily.      tamsulosin (FLOMAX) 0.4 MG capsule 24 hr capsule Take 1 capsule by mouth Daily.      baclofen (LIORESAL) 10 MG tablet Take 1 tablet by mouth As Needed for Muscle Spasms.      Glucose Blood (COOL BLOOD GLUCOSE TEST STRIPS VI)       Lancet Device misc 1 each 4 (Four) Times a Day.      metaxalone (SKELAXIN) 800 MG tablet Take 1 tablet by mouth As Needed.      traMADol (ULTRAM) 50 MG tablet Take 1 tablet by mouth Every 8 (Eight) Hours As Needed.         PMH:   Past Medical History:   Diagnosis Date    Anxiety     Cancer     skin     Chest pain syndrome     Coronary artery disease     Diabetes mellitus     TYPE 2 DIAGNOSED SEVERAL YEARS AGO. TRIES TO TEST ONCE DAILY    Diverticulitis     Diverticulitis     Fibromyalgia     Hearing aid worn     History of cellulitis     History of kidney stones     History of malignant neoplasm     History of MRI of spine     demonstrating buldging discs at L3/L4, L4/L5, L5/S1 levels    History of panic attacks     History of tobacco abuse     History of tobacco, quitting 28 years ago.    Hypertension     Hypokalemia     Irregular heart beat     Low back pain     Migraine     occular    Obesity     Rheumatoid arthritis     Stroke     mini    Transient ischemic attack     Recent transient ischemic attack with normal echocardiogram, carotid duplex, and CT scan of the brain by verbal report.     PSH:    Past Surgical History:   Procedure Laterality Date    ANAL FISTULA REPAIR  1999     Anal fistula repair,  and .    ATRIAL APPENDAGE EXCLUSION LEFT WITH TRANSESOPHAGEAL ECHOCARDIOGRAM N/A 2019    Procedure: ATRIAL APPENDAGE OCCLUSION LEFT;  Surgeon: Dillon Gu MD;  Location: [facility] OR;  Service: Cardiothoracic    CARDIAC CATHETERIZATION N/A 2019    Procedure: Left Heart Cath;  Surgeon: Dillon Neville III, MD;  Location:  [facility];  Service: Cardiovascular    CARDIAC CATHETERIZATION N/A 2021    Procedure: Left Heart Cath;  Surgeon: Mercy Martines MD;  Location:  [facility];  Service: Cardiovascular;  Laterality: N/A;    CHOLECYSTECTOMY N/A 2021    Procedure: CHOLECYSTECTOMY LAPAROSCOPIC;  Surgeon: Gigi Ambrose MD;  Location: [facility] OR;  Service: General;  Laterality: N/A;    COLONOSCOPY  2016    CORONARY ARTERY BYPASS GRAFT N/A 2019    Procedure: MEDIAN STERNOTOMY, CORONARY ARTERY BYPASS GRAFT X5, UTILIZING THE LEFT INTERNAL MAMMARY ARTERY, EVH OF THE RIGHT GREATER SAPHENOUS VEIN;  Surgeon: Dillon Gu MD;  Location: [facility] OR;  Service: Cardiothoracic    MOUTH FLOOR MASS EXCISION N/A     MOUTH FLOOR MASS EXCISION  2022    MYOMECTOMY  1980    Benign fibroid removed, .    PILONIDAL CYSTECTOMY      SKIN CANCER EXCISION      left shoulder    TRANSESOPHAGEAL ECHOCARDIOGRAM (TIMOTHY) N/A 2019    Procedure: TRANSESOPHAGEAL ECHOCARDIOGRAM WITH ANESTHESIA;  Surgeon: Dillon Gu MD;  Location: [facility] OR;  Service: Cardiothoracic       Immunization History:  Influenza: No  Pneumococcal: Yes  Tetanus: No    Social History:   Tobacco:   Social History     Tobacco Use   Smoking Status Former    Current packs/day: 0.00    Average packs/day: 1.5 packs/day for 8.2 years (12.3 ttl pk-yrs)    Types: Cigarettes, Cigars    Start date: 1976    Quit date: 3/5/1984    Years since quittin.2    Passive exposure: Past   Smokeless Tobacco Never   Tobacco Comments    quit about 42 years ago  "     Alcohol:     Social History     Substance and Sexual Activity   Alcohol Use Yes    Alcohol/week: 1.0 standard drink of alcohol    Comment: couple of beers  and maybe a mixed drink every other month       Vitals:           /85 (BP Location: Right arm, Patient Position: Sitting)   Pulse 58   Temp 97.2 °F (36.2 °C) (Temporal)   Resp 18   Ht 172.7 cm (68\")   Wt 103 kg (226 lb)   SpO2 96%   BMI 34.36 kg/m²     Physical Exam:  General Appearance:    Alert, cooperative, no distress, appears stated age   Head:    Normocephalic, without obvious abnormality, atraumatic   Lungs:     Clear to auscultation bilaterally, respirations unlabored    Heart:   Regular rate and rhythm, S1 and S2 normal, no murmur, rub    or gallop    Abdomen:    Soft, nontender.  +bowel sounds   Breast Exam:    deferred   Genitalia:    deferred   Extremities:   Extremities normal, atraumatic, no cyanosis or edema   Skin:   Skin color, texture, turgor normal, no rashes or lesions   Neurologic:   Grossly intact   Results Review  LABS:  Lab Results   Component Value Date    WBC 7.52 05/19/2025    HGB 16.2 05/19/2025    HCT 48.0 05/19/2025    MCV 91.6 05/19/2025     05/19/2025    NEUTROABS 3.6 03/26/2021    GLUCOSE 132 (H) 05/19/2025    BUN 8 05/19/2025    CREATININE 1.25 05/19/2025    EGFRIFNONA 59 (L) 03/29/2021     05/19/2025    K 4.6 05/19/2025     05/19/2025    CO2 29.0 05/19/2025    MG 2.2 02/05/2019    PHOS 2.5 02/05/2019    CALCIUM 9.4 05/19/2025    ALBUMIN 4.20 06/17/2022    AST 19 06/17/2022    ALT 19 06/17/2022    BILITOT 0.5 06/17/2022    PTT 27.9 01/31/2019    INR 1.02 03/26/2021       RADIOLOGY:  No radiology results for the last 3 days     I reviewed the patient's new clinical results.    Cancer Staging (if applicable)  Cancer Patient: __ yes __no __unknown; If yes, clinical stage T:__ N:__M:__, stage group or __N/A    Impression: Patient presents with BPH with urinary obstruction.    Plan: Dr. Dotson will " perform aquablation of prostate.      Saman Nguyen PA-C   06/02/25   6:48 AM EDT

## 2025-06-02 NOTE — ANESTHESIA PROCEDURE NOTES
Airway  Reason: elective    Date/Time: 6/2/2025 7:42 AM  Airway not difficult    General Information and Staff    Patient location during procedure: OR  CRNA/CAA: Schirmer, Shelley P, CRNA    Indications and Patient Condition  Indications for airway management: airway protection    Preoxygenated: yes  MILS not maintained throughout    Mask difficulty assessment: 0 - not attempted    Final Airway Details    Final airway type: endotracheal airway      Successful airway: ETT  Cuffed: yes   Successful intubation technique: direct laryngoscopy and RSI  Adjuncts used in placement: cricoid pressure and intubating stylet  Endotracheal tube insertion site: oral  Blade: Hernandez  Blade size: 4  ETT size (mm): 7.5  Cormack-Lehane Classification: grade I - full view of glottis  Placement verified by: chest auscultation and capnometry   Measured from: lips  ETT/EBT  to lips (cm): 22  Number of attempts at approach: 1  Assessment: lips, teeth, and gum same as pre-op and atraumatic intubation    Additional Comments  Negative epigastric sounds, Breath sound equal bilaterally with symmetric chest rise and fall

## 2025-06-02 NOTE — ANESTHESIA POSTPROCEDURE EVALUATION
Patient: Jesse Brink    Procedure Summary       Date: 06/02/25 Room / Location:  [facility] 09 /  [facility]    Anesthesia Start: 0735 Anesthesia Stop: 0916    Procedure: Aquablation of prostate Diagnosis:       BPH with urinary obstruction      (BPH with urinary obstruction [N40.1, N13.8])    Surgeons: Samm Dotson MD Provider: Jj Mcdonald MD    Anesthesia Type: general ASA Status: 3            Anesthesia Type: general    Vitals  Vitals Value Taken Time   /70 06/02/25 09:11   Temp 97.7    Pulse 62 06/02/25 09:15   Resp 18    SpO2 97 % 06/02/25 09:15   Vitals shown include unfiled device data.        Post Anesthesia Care and Evaluation    Patient location during evaluation: PACU  Patient participation: complete - patient participated  Level of consciousness: awake and alert  Pain management: adequate    Airway patency: patent  Anesthetic complications: No anesthetic complications  PONV Status: none  Cardiovascular status: hemodynamically stable and acceptable  Respiratory status: nonlabored ventilation, acceptable and nasal cannula  Hydration status: acceptable

## 2025-06-02 NOTE — NURSING NOTE
Caregiver Telephone Number    Phone Number for Ride/Caregiver: SAMMY RAMIREZ [phone]

## 2025-06-03 ENCOUNTER — TELEPHONE (OUTPATIENT)
Dept: UROLOGY | Facility: CLINIC | Age: 70
End: 2025-06-03

## 2025-06-03 ENCOUNTER — READMISSION MANAGEMENT (OUTPATIENT)
Dept: CALL CENTER | Facility: HOSPITAL | Age: 70
End: 2025-06-03
Payer: MEDICARE

## 2025-06-03 ENCOUNTER — PROCEDURE VISIT (OUTPATIENT)
Dept: UROLOGY | Facility: CLINIC | Age: 70
End: 2025-06-03
Payer: MEDICARE

## 2025-06-03 DIAGNOSIS — N40.0 BENIGN PROSTATIC HYPERPLASIA, UNSPECIFIED WHETHER LOWER URINARY TRACT SYMPTOMS PRESENT: Primary | ICD-10-CM

## 2025-06-03 NOTE — TELEPHONE ENCOUNTER
"     Hub staff attempted to follow warm transfer process and was unsuccessful     Caller: Jesse Brink \"Сергей\"     Relationship to patient: SELF    Best call back number: [phone]     Patient is needing: PT WAS IN OFFICE TODAY TO HAVE HIS CATHETER REMOVED. HE STATED THAT THE FIRST TIME HE USED THE RESTROOM AFTER LEAVING THE OFFICE, IT HURT A LOT.     IS THIS NORMAL? PLEASE GIVE PT A CALL TO DISCUSS.      "

## 2025-06-03 NOTE — PROGRESS NOTES
PATIENT IS HERE POST OP TO ATTEMPT A FILL AND VOID PATEINTS URINE IS CLEAR AND ORANGE FROM PYRIDIUM SO ADVISED PATIENT WHAT WE WOULD BE DOING I DETACHED BAG FROM CATHETER AND BEGAN FILLING UP BLADDER WITH STERILE WATER PATIENT WAS ABLE TO HANDLE 400ML AND I DEFALTED THE BALLOON AND REMOVED CATHETER PATIENT WAS ALREADY URINATING AROUND THE CATHETER BEFORE I REMOVED ONCE REMOVED HE URINATED 350 INTO URINAL ALOT GOT ON PATIENT AND BED. PVR WAS 0ML PATIENT WAS GIVEN INSTRUCTIONS TO PUSH FLUID TODAY AND TO RETURN TO OFFICE BY 2PM TODAY IF HE WAS NOT ABLE TO URINATE.         Kiley,CMA

## 2025-06-03 NOTE — TELEPHONE ENCOUNTER
"       Hub staff attempted to follow warm transfer process and was unsuccessful     Caller: Jesse Brink \"Сергей\"    Relationship to patient: Self    Best call back number: [phone]     Patient is needing: PT STATES HE WAS SPEAKING WITH SOMEONE AND THE PHONE CUT OFF. PLEASE GIVE HIM A CALL BACK.  "

## 2025-06-03 NOTE — OUTREACH NOTE
Prep Survey      Flowsheet Row Responses   [facility] facility patient discharged from? [facility]   Is LACE score < 7 ? No   Eligibility Readm Mgmt   Discharge diagnosis BPH with urinary obstruction (   Does the patient have one of the following disease processes/diagnoses(primary or secondary)? Other   Does the patient have Home health ordered? No   Is there a DME ordered? No   Prep survey completed? Yes            AIMEE JORGE - Registered Nurse

## 2025-06-03 NOTE — DISCHARGE SUMMARY
"    [facility]   DISCHARGE SUMMARY      Name:  Jesse Brink   Age:  69 y.o.  Sex:  male  :  1955  MRN:  [id number]   Visit Number:  [id number]  Primary Care Physician:  Terri Barreto APRN  Date of Discharge:  2025  Admission Date:  2025      Discharge Diagnosis:   Active Hospital Problems    Diagnosis  POA    **BPH with urinary obstruction [N40.1, N13.8]  Unknown    BPH (benign prostatic hyperplasia) [N40.0]  Yes      Resolved Hospital Problems   No resolved problems to display.         Presenting Problem/History of Present Illness:    BPH with urinary obstruction [N40.1, N13.8]  BPH (benign prostatic hyperplasia) [N40.0]       Hospital Course:  70 yo M who underwent uncomplicated aquablation surgery. Urine was clear in PACU and so he was discharged same day.     Procedures Performed:    Procedure(s):  Aquablation of prostate       Consults:     Consults       No orders found from 2025 to 6/3/2025.            Pertinent Test Results:     Lab Results (all)       Procedure Component Value Units Date/Time    Tissue Pathology Exam [[id number]] Collected: 25    Specimen: Tissue from Prostate Updated: 25    POC Glucose Once [[id number]]  (Normal) Collected: 25 0700    Specimen: Blood Updated: 25     Glucose 111 mg/dL             Imaging Results (All)       None            Condition on Discharge:    excellent    Vital Signs:    /79   Pulse 64   Temp 97.7 °F (36.5 °C) (Temporal)   Resp 16   Ht 172.7 cm (68\")   Wt 103 kg (226 lb)   SpO2 91%   BMI 34.36 kg/m²     Discharge Disposition:    Home or Self Care    Discharge Medication:       Discharge Medications        New Medications        Instructions Start Date   cefdinir 300 MG capsule  Commonly known as: OMNICEF   300 mg, Oral, 2 Times Daily      oxybutynin XL 10 MG 24 hr tablet  Commonly known as: Ditropan XL   10 mg, Oral, Daily PRN      phenazopyridine 100 MG tablet  Commonly known " as: PYRIDIUM   100 mg, Oral, Daily PRN             Continue These Medications        Instructions Start Date   acetaminophen 500 MG tablet  Commonly known as: TYLENOL   2 tablets, Every 6 Hours PRN      amLODIPine 2.5 MG tablet  Commonly known as: NORVASC   2.5 mg, Daily      aspirin 81 MG EC tablet   81 mg, Daily      baclofen 10 MG tablet  Commonly known as: LIORESAL   10 mg, As Needed      carvedilol 25 MG tablet  Commonly known as: COREG   TAKE 1 AND 1/2 TABLETS TWICE DAILY WITH MEALS (NEED LAB WORK FOR FURTHER REFILLS)      COOL BLOOD GLUCOSE TEST STRIPS VI   No dose, route, or frequency recorded.      diphenhydrAMINE-acetaminophen  MG tablet per tablet  Commonly known as: TYLENOL PM   1 tablet, Nightly      empagliflozin 25 MG tablet tablet  Commonly known as: JARDIANCE   25 mg, Daily      gabapentin 300 MG capsule  Commonly known as: NEURONTIN   300 mg, 2 Times Daily      HYDROcodone-acetaminophen 5-325 MG per tablet  Commonly known as: NORCO   1 tablet, Every 6 Hours PRN      Lancet Device misc   1 each, 4 Times Daily      losartan 100 MG tablet  Commonly known as: COZAAR   TAKE 1 AND 1/2 TABLETS EVERY DAY (NEED LAB WORK FOR FURTHER REFILLS)      metaxalone 800 MG tablet  Commonly known as: SKELAXIN   800 mg, As Needed      nitroglycerin 0.4 MG SL tablet  Commonly known as: NITROSTAT   0.4 mg, Sublingual, Every 5 Minutes PRN, Take no more than 3 doses in 15 minutes.      omeprazole 40 MG capsule  Commonly known as: priLOSEC   40 mg, Daily      Orencia ClickJect 125 MG/ML solution auto-injector  Generic drug: Abatacept   125 mg, Subcutaneous, Weekly      Ozempic (0.25 or 0.5 MG/DOSE) 2 MG/3ML solution pen-injector  Generic drug: Semaglutide(0.25 or 0.5MG/DOS)   Saturdays      potassium chloride 10 MEQ CR tablet   10 mEq, Daily      PROBIOTIC ADVANCED PO   1 capsule, Daily      psyllium 58.6 % powder  Commonly known as: METAMUCIL   1 packet      rosuvastatin 40 MG tablet  Commonly known as: CRESTOR    20-40 mg, Daily      sertraline 50 MG tablet  Commonly known as: ZOLOFT   50 mg, Daily      tamsulosin 0.4 MG capsule 24 hr capsule  Commonly known as: FLOMAX   1 capsule, Daily      traMADol 50 MG tablet  Commonly known as: ULTRAM   50 mg, Every 8 Hours PRN      Vitamin D3 50 MCG (2000 UT) capsule   2,000 Units, Daily               Discharge Diet:         Activity at Discharge:         Follow-up Appointments:    Future Appointments   Date Time Provider Department Center   6/3/2025  8:30 AM NURSE UROLOGY [facility] [place] [facility]   6/26/2025  9:30 AM Dillon Neville III, MD [facility]   7/1/2025  9:30 AM Sherlyn Almodovar APRN [facility]         Test Results Pending at Discharge:    Pending Labs       Order Current Status    Tissue Pathology Exam In process               Samm Dotson MD  06/02/25  20:34 EDT

## 2025-06-12 ENCOUNTER — READMISSION MANAGEMENT (OUTPATIENT)
Dept: CALL CENTER | Facility: HOSPITAL | Age: 70
End: 2025-06-12
Payer: MEDICARE

## 2025-06-12 NOTE — OUTREACH NOTE
Medical Week 2 Survey      Flowsheet Row Responses   [facility] patient discharged from? [place]   Does the patient have one of the following disease processes/diagnoses(primary or secondary)? Other   Week 2 attempt successful? No   Unsuccessful attempts Attempt 1            Glenis Cantu Registered Nurse

## 2025-06-17 ENCOUNTER — READMISSION MANAGEMENT (OUTPATIENT)
Dept: CALL CENTER | Facility: HOSPITAL | Age: 70
End: 2025-06-17
Payer: MEDICARE

## 2025-06-17 NOTE — OUTREACH NOTE
Medical Week 2 Survey      Flowsheet Row Responses   [facility] patient discharged from? [place]   Does the patient have one of the following disease processes/diagnoses(primary or secondary)? Other   Week 2 attempt successful? Yes   Call start time 1224   Discharge diagnosis BPH with urinary obstruction (   Call end time 1225   Meds reviewed with patient/caregiver? Yes   Is the patient having any side effects they believe may be caused by any medication additions or changes? No   Does the patient have all medications ordered at discharge? Yes   Is the patient taking all medications as directed (includes completed medication regime)? Yes   Does the patient have a primary care provider?  Yes   Does the patient have an appointment with their PCP within 7 days of discharge? Yes   Has the patient kept scheduled appointments due by today? Yes   Has home health visited the patient within 72 hours of discharge? N/A   Psychosocial issues? No   What is the patient's perception of their health status since discharge? Improving   Week 2 Call Completed? Yes   Graduated Yes   Wrap up additional comments Pt better and has been in contact with    Call end time 1225            Kia ZAPATA - Registered Nurse

## 2025-06-26 ENCOUNTER — OFFICE VISIT (OUTPATIENT)
Dept: CARDIOLOGY | Facility: CLINIC | Age: 70
End: 2025-06-26
Payer: MEDICARE

## 2025-06-26 VITALS
WEIGHT: 215.2 LBS | HEART RATE: 78 BPM | BODY MASS INDEX: 32.61 KG/M2 | DIASTOLIC BLOOD PRESSURE: 78 MMHG | HEIGHT: 68 IN | SYSTOLIC BLOOD PRESSURE: 126 MMHG | OXYGEN SATURATION: 99 %

## 2025-06-26 DIAGNOSIS — Z95.1 S/P CABG X 5: ICD-10-CM

## 2025-06-26 DIAGNOSIS — I10 BENIGN ESSENTIAL HTN: ICD-10-CM

## 2025-06-26 DIAGNOSIS — E78.2 MIXED HYPERLIPIDEMIA: ICD-10-CM

## 2025-06-26 DIAGNOSIS — I25.10 CAD IN NATIVE ARTERY: Primary | ICD-10-CM

## 2025-06-26 NOTE — ASSESSMENT & PLAN NOTE
early rheumatoid arthritis  sero +;    Prior meds: Plaquenil (further dermatitis)  ssz w/o results. mtx @ 1st then sx increased. Arava (BUE numbness), Humira ( HAs), Enbrel ( lost efficacy. Xeljanz ( new data)   CURRENT TREATMENT : Orencia  ???CTS sx--wear splints--was mild 2004 emg/ncv--resolved w/nsaid per pt.   in past had dramatic results w/nsaid but mild RI  Esr and Crp looked good in 8/19  Hand films show moderate degenerative changes of the first cmc joints, no erosions.   Foot films show moderate heel spurs, no erosions.  Denies chronic infection , CHF, MS, TB, COPD, Hepatitis. ; 1 flare of diverticulitis many years ago.  Started Orencia 4/22.   Currently stable with overall improvement.    Continue Orencia Sub Q.  He continues to have shoulder pain.  No relief with PT.  He continues to have CMC pain.  Continue Gabapentin regularly.  Prescribed by outside provider.  Tramadol - uses occasionally.  Prescribed by outside provider.  PCP has given him a RX of hydrocodone and he rarely takes and only half a dose.  Works better than the Tramadol.  Tylenol Arthritis 2 twice daily as needed.  NO NSAIDS due to CKD.  Wrist splint recommended while working.  Orders:    QuantiFERON-TB Gold Plus (Li-Hep); Future    Hepatitis Panel, Acute; Future    Comprehensive Metabolic Panel; Future    C-reactive Protein; Future    Sedimentation Rate; Future    CBC & Differential; Future

## 2025-06-26 NOTE — PROGRESS NOTES
[facility]  Office visit  Jesse Brink  1955  [phone]    VISIT DATE:  6/26/2025      PCP: Terri Barreto APRN  [address] [place] [id number]    CC:  Chief Complaint   Patient presents with    Coronary Artery Disease       PROBLEM LIST:  Coronary artery disease  Transient ischemic attack with normal echocardiogram, carotid duplex, and CT scan of the brain by verbal report.  Benign hypertension.  History of tobacco, quitting 28 years ago.  Hyperglycemia, diet controlled.  Rheumatoid arthritis.  Obesity.  Family history of heart disease.  Surgical history:  Pilonidal cyst, 1977.  Benign fibroid removed, 1980.  Anal fistula repair, 1999 and 2008.    Previous cardiac studies and procedures  January 2019  Cardiac catheterization  Multivessel obstructive coronary disease: Proximal RCA .  Mid LAD .  95% proximal first diagonal, 95% first obtuse marginal branch, 70-80% proximal second obtuse marginal branch.  Mildly elevated LVEDP  No aortic stenosis.  Echo   Left ventricular wall thickness is consistent with mild concentric hypertrophy.  Estimated EF appears to be in the range of 36 - 40%  Left ventricular diastolic dysfunction (grade I a) consistent with impaired relaxation.  The following left ventricular wall segments are hypokinetic: mid anterior, apical anterior, apical lateral, apical inferior, apical septal and apex hypokinetic.  Regional contracility augments post PVC.    Carotid duplex  Right internal carotid artery stenosis of 0-49%.  Left internal carotid artery stenosis of 0-49%.    1. Coronary artery bypass graft x 5  -LIMA to LAD.    -Greater saphenous vein to RCA  -Greater saphenous vein to OM1  -Greater saphenous vein to OM2  -Greater saphenous vein to D1  2. Left atrial appendage ligation (35 mm Atricure clip placement)    May 2019 echo  Left ventricular systolic function is normal.  Calculated EF = 55%  Left atrial cavity size is mildly  dilated.    March 2021  Cardiac catheterization  3/5 patent grafts with evidence of occlusion of the SVG to one of the obtuse marginal branches as well as the diagonal branch.  These both appear to be old findings.  Normal LV systolic function wall motion  No EKG changes are seen  Elevated LVEDP at 22 mmHg  Transthoracic echocardiogram  Estimated left ventricular EF = 60% Left ventricular systolic function is normal.  Left ventricular diastolic function is consistent with (grade II w/high LAP) pseudonormalization.  Trace mitral valve regurgitation is present.  Trace tricuspid valve regurgitation is present.  There is no evidence of pericardial effusion    January 2025 Danay scan SPECT MPI    Stress Combined Conclusion: The study is positive for myocardial   ischemia.  Small to moderate region of moderately severe ischemia   involving the inferolateral wall.  Findings suggest a moderate risk of   cardiac events.     Stress Function: Left ventricular function post-stress is normal. The   stress end diastolic cavity size is normal. The stress end systolic cavity   size is normal.     ASSESSMENT:   Diagnosis Plan   1. CAD in native artery        2. Benign essential HTN        3. Mixed hyperlipidemia        4. S/P CABG x 5 1/31/19              PLAN:  Coronary artery disease: Status post surgical revascularization.  Currently stable and asymptomatic.  Continue current medical therapy.  Known ischemia in the left circumflex territory due to known occluded SVG to OM.    Hypertension: Goal less than 130/90.  Currently well controlled, continue current medical therapy.    Palpitations: Consistent with symptomatic premature ventricular contractions.   Continue beta-blockade.  Currently well controlled.    Hyperlipidemia: Continue high intensity statin therapy, goal LDL less than 70.  Continue rosuvastatin 20 mg p.o. daily.  Currently well controlled.    History of stroke: Continue aggressive risk factor modification.  Continue  "current medical therapy.    Subjective  Denies chest discomfort, palpitations or dyspnea.  Blood pressures running less than 130/80 mmHg.  He is compliant with medical therapy.    PHYSICAL EXAMINATION:  Vitals:    06/26/25 0928   BP: 126/78   BP Location: Left arm   Patient Position: Sitting   Pulse: 78   SpO2: 99%   Weight: 97.6 kg (215 lb 3.2 oz)   Height: 172.7 cm (68\")     General Appearance:    Alert, cooperative, no distress, appears stated age   Head:    Normocephalic, without obvious abnormality, atraumatic   Eyes:    conjunctiva/corneas clear   Nose:   Nares normal, septum midline, mucosa normal, no drainage   Throat:   Lips, teeth and gums normal   Neck:   Supple, symmetrical, trachea midline, no carotid    bruit or JVD   Lungs:     Clear to auscultation bilaterally, respirations unlabored   Chest Wall:    No tenderness or deformity    Heart:    Regular rate and rhythm, S1 and S2 normal.  2/6 holosystolic murmur left lower sternal border to apex, no rub   or gallop, normal carotid impulse bilaterally without bruit.   Abdomen:     Soft, non-tender   Extremities:   Extremities normal, atraumatic, no cyanosis or edema   Pulses:   2+ and symmetric all extremities   Skin:   Skin color, texture, turgor normal, no rashes or lesions       Diagnostic Data:  Procedures  Lab Results   Component Value Date    TRIG 96 03/27/2021    HDL 51 03/27/2021     Lab Results   Component Value Date    GLUCOSE 132 (H) 05/19/2025    BUN 8 05/19/2025    CREATININE 1.25 05/19/2025     05/19/2025    K 4.6 05/19/2025     05/19/2025    CO2 29.0 05/19/2025     Lab Results   Component Value Date    HGBA1C 6.60 (H) 01/30/2019     Lab Results   Component Value Date    WBC 7.52 05/19/2025    HGB 16.2 05/19/2025    HCT 48.0 05/19/2025     05/19/2025       Allergies  Allergies   Allergen Reactions    Humira [Adalimumab] Headache     HA, intractable ocular migraine       Current Medications    Current Outpatient Medications: "     Abatacept (Orencia ClickJect) 125 MG/ML solution auto-injector, Inject 1 mL under the skin into the appropriate area as directed 1 (One) Time Per Week. (Patient taking differently: Inject 1 mL under the skin into the appropriate area as directed 1 (One) Time Per Week. Sundays), Disp: 4 mL, Rfl: 5    acetaminophen (TYLENOL) 500 MG tablet, Take 2 tablets by mouth Every 6 (Six) Hours As Needed., Disp: , Rfl:     amLODIPine (NORVASC) 2.5 MG tablet, Take 1 tablet by mouth Daily., Disp: , Rfl:     aspirin 81 MG EC tablet, Take 1 tablet by mouth Daily., Disp: , Rfl:     baclofen (LIORESAL) 10 MG tablet, Take 1 tablet by mouth As Needed for Muscle Spasms., Disp: , Rfl:     carvedilol (COREG) 25 MG tablet, TAKE 1 AND 1/2 TABLETS TWICE DAILY WITH MEALS (NEED LAB WORK FOR FURTHER REFILLS), Disp: 270 tablet, Rfl: 3    Cholecalciferol (VITAMIN D3) 2000 UNITS capsule, Take 1 capsule by mouth Daily., Disp: , Rfl:     diphenhydrAMINE-acetaminophen (TYLENOL PM)  MG tablet per tablet, Take 1 tablet by mouth Every Night., Disp: , Rfl:     empagliflozin (JARDIANCE) 25 MG tablet tablet, Take 1 tablet by mouth Daily., Disp: , Rfl:     gabapentin (NEURONTIN) 300 MG capsule, Take 1 capsule by mouth 2 (Two) Times a Day., Disp: , Rfl:     Glucose Blood (COOL BLOOD GLUCOSE TEST STRIPS VI), , Disp: , Rfl:     HYDROcodone-acetaminophen (NORCO) 5-325 MG per tablet, Take 1 tablet by mouth Every 6 (Six) Hours As Needed., Disp: , Rfl:     Lancet Device misc, 1 each 4 (Four) Times a Day., Disp: , Rfl:     losartan (COZAAR) 100 MG tablet, TAKE 1 AND 1/2 TABLETS EVERY DAY (NEED LAB WORK FOR FURTHER REFILLS), Disp: 135 tablet, Rfl: 3    metaxalone (SKELAXIN) 800 MG tablet, Take 1 tablet by mouth As Needed., Disp: , Rfl:     nitroglycerin (NITROSTAT) 0.4 MG SL tablet, Place 1 tablet under the tongue Every 5 (Five) Minutes As Needed for Chest Pain. Take no more than 3 doses in 15 minutes., Disp: 25 tablet, Rfl: 1    omeprazole (priLOSEC) 40 MG  capsule, Take 1 capsule by mouth Daily., Disp: , Rfl:     oxybutynin XL (Ditropan XL) 10 MG 24 hr tablet, Take 1 tablet by mouth Daily As Needed (bladder spasm)., Disp: 10 tablet, Rfl: 0    Ozempic, 0.25 or 0.5 MG/DOSE, 2 MG/3ML solution pen-injector, Saturdays, Disp: , Rfl:     phenazopyridine (PYRIDIUM) 100 MG tablet, Take 1 tablet by mouth Daily As Needed (urinary burning)., Disp: 5 tablet, Rfl: 0    potassium chloride (K-DUR) 10 MEQ CR tablet, Take 1 tablet by mouth Daily., Disp: , Rfl:     Probiotic Product (PROBIOTIC ADVANCED PO), Take 1 capsule by mouth Daily., Disp: , Rfl:     psyllium (METAMUCIL) 58.6 % powder, Take 1 packet by mouth., Disp: , Rfl:     rosuvastatin (CRESTOR) 40 MG tablet, Take 0.5-1 tablets by mouth Daily. Alternate taking 1/2 tablet (20mg) every other day with 1 tablet (40mg) every other day., Disp: , Rfl:     sertraline (ZOLOFT) 50 MG tablet, Take 1 tablet by mouth Daily., Disp: , Rfl:     tamsulosin (FLOMAX) 0.4 MG capsule 24 hr capsule, Take 1 capsule by mouth Daily., Disp: , Rfl:     traMADol (ULTRAM) 50 MG tablet, Take 1 tablet by mouth Every 8 (Eight) Hours As Needed., Disp: , Rfl:   No current facility-administered medications for this visit.    Facility-Administered Medications Ordered in Other Visits:     Chlorhexidine Gluconate Cloth 2 % pads 1 application, 1 application , Topical, Q12H PRN, Marie Barnes PA-C ROS  Review of Systems   Cardiovascular:  Positive for leg swelling. Negative for chest pain, claudication, dyspnea on exertion, irregular heartbeat and palpitations.   Respiratory:  Negative for cough and shortness of breath.          SOCIAL HX  Social History     Socioeconomic History    Marital status:     Number of children: 2   Tobacco Use    Smoking status: Former     Current packs/day: 0.00     Average packs/day: 1.5 packs/day for 8.2 years (12.3 ttl pk-yrs)     Types: Cigarettes, Cigars     Start date: 1/1/1976     Quit date: 3/5/1984      Years since quittin.3     Passive exposure: Past    Smokeless tobacco: Never    Tobacco comments:     quit about 42 years ago   Vaping Use    Vaping status: Never Used   Substance and Sexual Activity    Alcohol use: Yes     Alcohol/week: 1.0 standard drink of alcohol     Comment: couple of beers  and maybe a mixed drink every other month    Drug use: No    Sexual activity: Not Currently     Partners: Female       FAMILY HX  Family History   Problem Relation Age of Onset    Heart disease Mother     Coronary artery disease Mother     Early death Mother     Diabetes Father     Hypertension Father     Cancer Father     Pancreatic cancer Father     Arthritis Father     Heart disease Brother     Drug abuse Brother     Heart disease Other     Diabetes Other     Cancer Other     Hypertension Other     Arthritis Other              Dillon Neville III, MD, FACC

## 2025-06-26 NOTE — PROGRESS NOTES
Office Visit       Date: 07/01/2025   Patient Name: Jesse Brink  MRN: [id number]  YOB: 1955    Referring Physician: No ref. provider found     Chief Complaint:   Chief Complaint   Patient presents with    Rheumatoid arthritis, involving unspecified site, unspecifi       History of Present Illness: Jesse Brink is a 69 y.o. male who is here today for follow-up of rheumatoid arthritis.  Today he reports feeling the same.  He rates his pain 3/10, global 6/10 and has 1 hour and 15 minutes of morning stiffness.  We prescribed him Orencia subcu.  He does not need refills today.  He had a prostate aqua ablation 6/2025.  Pathology was good but he is still having prostate issues.  The doctor he was seeing is no longer in that practice.    Subjective   Review of Systems:  Review of Systems   All other systems reviewed and are negative.       Past Medical History:   Past Medical History:   Diagnosis Date    Anxiety     Cancer     skin     Chest pain syndrome     Chronic pain disorder     Coronary artery disease     Diabetes mellitus     TYPE 2 DIAGNOSED SEVERAL YEARS AGO. TRIES TO TEST ONCE DAILY    Difficulty walking     Diverticulitis     Diverticulitis     Erectile dysfunction     Fibroid     Fibromyalgia     Fibromyalgia, primary     Hearing aid worn     History of cellulitis     History of kidney stones     History of malignant neoplasm     History of MRI of spine     demonstrating buldging discs at L3/L4, L4/L5, L5/S1 levels    History of panic attacks     History of tobacco abuse     History of tobacco, quitting 28 years ago.    HL (hearing loss)     Hypertension     Hypokalemia     Irregular heart beat     Joint pain     Kidney stone     Low back pain     Migraine     occular    Neuropathy in diabetes     Obesity     Rheumatoid arthritis     Stroke     mini    Transient ischemic attack     Recent transient ischemic attack with normal echocardiogram, carotid  duplex, and CT scan of the brain by verbal report.    Urinary tract infection     after heart surgery       Past Surgical History:   Past Surgical History:   Procedure Laterality Date    ANAL FISTULA REPAIR  1999    Anal fistula repair, 1999 and 2008.    ATRIAL APPENDAGE EXCLUSION LEFT WITH TRANSESOPHAGEAL ECHOCARDIOGRAM N/A 01/31/2019    Procedure: ATRIAL APPENDAGE OCCLUSION LEFT;  Surgeon: Dillon Gu MD;  Location:  [facility];  Service: Cardiothoracic    CARDIAC CATHETERIZATION N/A 01/18/2019    Procedure: Left Heart Cath;  Surgeon: Dillon Neville III, MD;  Location:  [facility];  Service: Cardiovascular    CARDIAC CATHETERIZATION N/A 03/26/2021    Procedure: Left Heart Cath;  Surgeon: Mercy Martines MD;  Location:  [facility];  Service: Cardiovascular;  Laterality: N/A;    CHOLECYSTECTOMY N/A 03/28/2021    Procedure: CHOLECYSTECTOMY LAPAROSCOPIC;  Surgeon: Gigi Ambrose MD;  Location:  [facility];  Service: General;  Laterality: N/A;    COLONOSCOPY  2016    CORONARY ARTERY BYPASS GRAFT N/A 01/31/2019    Procedure: MEDIAN STERNOTOMY, CORONARY ARTERY BYPASS GRAFT X5, UTILIZING THE LEFT INTERNAL MAMMARY ARTERY, EVH OF THE RIGHT GREATER SAPHENOUS VEIN;  Surgeon: Dillon Gu MD;  Location:  [facility];  Service: Cardiothoracic    CYSTOSCOPY      EPIDURAL BLOCK      MOUTH FLOOR MASS EXCISION N/A     MOUTH FLOOR MASS EXCISION  07/01/2022    MYOMECTOMY  1980    Benign fibroid removed, 1980.    NECK SURGERY      PILONIDAL CYSTECTOMY  1977    PROSTATE AQUABLATION N/A 06/02/2025    Procedure: AQUABLATION OF PROSTATE;  Surgeon: Samm Dotson MD;  Location:  [facility];  Service: Robotics - Urology;  Laterality: N/A;    SKIN CANCER EXCISION      left shoulder    TRANSESOPHAGEAL ECHOCARDIOGRAM (TIMOTHY) N/A 01/31/2019    Procedure: TRANSESOPHAGEAL ECHOCARDIOGRAM WITH ANESTHESIA;  Surgeon: Dillon Gu MD;  Location:  [facility];  Service: Cardiothoracic    TRIGGER  POINT INJECTION         Family History:   Family History   Problem Relation Age of Onset    Heart disease Mother     Coronary artery disease Mother     Early death Mother     Diabetes Father     Hypertension Father     Cancer Father     Pancreatic cancer Father     Arthritis Father     Heart disease Brother     Drug abuse Brother     Heart disease Other     Diabetes Other     Cancer Other     Hypertension Other     Arthritis Other        Social History:   Social History     Socioeconomic History    Marital status:     Number of children: 2   Tobacco Use    Smoking status: Former     Current packs/day: 0.00     Average packs/day: 1.5 packs/day for 8.2 years (12.3 ttl pk-yrs)     Types: Cigarettes, Cigars     Start date: 1976     Quit date: 3/5/1984     Years since quittin.3     Passive exposure: Past    Smokeless tobacco: Never    Tobacco comments:     quit about 42 years ago   Vaping Use    Vaping status: Never Used   Substance and Sexual Activity    Alcohol use: Yes     Alcohol/week: 1.0 standard drink of alcohol     Comment: couple of beers  and maybe a mixed drink every other month    Drug use: No    Sexual activity: Not Currently     Partners: Female       Medications:   Current Outpatient Medications:     Abatacept (Orencia ClickJect) 125 MG/ML solution auto-injector, Inject 1 mL under the skin into the appropriate area as directed 1 (One) Time Per Week. (Patient taking differently: Inject 1 mL under the skin into the appropriate area as directed 1 (One) Time Per Week. Sundays), Disp: 4 mL, Rfl: 5    acetaminophen (TYLENOL) 500 MG tablet, Take 2 tablets by mouth Every 6 (Six) Hours As Needed., Disp: , Rfl:     amLODIPine (NORVASC) 2.5 MG tablet, Take 1 tablet by mouth Daily., Disp: , Rfl:     aspirin 81 MG EC tablet, Take 1 tablet by mouth Daily., Disp: , Rfl:     baclofen (LIORESAL) 10 MG tablet, Take 1 tablet by mouth As Needed for Muscle Spasms., Disp: , Rfl:     carvedilol (COREG) 25 MG  tablet, TAKE 1 AND 1/2 TABLETS TWICE DAILY WITH MEALS (NEED LAB WORK FOR FURTHER REFILLS), Disp: 270 tablet, Rfl: 3    Cholecalciferol (VITAMIN D3) 2000 UNITS capsule, Take 1 capsule by mouth Daily., Disp: , Rfl:     ciprofloxacin (CIPRO) 500 MG tablet, , Disp: , Rfl:     diphenhydrAMINE-acetaminophen (TYLENOL PM)  MG tablet per tablet, Take 1 tablet by mouth Every Night., Disp: , Rfl:     empagliflozin (JARDIANCE) 25 MG tablet tablet, Take 1 tablet by mouth Daily., Disp: , Rfl:     gabapentin (NEURONTIN) 300 MG capsule, Take 1 capsule by mouth 2 (Two) Times a Day., Disp: , Rfl:     Glucose Blood (COOL BLOOD GLUCOSE TEST STRIPS VI), , Disp: , Rfl:     HYDROcodone-acetaminophen (NORCO) 5-325 MG per tablet, Take 1 tablet by mouth Every 6 (Six) Hours As Needed., Disp: , Rfl:     Lancet Device misc, 1 each 4 (Four) Times a Day., Disp: , Rfl:     losartan (COZAAR) 100 MG tablet, TAKE 1 AND 1/2 TABLETS EVERY DAY (NEED LAB WORK FOR FURTHER REFILLS), Disp: 135 tablet, Rfl: 3    metaxalone (SKELAXIN) 800 MG tablet, Take 1 tablet by mouth As Needed., Disp: , Rfl:     nitroglycerin (NITROSTAT) 0.4 MG SL tablet, Place 1 tablet under the tongue Every 5 (Five) Minutes As Needed for Chest Pain. Take no more than 3 doses in 15 minutes., Disp: 25 tablet, Rfl: 1    omeprazole (priLOSEC) 40 MG capsule, Take 1 capsule by mouth Daily., Disp: , Rfl:     oxybutynin XL (Ditropan XL) 10 MG 24 hr tablet, Take 1 tablet by mouth Daily As Needed (bladder spasm)., Disp: 10 tablet, Rfl: 0    Ozempic, 0.25 or 0.5 MG/DOSE, 2 MG/3ML solution pen-injector, Saturdays, Disp: , Rfl:     potassium chloride (K-DUR) 10 MEQ CR tablet, Take 1 tablet by mouth Daily., Disp: , Rfl:     Probiotic Product (PROBIOTIC ADVANCED PO), Take 1 capsule by mouth Daily., Disp: , Rfl:     psyllium (METAMUCIL) 58.6 % powder, Take 1 packet by mouth., Disp: , Rfl:     rosuvastatin (CRESTOR) 40 MG tablet, Take 0.5-1 tablets by mouth Daily. Alternate taking 1/2 tablet  "(20mg) every other day with 1 tablet (40mg) every other day., Disp: , Rfl:     sertraline (ZOLOFT) 50 MG tablet, Take 1 tablet by mouth Daily., Disp: , Rfl:     tamsulosin (FLOMAX) 0.4 MG capsule 24 hr capsule, Take 1 capsule by mouth Daily., Disp: , Rfl:     traMADol (ULTRAM) 50 MG tablet, Take 1 tablet by mouth Every 8 (Eight) Hours As Needed., Disp: , Rfl:   No current facility-administered medications for this visit.    Facility-Administered Medications Ordered in Other Visits:     Chlorhexidine Gluconate Cloth 2 % pads 1 application, 1 application , Topical, Q12H PRN, Marie Barnes PA-C    Allergies:   Allergies   Allergen Reactions    Humira [Adalimumab] Headache     HA, intractable ocular migraine       I have reviewed and updated the patient's chief complaint, history of present illness, review of systems, past medical history, surgical history, family history, social history, medications and allergy list as appropriate.     Objective    Vital Signs:   Vitals:    07/01/25 0920   BP: 124/78   BP Location: Left arm   Patient Position: Sitting   Cuff Size: Adult   Pulse: 73   Temp: 96.8 °F (36 °C)   Weight: 97.2 kg (214 lb 3.2 oz)   Height: 172.7 cm (68\")   PainSc: 3    PainLoc: Generalized  Comment: hands, hip       Body mass index is 32.57 kg/m².   BMI is >= 30 and <35. (Class 1 Obesity). The following options were offered after discussion;: defer to PCP       Physical Exam:  Physical Exam  Vitals reviewed.   Constitutional:       Appearance: Normal appearance. He is obese.   HENT:      Head: Normocephalic and atraumatic.      Mouth/Throat:      Mouth: Mucous membranes are moist.   Eyes:      Conjunctiva/sclera: Conjunctivae normal.   Cardiovascular:      Rate and Rhythm: Normal rate and regular rhythm.      Pulses: Normal pulses.      Heart sounds: Normal heart sounds.   Pulmonary:      Effort: Pulmonary effort is normal.      Breath sounds: Normal breath sounds.   Musculoskeletal:         General: " Normal range of motion.      Cervical back: Normal range of motion and neck supple.      Comments: He has no synovitis  Heberden's bilaterally  Strength 5/5 BUE and BLE  Shoulders tender with Rom   Skin:     General: Skin is warm and dry.   Neurological:      General: No focal deficit present.      Mental Status: He is alert and oriented to person, place, and time. Mental status is at baseline.   Psychiatric:         Mood and Affect: Mood normal.         Behavior: Behavior normal.         Thought Content: Thought content normal.         Judgment: Judgment normal.          Results Review:   Imaging Results (Last 24 Hours)       ** No results found for the last 24 hours. **            Procedures    Assessment / Plan      Assessment & Plan  Rheumatoid arthritis, involving unspecified site, unspecified whether rheumatoid factor present  early rheumatoid arthritis  sero +;    Prior meds: Plaquenil (further dermatitis)  ssz w/o results. mtx @ 1st then sx increased. Arava (BUE numbness), Humira ( HAs), Enbrel ( lost efficacy. Xeljanz ( new data)   CURRENT TREATMENT : Orencia  ???CTS sx--wear splints--was mild 2004 emg/ncv--resolved w/nsaid per pt.   in past had dramatic results w/nsaid but mild RI  Esr and Crp looked good in 8/19  Hand films show moderate degenerative changes of the first cmc joints, no erosions.   Foot films show moderate heel spurs, no erosions.  Denies chronic infection , CHF, MS, TB, COPD, Hepatitis. ; 1 flare of diverticulitis many years ago.  Started Orencia 4/22.   Currently stable with overall improvement.    Continue Orencia Sub Q.  He continues to have shoulder pain.  No relief with PT.  He continues to have CMC pain.  Continue Gabapentin regularly.  Prescribed by outside provider.  Tramadol - uses occasionally.  Prescribed by outside provider.  PCP has given him a RX of hydrocodone and he rarely takes and only half a dose.  Works better than the Tramadol.  Tylenol Arthritis 2 twice daily as  needed.  NO NSAIDS due to CKD.  Wrist splint recommended while working.  Orders:    QuantiFERON-TB Gold Plus (Li-Hep); Future    Hepatitis Panel, Acute; Future    Comprehensive Metabolic Panel; Future    C-reactive Protein; Future    Sedimentation Rate; Future    CBC & Differential; Future     High risk medication use  Started Orencia 4/22 - well tolerated and effective. Occ hand sx.  S/p Covid vaccine +2 boosters. He has had Bivalent booster in 9/22.  Hep and Qfn negative 10/22    Cbc, Cmp Q 4 months  Update Qtb and hepatitis panel with next lab draw.    Would hold Orencia for any infection or illness, Seek treatment and when well and illness is resolved you may restart medication.  Orders:    QuantiFERON-TB Gold Plus (Li-Hep); Future    Hepatitis Panel, Acute; Future    Comprehensive Metabolic Panel; Future    C-reactive Protein; Future    Sedimentation Rate; Future    CBC & Differential; Future     Kidney disease      Stage 3A  No Nsaid            Myalgia  Strength is stable.  He rarely uses tizanidine.        Fatigue, unspecified type  Update Qtb and hepatitis panel.  Orders:    QuantiFERON-TB Gold Plus (Li-Hep); Future    Hepatitis Panel, Acute; Future      I attest that the documentation was copied from a previous note but is still current and accurate.      Follow Up:   Return in about 4 months (around 11/1/2025) for SLEMA Myles.        SELMA Zavala  [facility]

## 2025-06-26 NOTE — ASSESSMENT & PLAN NOTE
Started Orencia 4/22 - well tolerated and effective. Occ hand sx.  S/p Covid vaccine +2 boosters. He has had Bivalent booster in 9/22.  Hep and Qfn negative 10/22    Cbc, Cmp Q 4 months  Update Qtb and hepatitis panel with next lab draw.    Would hold Orencia for any infection or illness, Seek treatment and when well and illness is resolved you may restart medication.  Orders:    QuantiFERON-TB Gold Plus (Li-Hep); Future    Hepatitis Panel, Acute; Future    Comprehensive Metabolic Panel; Future    C-reactive Protein; Future    Sedimentation Rate; Future    CBC & Differential; Future

## 2025-06-26 NOTE — ASSESSMENT & PLAN NOTE
Update Qtb and hepatitis panel.  Orders:    QuantiFERON-TB Gold Plus (Li-Hep); Future    Hepatitis Panel, Acute; Future

## 2025-07-01 ENCOUNTER — OFFICE VISIT (OUTPATIENT)
Age: 70
End: 2025-07-01
Payer: MEDICARE

## 2025-07-01 VITALS
WEIGHT: 214.2 LBS | SYSTOLIC BLOOD PRESSURE: 124 MMHG | TEMPERATURE: 96.8 F | BODY MASS INDEX: 32.46 KG/M2 | HEIGHT: 68 IN | DIASTOLIC BLOOD PRESSURE: 78 MMHG | HEART RATE: 73 BPM

## 2025-07-01 DIAGNOSIS — M06.9 RHEUMATOID ARTHRITIS, INVOLVING UNSPECIFIED SITE, UNSPECIFIED WHETHER RHEUMATOID FACTOR PRESENT: Primary | ICD-10-CM

## 2025-07-01 DIAGNOSIS — N28.9 KIDNEY DISEASE: ICD-10-CM

## 2025-07-01 DIAGNOSIS — M79.10 MYALGIA: ICD-10-CM

## 2025-07-01 DIAGNOSIS — R53.83 FATIGUE, UNSPECIFIED TYPE: ICD-10-CM

## 2025-07-01 DIAGNOSIS — Z79.899 HIGH RISK MEDICATION USE: ICD-10-CM

## 2025-07-01 RX ORDER — CIPROFLOXACIN 500 MG/1
TABLET, FILM COATED ORAL
COMMUNITY
Start: 2025-06-30

## 2025-07-07 ENCOUNTER — TELEPHONE (OUTPATIENT)
Dept: UROLOGY | Facility: CLINIC | Age: 70
End: 2025-07-07

## 2025-07-08 ENCOUNTER — OFFICE VISIT (OUTPATIENT)
Dept: UROLOGY | Facility: CLINIC | Age: 70
End: 2025-07-08
Payer: MEDICARE

## 2025-07-08 VITALS
BODY MASS INDEX: 32.58 KG/M2 | TEMPERATURE: 96.7 F | HEIGHT: 68 IN | HEART RATE: 77 BPM | RESPIRATION RATE: 14 BRPM | OXYGEN SATURATION: 97 %

## 2025-07-08 DIAGNOSIS — K59.00 CONSTIPATION, UNSPECIFIED CONSTIPATION TYPE: ICD-10-CM

## 2025-07-08 DIAGNOSIS — N39.43 DRIBBLING OF URINE: Primary | ICD-10-CM

## 2025-07-08 DIAGNOSIS — N40.0 BENIGN PROSTATIC HYPERPLASIA, UNSPECIFIED WHETHER LOWER URINARY TRACT SYMPTOMS PRESENT: ICD-10-CM

## 2025-07-08 DIAGNOSIS — R39.14 FEELING OF INCOMPLETE BLADDER EMPTYING: ICD-10-CM

## 2025-07-08 PROBLEM — N25.81 HYPERPARATHYROIDISM DUE TO RENAL INSUFFICIENCY: Status: ACTIVE | Noted: 2023-10-31

## 2025-07-08 PROBLEM — N18.9 VITAMIN D DEFICIENCY DUE TO CHRONIC KIDNEY DISEASE: Status: ACTIVE | Noted: 2023-10-31

## 2025-07-08 PROBLEM — N18.31 STAGE 3A CHRONIC KIDNEY DISEASE: Status: ACTIVE | Noted: 2017-07-25

## 2025-07-08 PROBLEM — E78.5 DYSLIPIDEMIA: Status: ACTIVE | Noted: 2019-01-31

## 2025-07-08 PROBLEM — E55.9 VITAMIN D DEFICIENCY DUE TO CHRONIC KIDNEY DISEASE: Status: ACTIVE | Noted: 2023-10-31

## 2025-07-08 LAB
BILIRUB BLD-MCNC: NEGATIVE MG/DL
CLARITY, POC: ABNORMAL
COLOR UR: ABNORMAL
EXPIRATION DATE: ABNORMAL
GLUCOSE UR STRIP-MCNC: ABNORMAL MG/DL
KETONES UR QL: NEGATIVE
LEUKOCYTE EST, POC: ABNORMAL
Lab: ABNORMAL
NITRITE UR-MCNC: NEGATIVE MG/ML
PH UR: 5.5 [PH] (ref 5–8)
PROT UR STRIP-MCNC: ABNORMAL MG/DL
RBC # UR STRIP: ABNORMAL /UL
SP GR UR: 1.02 (ref 1–1.03)
UROBILINOGEN UR QL: ABNORMAL

## 2025-07-08 NOTE — PROGRESS NOTES
Office Visit     Patient Name: Jesse Brink  : 1955   MRN: [id number]   Patient or patient representative verbalized consent for the use of Ambient Listening during the visit with  SELMA Joshi for chart documentation. 2025  07:56 EDT    Chief Complaint   Patient presents with    Urinary Retention     Dribbling when urinating     Referring Provider: No ref. provider found    Primary Care Provider: Terri Barreto APRN     History of Present Illness  The patient is a 69-year-old male presenting with urinary symptoms following an Aquablation performed in 2025.    Urinary Symptoms  - Reports dysuria  - Dribbling  - Hematuria last week  - Nocturia (2-3 times/night)  - Bladder spasms relieved by oxybutynin  - Sitting pain attributed to prostate  - Ciprofloxacin prescribed for 10 days by PCP has mildly improved symptoms    Supplemental information: Consumes ~1.5L water daily. History of enlarged prostate. Uses Metamucil for regular bowel movements.     Subjective   Review of System: As noted in HPI.    Past Medical History:   Diagnosis Date    Anxiety     Cancer     skin     Chest pain syndrome     Chronic pain disorder     Coronary artery disease     Diabetes mellitus     TYPE 2 DIAGNOSED SEVERAL YEARS AGO. TRIES TO TEST ONCE DAILY    Difficulty walking     Diverticulitis     Diverticulitis     Erectile dysfunction     Fibroid     Fibromyalgia     Fibromyalgia, primary     Hearing aid worn     History of cellulitis     History of kidney stones     History of malignant neoplasm     History of MRI of spine     demonstrating buldging discs at L3/L4, L4/L5, L5/S1 levels    History of panic attacks     History of tobacco abuse     History of tobacco, quitting 28 years ago.    HL (hearing loss)     Hypertension     Hypokalemia     Irregular heart beat     Joint pain     Kidney stone     Low back pain     Migraine     occular    Neuropathy in diabetes     Obesity     Rheumatoid  arthritis     Stroke     mini    Transient ischemic attack     Recent transient ischemic attack with normal echocardiogram, carotid duplex, and CT scan of the brain by verbal report.    Urinary tract infection     after heart surgery     Past Surgical History:   Procedure Laterality Date    ANAL FISTULA REPAIR  1999    Anal fistula repair, 1999 and 2008.    ATRIAL APPENDAGE EXCLUSION LEFT WITH TRANSESOPHAGEAL ECHOCARDIOGRAM N/A 01/31/2019    Procedure: ATRIAL APPENDAGE OCCLUSION LEFT;  Surgeon: Dillon Gu MD;  Location:  [facility];  Service: Cardiothoracic    CARDIAC CATHETERIZATION N/A 01/18/2019    Procedure: Left Heart Cath;  Surgeon: Dillon Neville III, MD;  Location:  [facility];  Service: Cardiovascular    CARDIAC CATHETERIZATION N/A 03/26/2021    Procedure: Left Heart Cath;  Surgeon: Mercy Martines MD;  Location:  [facility];  Service: Cardiovascular;  Laterality: N/A;    CHOLECYSTECTOMY N/A 03/28/2021    Procedure: CHOLECYSTECTOMY LAPAROSCOPIC;  Surgeon: Gigi Ambrose MD;  Location:  [facility];  Service: General;  Laterality: N/A;    COLONOSCOPY  2016    CORONARY ARTERY BYPASS GRAFT N/A 01/31/2019    Procedure: MEDIAN STERNOTOMY, CORONARY ARTERY BYPASS GRAFT X5, UTILIZING THE LEFT INTERNAL MAMMARY ARTERY, EVH OF THE RIGHT GREATER SAPHENOUS VEIN;  Surgeon: Dillon Gu MD;  Location:  [facility];  Service: Cardiothoracic    CYSTOSCOPY      EPIDURAL BLOCK      MOUTH FLOOR MASS EXCISION N/A     MOUTH FLOOR MASS EXCISION  07/01/2022    MYOMECTOMY  1980    Benign fibroid removed, 1980.    NECK SURGERY      PILONIDAL CYSTECTOMY  1977    PROSTATE AQUABLATION N/A 06/02/2025    Procedure: AQUABLATION OF PROSTATE;  Surgeon: Samm Dotson MD;  Location:  [facility];  Service: Robotics - Urology;  Laterality: N/A;    SKIN CANCER EXCISION      left shoulder    TRANSESOPHAGEAL ECHOCARDIOGRAM (TIMOTHY) N/A 01/31/2019    Procedure: TRANSESOPHAGEAL ECHOCARDIOGRAM  WITH ANESTHESIA;  Surgeon: Dillon Gu MD;  Location: [facility];  Service: Cardiothoracic    TRIGGER POINT INJECTION       Family History   Problem Relation Age of Onset    Heart disease Mother     Coronary artery disease Mother     Early death Mother     Diabetes Father     Hypertension Father     Cancer Father     Pancreatic cancer Father     Arthritis Father     Heart disease Brother     Drug abuse Brother     Heart disease Other     Diabetes Other     Cancer Other     Hypertension Other     Arthritis Other      Social History     Socioeconomic History    Marital status:     Number of children: 2   Tobacco Use    Smoking status: Former     Current packs/day: 0.00     Average packs/day: 1.5 packs/day for 8.2 years (12.3 ttl pk-yrs)     Types: Cigarettes, Cigars     Start date: 1976     Quit date: 3/5/1984     Years since quittin.3     Passive exposure: Past    Smokeless tobacco: Never    Tobacco comments:     quit about 42 years ago   Vaping Use    Vaping status: Never Used   Substance and Sexual Activity    Alcohol use: Yes     Alcohol/week: 1.0 standard drink of alcohol     Comment: couple of beers  and maybe a mixed drink every other month    Drug use: No    Sexual activity: Not Currently     Partners: Female       Current Outpatient Medications:     Abatacept (Orencia ClickJect) 125 MG/ML solution auto-injector, Inject 1 mL under the skin into the appropriate area as directed 1 (One) Time Per Week. (Patient taking differently: Inject 1 mL under the skin into the appropriate area as directed 1 (One) Time Per Week. Sundays), Disp: 4 mL, Rfl: 5    acetaminophen (TYLENOL) 500 MG tablet, Take 2 tablets by mouth Every 6 (Six) Hours As Needed., Disp: , Rfl:     amLODIPine (NORVASC) 2.5 MG tablet, Take 1 tablet by mouth Daily., Disp: , Rfl:     aspirin 81 MG EC tablet, Take 1 tablet by mouth Daily., Disp: , Rfl:     baclofen (LIORESAL) 10 MG tablet, Take 1 tablet by mouth As Needed for Muscle  Spasms., Disp: , Rfl:     carvedilol (COREG) 25 MG tablet, TAKE 1 AND 1/2 TABLETS TWICE DAILY WITH MEALS (NEED LAB WORK FOR FURTHER REFILLS), Disp: 270 tablet, Rfl: 3    Cholecalciferol (VITAMIN D3) 2000 UNITS capsule, Take 1 capsule by mouth Daily., Disp: , Rfl:     ciprofloxacin (CIPRO) 500 MG tablet, , Disp: , Rfl:     diphenhydrAMINE-acetaminophen (TYLENOL PM)  MG tablet per tablet, Take 1 tablet by mouth Every Night., Disp: , Rfl:     empagliflozin (JARDIANCE) 25 MG tablet tablet, Take 1 tablet by mouth Daily., Disp: , Rfl:     gabapentin (NEURONTIN) 300 MG capsule, Take 1 capsule by mouth 2 (Two) Times a Day., Disp: , Rfl:     Glucose Blood (COOL BLOOD GLUCOSE TEST STRIPS VI), , Disp: , Rfl:     HYDROcodone-acetaminophen (NORCO) 5-325 MG per tablet, Take 1 tablet by mouth Every 6 (Six) Hours As Needed., Disp: , Rfl:     Lancet Device misc, 1 each 4 (Four) Times a Day., Disp: , Rfl:     losartan (COZAAR) 100 MG tablet, TAKE 1 AND 1/2 TABLETS EVERY DAY (NEED LAB WORK FOR FURTHER REFILLS), Disp: 135 tablet, Rfl: 3    metaxalone (SKELAXIN) 800 MG tablet, Take 1 tablet by mouth As Needed., Disp: , Rfl:     nitroglycerin (NITROSTAT) 0.4 MG SL tablet, Place 1 tablet under the tongue Every 5 (Five) Minutes As Needed for Chest Pain. Take no more than 3 doses in 15 minutes., Disp: 25 tablet, Rfl: 1    omeprazole (priLOSEC) 40 MG capsule, Take 1 capsule by mouth Daily., Disp: , Rfl:     oxybutynin XL (Ditropan XL) 10 MG 24 hr tablet, Take 1 tablet by mouth Daily As Needed (bladder spasm)., Disp: 10 tablet, Rfl: 0    Ozempic, 0.25 or 0.5 MG/DOSE, 2 MG/3ML solution pen-injector, Saturdays, Disp: , Rfl:     potassium chloride (K-DUR) 10 MEQ CR tablet, Take 1 tablet by mouth Daily., Disp: , Rfl:     Probiotic Product (PROBIOTIC ADVANCED PO), Take 1 capsule by mouth Daily., Disp: , Rfl:     psyllium (METAMUCIL) 58.6 % powder, Take 1 packet by mouth., Disp: , Rfl:     rosuvastatin (CRESTOR) 40 MG tablet, Take 0.5-1  "tablets by mouth Daily. Alternate taking 1/2 tablet (20mg) every other day with 1 tablet (40mg) every other day., Disp: , Rfl:     sertraline (ZOLOFT) 50 MG tablet, Take 1 tablet by mouth Daily., Disp: , Rfl:     tamsulosin (FLOMAX) 0.4 MG capsule 24 hr capsule, Take 1 capsule by mouth Daily., Disp: , Rfl:     traMADol (ULTRAM) 50 MG tablet, Take 1 tablet by mouth Every 8 (Eight) Hours As Needed., Disp: , Rfl:   No current facility-administered medications for this visit.    Facility-Administered Medications Ordered in Other Visits:     Chlorhexidine Gluconate Cloth 2 % pads 1 application, 1 application , Topical, Q12H PRN, Marie Barnes PA-C    Allergies   Allergen Reactions    Humira [Adalimumab] Headache     HA, intractable ocular migraine     Objective   Visit Vitals  Pulse 77   Temp 96.7 °F (35.9 °C) (Temporal)   Resp 14   Ht 172.7 cm (67.99\")   SpO2 97%   BMI 32.58 kg/m²      Body mass index is 32.58 kg/m².   Physical Exam  Vitals and nursing note reviewed.   Constitutional:       General: He is not in acute distress.     Appearance: Normal appearance. He is obese. He is not ill-appearing.   HENT:      Head: Normocephalic and atraumatic.   Pulmonary:      Effort: Pulmonary effort is normal.   Skin:     General: Skin is warm and dry.   Neurological:      General: No focal deficit present.      Mental Status: He is alert and oriented to person, place, and time.   Psychiatric:         Mood and Affect: Mood normal.         Behavior: Behavior normal.      Labs  Lab Results   Component Value Date    COLORU Straw 07/08/2025    CLARITYU Cloudy (A) 07/08/2025    SPECGRAV 1.025 07/08/2025    PHUR 5.5 07/08/2025    LEUKOCYTESUR Trace (A) 07/08/2025    NITRITE Negative 07/08/2025    PROTEINPOCUA 100 mg/dL (A) 07/08/2025    GLUCOSEUR >=1000 mg/dL (3+) (A) 07/08/2025    KETONESU Negative 07/08/2025    UROBILINOGEN 0.2 E.U./dL 07/08/2025    BILIRUBINUR Negative 07/08/2025    RBCUR Large (A) 07/08/2025      No results " "found for: \"WBCUA\", \"RBCUA\", \"BACTERIA\", \"HYALCASTU\", \"SQUAMEPIUA\"     Lab Results   Component Value Date    WBC 7.52 05/19/2025    HGB 16.2 05/19/2025    HCT 48.0 05/19/2025    MCV 91.6 05/19/2025     05/19/2025     Lab Results   Component Value Date    GLUCOSE 132 (H) 05/19/2025    CALCIUM 9.4 05/19/2025     05/19/2025    K 4.6 05/19/2025    CO2 29.0 05/19/2025     05/19/2025    BUN 8 05/19/2025    BUN 13 06/17/2022    CREATININE 1.25 05/19/2025    CREATININE 1.19 06/17/2022    EGFR 62.7 05/19/2025    EGFR 67.8 06/17/2022    BCR 6.4 (L) 05/19/2025    ANIONGAP 8.0 05/19/2025    ALT 19 06/17/2022    AST 19 06/17/2022     Lab Results   Component Value Date    HGBA1C 6.60 (H) 01/30/2019     No results found for: \"PSA\", \"PCTFREEPSA\", \"PROSTATEBIO\"  No results found for: \"URICACIDSTN\", \"YOHL2CCFVTN\", \"FRKI1NSNRK\", \"LABMAGN\"  Lab Results   Component Value Date    CAION 1.22 01/31/2019     No results found for: \"CYSTINE\", \"URINEVOLUM\", \"CALCIUMUR\", \"OXALATEU\", \"CITRATEUR\", \"LABPH\", \"URICUR\", \"NAUR\", \"KUR\", \"MAGNESIUMUR\", \"PHOSUR\", \"AMMONIUMUR\", \"CLUR\", \"[id number]\", \"UREAUR\", \"LABCREAUR\"  No results found for: \"TESTOSTEROTT\", \"TESTFRE\", \"PSA\", \"ESTRADIOL\", \"LH\", \"PROLACTIN\", \"FSH\"    Radiographic Studies  No Images in the past 120 days found.    I have reviewed the above labs and imaging.     PVR  Post-void residual performed with ultrasound by staff and interpreted by me - 68 mL    Assessment / Plan    Diagnoses and all orders for this visit:    1. Dribbling of urine (Primary)  -     POC Urinalysis Dipstick, Automated    2. Benign prostatic hyperplasia, unspecified whether lower urinary tract symptoms present    3. Feeling of incomplete bladder emptying  -     POC Urinalysis Dipstick, Automated    4. Constipation, unspecified constipation type       Assessment & Plan  1. Post-AquaBeam ablation BPH   - Urinary symptoms (dysuria, dribbling, bladder spasms) post-surgery  - Ciprofloxacin regimen ongoing " with out much improvement  - Urine clear today with ~2 oz residual bladder fluid  - Hematuria and leukocyturia likely post-surgical; recovery may take up to 6 months  - Advised to:    - Continue ciprofloxacin until completed     - Increase water intake    - Avoid bladder irritants (soda, coffee, tea, citrus juices, tomato juice, pizza, spaghetti)  - Prescribed Gemtesa for bladder spasms; samples provided for 6 weeks  - Instructed daily Gemtesa use at consistent time  - Repeat PVR and urinalysis in 6 weeks    2. Constipation  - Managed with Metamucil, achieving regular bowel movements  - Advised to:    - Continue Metamucil    - Maintain hydration to prevent constipation affecting bladder function.  - Discontinue Oxybutynin as this may worsen constipation.         Return in about 6 weeks (around 8/19/2025) for f/u with Ana with PVR, IPSS, and UA. .    Ana Arceo, MSN, APRN, FNP-C  [facility] [place]

## 2025-08-19 ENCOUNTER — OFFICE VISIT (OUTPATIENT)
Dept: UROLOGY | Facility: CLINIC | Age: 70
End: 2025-08-19
Payer: MEDICARE

## 2025-08-19 VITALS
RESPIRATION RATE: 16 BRPM | WEIGHT: 220.4 LBS | HEART RATE: 77 BPM | HEIGHT: 68 IN | BODY MASS INDEX: 33.4 KG/M2 | OXYGEN SATURATION: 96 % | TEMPERATURE: 97.3 F

## 2025-08-19 DIAGNOSIS — Z87.442 HISTORY OF NEPHROLITHIASIS: ICD-10-CM

## 2025-08-19 DIAGNOSIS — N39.43 POST-VOID DRIBBLING: Primary | ICD-10-CM

## 2025-08-19 DIAGNOSIS — K59.00 CONSTIPATION, UNSPECIFIED CONSTIPATION TYPE: ICD-10-CM

## 2025-08-19 DIAGNOSIS — N40.0 BENIGN PROSTATIC HYPERPLASIA, UNSPECIFIED WHETHER LOWER URINARY TRACT SYMPTOMS PRESENT: ICD-10-CM

## 2025-08-19 DIAGNOSIS — R10.9 LEFT FLANK PAIN: ICD-10-CM

## 2025-08-19 PROBLEM — N13.8 BPH WITH URINARY OBSTRUCTION: Status: RESOLVED | Noted: 2024-12-16 | Resolved: 2025-08-19

## 2025-08-19 PROBLEM — G93.89 MASS OF BRAIN: Status: ACTIVE | Noted: 2025-08-04

## 2025-08-19 PROBLEM — N40.1 BPH WITH URINARY OBSTRUCTION: Status: RESOLVED | Noted: 2024-12-16 | Resolved: 2025-08-19

## 2025-08-19 PROBLEM — E11.9 TYPE 2 DIABETES MELLITUS: Status: RESOLVED | Noted: 2019-01-31 | Resolved: 2025-08-19

## 2025-08-19 LAB
BILIRUB BLD-MCNC: NEGATIVE MG/DL
CLARITY, POC: CLEAR
COLOR UR: YELLOW
EXPIRATION DATE: ABNORMAL
GLUCOSE UR STRIP-MCNC: ABNORMAL MG/DL
KETONES UR QL: NEGATIVE
LEUKOCYTE EST, POC: NEGATIVE
Lab: ABNORMAL
NITRITE UR-MCNC: NEGATIVE MG/ML
PH UR: 5.5 [PH] (ref 5–8)
PROT UR STRIP-MCNC: NEGATIVE MG/DL
RBC # UR STRIP: NEGATIVE /UL
SP GR UR: 1.01 (ref 1–1.03)
UROBILINOGEN UR QL: NORMAL

## 2025-08-19 RX ORDER — ALPRAZOLAM 1 MG/1
1 TABLET ORAL 3 TIMES DAILY
COMMUNITY
Start: 2025-07-31

## (undated) DEVICE — DEV INFL MONARCH 25W

## (undated) DEVICE — INTRAOPERATIVE COVER KIT, 10 PACK: Brand: SITE-RITE

## (undated) DEVICE — BLD SCLPL BEAVR MINI STR 2BVL 180D LF

## (undated) DEVICE — GLV SURG SIGNATURE ESSENTIAL PF LTX SZ7

## (undated) DEVICE — VASOVIEW HEMOPRO: Brand: VASOVIEW HEMOPRO

## (undated) DEVICE — SUT PROLN 4/0 V7 36IN 8975H

## (undated) DEVICE — CANISTER, RIGID, 2000CC: Brand: MEDLINE INDUSTRIES, INC.

## (undated) DEVICE — PK CATH CARD 10

## (undated) DEVICE — MINI ENDOCUT SCISSOR TIP, DISPOSABLE: Brand: RENEW

## (undated) DEVICE — CATH DIAG EXPO .056 ARMOD 6F 100CM

## (undated) DEVICE — ENDOPATH XCEL UNIVERSAL TROCAR STABLILITY SLEEVES: Brand: ENDOPATH XCEL

## (undated) DEVICE — TUBING, SUCTION, 1/4" X 10', STRAIGHT: Brand: MEDLINE

## (undated) DEVICE — ANTIBACTERIAL UNDYED BRAIDED (POLYGLACTIN 910), SYNTHETIC ABSORBABLE SUTURE: Brand: COATED VICRYL

## (undated) DEVICE — SUT SILK 2/0 CT1 CR8 18IN C022D

## (undated) DEVICE — SYR CATH/TIP 50ML 2OZ STRL 1P/U

## (undated) DEVICE — PRESSURE TUBING: Brand: TRUWAVE

## (undated) DEVICE — SOL NACL 0.9PCT 1000ML

## (undated) DEVICE — 12 FOOT DISPOSABLE EXTENSION CABLE WITH SAFE CONNECT / SCREW-DOWN

## (undated) DEVICE — Device

## (undated) DEVICE — PRESSURE MONITORING ACCESSORY: Brand: TRUWAVE

## (undated) DEVICE — SUT SILK 4/0 TIES 18IN A183H

## (undated) DEVICE — SUT PROLN 7/0 BV1 D/A 24IN 8702H

## (undated) DEVICE — RADIFOCUS GLIDEWIRE: Brand: GLIDEWIRE

## (undated) DEVICE — SUT SILK 2 SUTUPAK TIE 60IN SA8H 2STRAND

## (undated) DEVICE — GW J TP FIX CORE .035 150

## (undated) DEVICE — EVAC BLDR UROVAC W ADAPT

## (undated) DEVICE — SUT STL 2/0 CV SH 24IN TPW32

## (undated) DEVICE — DRAINBAG,ANTI-REFLUX TOWER,L/F,2000ML,LL: Brand: MEDLINE

## (undated) DEVICE — PRESSURE MONITORING SET: Brand: TRUWAVE

## (undated) DEVICE — MODEL AT P65, P/N 701554-001KIT CONTENTS: HAND CONTROLLER, 3-WAY HIGH-PRESSURE STOPCOCK WITH ROTATING END AND PREMIUM HIGH-PRESSURE TUBING: Brand: ANGIOTOUCH® KIT

## (undated) DEVICE — CATHETER,FOLEY,100%SILICONE,18FR,10ML,LF: Brand: MEDLINE

## (undated) DEVICE — MODEL BT2000 P/N 700287-012KIT CONTENTS: MANIFOLD WITH SALINE AND CONTRAST PORTS, SALINE TUBING WITH SPIKE AND HAND SYRINGE, TRANSDUCER: Brand: BT2000 AUTOMATED MANIFOLD KIT

## (undated) DEVICE — SYRINGE,TOOMEY,IRRIGATION,70CC,STERILE: Brand: MEDLINE

## (undated) DEVICE — OASIS DRAIN, SINGLE, INLINE & ATS COMPATIBLE: Brand: OASIS

## (undated) DEVICE — PINNACLE INTRODUCER SHEATH: Brand: PINNACLE

## (undated) DEVICE — TAPE MICROFM 2IN LF

## (undated) DEVICE — SUT PROLN CARDIO V5 3/0 36IN 8936H

## (undated) DEVICE — ENDOPOUCH RETRIEVER SPECIMEN RETRIEVAL BAGS: Brand: ENDOPOUCH RETRIEVER

## (undated) DEVICE — VISUALIZATION SYSTEM: Brand: CLEARIFY

## (undated) DEVICE — ENDOPATH XCEL BLADELESS TROCARS WITH STABILITY SLEEVES: Brand: ENDOPATH XCEL

## (undated) DEVICE — SUT ETHIB 1 CT1 30IN  X425H

## (undated) DEVICE — NDL PERC 1PRT THNWALL W/BASEPLT 18G 7CM

## (undated) DEVICE — SYR LUERLOK 30CC

## (undated) DEVICE — CATH DIAG EXPO M/ PK 6FR FL4/FR4 PIG 3PK

## (undated) DEVICE — [HIGH FLOW INSUFFLATOR,  DO NOT USE IF PACKAGE IS DAMAGED,  KEEP DRY,  KEEP AWAY FROM SUNLIGHT,  PROTECT FROM HEAT AND RADIOACTIVE SOURCES.]: Brand: PNEUMOSURE

## (undated) DEVICE — SUCTION CANISTER, 2500CC, RIGID: Brand: DEROYAL

## (undated) DEVICE — 24FR BIPLR COAG ELECTRDE, STERILE: Brand: N.A.

## (undated) DEVICE — BNDG ELAS ELITE V/CLOSE 4IN 5YD LF STRL

## (undated) DEVICE — AVANTI + 4F STD W/GW: Brand: AVANTI

## (undated) DEVICE — Device: Brand: AQUABEAM HANDPIECE

## (undated) DEVICE — COVER,MAYO STAND,XL,STERILE: Brand: MEDLINE

## (undated) DEVICE — GW LUGE .014 182 CM

## (undated) DEVICE — INTRO SHEATH PRELUDE EASE HC .025 6F 11CM

## (undated) DEVICE — 2963 MEDIPORE SOFT CLOTH TAPE 3 IN X 10 YD 12 RLS/CS: Brand: 3M™ MEDIPORE™

## (undated) DEVICE — CVR HNDL LT SURG ACCSSRY BLU STRL

## (undated) DEVICE — SPNG GZ WOVN 4X4IN 12PLY 10/BX STRL

## (undated) DEVICE — ENDOPATH XCEL BLUNT TIP TROCARS WITH SMOOTH SLEEVES: Brand: ENDOPATH XCEL

## (undated) DEVICE — SUT PROLN SURGILENE SURGIPRO 8 0 24IN BL

## (undated) DEVICE — CLTH CLENS READYCLEANSE PERI CARE PK/5

## (undated) DEVICE — CATH DIAG EXPO .056 LCB 6F 100CM

## (undated) DEVICE — GLV SURG PREMIERPRO MIC LTX PF SZ7.5 BRN

## (undated) DEVICE — PK DRP AQUABEAM LITHO 65X69IN

## (undated) DEVICE — CATH DIAG EXPO .045 FL3.5 5F 100CM

## (undated) DEVICE — SUT PROLN 6/0 C1 D/A 30IN 8706H

## (undated) DEVICE — SUT SILK 0/0 CT2 18IN C027D

## (undated) DEVICE — GLV SURG SENSICARE MICRO PF LF 7 STRL

## (undated) DEVICE — PK LAP LASR CHOLE 10

## (undated) DEVICE — SUT VIC 0 UR6 27IN VCP603H

## (undated) DEVICE — Device: Brand: MEDEX

## (undated) DEVICE — GLV SURG PREMIERPRO MIC LTX PF SZ8 BRN

## (undated) DEVICE — PK HEART OPN 10

## (undated) DEVICE — PK CYSTO-TUR BASIC 10

## (undated) DEVICE — PRESSURE MONITORING SET: Brand: TRUWAVE, VAMP

## (undated) DEVICE — MEDI-VAC NON-CONDUCTIVE SUCTION TUBING: Brand: CARDINAL HEALTH

## (undated) DEVICE — SKIN AFFIX SURG ADHESIVE 72/CS 0.55ML: Brand: MEDLINE

## (undated) DEVICE — KT MANIFOLD CATHLAB CUST

## (undated) DEVICE — CATH DIAG EXPO M/ PK 5F FL4/FR4 PIG

## (undated) DEVICE — BNDG ELAS ELITE V/CLOSE 6IN 5YD LF STRL

## (undated) DEVICE — SUT PROLN 3/0 V7 D/A 36IN 8976H

## (undated) DEVICE — STERILE ULTRASOUND GEL, SAFEWRAP: Brand: ECOVUE

## (undated) DEVICE — PAD ARMBRD SURG CONVOL 7.5X20X2IN

## (undated) DEVICE — DEV COMP RAD PRELUDESYNC 24CM

## (undated) DEVICE — INTENDED FOR TISSUE SEPARATION, AND OTHER PROCEDURES THAT REQUIRE A SHARP SURGICAL BLADE TO PUNCTURE OR CUT.: Brand: BARD-PARKER ® STAINLESS STEEL BLADES

## (undated) DEVICE — CUTTING LOOP, BIPOLAR, 24/26 FR.: Brand: N.A.

## (undated) DEVICE — APPL CHLORAPREP TINTED 26ML TEAL

## (undated) DEVICE — SUT MNCRYL PLS ANTIB UD 4/0 PS2 18IN